# Patient Record
Sex: FEMALE | Race: WHITE | NOT HISPANIC OR LATINO | Employment: FULL TIME | URBAN - METROPOLITAN AREA
[De-identification: names, ages, dates, MRNs, and addresses within clinical notes are randomized per-mention and may not be internally consistent; named-entity substitution may affect disease eponyms.]

---

## 2017-01-09 ENCOUNTER — TELEPHONE (OUTPATIENT)
Dept: MEDICAL GROUP | Facility: MEDICAL CENTER | Age: 32
End: 2017-01-09

## 2017-01-09 DIAGNOSIS — N39.0 ACUTE UTI: ICD-10-CM

## 2017-01-09 RX ORDER — PROMETHAZINE HYDROCHLORIDE 25 MG/1
TABLET ORAL
Qty: 90 TAB | Refills: 0 | Status: SHIPPED | OUTPATIENT
Start: 2017-01-09 | End: 2017-03-14 | Stop reason: SDUPTHER

## 2017-01-09 RX ORDER — CIPROFLOXACIN 500 MG/1
500 TABLET, FILM COATED ORAL 2 TIMES DAILY
Qty: 10 TAB | Refills: 0 | Status: SHIPPED | OUTPATIENT
Start: 2017-01-09 | End: 2017-01-14

## 2017-01-09 NOTE — TELEPHONE ENCOUNTER
"1. Caller Name: Joy                                         Call Back Number: [phone]      Patient approves a detailed voicemail message: yes    Patient says she took a \"home UTI test\" and it came back positive for infection. She says she has been having urgency, pain with urination, nausea and states she also had a fever of 101.2 yesterday.  She is requesting a prescription of Cipro called in for her into [facility] on [address].  "

## 2017-01-24 ENCOUNTER — TELEPHONE (OUTPATIENT)
Dept: MEDICAL GROUP | Facility: MEDICAL CENTER | Age: 32
End: 2017-01-24

## 2017-01-24 NOTE — TELEPHONE ENCOUNTER
DOCUMENTATION OF PAR STATUS:    1. Name of Medication & Dose: FentaNYL 75MCG/HR 72 hr patches      2. Name of Prescription Coverage Company & phone #: [email or web address]    3. Date Prior Auth Submitted: 1/24/17    4. What information was given to obtain insurance decision? Pt stable, list of meds tried and failed    5. Prior Auth Status? Approved 1/24/17-1/24/18/    6. Patient Notified: n/a

## 2017-02-15 NOTE — TELEPHONE ENCOUNTER
This has been approved 1/24/17-1/24/18/ you can update this and close this encounter. I will fax approval letter to [place] [phone]

## 2017-02-17 ENCOUNTER — OFFICE VISIT (OUTPATIENT)
Dept: MEDICAL GROUP | Facility: MEDICAL CENTER | Age: 32
End: 2017-02-17
Payer: COMMERCIAL

## 2017-02-17 VITALS
SYSTOLIC BLOOD PRESSURE: 128 MMHG | TEMPERATURE: 98.1 F | RESPIRATION RATE: 12 BRPM | HEIGHT: 72 IN | OXYGEN SATURATION: 99 % | BODY MASS INDEX: 26.41 KG/M2 | HEART RATE: 77 BPM | WEIGHT: 195 LBS | DIASTOLIC BLOOD PRESSURE: 92 MMHG

## 2017-02-17 DIAGNOSIS — M51.26 DISPLACEMENT OF LUMBAR INTERVERTEBRAL DISC WITHOUT MYELOPATHY: ICD-10-CM

## 2017-02-17 DIAGNOSIS — Z00.00 PREVENTATIVE HEALTH CARE: ICD-10-CM

## 2017-02-17 DIAGNOSIS — N92.1 MENORRHAGIA WITH IRREGULAR CYCLE: ICD-10-CM

## 2017-02-17 DIAGNOSIS — M51.36 DEGENERATIVE DISC DISEASE, LUMBAR: ICD-10-CM

## 2017-02-17 DIAGNOSIS — Z79.891 CHRONIC USE OF OPIATE DRUGS THERAPEUTIC PURPOSES: ICD-10-CM

## 2017-02-17 DIAGNOSIS — M51.34 DDD (DEGENERATIVE DISC DISEASE), THORACIC: ICD-10-CM

## 2017-02-17 DIAGNOSIS — F32.89 OTHER DEPRESSION: ICD-10-CM

## 2017-02-17 PROCEDURE — 99214 OFFICE O/P EST MOD 30 MIN: CPT | Performed by: NURSE PRACTITIONER

## 2017-02-17 RX ORDER — FENTANYL 75 UG/1
1 PATCH TRANSDERMAL
Qty: 15 PATCH | Refills: 0 | Status: SHIPPED | OUTPATIENT
Start: 2017-02-17 | End: 2017-03-15

## 2017-02-17 RX ORDER — FENTANYL 75 UG/1
1 PATCH TRANSDERMAL
Qty: 15 PATCH | Refills: 0 | Status: SHIPPED | OUTPATIENT
Start: 2017-02-17 | End: 2017-10-16

## 2017-02-17 RX ORDER — HYDROCODONE BITARTRATE AND ACETAMINOPHEN 5; 325 MG/1; MG/1
1 TABLET ORAL EVERY 12 HOURS PRN
Qty: 56 TAB | Refills: 0 | Status: SHIPPED | OUTPATIENT
Start: 2017-02-17 | End: 2017-05-24 | Stop reason: SDUPTHER

## 2017-02-17 RX ORDER — HYDROCODONE BITARTRATE AND ACETAMINOPHEN 5; 325 MG/1; MG/1
1 TABLET ORAL EVERY 12 HOURS
Qty: 56 TAB | Refills: 0 | Status: SHIPPED | OUTPATIENT
Start: 2017-02-17 | End: 2017-03-15

## 2017-02-17 RX ORDER — HYDROCODONE BITARTRATE AND ACETAMINOPHEN 5; 325 MG/1; MG/1
1 TABLET ORAL EVERY 12 HOURS PRN
Qty: 56 TAB | Refills: 0 | Status: SHIPPED | OUTPATIENT
Start: 2017-02-17 | End: 2017-03-15

## 2017-02-17 ASSESSMENT — ENCOUNTER SYMPTOMS: DEPRESSION: 1

## 2017-02-17 ASSESSMENT — LIFESTYLE VARIABLES: HISTORY_ALCOHOL_USE: 0

## 2017-02-17 NOTE — PROGRESS NOTES
Chief Complaint   Patient presents with   • Medication Refill     3 month med check & refill      HPI:   Joy is a 31-year-old established female here to follow-up on chronic pain, depression and menorrhagia.  #1-chronic pain: Suffers from chronic pain in her mid and low back. She plans on having a hysterectomy this summer and then proceeding with a spinal fusion with Dr. Jack. Current physical medicine specialist is Dr. Main. Describes her current pain control as excellent with a fentanyl patch every other day and to [place] at night.  Chronic pain recheck: 3 months ago   Last dose of controlled substance: last night - norco.  Last patch placed yesterday morning.   Chronic pain treated with hydrocodone taken 1-2 times a day.  Fentanyl patch every other day    She  reports that she does not drink alcohol.  She  reports that she does not use illicit drugs.    Consequences of Chronic Opiate therapy:  (5 A's)  Analgesia: Compared to no treatment or prior treatment, pain is currently improved with changing fentanyl patch every 48 hours  Activity: improved  Adverse Events: She denies constipation, dry mouth, itchy skin, nausea and sedation.  Drinks lots of water and takes senokot daily   Aberrant Behaviors: She reports she is taking medication as prescribed and is not veering from agreed treatment regimen. There have been no inappropriate refills or lost/stolen meds reported.   Affect/Mood: Pain is not impacting patient's mood.  Patient denies depression/anxiety - controlled with 10 mg prozac.  Nonnarcotic treatments that are being used: muscle relaxers and SSRI/SNRI.   Treatment goals discussed.    Last order of CONTROLLED SUBSTANCE TREATMENT AGREEMENT was found on 3/4/2016 from Office Visit on 3/4/2016     UDS Summary     Patient has no health maintenance due at this time - done 5/2016       Most recent UDS reviewed today and is consistent with prescribed medications.  I have reviewed the medical records, the  Prescription Monitoring Program and I have determined that controlled substance treatment is medically indicated.    #2-depression: Chronic issue. Feels that her moods are well controlled with Prozac 10 mg daily. She continues of frustration that her pain level and her heavy menses but feels that she is able to handle was going on in her life better with fluoxetine 10 mg daily. Denies suicidal or homicidal ideations.    #3-menorrhagia: Persistent issue. Resolved for a few months but returned. She is having heavy, crampy, clotty periods for 7-10 days monthly. She plans on having a hysterectomy this summer, prolonging this because of school, work and insurance.    Review of systems:  Negative for fever, chills, syncopal episodes or chest pain    Exam:  Blood pressure 128/92, pulse 77, temperature 36.7 °C (98.1 °F), resp. rate 12, height 1.829 m (6'), weight 88.451 kg (195 lb), SpO2 99 %.  Gen. appears healthy in no distress   Skin appropriate for sex and age   HEENT unremarkable   Neck no adenopathy, no nodules or masses palpable   Lungs clear   Heart regular   Extremities no edema   Neuro gait and station normal   Psych appropriate      Assessment/plan:  1. Chronic use of opiate drugs therapeutic purposes  - Controlled Substance Treatment Agreement    2. Degenerative disc disease, lumbar  -Continue with current pain regimen. Patient compliant with current treatment plan. Updated controlled substance agreement. Urine drug screen to be updated at next visit. Continue efforts at trunk strengthening and weight loss which she has been successful with lately. Follow-up 3 months.  -Reviewed  profile which is appropriate.    3. Displacement of lumbar intervertebral disc without myelopathy  -As above. She also plans on following up with Dr. Main for possible injection and then Dr. Jack after her hysterectomy, likely next fall.    4. DDD (degenerative disc disease), thoracic  As above      5. Other depression  -Stable  with fluoxetine.    6. Menorrhagia with irregular cycle  -Agree with the patient that she should move forward with Dr. Vivas recommendation to have a hysterectomy. We'll check a CBC.    7. Preventative health care  -As above  - COMP METABOLIC PANEL; Future  - CBC WITH DIFFERENTIAL; Future  - LIPID PROFILE; Future

## 2017-02-17 NOTE — MR AVS SNAPSHOT
"        Joy Mcnamara   2017 3:20 PM   Office Visit   MRN: [id number]    Department:  [address]   Dept Phone:  [phone]    Description:  Female : 1985   Provider:  ILYA May           Reason for Visit     Medication Refill 3 month med check & refill       Allergies as of 2017     Allergen Noted Reactions    Sumatriptan Succinate 2008       \"face burns & I can't see\"    Tramadol 2014       Gives her migraines and makes her feel sick      You were diagnosed with     Chronic use of opiate drugs therapeutic purposes   [0428762]       Degenerative disc disease, lumbar   [713727]       Displacement of lumbar intervertebral disc without myelopathy   [722.10.ICD-9-CM]       DDD (degenerative disc disease), thoracic   [983546]       Other depression   [8833091]       Preventative health care   [610483]         Vital Signs     Blood Pressure Pulse Temperature Respirations Height Weight    128/92 mmHg 77 36.7 °C (98.1 °F) 12 1.829 m (6') 88.451 kg (195 lb)    Body Mass Index Oxygen Saturation Smoking Status             26.44 kg/m2 99% Former Smoker         Basic Information     Date Of Birth Sex Race Ethnicity Preferred Language    1985 Female White Non- English      Your appointments     May 01, 2017 10:00 AM   Established Patient with ILYA May   [facility] (--)    [address] [address] [id number] [phone]           You will be receiving a confirmation call a few days before your appointment from our automated call confirmation system.              Problem List              ICD-10-CM Priority Class Noted - Resolved    Degenerative disc disease, lumbar M51.36   2012 - Present    Chronic low back pain M54.5, G89.29   2012 - Present    Displacement of lumbar intervertebral disc without myelopathy M51.26   10/3/2013 - Present    Anxiety F41.9   3/28/2014 - Present    Depression " F32.9   4/25/2014 - Present    Chronic migraine G43.709   9/8/2015 - Present      Health Maintenance        Date Due Completion Dates    IMM DTaP/Tdap/Td Vaccine (2 - Td) 12/19/2016 12/19/2006 (Done)    Override on 12/19/2006: Done    PAP SMEAR 12/19/2017 12/19/2014, 10/12/2010            Current Immunizations     Influenza TIV (IM) 12/2/2013  9:40 AM, 10/24/2012    Influenza Vac Subunit Quad Inj (Pf) 11/18/2016    Tuberculin Skin Test 11/18/2014  4:02 PM      Below and/or attached are the medications your provider expects you to take. Review all of your home medications and newly ordered medications with your provider and/or pharmacist. Follow medication instructions as directed by your provider and/or pharmacist. Please keep your medication list with you and share with your provider. Update the information when medications are discontinued, doses are changed, or new medications (including over-the-counter products) are added; and carry medication information at all times in the event of emergency situations     Allergies:  SUMATRIPTAN SUCCINATE - (reactions not documented)     TRAMADOL - (reactions not documented)               Medications  Valid as of: February 17, 2017 -  3:41 PM    Generic Name Brand Name Tablet Size Instructions for use    Butalbital-APAP-Caffeine (Cap) FIORICET -40 MG TAKE ONE CAPSULE BY MOUTH TWICE DAILY AS NEEDED FOR HEADACHE        Cranberry (Cap) Cranberry 1000 MG Take 2,500 mg by mouth every day.        Cyanocobalamin (Solution) VITAMIN B-12 1000 MCG/ML 1 mL by Intramuscular route every 30 days. Please administer to patient (she reports [facility] does this?)        FentaNYL (PATCH 72 HR) DURAGESIC 75 MCG/HR Apply 1 Patch to skin as directed every 48 hours.        FentaNYL (PATCH 72 HR) DURAGESIC 75 MCG/HR Apply 1 Patch to skin as directed every 48 hours.        FentaNYL (PATCH 72 HR) DURAGESIC 75 MCG/HR Apply 1 Patch to skin as directed every 48 hours.        FLUoxetine HCl (Cap)  PROZAC 10 MG Take 1 Cap by mouth every day.        Hydrocodone-Acetaminophen (Tab) NORCO 5-325 MG Take 1 Tab by mouth every 12 hours as needed.        Hydrocodone-Acetaminophen (Tab) NORCO 5-325 MG Take 1 Tab by mouth every 12 hours as needed.        Hydrocodone-Acetaminophen (Tab) NORCO 5-325 MG Take 1 Tab by mouth every 12 hours.        Promethazine HCl (Tab) PHENERGAN 25 MG TAKE 1 TABLET BY MOUTH EVERY 6 HOURS AS NEEDED FOR NAUSEA OR VOMITING        TiZANidine HCl (Tab) ZANAFLEX 4 MG TAKE 1 TABLET BY MOUTH EVERY 6 HOURS AS NEEDED        .                 Medicines prescribed today were sent to:     [facility] [address] [facility], [place] - [address] AT [facility] [address]    [address] [place] [address]    Phone: [phone] Fax: [phone]    Open 24 Hours?: No      Medication refill instructions:       If your prescription bottle indicates you have medication refills left, it is not necessary to call your provider’s office. Please contact your pharmacy and they will refill your medication.    If your prescription bottle indicates you do not have any refills left, you may request refills at any time through one of the following ways: The online [employer] system (except Urgent Care), by calling your provider’s office, or by asking your pharmacy to contact your provider’s office with a refill request. Medication refills are processed only during regular business hours and may not be available until the next business day. Your provider may request additional information or to have a follow-up visit with you prior to refilling your medication.   *Please Note: Medication refills are assigned a new Rx number when refilled electronically. Your pharmacy may indicate that no refills were authorized even though a new prescription for the same medication is available at the pharmacy. Please request the medicine by name with the pharmacy before contacting your provider for a refill.        Your  To Do List     Future Labs/Procedures Complete By Expires    CBC WITH DIFFERENTIAL  As directed 2/18/2018    COMP METABOLIC PANEL  As directed 2/18/2018    LIPID PROFILE  As directed 2/18/2018         MyChart Access Code: Activation code not generated  Current MyChart Status: Active

## 2017-02-20 ENCOUNTER — HOSPITAL ENCOUNTER (OUTPATIENT)
Dept: LAB | Facility: MEDICAL CENTER | Age: 32
End: 2017-02-20
Attending: NURSE PRACTITIONER
Payer: COMMERCIAL

## 2017-02-20 DIAGNOSIS — Z00.00 PREVENTATIVE HEALTH CARE: ICD-10-CM

## 2017-02-20 LAB
ALBUMIN SERPL BCP-MCNC: 4.5 G/DL (ref 3.2–4.9)
ALBUMIN/GLOB SERPL: 1.9 G/DL
ALP SERPL-CCNC: 52 U/L (ref 30–99)
ALT SERPL-CCNC: 24 U/L (ref 2–50)
ANION GAP SERPL CALC-SCNC: 8 MMOL/L (ref 0–11.9)
AST SERPL-CCNC: 26 U/L (ref 12–45)
BASOPHILS # BLD AUTO: 1.1 % (ref 0–1.8)
BASOPHILS # BLD: 0.05 K/UL (ref 0–0.12)
BILIRUB SERPL-MCNC: 0.5 MG/DL (ref 0.1–1.5)
BUN SERPL-MCNC: 9 MG/DL (ref 8–22)
CALCIUM SERPL-MCNC: 9.2 MG/DL (ref 8.4–10.2)
CHLORIDE SERPL-SCNC: 105 MMOL/L (ref 96–112)
CHOLEST SERPL-MCNC: 197 MG/DL (ref 100–199)
CO2 SERPL-SCNC: 27 MMOL/L (ref 20–33)
CREAT SERPL-MCNC: 0.74 MG/DL (ref 0.5–1.4)
EOSINOPHIL # BLD AUTO: 0.11 K/UL (ref 0–0.51)
EOSINOPHIL NFR BLD: 2.4 % (ref 0–6.9)
ERYTHROCYTE [DISTWIDTH] IN BLOOD BY AUTOMATED COUNT: 40.9 FL (ref 35.9–50)
GFR SERPL CREATININE-BSD FRML MDRD: >60 ML/MIN/1.73 M 2
GLOBULIN SER CALC-MCNC: 2.4 G/DL (ref 1.9–3.5)
GLUCOSE SERPL-MCNC: 93 MG/DL (ref 65–99)
HCT VFR BLD AUTO: 38.7 % (ref 37–47)
HDLC SERPL-MCNC: 51 MG/DL
HGB BLD-MCNC: 13.7 G/DL (ref 12–16)
IMM GRANULOCYTES # BLD AUTO: 0 K/UL (ref 0–0.11)
IMM GRANULOCYTES NFR BLD AUTO: 0 % (ref 0–0.9)
LDLC SERPL CALC-MCNC: 136 MG/DL
LYMPHOCYTES # BLD AUTO: 1.71 K/UL (ref 1–4.8)
LYMPHOCYTES NFR BLD: 37.7 % (ref 22–41)
MCH RBC QN AUTO: 30.5 PG (ref 27–33)
MCHC RBC AUTO-ENTMCNC: 35.4 G/DL (ref 33.6–35)
MCV RBC AUTO: 86.2 FL (ref 81.4–97.8)
MONOCYTES # BLD AUTO: 0.42 K/UL (ref 0–0.85)
MONOCYTES NFR BLD AUTO: 9.3 % (ref 0–13.4)
NEUTROPHILS # BLD AUTO: 2.24 K/UL (ref 2–7.15)
NEUTROPHILS NFR BLD: 49.5 % (ref 44–72)
NRBC # BLD AUTO: 0 K/UL
NRBC BLD AUTO-RTO: 0 /100 WBC
PLATELET # BLD AUTO: 238 K/UL (ref 164–446)
PMV BLD AUTO: 9.4 FL (ref 9–12.9)
POTASSIUM SERPL-SCNC: 3.9 MMOL/L (ref 3.6–5.5)
PROT SERPL-MCNC: 6.9 G/DL (ref 6–8.2)
RBC # BLD AUTO: 4.49 M/UL (ref 4.2–5.4)
SODIUM SERPL-SCNC: 140 MMOL/L (ref 135–145)
TRIGL SERPL-MCNC: 48 MG/DL (ref 0–149)
WBC # BLD AUTO: 4.5 K/UL (ref 4.8–10.8)

## 2017-02-20 PROCEDURE — 36415 COLL VENOUS BLD VENIPUNCTURE: CPT

## 2017-02-20 PROCEDURE — 85025 COMPLETE CBC W/AUTO DIFF WBC: CPT

## 2017-02-20 PROCEDURE — 80061 LIPID PANEL: CPT

## 2017-02-20 PROCEDURE — 80053 COMPREHEN METABOLIC PANEL: CPT

## 2017-02-21 RX ORDER — TIZANIDINE 4 MG/1
TABLET ORAL
Qty: 90 TAB | Refills: 0 | Status: SHIPPED | OUTPATIENT
Start: 2017-02-21 | End: 2017-03-14 | Stop reason: SDUPTHER

## 2017-02-21 RX ORDER — ESTRADIOL 2 MG/1
TABLET ORAL
Qty: 10 TAB | Refills: 0 | OUTPATIENT
Start: 2017-02-21

## 2017-03-01 ENCOUNTER — HOSPITAL ENCOUNTER (OUTPATIENT)
Facility: MEDICAL CENTER | Age: 32
End: 2017-03-01
Attending: OBSTETRICS & GYNECOLOGY
Payer: COMMERCIAL

## 2017-03-01 LAB — ESTRADIOL SERPL-MCNC: 34 PG/ML

## 2017-03-01 PROCEDURE — 82670 ASSAY OF TOTAL ESTRADIOL: CPT

## 2017-03-02 ENCOUNTER — TELEPHONE (OUTPATIENT)
Dept: MEDICAL GROUP | Facility: LAB | Age: 32
End: 2017-03-02

## 2017-03-03 NOTE — TELEPHONE ENCOUNTER
Patient called and states that she is having her hysterectomy March 27th.  She wants to know how to handle the pain management.  She does not want to break her pain contract,

## 2017-03-15 DIAGNOSIS — Z01.812 PRE-OPERATIVE LABORATORY EXAMINATION: ICD-10-CM

## 2017-03-15 LAB
ANION GAP SERPL CALC-SCNC: 10 MMOL/L (ref 0–11.9)
APPEARANCE UR: ABNORMAL
BACTERIA #/AREA URNS HPF: ABNORMAL /HPF
BASOPHILS # BLD AUTO: 0.7 % (ref 0–1.8)
BASOPHILS # BLD: 0.04 K/UL (ref 0–0.12)
BILIRUB UR QL STRIP.AUTO: NEGATIVE
BUN SERPL-MCNC: 11 MG/DL (ref 8–22)
CALCIUM SERPL-MCNC: 9.4 MG/DL (ref 8.5–10.5)
CAOX CRY #/AREA URNS HPF: ABNORMAL /HPF
CHLORIDE SERPL-SCNC: 105 MMOL/L (ref 96–112)
CO2 SERPL-SCNC: 27 MMOL/L (ref 20–33)
COLOR UR: YELLOW
CREAT SERPL-MCNC: 0.6 MG/DL (ref 0.5–1.4)
CULTURE IF INDICATED INDCX: NO UA CULTURE
EOSINOPHIL # BLD AUTO: 0.09 K/UL (ref 0–0.51)
EOSINOPHIL NFR BLD: 1.6 % (ref 0–6.9)
EPI CELLS #/AREA URNS HPF: ABNORMAL /HPF
ERYTHROCYTE [DISTWIDTH] IN BLOOD BY AUTOMATED COUNT: 43 FL (ref 35.9–50)
GFR SERPL CREATININE-BSD FRML MDRD: >60 ML/MIN/1.73 M 2
GLUCOSE SERPL-MCNC: 39 MG/DL (ref 65–99)
GLUCOSE UR STRIP.AUTO-MCNC: NEGATIVE MG/DL
HCG SERPL QL: NEGATIVE
HCT VFR BLD AUTO: 39.8 % (ref 37–47)
HGB BLD-MCNC: 13.2 G/DL (ref 12–16)
HYALINE CASTS #/AREA URNS LPF: ABNORMAL /LPF
IMM GRANULOCYTES # BLD AUTO: 0.01 K/UL (ref 0–0.11)
IMM GRANULOCYTES NFR BLD AUTO: 0.2 % (ref 0–0.9)
KETONES UR STRIP.AUTO-MCNC: NEGATIVE MG/DL
LEUKOCYTE ESTERASE UR QL STRIP.AUTO: NEGATIVE
LYMPHOCYTES # BLD AUTO: 1.52 K/UL (ref 1–4.8)
LYMPHOCYTES NFR BLD: 27.6 % (ref 22–41)
MCH RBC QN AUTO: 29.9 PG (ref 27–33)
MCHC RBC AUTO-ENTMCNC: 33.2 G/DL (ref 33.6–35)
MCV RBC AUTO: 90 FL (ref 81.4–97.8)
MICRO URNS: ABNORMAL
MONOCYTES # BLD AUTO: 0.37 K/UL (ref 0–0.85)
MONOCYTES NFR BLD AUTO: 6.7 % (ref 0–13.4)
MUCOUS THREADS #/AREA URNS HPF: ABNORMAL /HPF
NEUTROPHILS # BLD AUTO: 3.48 K/UL (ref 2–7.15)
NEUTROPHILS NFR BLD: 63.2 % (ref 44–72)
NITRITE UR QL STRIP.AUTO: NEGATIVE
NRBC # BLD AUTO: 0 K/UL
NRBC BLD AUTO-RTO: 0 /100 WBC
PH UR STRIP.AUTO: 6 [PH]
PLATELET # BLD AUTO: 245 K/UL (ref 164–446)
PMV BLD AUTO: 9.8 FL (ref 9–12.9)
POTASSIUM SERPL-SCNC: 3.2 MMOL/L (ref 3.6–5.5)
PROT UR QL STRIP: NEGATIVE MG/DL
RBC # BLD AUTO: 4.42 M/UL (ref 4.2–5.4)
RBC # URNS HPF: ABNORMAL /HPF
RBC UR QL AUTO: NEGATIVE
SODIUM SERPL-SCNC: 142 MMOL/L (ref 135–145)
SP GR UR STRIP.AUTO: 1.03
WBC # BLD AUTO: 5.5 K/UL (ref 4.8–10.8)
WBC #/AREA URNS HPF: ABNORMAL /HPF

## 2017-03-15 PROCEDURE — 81001 URINALYSIS AUTO W/SCOPE: CPT

## 2017-03-15 PROCEDURE — 85025 COMPLETE CBC W/AUTO DIFF WBC: CPT

## 2017-03-15 PROCEDURE — 36415 COLL VENOUS BLD VENIPUNCTURE: CPT

## 2017-03-15 PROCEDURE — 80048 BASIC METABOLIC PNL TOTAL CA: CPT

## 2017-03-15 PROCEDURE — 84703 CHORIONIC GONADOTROPIN ASSAY: CPT

## 2017-03-15 RX ORDER — IBUPROFEN 800 MG
1 TABLET ORAL DAILY
COMMUNITY
End: 2019-09-30

## 2017-03-15 RX ORDER — DIPHENHYDRAMINE HCL 25 MG
25 TABLET ORAL EVERY 6 HOURS PRN
COMMUNITY
End: 2017-04-03

## 2017-03-15 RX ORDER — PHENOL 1.4 %
AEROSOL, SPRAY (ML) MUCOUS MEMBRANE PRN
COMMUNITY
End: 2019-09-30

## 2017-03-22 ENCOUNTER — TELEPHONE (OUTPATIENT)
Dept: MEDICAL GROUP | Facility: LAB | Age: 32
End: 2017-03-22

## 2017-03-22 ENCOUNTER — OFFICE VISIT (OUTPATIENT)
Dept: MEDICAL GROUP | Facility: LAB | Age: 32
End: 2017-03-22
Payer: COMMERCIAL

## 2017-03-22 VITALS
HEIGHT: 72 IN | OXYGEN SATURATION: 99 % | HEART RATE: 75 BPM | RESPIRATION RATE: 12 BRPM | DIASTOLIC BLOOD PRESSURE: 86 MMHG | SYSTOLIC BLOOD PRESSURE: 116 MMHG | BODY MASS INDEX: 27.09 KG/M2 | TEMPERATURE: 99 F | WEIGHT: 200 LBS

## 2017-03-22 DIAGNOSIS — R10.2 PELVIC PAIN: ICD-10-CM

## 2017-03-22 DIAGNOSIS — S92.901G: ICD-10-CM

## 2017-03-22 DIAGNOSIS — D26.9: ICD-10-CM

## 2017-03-22 PROCEDURE — 99213 OFFICE O/P EST LOW 20 MIN: CPT | Performed by: NURSE PRACTITIONER

## 2017-03-22 RX ORDER — OXYCODONE HYDROCHLORIDE AND ACETAMINOPHEN 5; 325 MG/1; MG/1
1-2 TABLET ORAL EVERY 6 HOURS PRN
Qty: 50 TAB | Refills: 0 | Status: SHIPPED | OUTPATIENT
Start: 2017-03-22 | End: 2017-08-24

## 2017-03-22 RX ORDER — BUTALBITAL, ACETAMINOPHEN AND CAFFEINE 300; 40; 50 MG/1; MG/1; MG/1
1-2 CAPSULE ORAL EVERY 6 HOURS PRN
Qty: 90 CAP | Refills: 0
Start: 2017-03-22 | End: 2018-01-03 | Stop reason: SDUPTHER

## 2017-03-22 NOTE — MR AVS SNAPSHOT
"        Joy Mcnamara   3/22/2017 10:00 AM   Office Visit   MRN: [id number]    Department:  [facility]   Dept Phone:  [phone]    Description:  Female : 1985   Provider:  ILYA May           Reason for Visit     Procedure pre- surgery check up       Allergies as of 3/22/2017     Allergen Noted Reactions    Sumatriptan Succinate 2008       \"face burns & I can't see\"    Tramadol 2014   Anaphylaxis    Gives her migraines and makes her feel sick      You were diagnosed with     Adenomatoid tumor of uterus   [297929]       Pelvic pain   [720407]       Chronic migraine   [092435]         Vital Signs     Blood Pressure Pulse Temperature Respirations Height Weight    116/86 mmHg 75 37.2 °C (99 °F) 12 1.829 m (6' 0.01\") 90.719 kg (200 lb)    Body Mass Index Oxygen Saturation Last Menstrual Period Smoking Status          27.12 kg/m2 99% 2017 Former Smoker        Basic Information     Date Of Birth Sex Race Ethnicity Preferred Language    1985 Female White Non- English      Your appointments     May 01, 2017 10:00 AM   Established Patient with ILYA May   [facility] (--)    [id number] [place] [id number] [phone]           You will be receiving a confirmation call a few days before your appointment from our automated call confirmation system.              Problem List              ICD-10-CM Priority Class Noted - Resolved    Degenerative disc disease, lumbar M51.36   2012 - Present    Chronic low back pain M54.5, G89.29   2012 - Present    Displacement of lumbar intervertebral disc without myelopathy M51.26   10/3/2013 - Present    Anxiety F41.9   3/28/2014 - Present    Depression F32.9   2014 - Present    Chronic migraine G43.709   2015 - Present    Chronic use of opiate drugs therapeutic purposes Z79.899   2017 - Present      Health Maintenance        Date Due " Completion Dates    IMM DTaP/Tdap/Td Vaccine (2 - Td) 12/19/2016 12/19/2006 (Done)    Override on 12/19/2006: Done    PAP SMEAR 12/19/2017 12/19/2014, 10/12/2010            Current Immunizations     Influenza TIV (IM) 12/2/2013  9:40 AM, 10/24/2012    Influenza Vac Subunit Quad Inj (Pf) 11/18/2016    Tuberculin Skin Test 11/18/2014  4:02 PM      Below and/or attached are the medications your provider expects you to take. Review all of your home medications and newly ordered medications with your provider and/or pharmacist. Follow medication instructions as directed by your provider and/or pharmacist. Please keep your medication list with you and share with your provider. Update the information when medications are discontinued, doses are changed, or new medications (including over-the-counter products) are added; and carry medication information at all times in the event of emergency situations     Allergies:  SUMATRIPTAN SUCCINATE - (reactions not documented)     TRAMADOL - Anaphylaxis               Medications  Valid as of: March 22, 2017 -  2:55 PM    Generic Name Brand Name Tablet Size Instructions for use    Biotin   Take  by mouth every day. 10,000 mcg        Butalbital-APAP-Caffeine (Cap) FIORICET -40 MG Take 1-2 Caps by mouth every 6 hours as needed for Headache.        Cholecalciferol (Cap) Vitamin D3 400 UNITS Take 1 Cap by mouth every day.        Cranberry (Cap) Cranberry 1000 MG Take 4,200 mg by mouth every day.        Cyanocobalamin (Solution) VITAMIN B-12 1000 MCG/ML 1 mL by Intramuscular route every 30 days. Please administer to patient (she reports [facility] does this?)        DiphenhydrAMINE HCl (Tab) BENADRYL 25 MG Take 25 mg by mouth every 6 hours as needed for Sleep.        FentaNYL (PATCH 72 HR) DURAGESIC 75 MCG/HR Apply 1 Patch to skin as directed every 48 hours.        FLUoxetine HCl (Cap) PROZAC 10 MG Take 1 Cap by mouth every day.        Hydrocodone-Acetaminophen (Tab) NORCO 5-325 MG  Take 1 Tab by mouth every 12 hours as needed.        Melatonin (Tab) Melatonin 10 MG Take  by mouth as needed.        Oxycodone-Acetaminophen (Tab) PERCOCET 5-325 MG Take 1-2 Tabs by mouth every 6 hours as needed. For post-operative pain.  Do not take norco for 1-2 weeks while taking percocet.        Promethazine HCl (Tab) PHENERGAN 25 MG TAKE 1 TABLET BY MOUTH EVERY 6 HOURS AS NEEDED FOR NAUSEA OR VOMITING        TiZANidine HCl (Tab) ZANAFLEX 4 MG TAKE 1 TABLET BY MOUTH EVERY 6 HOURS AS NEEDED        .                 Medicines prescribed today were sent to:     [facility] [address] [facility] [place] - [address] [facility]    [address] [facility] [address]    Phone: [phone] Fax: [phone]    Open 24 Hours?: No      Medication refill instructions:       If your prescription bottle indicates you have medication refills left, it is not necessary to call your provider’s office. Please contact your pharmacy and they will refill your medication.    If your prescription bottle indicates you do not have any refills left, you may request refills at any time through one of the following ways: The online [employer]TM[employer] by [employer] system (except Urgent Care), by calling your provider’s office, or by asking your pharmacy to contact your provider’s office with a refill request. Medication refills are processed only during regular business hours and may not be available until the next business day. Your provider may request additional information or to have a follow-up visit with you prior to refilling your medication.   *Please Note: Medication refills are assigned a new Rx number when refilled electronically. Your pharmacy may indicate that no refills were authorized even though a new prescription for the same medication is available at the pharmacy. Please request the medicine by name with the pharmacy before contacting your provider for a refill.           [employer](TM[employer] Access Code: Activation code not  generated  Current MyChart Status: Active

## 2017-03-22 NOTE — PROGRESS NOTES
"Chief Complaint   Patient presents with   • Procedure     pre- surgery check up        HPI  Mercedes is a 30 yo est female here to f/u on pain management.  She is currently treated with fentanyl patches 75 µg every 48 hours and to hydrocodone at night. She suffers from chronic menstrual pain and chronic low back pain due to lumbar degenerative disc disease. She is scheduled to have a partial hysterectomy on Monday with Dr. Santillan. She's been told that she has a fairly large tumor growing in her uterus. She has very long, heavy and painful menses. She will then be scheduling to see her neurosurgeon, update her MRI and have lumbar spine surgery. She is very motivated to fix all of the sources of her chronic pain and taper off of her opiates. She is requesting postoperative pain control. She's been told to leave her fentanyl patch on throughout surgery.    Past medical, surgical, family, and social history is reviewed and updated in Epic chart by me today.   Medications and allergies reviewed and updated in Epic chart by me today.     ROS:   As documented in history of present illness above    Exam:  Blood pressure 116/86, pulse 75, temperature 37.2 °C (99 °F), resp. rate 12, height 1.829 m (6' 0.01\"), weight 90.719 kg (200 lb), last menstrual period 03/16/2017, SpO2 99 %.  Gen. appears healthy in no distress   Skin appropriate for sex and age   HEENT unremarkable   Neck no adenopathy, no nodules or masses palpable   Lungs clear   Heart regular   Extremities no edema   Neuro gait and station normal   Psych appropriate      A/P:  1. Adenomatoid tumor of uterus  -Stop Freer after dosage on Sunday night prior to surgery. Discussed Percocet one to 2 pills every 6 hours as needed for severe pain postoperatively. She will restart Norco when she no longer needs Percocet. She'll continue with her fentanyl as prescribed. Will follow-up with me in May and will begin reducing her fentanyl patch microgram dosage, eventually " tapering her off of fentanyl.  - oxycodone-acetaminophen (PERCOCET) 5-325 MG Tab; Take 1-2 Tabs by mouth every 6 hours as needed. For post-operative pain.  Do not take norco for 1-2 weeks while taking percocet.  Dispense: 50 Tab; Refill: 0    2. Pelvic pain  -As above    3. Chronic migraine  -Changed how her Fioricet was written per patient request because of cost.  - acetaminophen/caffeine/butalbital 300-40-50 mg (FIORICET) -40 MG Cap capsule; Take 1-2 Caps by mouth every 6 hours as needed for Headache.  Dispense: 90 Cap; Refill: 0

## 2017-03-27 ENCOUNTER — HOSPITAL ENCOUNTER (OUTPATIENT)
Facility: MEDICAL CENTER | Age: 32
End: 2017-03-28
Attending: OBSTETRICS & GYNECOLOGY | Admitting: OBSTETRICS & GYNECOLOGY
Payer: COMMERCIAL

## 2017-03-27 PROBLEM — N92.0 EXCESSIVE OR FREQUENT MENSTRUATION: Status: ACTIVE | Noted: 2017-03-27

## 2017-03-27 LAB
BASOPHILS # BLD AUTO: 0.1 % (ref 0–1.8)
BASOPHILS # BLD: 0.02 K/UL (ref 0–0.12)
EOSINOPHIL # BLD AUTO: 0 K/UL (ref 0–0.51)
EOSINOPHIL NFR BLD: 0 % (ref 0–6.9)
ERYTHROCYTE [DISTWIDTH] IN BLOOD BY AUTOMATED COUNT: 44 FL (ref 35.9–50)
HCG SERPL QL: NEGATIVE
HCT VFR BLD AUTO: 38.2 % (ref 37–47)
HGB BLD-MCNC: 13 G/DL (ref 12–16)
IMM GRANULOCYTES # BLD AUTO: 0.07 K/UL (ref 0–0.11)
IMM GRANULOCYTES NFR BLD AUTO: 0.5 % (ref 0–0.9)
LYMPHOCYTES # BLD AUTO: 0.57 K/UL (ref 1–4.8)
LYMPHOCYTES NFR BLD: 3.9 % (ref 22–41)
MCH RBC QN AUTO: 31 PG (ref 27–33)
MCHC RBC AUTO-ENTMCNC: 34 G/DL (ref 33.6–35)
MCV RBC AUTO: 91.2 FL (ref 81.4–97.8)
MONOCYTES # BLD AUTO: 0.23 K/UL (ref 0–0.85)
MONOCYTES NFR BLD AUTO: 1.6 % (ref 0–13.4)
NEUTROPHILS # BLD AUTO: 13.75 K/UL (ref 2–7.15)
NEUTROPHILS NFR BLD: 93.9 % (ref 44–72)
NRBC # BLD AUTO: 0 K/UL
NRBC BLD AUTO-RTO: 0 /100 WBC
PLATELET # BLD AUTO: 272 K/UL (ref 164–446)
PMV BLD AUTO: 9.3 FL (ref 9–12.9)
RBC # BLD AUTO: 4.19 M/UL (ref 4.2–5.4)
WBC # BLD AUTO: 14.6 K/UL (ref 4.8–10.8)

## 2017-03-27 PROCEDURE — 700111 HCHG RX REV CODE 636 W/ 250 OVERRIDE (IP): Performed by: OBSTETRICS & GYNECOLOGY

## 2017-03-27 PROCEDURE — 700111 HCHG RX REV CODE 636 W/ 250 OVERRIDE (IP)

## 2017-03-27 PROCEDURE — 502704 HCHG DEVICE, LIGASURE IMPACT: Performed by: OBSTETRICS & GYNECOLOGY

## 2017-03-27 PROCEDURE — 160041 HCHG SURGERY MINUTES - EA ADDL 1 MIN LEVEL 4: Performed by: OBSTETRICS & GYNECOLOGY

## 2017-03-27 PROCEDURE — 160029 HCHG SURGERY MINUTES - 1ST 30 MINS LEVEL 4: Performed by: OBSTETRICS & GYNECOLOGY

## 2017-03-27 PROCEDURE — A6402 STERILE GAUZE <= 16 SQ IN: HCPCS | Performed by: OBSTETRICS & GYNECOLOGY

## 2017-03-27 PROCEDURE — 160048 HCHG OR STATISTICAL LEVEL 1-5: Performed by: OBSTETRICS & GYNECOLOGY

## 2017-03-27 PROCEDURE — A4338 INDWELLING CATHETER LATEX: HCPCS | Performed by: OBSTETRICS & GYNECOLOGY

## 2017-03-27 PROCEDURE — 501838 HCHG SUTURE GENERAL: Performed by: OBSTETRICS & GYNECOLOGY

## 2017-03-27 PROCEDURE — 503054 HCHG APPLICATOR-HEMOSTAT POWDER: Performed by: OBSTETRICS & GYNECOLOGY

## 2017-03-27 PROCEDURE — 700102 HCHG RX REV CODE 250 W/ 637 OVERRIDE(OP): Performed by: OBSTETRICS & GYNECOLOGY

## 2017-03-27 PROCEDURE — 84703 CHORIONIC GONADOTROPIN ASSAY: CPT

## 2017-03-27 PROCEDURE — G0378 HOSPITAL OBSERVATION PER HR: HCPCS

## 2017-03-27 PROCEDURE — 96376 TX/PRO/DX INJ SAME DRUG ADON: CPT

## 2017-03-27 PROCEDURE — 502240 HCHG MISC OR SUPPLY RC 0272: Performed by: OBSTETRICS & GYNECOLOGY

## 2017-03-27 PROCEDURE — 700102 HCHG RX REV CODE 250 W/ 637 OVERRIDE(OP)

## 2017-03-27 PROCEDURE — A9270 NON-COVERED ITEM OR SERVICE: HCPCS | Performed by: OBSTETRICS & GYNECOLOGY

## 2017-03-27 PROCEDURE — 88307 TISSUE EXAM BY PATHOLOGIST: CPT

## 2017-03-27 PROCEDURE — 160002 HCHG RECOVERY MINUTES (STAT): Performed by: OBSTETRICS & GYNECOLOGY

## 2017-03-27 PROCEDURE — 503052 HCHG HEMOSTAT POWDER-5GRAM: Performed by: OBSTETRICS & GYNECOLOGY

## 2017-03-27 PROCEDURE — 36415 COLL VENOUS BLD VENIPUNCTURE: CPT

## 2017-03-27 PROCEDURE — 501411 HCHG SPONGE, BABY LAP W/O RINGS: Performed by: OBSTETRICS & GYNECOLOGY

## 2017-03-27 PROCEDURE — A4606 OXYGEN PROBE USED W OXIMETER: HCPCS | Performed by: OBSTETRICS & GYNECOLOGY

## 2017-03-27 PROCEDURE — 700101 HCHG RX REV CODE 250

## 2017-03-27 PROCEDURE — 160036 HCHG PACU - EA ADDL 30 MINS PHASE I: Performed by: OBSTETRICS & GYNECOLOGY

## 2017-03-27 PROCEDURE — 500888 HCHG PACK, MINOR BASIN: Performed by: OBSTETRICS & GYNECOLOGY

## 2017-03-27 PROCEDURE — A9270 NON-COVERED ITEM OR SERVICE: HCPCS

## 2017-03-27 PROCEDURE — 500886 HCHG PACK, LAPAROSCOPY: Performed by: OBSTETRICS & GYNECOLOGY

## 2017-03-27 PROCEDURE — 160009 HCHG ANES TIME/MIN: Performed by: OBSTETRICS & GYNECOLOGY

## 2017-03-27 PROCEDURE — 160035 HCHG PACU - 1ST 60 MINS PHASE I: Performed by: OBSTETRICS & GYNECOLOGY

## 2017-03-27 PROCEDURE — 110382 HCHG SHELL REV 271: Performed by: OBSTETRICS & GYNECOLOGY

## 2017-03-27 PROCEDURE — 96374 THER/PROPH/DIAG INJ IV PUSH: CPT

## 2017-03-27 PROCEDURE — 85025 COMPLETE CBC W/AUTO DIFF WBC: CPT

## 2017-03-27 RX ORDER — OXYCODONE HYDROCHLORIDE 5 MG/1
5 TABLET ORAL
Status: DISCONTINUED | OUTPATIENT
Start: 2017-03-27 | End: 2017-03-28 | Stop reason: HOSPADM

## 2017-03-27 RX ORDER — KETOROLAC TROMETHAMINE 30 MG/ML
30 INJECTION, SOLUTION INTRAMUSCULAR; INTRAVENOUS EVERY 6 HOURS
Status: COMPLETED | OUTPATIENT
Start: 2017-03-27 | End: 2017-03-28

## 2017-03-27 RX ORDER — SODIUM CHLORIDE 9 MG/ML
INJECTION, SOLUTION INTRAVENOUS
Status: ACTIVE
Start: 2017-03-27 | End: 2017-03-28

## 2017-03-27 RX ORDER — SODIUM CHLORIDE, SODIUM LACTATE, POTASSIUM CHLORIDE, CALCIUM CHLORIDE 600; 310; 30; 20 MG/100ML; MG/100ML; MG/100ML; MG/100ML
1000 INJECTION, SOLUTION INTRAVENOUS ONCE
Status: COMPLETED | OUTPATIENT
Start: 2017-03-27 | End: 2017-03-27

## 2017-03-27 RX ORDER — ONDANSETRON 2 MG/ML
4 INJECTION INTRAMUSCULAR; INTRAVENOUS EVERY 6 HOURS PRN
Status: DISCONTINUED | OUTPATIENT
Start: 2017-03-27 | End: 2017-03-28 | Stop reason: HOSPADM

## 2017-03-27 RX ORDER — BUPIVACAINE HYDROCHLORIDE AND EPINEPHRINE 2.5; 5 MG/ML; UG/ML
INJECTION, SOLUTION EPIDURAL; INFILTRATION; INTRACAUDAL; PERINEURAL
Status: COMPLETED
Start: 2017-03-27 | End: 2017-03-27

## 2017-03-27 RX ORDER — SODIUM CHLORIDE, SODIUM LACTATE, POTASSIUM CHLORIDE, CALCIUM CHLORIDE 600; 310; 30; 20 MG/100ML; MG/100ML; MG/100ML; MG/100ML
INJECTION, SOLUTION INTRAVENOUS CONTINUOUS
Status: DISCONTINUED | OUTPATIENT
Start: 2017-03-27 | End: 2017-03-28 | Stop reason: HOSPADM

## 2017-03-27 RX ORDER — ACETAMINOPHEN 500 MG
1000 TABLET ORAL EVERY 6 HOURS
Status: DISCONTINUED | OUTPATIENT
Start: 2017-03-27 | End: 2017-03-28 | Stop reason: HOSPADM

## 2017-03-27 RX ORDER — OXYCODONE HCL 5 MG/5 ML
SOLUTION, ORAL ORAL
Status: COMPLETED
Start: 2017-03-27 | End: 2017-03-27

## 2017-03-27 RX ORDER — KETAMINE HYDROCHLORIDE 50 MG/ML
INJECTION, SOLUTION INTRAMUSCULAR; INTRAVENOUS
Status: COMPLETED
Start: 2017-03-27 | End: 2017-03-27

## 2017-03-27 RX ORDER — HALOPERIDOL 5 MG/ML
INJECTION INTRAMUSCULAR
Status: COMPLETED
Start: 2017-03-27 | End: 2017-03-27

## 2017-03-27 RX ORDER — GABAPENTIN 300 MG/1
CAPSULE ORAL
Status: COMPLETED
Start: 2017-03-27 | End: 2017-03-27

## 2017-03-27 RX ORDER — OXYCODONE HCL 10 MG/1
10 TABLET, FILM COATED, EXTENDED RELEASE ORAL ONCE
Status: COMPLETED | OUTPATIENT
Start: 2017-03-27 | End: 2017-03-27

## 2017-03-27 RX ORDER — SIMETHICONE 80 MG
80 TABLET,CHEWABLE ORAL EVERY 8 HOURS PRN
Status: DISCONTINUED | OUTPATIENT
Start: 2017-03-27 | End: 2017-03-28 | Stop reason: HOSPADM

## 2017-03-27 RX ORDER — ACETAMINOPHEN 325 MG/1
650 TABLET ORAL ONCE
Status: ACTIVE | OUTPATIENT
Start: 2017-03-27 | End: 2017-03-28

## 2017-03-27 RX ORDER — OXYCODONE HYDROCHLORIDE 5 MG/1
2.5 TABLET ORAL
Status: DISCONTINUED | OUTPATIENT
Start: 2017-03-27 | End: 2017-03-28 | Stop reason: HOSPADM

## 2017-03-27 RX ORDER — BUPIVACAINE HYDROCHLORIDE AND EPINEPHRINE 2.5; 5 MG/ML; UG/ML
INJECTION, SOLUTION EPIDURAL; INFILTRATION; INTRACAUDAL; PERINEURAL
Status: DISCONTINUED | OUTPATIENT
Start: 2017-03-27 | End: 2017-03-27 | Stop reason: HOSPADM

## 2017-03-27 RX ORDER — MORPHINE SULFATE 4 MG/ML
2 INJECTION, SOLUTION INTRAMUSCULAR; INTRAVENOUS
Status: DISCONTINUED | OUTPATIENT
Start: 2017-03-27 | End: 2017-03-28 | Stop reason: HOSPADM

## 2017-03-27 RX ORDER — AMOXICILLIN 250 MG
2 CAPSULE ORAL
Status: DISCONTINUED | OUTPATIENT
Start: 2017-03-27 | End: 2017-03-28 | Stop reason: HOSPADM

## 2017-03-27 RX ORDER — OXYCODONE HCL 10 MG/1
TABLET, FILM COATED, EXTENDED RELEASE ORAL
Status: COMPLETED
Start: 2017-03-27 | End: 2017-03-27

## 2017-03-27 RX ORDER — CELECOXIB 200 MG/1
200 CAPSULE ORAL 2 TIMES DAILY WITH MEALS
Status: DISCONTINUED | OUTPATIENT
Start: 2017-03-28 | End: 2017-03-28 | Stop reason: HOSPADM

## 2017-03-27 RX ORDER — LIDOCAINE HYDROCHLORIDE 10 MG/ML
INJECTION, SOLUTION INFILTRATION; PERINEURAL
Status: COMPLETED
Start: 2017-03-27 | End: 2017-03-27

## 2017-03-27 RX ORDER — ACETAMINOPHEN 500 MG
TABLET ORAL
Status: COMPLETED
Start: 2017-03-27 | End: 2017-03-27

## 2017-03-27 RX ADMIN — HALOPERIDOL LACTATE 1 MG: 5 INJECTION, SOLUTION INTRAMUSCULAR at 17:37

## 2017-03-27 RX ADMIN — ACETAMINOPHEN 1000 MG: 500 TABLET, FILM COATED ORAL at 23:19

## 2017-03-27 RX ADMIN — SODIUM CHLORIDE, POTASSIUM CHLORIDE, SODIUM LACTATE AND CALCIUM CHLORIDE: 600; 310; 30; 20 INJECTION, SOLUTION INTRAVENOUS at 19:51

## 2017-03-27 RX ADMIN — ACETAMINOPHEN 1000 MG: 500 TABLET, FILM COATED ORAL at 18:51

## 2017-03-27 RX ADMIN — SODIUM CHLORIDE, SODIUM LACTATE, POTASSIUM CHLORIDE, CALCIUM CHLORIDE 1000 ML: 600; 310; 30; 20 INJECTION, SOLUTION INTRAVENOUS at 12:00

## 2017-03-27 RX ADMIN — ACETAMINOPHEN 1000 MG: 500 TABLET, FILM COATED ORAL at 11:45

## 2017-03-27 RX ADMIN — LIDOCAINE HYDROCHLORIDE 0.1 ML: 10 INJECTION, SOLUTION INFILTRATION; PERINEURAL at 12:00

## 2017-03-27 RX ADMIN — HYDROMORPHONE HYDROCHLORIDE 0.5 MG: 1 INJECTION, SOLUTION INTRAMUSCULAR; INTRAVENOUS; SUBCUTANEOUS at 16:45

## 2017-03-27 RX ADMIN — OXYCODONE HYDROCHLORIDE 5 MG: 5 TABLET ORAL at 20:05

## 2017-03-27 RX ADMIN — GABAPENTIN 300 MG: 300 CAPSULE ORAL at 11:45

## 2017-03-27 RX ADMIN — SIMETHICONE CHEW TAB 80 MG 80 MG: 80 TABLET ORAL at 18:50

## 2017-03-27 RX ADMIN — HYDROMORPHONE HYDROCHLORIDE 0.5 MG: 1 INJECTION, SOLUTION INTRAMUSCULAR; INTRAVENOUS; SUBCUTANEOUS at 17:00

## 2017-03-27 RX ADMIN — KETOROLAC TROMETHAMINE 30 MG: 30 INJECTION, SOLUTION INTRAMUSCULAR; INTRAVENOUS at 23:19

## 2017-03-27 RX ADMIN — OXYCODONE HYDROCHLORIDE 10 MG: 10 TABLET, FILM COATED, EXTENDED RELEASE ORAL at 13:38

## 2017-03-27 RX ADMIN — KETOROLAC TROMETHAMINE 30 MG: 30 INJECTION, SOLUTION INTRAMUSCULAR; INTRAVENOUS at 18:51

## 2017-03-27 RX ADMIN — HYDROMORPHONE HYDROCHLORIDE 0.5 MG: 1 INJECTION, SOLUTION INTRAMUSCULAR; INTRAVENOUS; SUBCUTANEOUS at 18:00

## 2017-03-27 RX ADMIN — OXYCODONE HYDROCHLORIDE 10 MG: 5 SOLUTION ORAL at 17:25

## 2017-03-27 ASSESSMENT — LIFESTYLE VARIABLES
ALCOHOL_USE: NO
EVER_SMOKED: YES

## 2017-03-27 ASSESSMENT — PATIENT HEALTH QUESTIONNAIRE - PHQ9
SUM OF ALL RESPONSES TO PHQ QUESTIONS 1-9: 0
SUM OF ALL RESPONSES TO PHQ9 QUESTIONS 1 AND 2: 0
2. FEELING DOWN, DEPRESSED, IRRITABLE, OR HOPELESS: NOT AT ALL
1. LITTLE INTEREST OR PLEASURE IN DOING THINGS: NOT AT ALL

## 2017-03-27 ASSESSMENT — PAIN SCALES - GENERAL
PAINLEVEL_OUTOF10: 0
PAINLEVEL_OUTOF10: 6
PAINLEVEL_OUTOF10: 10
PAINLEVEL_OUTOF10: 6
PAINLEVEL_OUTOF10: 4
PAINLEVEL_OUTOF10: 0
PAINLEVEL_OUTOF10: 4
PAINLEVEL_OUTOF10: 0
PAINLEVEL_OUTOF10: 6
PAINLEVEL_OUTOF10: 6
PAINLEVEL_OUTOF10: 4
PAINLEVEL_OUTOF10: 10

## 2017-03-27 NOTE — IP AVS SNAPSHOT
" <p align=\"LEFT\"><IMG SRC=\"//[email or web address]\" alt=\"Image\" WIDTH=\"50%\" HEIGHT=\"200\" BORDER=\"\"></p>                   Name:Joy Mcnamara  Medical Record Number:[id number]  CSN: [id number]    YOB: 1985   Age: 31 y.o.  Sex: female  HT:1.829 m (6') WT: 90 kg (198 lb 6.6 oz)          Admit Date: 3/27/2017     Discharge Date:   Today's Date: 3/28/2017  Attending Doctor:  Zayda Santillan M.D.                  Allergies:  Sumatriptan succinate and Tramadol          Your appointments     May 01, 2017 10:00 AM   Established Patient with ILYA May   [facility] (--)    [address] [address] [phone]           You will be receiving a confirmation call a few days before your appointment from our automated call confirmation system.              Follow-up Information     1. Follow up with Zayda Santillan M.D.. Go in 1 week.    Specialty:  OB/Gyn    Why:  Follow Up    Contact information    [address]  [address]  [facility] [id number] [phone]          2. Follow up with ILYA May.    Specialty:  Family Medicine    Why:  As needed    Contact information    [address] [address] [phone]           Medication List      Take these Medications        Instructions    NIFEdipine 30 MG CR tablet   Commonly known as:  ADALAT CC    Take 1 Tab by mouth every day.   Dose:  30 mg         Ask your Physician about these medications        Instructions    acetaminophen/caffeine/butalbital 300-40-50 mg -40 MG Caps capsule   Commonly known as:  FIORICET    Take 1-2 Caps by mouth every 6 hours as needed for Headache.   Dose:  1-2 Cap       BIOTIN PO    Take  by mouth every day. 10,000 mcg       Cranberry 1000 MG Caps    Take 4,200 mg by mouth every day.   Dose:  4200 mg       cyanocobalamin 1000 MCG/ML Soln   Commonly known as:  VITAMIN B-12    1 mL by Intramuscular route every 30 days. " Please administer to patient (she reports [facility] does this?)   Dose:  1000 mcg       diphenhydrAMINE 25 MG Tabs   Commonly known as:  BENADRYL    Take 25 mg by mouth every 6 hours as needed for Sleep.   Dose:  25 mg       fentanyl 75 MCG/HR Pt72   Commonly known as:  DURAGESIC    Doctor's comments:  May fill 2/24/2017   Apply 1 Patch to skin as directed every 48 hours.   Dose:  1 Patch       hydrocodone-acetaminophen 5-325 MG Tabs per tablet   Commonly known as:  NORCO    Doctor's comments:  May fill 4/17/2017   Take 1 Tab by mouth every 12 hours as needed.   Dose:  1 Tab       Melatonin 10 MG Tabs    Take  by mouth as needed.       oxycodone-acetaminophen 5-325 MG Tabs   Commonly known as:  PERCOCET    Take 1-2 Tabs by mouth every 6 hours as needed. For post-operative pain.  Do not take norco for 1-2 weeks while taking percocet.   Dose:  1-2 Tab       promethazine 25 MG Tabs   Commonly known as:  PHENERGAN    TAKE 1 TABLET BY MOUTH EVERY 6 HOURS AS NEEDED FOR NAUSEA OR VOMITING       tizanidine 4 MG Tabs   Commonly known as:  ZANAFLEX    TAKE 1 TABLET BY MOUTH EVERY 6 HOURS AS NEEDED       Vitamin D3 400 UNITS Caps    Take 1 Cap by mouth every day.   Dose:  1 Cap

## 2017-03-27 NOTE — OR NURSING
RECEIVED FROM OR WITH DR GROSS.  PATIENT AWAKE.  VSS.  ABDOMEN SOFT.  GAUZE AND TEGADERM ON UMBILLICUS AND TEGADERM ON SUPRAPUBIC ABDOMEN BOTH DRY AND IN TACT.  IVORY CATHETER IN PLACE.  RALF PAD DRY.  WARMER STARTED AND COVERED WITH WARM BLANKETS.    1640 PATIENT C/O PAIN 10/10  MEDICATED PER ORDER.  1720 PAIN IMPROVING - 6/10  MOSTLY DISCOMFORT FROM CATHETER.  GIVEN ORAL PAIN MEDICATION AND  BROUGHT TO BEDSIDE.  1735 C/O NAUSEA.  MEDICATED.    1755 NAUSEA IMPROVED; PAIN REMAINS AT 6/10  CONTINUE TO MEDICATE. REPOSITIONED FOR COMFORT.  NO CHANGE IN ASSESSMENT  1805 REPORT TO ENOCH DELANEY  TRANSPORT REQUESTED.  1827 TRANSPORT HERE.   AT SIDE WITH BELONGINGS.

## 2017-03-27 NOTE — IP AVS SNAPSHOT
3/28/2017          Joy Mcnamara  [address]  [place] [address]    Dear Joy:    [facility] wants to ensure your discharge home is safe and you or your loved ones have had all your questions answered regarding your care after you leave the hospital.    You may receive a telephone call within two days of your discharge.  This call is to make certain you understand your discharge instructions as well as ensure we provided you with the best care possible during your stay with us.     The call will only last approximately 3-5 minutes and will be done by a nurse.    Once again, we want to ensure your discharge home is safe and that you have a clear understanding of any next steps in your care.  If you have any questions or concerns, please do not hesitate to contact us, we are here for you.  Thank you for choosing [facility] for your healthcare needs.    Sincerely,    Jose Woodward    [facility]

## 2017-03-27 NOTE — IP AVS SNAPSHOT
Home Care Instructions                                                                                                                  Name:Joy Mcnamara  Medical Record Number:[id number]  CSN: [id number]    YOB: 1985   Age: 31 y.o.  Sex: female  HT:1.829 m (6') WT: 90 kg (198 lb 6.6 oz)          Admit Date: 3/27/2017     Discharge Date:   Today's Date: 3/28/2017  Attending Doctor:  Zayda Santillan M.D.                  Allergies:  Sumatriptan succinate and Tramadol            Discharge Instructions       Discharge Instructions    Discharged to home by car with relative. Discharged via wheelchair, hospital escort: Yes.  Special equipment needed: Not Applicable    Be sure to schedule a follow-up appointment with your primary care doctor or any specialists as instructed.     Discharge Plan:   Diet Plan: Discussed  Activity Level: Discussed  Confirmed Follow up Appointment: Appointment Scheduled  Confirmed Symptoms Management: Discussed  Medication Reconciliation Updated: Yes    Incision Care  An incision is when a surgeon cuts into your body. After surgery, the incision needs to be cared for properly to prevent infection.   HOW TO CARE FOR YOUR INCISION  · Take medicines only as directed by your health care provider.  · There are many different ways to close and cover an incision, including stitches, skin glue, and adhesive strips. Follow your health care provider's instructions on:  · Incision care.  · Bandage (dressing) changes and removal.  · Incision closure removal.  · Do not take baths, swim, or use a hot tub until your health care provider approves. You may shower as directed by your health care provider.  · Resume your normal diet and activities as directed.  · Use anti-itch medicine (such as an antihistamine) as directed by your health care provider. The incision may itch while it is healing. Do not pick or scratch at the incision.  · Drink enough fluid to keep your urine clear or pale  yellow.  SEEK MEDICAL CARE IF:   · You have drainage, redness, swelling, or pain at your incision site.  · You have muscle aches, chills, or a general ill feeling.  · You notice a bad smell coming from the incision or dressing.  · Your incision edges separate after the sutures, staples, or skin adhesive strips have been removed.  · You have persistent nausea or vomiting.  · You have a fever.  · You are dizzy.  SEEK IMMEDIATE MEDICAL CARE IF:   · You have a rash.  · You faint.  · You have difficulty breathing.  MAKE SURE YOU:   · Understand these instructions.  · Will watch your condition.  · Will get help right away if you are not doing well or get worse.     This information is not intended to replace advice given to you by your health care provider. Make sure you discuss any questions you have with your health care provider.     Document Released: 07/07/2006 Document Revised: 01/08/2016 Document Reviewed: 02/11/2015  [employer] Interactive Patient Education ©2016 [employer].    Influenza Vaccine Indication: Not indicated: Previously immunized this influenza season and > 8 years of age    I understand that a diet low in cholesterol, fat, and sodium is recommended for good health. Unless I have been given specific instructions below for another diet, I accept this instruction as my diet prescription.   Other diet: As tolerated - Regular    Special Instructions: None    · Is patient discharged on Warfarin / Coumadin?   No     · Is patient Post Blood Transfusion?  No    Depression / Suicide Risk    As you are discharged from this [facility] facility, it is important to learn how to keep safe from harming yourself.    Recognize the warning signs:  · Abrupt changes in personality, positive or negative- including increase in energy   · Giving away possessions  · Change in eating patterns- significant weight changes-  positive or negative  · Change in sleeping patterns- unable to sleep or sleeping all the  time   · Unwillingness or inability to communicate  · Depression  · Unusual sadness, discouragement and loneliness  · Talk of wanting to die  · Neglect of personal appearance   · Rebelliousness- reckless behavior  · Withdrawal from people/activities they love  · Confusion- inability to concentrate     If you or a loved one observes any of these behaviors or has concerns about self-harm, here's what you can do:  · Talk about it- your feelings and reasons for harming yourself  · Remove any means that you might use to hurt yourself (examples: pills, rope, extension cords, firearm)  · Get professional help from the community (Mental Health, Substance Abuse, psychological counseling)  · Do not be alone:Call your Safe Contact- someone whom you trust who will be there for you.  · Call your local CRISIS HOTLINE 763-9660 or 282-749-8342  · Call your local Children's Mobile Crisis Response Team [place] (346) 057-8838 or www.Memorop  · Call the toll free National Suicide Prevention Hotlines   · National Suicide Prevention Lifeline 821-722-AMQH (2877)  · National Hope Line Network 800-SUICIDE (229-9231)  Hysterosalpingography, Care After  Refer to this sheet in the next few weeks. These instructions provide you with information on caring for yourself after your procedure. Your health care provider may also give you more specific instructions. Your treatment has been planned according to current medical practices, but problems sometimes occur. Call your health care provider if you have any problems or questions after your procedure.  WHAT TO EXPECT AFTER THE PROCEDURE  After your procedure, it is typical to have the following:  · Cramping and spotting. This should go away in 24 hours.  · Contrast dye naturally flowing out of your vagina. You may want to wear a sanitary pad.  HOME CARE INSTRUCTIONS  · Only take medicines as directed by your health care provider.  · Wear a sanitary pad to catch any dye that will  flow out naturally, if necessary. Change your sanitary pad each time it becomes wet or soiled.   · Do not douche or have sexual intercourse until your health care provider says it is okay.   · Ask when your test results will be ready. Make sure you get your test results.   · If you get an abnormal result, your health care provider may prescribe an antibiotic medicine. Take your antibiotics as directed. Finish them even if you start to feel better.  SEEK MEDICAL CARE IF:  · You have a fever.    · You have chills.    · Your skin is itchy, swollen, or has a rash.  · You have a bad smelling discharge coming from your vagina.  · You have vaginal bleeding that lasts more than 4 days.  SEEK IMMEDIATE MEDICAL CARE IF:  · You have nausea and vomiting.  · You have heavy vaginal bleeding, soaking more than one pad every hour.  · You have severe abdominal pain.     This information is not intended to replace advice given to you by your health care provider. Make sure you discuss any questions you have with your health care provider.     Document Released: 03/11/2013 Document Revised: 08/20/2014 Document Reviewed: 06/20/2014  [employer] Interactive Patient Education ©2016 [employer] Inc.    Hypertension  Hypertension, commonly called high blood pressure, is when the force of blood pumping through your arteries is too strong. Your arteries are the blood vessels that carry blood from your heart throughout your body. A blood pressure reading consists of a higher number over a lower number, such as 110/72. The higher number (systolic) is the pressure inside your arteries when your heart pumps. The lower number (diastolic) is the pressure inside your arteries when your heart relaxes. Ideally you want your blood pressure below 120/80.  Hypertension forces your heart to work harder to pump blood. Your arteries may become narrow or stiff. Having untreated or uncontrolled hypertension can cause heart attack, stroke, kidney disease, and other  problems.  RISK FACTORS  Some risk factors for high blood pressure are controllable. Others are not.   Risk factors you cannot control include:   · Race. You may be at higher risk if you are .  · Age. Risk increases with age.  · Gender. Men are at higher risk than women before age 45 years. After age 65, women are at higher risk than men.  Risk factors you can control include:  · Not getting enough exercise or physical activity.  · Being overweight.  · Getting too much fat, sugar, calories, or salt in your diet.  · Drinking too much alcohol.  SIGNS AND SYMPTOMS  Hypertension does not usually cause signs or symptoms. Extremely high blood pressure (hypertensive crisis) may cause headache, anxiety, shortness of breath, and nosebleed.  DIAGNOSIS  To check if you have hypertension, your health care provider will measure your blood pressure while you are seated, with your arm held at the level of your heart. It should be measured at least twice using the same arm. Certain conditions can cause a difference in blood pressure between your right and left arms. A blood pressure reading that is higher than normal on one occasion does not mean that you need treatment. If it is not clear whether you have high blood pressure, you may be asked to return on a different day to have your blood pressure checked again. Or, you may be asked to monitor your blood pressure at home for 1 or more weeks.  TREATMENT  Treating high blood pressure includes making lifestyle changes and possibly taking medicine. Living a healthy lifestyle can help lower high blood pressure. You may need to change some of your habits.  Lifestyle changes may include:  · Following the DASH diet. This diet is high in fruits, vegetables, and whole grains. It is low in salt, red meat, and added sugars.  · Keep your sodium intake below 2,300 mg per day.  · Getting at least 30-45 minutes of aerobic exercise at least 4 times per week.  · Losing weight if  necessary.  · Not smoking.  · Limiting alcoholic beverages.  · Learning ways to reduce stress.  Your health care provider may prescribe medicine if lifestyle changes are not enough to get your blood pressure under control, and if one of the following is true:  · You are 18-59 years of age and your systolic blood pressure is above 140.  · You are 60 years of age or older, and your systolic blood pressure is above 150.  · Your diastolic blood pressure is above 90.  · You have diabetes, and your systolic blood pressure is over 140 or your diastolic blood pressure is over 90.  · You have kidney disease and your blood pressure is above 140/90.  · You have heart disease and your blood pressure is above 140/90.  Your personal target blood pressure may vary depending on your medical conditions, your age, and other factors.  HOME CARE INSTRUCTIONS  · Have your blood pressure rechecked as directed by your health care provider.    · Take medicines only as directed by your health care provider. Follow the directions carefully. Blood pressure medicines must be taken as prescribed. The medicine does not work as well when you skip doses. Skipping doses also puts you at risk for problems.  · Do not smoke.    · Monitor your blood pressure at home as directed by your health care provider.   SEEK MEDICAL CARE IF:   · You think you are having a reaction to medicines taken.  · You have recurrent headaches or feel dizzy.  · You have swelling in your ankles.  · You have trouble with your vision.  SEEK IMMEDIATE MEDICAL CARE IF:  · You develop a severe headache or confusion.  · You have unusual weakness, numbness, or feel faint.  · You have severe chest or abdominal pain.  · You vomit repeatedly.  · You have trouble breathing.  MAKE SURE YOU:   · Understand these instructions.  · Will watch your condition.  · Will get help right away if you are not doing well or get worse.     This information is not intended to replace advice given to you  by your health care provider. Make sure you discuss any questions you have with your health care provider.     Document Released: 12/18/2006 Document Revised: 05/03/2016 Document Reviewed: 10/10/2014  [employer] Interactive Patient Education ©2016 [employer]  Laparoscopically Assisted Vaginal Hysterectomy, Care After  Refer to this sheet in the next few weeks. These instructions provide you with information on caring for yourself after your procedure. Your health care provider may also give you more specific instructions. Your treatment has been planned according to current medical practices, but problems sometimes occur. Call your health care provider if you have any problems or questions after your procedure.  WHAT TO EXPECT AFTER THE PROCEDURE  After your procedure, it is typical to have the following:  · Abdominal pain. You will be given pain medicine to control it.  · Sore throat from the breathing tube that was inserted during surgery.  HOME CARE INSTRUCTIONS  · Only take over-the-counter or prescription medicines for pain, discomfort, or fever as directed by your health care provider.  · Do not take aspirin. It can cause bleeding.  · Do not drive when taking pain medicine.  · Follow your health care provider's advice regarding diet, exercise, lifting, driving, and general activities.  · Resume your usual diet as directed and allowed.  · Get plenty of rest and sleep.  · Do not douche, use tampons, or have sexual intercourse for at least 6 weeks, or until your health care provider gives you permission.  · Change your bandages (dressings) as directed by your health care provider.  · Monitor your temperature and notify your health care provider of a fever.  · Take showers instead of baths for 2-3 weeks.  · Do not drink alcohol until your health care provider gives you permission.  · If you develop constipation, you may take a mild laxative with your health care provider's permission. Bran foods may help with  constipation problems. Drinking enough fluids to keep your urine clear or pale yellow may help as well.  · Try to have someone home with you for 1-2 weeks to help around the house.  · Keep all of your follow-up appointments as directed by your health care provider.  SEEK MEDICAL CARE IF:   · You have swelling, redness, or increasing pain around your incision sites.  · You have pus coming from your incision.  · You notice a bad smell coming from your incision.  · Your incision breaks open.  · You feel dizzy or lightheaded.  · You have pain or bleeding when you urinate.  · You have persistent diarrhea.  · You have persistent nausea and vomiting.  · You have abnormal vaginal discharge.  · You have a rash.  · You have any type of abnormal reaction or develop an allergy to your medicine.  · You have poor pain control with your prescribed medicine.  SEEK IMMEDIATE MEDICAL CARE IF:   · You have a fever.  · You have severe abdominal pain.  · You have chest pain.  · You have shortness of breath.  · You faint.  · You have pain, swelling, or redness in your leg.  · You have heavy vaginal bleeding with blood clots.  MAKE SURE YOU:  · Understand these instructions.  · Will watch your condition.  · Will get help right away if you are not doing well or get worse.     This information is not intended to replace advice given to you by your health care provider. Make sure you discuss any questions you have with your health care provider.     Document Released: 12/06/2012 Document Revised: 12/23/2014 Document Reviewed: 07/03/2014  [email or web address] Interactive Patient Education ©2016 [email or web address]    Managing Your High Blood Pressure  Blood pressure is a measurement of how forceful your blood is pressing against the walls of the arteries. Arteries are muscular tubes within the circulatory system. Blood pressure does not stay the same. Blood pressure rises when you are active, excited, or nervous; and it lowers during sleep and relaxation. If  the numbers measuring your blood pressure stay above normal most of the time, you are at risk for health problems. High blood pressure (hypertension) is a long-term (chronic) condition in which blood pressure is elevated.  A blood pressure reading is recorded as two numbers, such as 120 over 80 (or 120/80). The first, higher number is called the systolic pressure. It is a measure of the pressure in your arteries as the heart beats. The second, lower number is called the diastolic pressure. It is a measure of the pressure in your arteries as the heart relaxes between beats.   Keeping your blood pressure in a normal range is important to your overall health and prevention of health problems, such as heart disease and stroke. When your blood pressure is uncontrolled, your heart has to work harder than normal. High blood pressure is a very common condition in adults because blood pressure tends to rise with age. Men and women are equally likely to have hypertension but at different times in life. Before age 45, men are more likely to have hypertension. After 65 years of age, women are more likely to have it. Hypertension is especially common in  Americans. This condition often has no signs or symptoms. The cause of the condition is usually not known. Your caregiver can help you come up with a plan to keep your blood pressure in a normal, healthy range.  BLOOD PRESSURE STAGES  Blood pressure is classified into four stages: normal, prehypertension, stage 1, and stage 2. Your blood pressure reading will be used to determine what type of treatment, if any, is necessary. Appropriate treatment options are tied to these four stages:   Normal  · Systolic pressure (mm Hg): below 120.  · Diastolic pressure (mm Hg): below 80.  Prehypertension  · Systolic pressure (mm Hg): 120 to 139.  · Diastolic pressure (mm Hg): 80 to 89.  Stage 1  · Systolic pressure (mm Hg): 140 to 159.  · Diastolic pressure (mm Hg): 90 to  99.  Stage 2  · Systolic pressure (mm Hg): 160 or above.  · Diastolic pressure (mm Hg): 100 or above.  RISKS RELATED TO HIGH BLOOD PRESSURE  Managing your blood pressure is an important responsibility. Uncontrolled high blood pressure can lead to:  · A heart attack.  · A stroke.  · A weakened blood vessel (aneurysm).  · Heart failure.  · Kidney damage.  · Eye damage.  · Metabolic syndrome.  · Memory and concentration problems.  HOW TO MANAGE YOUR BLOOD PRESSURE  Blood pressure can be managed effectively with lifestyle changes and medicines (if needed). Your caregiver will help you come up with a plan to bring your blood pressure within a normal range. Your plan should include the following:  Education  · Read all information provided by your caregivers about how to control blood pressure.  · Educate yourself on the latest guidelines and treatment recommendations. New research is always being done to further define the risks and treatments for high blood pressure.  Lifestyle changes  · Control your weight.  · Avoid smoking.  · Stay physically active.  · Reduce the amount of salt in your diet.  · Reduce stress.  · Control any chronic conditions, such as high cholesterol or diabetes.  · Reduce your alcohol intake.  Medicines  · Several medicines (antihypertensive medicines) are available, if needed, to bring blood pressure within a normal range.  Communication  · Review all the medicines you take with your caregiver because there may be side effects or interactions.  · Talk with your caregiver about your diet, exercise habits, and other lifestyle factors that may be contributing to high blood pressure.  · See your caregiver regularly. Your caregiver can help you create and adjust your plan for managing high blood pressure.  RECOMMENDATIONS FOR TREATMENT AND FOLLOW-UP   The following recommendations are based on current guidelines for managing high blood pressure in nonpregnant adults. Use these recommendations to  identify the proper follow-up period or treatment option based on your blood pressure reading. You can discuss these options with your caregiver.  · Systolic pressure of 120 to 139 or diastolic pressure of 80 to 89: Follow up with your caregiver as directed.  · Systolic pressure of 140 to 160 or diastolic pressure of 90 to 100: Follow up with your caregiver within 2 months.  · Systolic pressure above 160 or diastolic pressure above 100: Follow up with your caregiver within 1 month.  · Systolic pressure above 180 or diastolic pressure above 110: Consider antihypertensive therapy; follow up with your caregiver within 1 week.  · Systolic pressure above 200 or diastolic pressure above 120: Begin antihypertensive therapy; follow up with your caregiver within 1 week.     This information is not intended to replace advice given to you by your health care provider. Make sure you discuss any questions you have with your health care provider.     Document Released: 09/11/2013 Document Reviewed: 09/11/2013  [employer] Interactive Patient Education ©2016 [employer]        Your appointments     May 01, 2017 10:00 AM   Established Patient with ILYA May   [facility] (--)    [address] [id number] [phone]           You will be receiving a confirmation call a few days before your appointment from our automated call confirmation system.              Follow-up Information     1. Follow up with Zayda Santillan M.D.. Go in 1 week.    Specialty:  OB/Gyn    Why:  Follow Up    Contact information    [address]  [address]  [facility] [address]  [phone]          2. Follow up with ILYA May.    Specialty:  Family Medicine    Why:  As needed    Contact information    [address] [id number]  [facility] [id number] [phone]           Discharge Medication Instructions:    Below are the medications your physician expects you to take upon  discharge:    Review all your home medications and newly ordered medications with your doctor and/or pharmacist. Follow medication instructions as directed by your doctor and/or pharmacist.    Please keep your medication list with you and share with your physician.               Medication List      START taking these medications        Instructions    NIFEdipine 30 MG CR tablet   Commonly known as:  ADALAT CC    Take 1 Tab by mouth every day.   Dose:  30 mg         ASK your doctor about these medications        Instructions    acetaminophen/caffeine/butalbital 300-40-50 mg -40 MG Caps capsule   Commonly known as:  FIORICET    Take 1-2 Caps by mouth every 6 hours as needed for Headache.   Dose:  1-2 Cap       BIOTIN PO    Take  by mouth every day. 10,000 mcg       Cranberry 1000 MG Caps    Take 4,200 mg by mouth every day.   Dose:  4200 mg       cyanocobalamin 1000 MCG/ML Soln   Commonly known as:  VITAMIN B-12    1 mL by Intramuscular route every 30 days. Please administer to patient (she reports [facility] does this?)   Dose:  1000 mcg       diphenhydrAMINE 25 MG Tabs   Commonly known as:  BENADRYL    Take 25 mg by mouth every 6 hours as needed for Sleep.   Dose:  25 mg       fentanyl 75 MCG/HR Pt72   Commonly known as:  DURAGESIC    Doctor's comments:  May fill 2/24/2017   Apply 1 Patch to skin as directed every 48 hours.   Dose:  1 Patch       hydrocodone-acetaminophen 5-325 MG Tabs per tablet   Commonly known as:  NORCO    Doctor's comments:  May fill 4/17/2017   Take 1 Tab by mouth every 12 hours as needed.   Dose:  1 Tab       Melatonin 10 MG Tabs    Take  by mouth as needed.       oxycodone-acetaminophen 5-325 MG Tabs   Commonly known as:  PERCOCET    Take 1-2 Tabs by mouth every 6 hours as needed. For post-operative pain.  Do not take norco for 1-2 weeks while taking percocet.   Dose:  1-2 Tab       promethazine 25 MG Tabs   Commonly known as:  PHENERGAN    TAKE 1 TABLET BY MOUTH EVERY 6 HOURS AS  NEEDED FOR NAUSEA OR VOMITING       tizanidine 4 MG Tabs   Commonly known as:  ZANAFLEX    TAKE 1 TABLET BY MOUTH EVERY 6 HOURS AS NEEDED       Vitamin D3 400 UNITS Caps    Take 1 Cap by mouth every day.   Dose:  1 Cap               Instructions           Diet / Nutrition:    Follow any diet instructions given to you by your doctor or the dietician, including how much salt (sodium) you are allowed each day.    If you are overweight, talk to your doctor about a weight reduction plan.    Activity:    Remain physically active following your doctor's instructions about exercise and activity.    Rest often.     Any time you become even a little tired or short of breath, SIT DOWN and rest.    Worsening Symptoms:    Report any of the following signs and symptoms to the doctor's office immediately:    *Pain of jaw, arm, or neck  *Chest pain not relieved by medication                               *Dizziness or loss of consciousness  *Difficulty breathing even when at rest   *More tired than usual                                       *Bleeding drainage or swelling of surgical site  *Swelling of feet, ankles, legs or stomach                 *Fever (>100ºF)  *Pink or blood tinged sputum  *Weight gain (3lbs/day or 5lbs /week)           *Shock from internal defibrillator (if applicable)  *Palpitations or irregular heartbeats                *Cool and/or numb extremities    Stroke Awareness    Common Risk Factors for Stroke include:    Age  Atrial Fibrillation  Carotid Artery Stenosis  Diabetes Mellitus  Excessive alcohol consumption  High blood pressure  Overweight   Physical inactivity  Smoking    Warning signs and symptoms of a stroke include:    *Sudden numbness or weakness of the face, arm or leg (especially on one side of the body).  *Sudden confusion, trouble speaking or understanding.  *Sudden trouble seeing in one or both eyes.  *Sudden trouble walking, dizziness, loss of balance or coordination.Sudden severe headache  with no known cause.    It is very important to get treatment quickly when a stroke occurs. If you experience any of the above warning signs, call 911 immediately.                   Disclaimer         Quit Smoking / Tobacco Use:    I understand the use of any tobacco products increases my chance of suffering from future heart disease or stroke and could cause other illnesses which may shorten my life. Quitting the use of tobacco products is the single most important thing I can do to improve my health. For further information on smoking / tobacco cessation call a Toll Free Quit Line at 1-639.375.9209 (*[employer] [place]) or 1-323.422.9455 (American [employer] Association) or you can access the web based program at www.lungusa.org.    [place] Tobacco Users Help Line:  (719) 767-4361       Toll Free: 1-702.618.7018  Quit Tobacco Program [facility] [phone]    Crisis Hotline:    [place] Crisis Hotline:  5-999-JXADDOO or 1-966.444.3756    [place] Crisis Hotline:    1-588.526.2367 or 957-863-4906    Discharge Survey:   Thank you for choosing [facility]. We hope we did everything we could to make your hospital stay a pleasant one. You may be receiving a phone survey and we would appreciate your time and participation in answering the questions. Your input is very valuable to us in our efforts to improve our service to our patients and their families.        My signature on this form indicates that:    1. I have reviewed and understand the above information.  2. My questions regarding this information have been answered to my satisfaction.  3. I have formulated a plan with my discharge nurse to obtain my prescribed medications for home.                  Disclaimer         __________________________________                     __________       ________                       Patient Signature                                                 Date                    Time

## 2017-03-27 NOTE — H&P
CHIEF COMPLAINT:  Patient presents for a laparoscopic-assisted vaginal   hysterectomy on March 27th for menorrhagia.    HISTORY OF PRESENT ILLNESS:  Patient is a 31-year-old female, para 2, who has   prolonged heavy periods that last 2 weeks.  They are very heavy for seven on   those days, which she will be changing a pad every hour.  She misses 1-2 days   of work a month because of her heavy bleeding and associated cramping.  She   has tried birth control pills in the past without benefit.    Ultrasound was performed.  She has a 2.5-3 cm lesion in the intrauterine   cavity, this is vascular.  Endometrial biopsy was normal.  Patient wants   removal of uterus for definitive treatment.    PAST MEDICAL HISTORY:  Significant for depression.    PAST SURGICAL HISTORY:  Include C-sections in 2003, 2007 salpingectomy in 2011   for ectopic.    ALLERGIES:  IMITREX.    HABITS:  The patient denies tobacco, alcohol or drug use.    OBSTETRIC HISTORY:  Significant for 2 C-sections.    PHYSICAL EXAMINATION:  VITAL SIGNS:  Normal.  HEENT:  Atraumatic, normocephalic.  NECK:  Soft, supple.  LUNGS:  Clear to auscultation.  CARDIOVASCULAR:  Regular rate without murmurs.  ABDOMEN:  Soft, nontender, no rebound, no guarding.  PELVIC:  Deferred.  EXTREMITIES:  Normal.    ASSESSMENT:  1.  Menorrhagia.  2.  Intrauterine lesion possibly fibroid or large polyp.  3.  Dysmenorrhea.    PLAN:  To proceed with hysterectomy, laparoscopic assisted.  We have discussed   the options of retry birth control pills Lysteda, Mirena IUD, endometrial   ablation and hysteroscopic resection of intrauterine lesion.  Patient wants   definitive treatment with hysterectomy.    The patient has read and signed consent form.  She acknowledges understanding.    We reviewed it and discussed potential sequelae of damage to bowel, bladder,   ureters, blood transfusion, and adverse effects of anesthesia, etc.  All   questions have been answered and patient desires to  proceed with surgery.       ____________________________________     MD DALLIN TALLEY / SHELTON    DD:  03/26/2017 15:51:56  DT:  03/26/2017 18:42:12    D#:  [id number]  Job#:  [id number]

## 2017-03-27 NOTE — H&P
CHIEF COMPLAINT:  Patient presents for a laparoscopic-assisted vaginal   hysterectomy on March 27th for menorrhagia.    HISTORY OF PRESENT ILLNESS:  Patient is a 31-year-old female, para 2, who has   prolonged heavy periods that last 2 weeks.  They are very heavy for seven on   those days, which she will be changing a pad every hour.  She misses 1-2 days   of work a month because of her heavy bleeding and associated cramping.  She   has tried birth control pills in the past without benefit.    Ultrasound was performed.  She has a 2.5-3 cm lesion in the intrauterine   cavity, this is vascular.  Endometrial biopsy was normal.  Patient wants   removal of uterus for definitive treatment.    PAST MEDICAL HISTORY:  Significant for depression.    PAST SURGICAL HISTORY:  Include C-sections in 2003, 2007 salpingectomy in 2011   for ectopic.    ALLERGIES:  IMITREX.    HABITS:  The patient denies tobacco, alcohol or drug use.    OBSTETRIC HISTORY:  Significant for 2 C-sections.    PHYSICAL EXAMINATION:  VITAL SIGNS:  Normal.  HEENT:  Atraumatic, normocephalic.  NECK:  Soft, supple.  LUNGS:  Clear to auscultation.  CARDIOVASCULAR:  Regular rate without murmurs.  ABDOMEN:  Soft, nontender, no rebound, no guarding.  PELVIC:  Deferred.  EXTREMITIES:  Normal.    ASSESSMENT:  1.  Menorrhagia.  2.  Intrauterine lesion possibly fibroid or large polyp.  3.  Dysmenorrhea.    PLAN:  To proceed with hysterectomy, laparoscopic assisted.  We have discussed   the options of retry birth control pills Lysteda, Mirena IUD, endometrial   ablation and hysteroscopic resection of intrauterine lesion.  Patient wants   definitive treatment with hysterectomy.    The patient has read and signed consent form.  She acknowledges understanding.    We reviewed it and discussed potential sequelae of damage to bowel, bladder,   ureters, blood transfusion, and adverse effects of anesthesia, etc.  All   questions have been answered and patient desires to  proceed with surgery.       ____________________________________     MD DALLIN TALLEY / SHELTON    DD:  03/26/2017 15:51:56  DT:  03/26/2017 18:42:12    D#:  [id number]  Job#:  [id number]

## 2017-03-28 VITALS
TEMPERATURE: 98 F | BODY MASS INDEX: 26.87 KG/M2 | OXYGEN SATURATION: 100 % | HEART RATE: 94 BPM | HEIGHT: 72 IN | WEIGHT: 198.41 LBS | SYSTOLIC BLOOD PRESSURE: 142 MMHG | RESPIRATION RATE: 16 BRPM | DIASTOLIC BLOOD PRESSURE: 90 MMHG

## 2017-03-28 LAB
BASOPHILS # BLD AUTO: 0.1 % (ref 0–1.8)
BASOPHILS # BLD: 0.02 K/UL (ref 0–0.12)
EOSINOPHIL # BLD AUTO: 0 K/UL (ref 0–0.51)
EOSINOPHIL NFR BLD: 0 % (ref 0–6.9)
ERYTHROCYTE [DISTWIDTH] IN BLOOD BY AUTOMATED COUNT: 42.9 FL (ref 35.9–50)
HCT VFR BLD AUTO: 33.5 % (ref 37–47)
HGB BLD-MCNC: 11.5 G/DL (ref 12–16)
IMM GRANULOCYTES # BLD AUTO: 0.09 K/UL (ref 0–0.11)
IMM GRANULOCYTES NFR BLD AUTO: 0.6 % (ref 0–0.9)
LYMPHOCYTES # BLD AUTO: 0.86 K/UL (ref 1–4.8)
LYMPHOCYTES NFR BLD: 5.8 % (ref 22–41)
MCH RBC QN AUTO: 30.5 PG (ref 27–33)
MCHC RBC AUTO-ENTMCNC: 34.3 G/DL (ref 33.6–35)
MCV RBC AUTO: 88.9 FL (ref 81.4–97.8)
MONOCYTES # BLD AUTO: 0.63 K/UL (ref 0–0.85)
MONOCYTES NFR BLD AUTO: 4.3 % (ref 0–13.4)
NEUTROPHILS # BLD AUTO: 13.14 K/UL (ref 2–7.15)
NEUTROPHILS NFR BLD: 89.2 % (ref 44–72)
NRBC # BLD AUTO: 0 K/UL
NRBC BLD AUTO-RTO: 0 /100 WBC
PLATELET # BLD AUTO: 305 K/UL (ref 164–446)
PMV BLD AUTO: 9.5 FL (ref 9–12.9)
RBC # BLD AUTO: 3.77 M/UL (ref 4.2–5.4)
WBC # BLD AUTO: 14.7 K/UL (ref 4.8–10.8)

## 2017-03-28 PROCEDURE — 85025 COMPLETE CBC W/AUTO DIFF WBC: CPT

## 2017-03-28 PROCEDURE — 96375 TX/PRO/DX INJ NEW DRUG ADDON: CPT

## 2017-03-28 PROCEDURE — 700102 HCHG RX REV CODE 250 W/ 637 OVERRIDE(OP): Performed by: OBSTETRICS & GYNECOLOGY

## 2017-03-28 PROCEDURE — G0378 HOSPITAL OBSERVATION PER HR: HCPCS

## 2017-03-28 PROCEDURE — A9270 NON-COVERED ITEM OR SERVICE: HCPCS | Performed by: OBSTETRICS & GYNECOLOGY

## 2017-03-28 PROCEDURE — 96376 TX/PRO/DX INJ SAME DRUG ADON: CPT

## 2017-03-28 PROCEDURE — 36415 COLL VENOUS BLD VENIPUNCTURE: CPT

## 2017-03-28 PROCEDURE — 700111 HCHG RX REV CODE 636 W/ 250 OVERRIDE (IP): Performed by: OBSTETRICS & GYNECOLOGY

## 2017-03-28 RX ORDER — LABETALOL 200 MG/1
200 TABLET, FILM COATED ORAL TWICE DAILY
Status: DISCONTINUED | OUTPATIENT
Start: 2017-03-28 | End: 2017-03-28

## 2017-03-28 RX ORDER — LABETALOL 200 MG/1
200 TABLET, FILM COATED ORAL
Status: COMPLETED | OUTPATIENT
Start: 2017-03-28 | End: 2017-03-28

## 2017-03-28 RX ORDER — NIFEDIPINE 30 MG
30 TABLET, EXTENDED RELEASE ORAL DAILY
Qty: 15 TAB | Refills: 1 | Status: SHIPPED | OUTPATIENT
Start: 2017-03-28 | End: 2019-07-18

## 2017-03-28 RX ORDER — NIFEDIPINE 30 MG/1
30 TABLET, EXTENDED RELEASE ORAL
Status: DISCONTINUED | OUTPATIENT
Start: 2017-03-28 | End: 2017-03-28 | Stop reason: HOSPADM

## 2017-03-28 RX ADMIN — LABETALOL HCL 200 MG: 200 TABLET, FILM COATED ORAL at 16:54

## 2017-03-28 RX ADMIN — CELECOXIB 200 MG: 200 CAPSULE ORAL at 17:50

## 2017-03-28 RX ADMIN — ACETAMINOPHEN 1000 MG: 500 TABLET, FILM COATED ORAL at 04:58

## 2017-03-28 RX ADMIN — KETOROLAC TROMETHAMINE 30 MG: 30 INJECTION, SOLUTION INTRAMUSCULAR; INTRAVENOUS at 04:59

## 2017-03-28 RX ADMIN — OXYCODONE HYDROCHLORIDE 5 MG: 5 TABLET ORAL at 13:48

## 2017-03-28 RX ADMIN — OXYCODONE HYDROCHLORIDE 5 MG: 5 TABLET ORAL at 16:49

## 2017-03-28 RX ADMIN — OXYCODONE HYDROCHLORIDE 5 MG: 5 TABLET ORAL at 04:11

## 2017-03-28 RX ADMIN — ACETAMINOPHEN 1000 MG: 500 TABLET, FILM COATED ORAL at 11:10

## 2017-03-28 RX ADMIN — MORPHINE SULFATE 2 MG: 4 INJECTION INTRAVENOUS at 08:38

## 2017-03-28 RX ADMIN — KETOROLAC TROMETHAMINE 30 MG: 30 INJECTION, SOLUTION INTRAMUSCULAR; INTRAVENOUS at 11:10

## 2017-03-28 RX ADMIN — ACETAMINOPHEN 1000 MG: 500 TABLET, FILM COATED ORAL at 17:51

## 2017-03-28 RX ADMIN — SODIUM CHLORIDE, POTASSIUM CHLORIDE, SODIUM LACTATE AND CALCIUM CHLORIDE: 600; 310; 30; 20 INJECTION, SOLUTION INTRAVENOUS at 03:39

## 2017-03-28 RX ADMIN — SIMETHICONE CHEW TAB 80 MG 80 MG: 80 TABLET ORAL at 11:46

## 2017-03-28 RX ADMIN — OXYCODONE HYDROCHLORIDE 5 MG: 5 TABLET ORAL at 07:42

## 2017-03-28 RX ADMIN — SIMETHICONE CHEW TAB 80 MG 80 MG: 80 TABLET ORAL at 03:39

## 2017-03-28 ASSESSMENT — PAIN SCALES - GENERAL
PAINLEVEL_OUTOF10: 9
PAINLEVEL_OUTOF10: 5

## 2017-03-28 ASSESSMENT — LIFESTYLE VARIABLES: EVER_SMOKED: YES

## 2017-03-28 NOTE — CARE PLAN
Problem: Safety  Goal: Will remain free from falls  Outcome: PROGRESSING AS EXPECTED    Problem: Pain Management  Goal: Pain level will decrease to patient’s comfort goal  Outcome: PROGRESSING AS EXPECTED  Patient medicated for pain as ordered.

## 2017-03-28 NOTE — DIETARY
Nutrition Services: Unplanned weight loss, pta. This is a 31 y.o female with admit dx: Excessive or frequent menstruation. Ht: 6'  Wt: 90 kg  BMI: 26.9 kg/m2  The patient is s/p laparoscopic-assisted vaginal hysterectomy, right salpingectomy( 3/27)  The patient is on a low fiber PO diet, no meals have been recorded  No recent labs, IVF of LR infusion at 125 ml/hr, no skin breakdown is noted per chart  Plan/Rcommendations: Encourage good PO intake, fluids per MD  RD monitoring

## 2017-03-28 NOTE — CARE PLAN
Problem: Pain Management  Goal: Pain level will decrease to patient’s comfort goal  Intervention: Follow pain managment plan developed in collaboration with patient and Interdisciplinary Team  Encouraged pt to call for pain management as needed. Pain assessment Q4H.       Problem: Respiratory:  Goal: Respiratory status will improve  Intervention: Educate and encourage incentive spirometry usage  Encouraged DB and C exercises with incentive spirometer Q1H while awake.

## 2017-03-28 NOTE — PROGRESS NOTES
"Received pt awake sitting in chair at bedside. Alert and oriented x 3. VSS.   Lungs clear bilaterally. No SOB or cough.   Abdomen soft, slightly distended and tender. BS present x 4. Pt states passing flatus and denies nausea.   Abdomen dressing with tegarderm and guaze clean, dry and intact. Suprapubic dressing clean, dry and intact.   IV infusing well.   Pt had aguilera catheter removed early this morning, pt states has voided twice since then (unmeasured).  Pt states abdominal pain 9/10 \" lots of gas pain\". Oxycodone 5mg PO given as ordered.  No voiced concerns at this time. Call bell in reach.   "

## 2017-03-28 NOTE — OP REPORT
DATE OF SERVICE:  03/27/2017    PREOPERATIVE DIAGNOSES:  1.  Menorrhagia.  2.  Dysmenorrhea.  3.  Intrauterine lesion.    POSTOPERATIVE DIAGNOSES:  1.  Menorrhagia.  2.  Dysmenorrhea.  3.  Intrauterine lesion.    PROCEDURES:  Laparoscopic-assisted vaginal hysterectomy, right salpingectomy.    SURGEON:  Zayda Santillan MD.    ASSISTANT:  Beka Wilkes MD.    ANESTHESIA TYPE:  General.    ESTIMATED BLOOD LOSS:  500 mL.    ANTIBIOTICS:  Ancef.    INDICATIONS:  Patient is a 31-year-old female who has prolonged heavy periods   of 2 weeks.  She presented for hopeful definitive treatment.    PROCEDURE:  Patient was taken to the OR, given general anesthesia, placed in a   lithotomy position and prepped and draped in sterile manner.  A Hulka   tenaculum was placed in the uterus.  The umbilicus and suprapubic area were   injected with 0.25% Marcaine with epinephrine.  A 5 mm horizontal incision was   made suprapubically 2 cm above the pubic bone midline.  A 2 cm incision was   made in the umbilicus.  We bluntly dissected down to the fascia.  The fascia   was grasped with Kocher clamps and the umbilicus.  A 1 cm incision was made   and 0 Vicryl sutures were placed on the superior and inferior sheaths.  While   dissecting down the peritoneum, it was entered with the S retractor and we   then placed Melanie and the abdomen was insufflated under high flow.    Inspection of the pelvis revealed normal liver and gallbladder, and normal   appendix.  Right tube, uterus, and left ovary are normal.  There is only half   of a left tube.  The fimbria was grasped on the right tube.  We then   cauterized along the mesosalpinx.  The cornea of the uterus was cut off and   removed out of the field of operation.  We then cauterized adjacent lead to   the uterus along the uteroovarian pedicle just above the bladder.  The exact   same procedure was carried on the left side.  We then created the bladder flap   without complication.    We then placed  tenaculums at 3 and 9 o'clock on the cervix as we turned our   attention to the vaginal portion of the case.  We injected circumferentially   with 0.25% Marcaine with epinephrine about 8 mL and then cauterized in a   circumferential manner, dissected down the pubocervical fascia, retracted the   vaginal mucosa anteriorly.  We then entered the posterior cul-de-sac, placed   in a weighted speculum.  We then created a plane on the anterior cervix,   clamped the uterosacral ligaments.  These were clamped, cut, and tied.  We   then sharply dissected creating a plane along the anterior wall of the uterus   and right angle retractor was then placed into the abdominal cavity.  The bowel was   visualized and the CO2 bubbles were seen.  We then clamped the uterosacral   ligaments.  These were cut and tied.  We then took about 4 bites on each side   until the uterus was free.  The uterus was removed without complication.  We   did this with about 500 mL.  There was some bleeding from the vaginal cuff and   some backbleeding on the uterus as well as some bleeders on the anterior   vaginal cuff.  Once the uterus was identified, we then placed one additional   suture in the left uterosacral ligament.  We then closed the cuff with a   running 0 Vicryl suture.    We then turned our attention to the abdominal portion.  We inspected all   pedicles.  They were hemostatic.  We irrigated the pelvis and put some Alex   along the cuff.  All pedicles noted to be hemostatic.  We desufflated the   abdomen, removed the 5 mm port, appeared hemostatic.  We then slowly removed   the umbilical port, it was also hemostatic.  We tied the medial ends of the   fascial sutures together and placed the suture left in lateral making it   figure-of-eight suture.  This was tied.  Fascia was palpated and noted to be   well approximated.  We then placed a deep and subcuticular 4-0 Vicryl suture   in the umbilicus.  We placed 2 interrupted sutures  suprapubically.    We then did a cystoscopy.  Both ureters were patent.  Bladder was intact.    Peguero catheter was placed.  Patient discharged to recovery room in good   condition.       ____________________________________     MD DALLIN TALLEY / SHELTON    DD:  03/27/2017 16:38:21  DT:  03/27/2017 17:20:27    D#:  [id number]  Job#:  [id number]

## 2017-03-28 NOTE — PROGRESS NOTES
Received pt from PACU via stretcher, crying in pain. Pt c/o gas pain to right ribs.   Pt ambulated to bed with 1 x assist. Pt settled into bed with call bell in reach.   Administered Toradol, Acetaminophen and Simethicone as ordered.   Umbilical dressing clean, dry and intact and dressing to suprapubic clean, dry and intact.   Peguero insitu draining bright yellow urine.   IV infusing RL @ 125ml/h.  No voiced concerns at this time. Call bell in reach.

## 2017-03-28 NOTE — PROGRESS NOTES
"Pt in bed resting.   BP reading 173/117. Manual /110. Pt states \"having lots of gas pain\".   MD paged.   "

## 2017-03-29 RX ORDER — TIZANIDINE 4 MG/1
TABLET ORAL
Qty: 90 TAB | Refills: 0 | Status: SHIPPED | OUTPATIENT
Start: 2017-03-29 | End: 2017-04-20 | Stop reason: SDUPTHER

## 2017-03-29 NOTE — PROGRESS NOTES
Last BP @ 1740 142/90.  Pt has been ambulating frequently in the hallways throughout the day.   Pt passing flatus. Had a BM today.   Pt tolerating meals well, no nausea.   Pain controlled with PO pain medication.   No voiced concerns at this time. Call bell in reach.

## 2017-03-29 NOTE — PROGRESS NOTES
DATE OF SERVICE:  03/28/2017    SUBJECTIVE:  The patient had a LAVH last night.  Her only complaints today was   mainly gas pain and some shoulder pain.    Today, she has been walking, passed gas and feels much better.  She has been   on a fentanyl patch for chronic pain.  She will be due for that today and her   blood pressures have been high and she thinks it is more related to pain.  We   did give her labetalol 200 mg p.o.    PHYSICAL EXAMINATION:  VITAL SIGNS:  Her most recent blood pressure since labetalol is 142/90.  HEAD:  Atraumatic, normocephalic.  ABDOMEN:  Soft.  She has some appropriate tenderness.    ASSESSMENT:  Postop day #1 laparoscopic assisted vaginal hysterectomy.    PLAN:  We are going to discharge the patient home.  Infectious and activity   precautions were reviewed.  She will get a blood pressure cuff to monitor her   blood pressure.  We will give her a script forprocardia 30 mg xl.  We expect her   blood pressures to be normal,  with appropriate pain control.  The   patient will follow up next week for an appointment.       ____________________________________     MD DALLIN TALLEY / NTS    DD:  03/28/2017 18:11:53  DT:  03/28/2017 19:28:57    D#:  [id number]  Job#:  [id number]

## 2017-03-29 NOTE — PROGRESS NOTES
Discharge papers signed and given to pt. Prescription for antihypertensive given to pt.   Teaching done in regards to incision care, pain management, hysterectomy care after, follow up appointments and hypertension. No voiced concerns or questions.

## 2017-03-29 NOTE — DISCHARGE INSTRUCTIONS
Discharge Instructions    Discharged to home by car with relative. Discharged via wheelchair, hospital escort: Yes.  Special equipment needed: Not Applicable    Be sure to schedule a follow-up appointment with your primary care doctor or any specialists as instructed.     Discharge Plan:   Diet Plan: Discussed  Activity Level: Discussed  Confirmed Follow up Appointment: Appointment Scheduled  Confirmed Symptoms Management: Discussed  Medication Reconciliation Updated: Yes    Incision Care  An incision is when a surgeon cuts into your body. After surgery, the incision needs to be cared for properly to prevent infection.   HOW TO CARE FOR YOUR INCISION  · Take medicines only as directed by your health care provider.  · There are many different ways to close and cover an incision, including stitches, skin glue, and adhesive strips. Follow your health care provider's instructions on:  · Incision care.  · Bandage (dressing) changes and removal.  · Incision closure removal.  · Do not take baths, swim, or use a hot tub until your health care provider approves. You may shower as directed by your health care provider.  · Resume your normal diet and activities as directed.  · Use anti-itch medicine (such as an antihistamine) as directed by your health care provider. The incision may itch while it is healing. Do not pick or scratch at the incision.  · Drink enough fluid to keep your urine clear or pale yellow.  SEEK MEDICAL CARE IF:   · You have drainage, redness, swelling, or pain at your incision site.  · You have muscle aches, chills, or a general ill feeling.  · You notice a bad smell coming from the incision or dressing.  · Your incision edges separate after the sutures, staples, or skin adhesive strips have been removed.  · You have persistent nausea or vomiting.  · You have a fever.  · You are dizzy.  SEEK IMMEDIATE MEDICAL CARE IF:   · You have a rash.  · You faint.  · You have difficulty breathing.  MAKE SURE YOU:    · Understand these instructions.  · Will watch your condition.  · Will get help right away if you are not doing well or get worse.     This information is not intended to replace advice given to you by your health care provider. Make sure you discuss any questions you have with your health care provider.     Document Released: 07/07/2006 Document Revised: 01/08/2016 Document Reviewed: 02/11/2015  [employer] Interactive Patient Education ©2016 [employer]vier Inc.    Influenza Vaccine Indication: Not indicated: Previously immunized this influenza season and > 8 years of age    I understand that a diet low in cholesterol, fat, and sodium is recommended for good health. Unless I have been given specific instructions below for another diet, I accept this instruction as my diet prescription.   Other diet: As tolerated - Regular    Special Instructions: None    · Is patient discharged on Warfarin / Coumadin?   No     · Is patient Post Blood Transfusion?  No    Depression / Suicide Risk    As you are discharged from this [facility] facility, it is important to learn how to keep safe from harming yourself.    Recognize the warning signs:  · Abrupt changes in personality, positive or negative- including increase in energy   · Giving away possessions  · Change in eating patterns- significant weight changes-  positive or negative  · Change in sleeping patterns- unable to sleep or sleeping all the time   · Unwillingness or inability to communicate  · Depression  · Unusual sadness, discouragement and loneliness  · Talk of wanting to die  · Neglect of personal appearance   · Rebelliousness- reckless behavior  · Withdrawal from people/activities they love  · Confusion- inability to concentrate     If you or a loved one observes any of these behaviors or has concerns about self-harm, here's what you can do:  · Talk about it- your feelings and reasons for harming yourself  · Remove any means that you might use to hurt yourself  (examples: pills, rope, extension cords, firearm)  · Get professional help from the community (Mental Health, Substance Abuse, psychological counseling)  · Do not be alone:Call your Safe Contact- someone whom you trust who will be there for you.  · Call your local CRISIS HOTLINE 484-5709 or 736-466-5064  · Call your local Children's Mobile Crisis Response Team [place] (463) 980-8291 or www.[employer]  · Call the toll free National Suicide Prevention Hotlines   · National Suicide Prevention Lifeline 866-959-TSGO (9580)  · [facility] [employer] [facility] Network 800-SUICIDE (617-4048)  Hysterosalpingography, Care After  Refer to this sheet in the next few weeks. These instructions provide you with information on caring for yourself after your procedure. Your health care provider may also give you more specific instructions. Your treatment has been planned according to current medical practices, but problems sometimes occur. Call your health care provider if you have any problems or questions after your procedure.  WHAT TO EXPECT AFTER THE PROCEDURE  After your procedure, it is typical to have the following:  · Cramping and spotting. This should go away in 24 hours.  · Contrast dye naturally flowing out of your vagina. You may want to wear a sanitary pad.  HOME CARE INSTRUCTIONS  · Only take medicines as directed by your health care provider.  · Wear a sanitary pad to catch any dye that will flow out naturally, if necessary. Change your sanitary pad each time it becomes wet or soiled.   · Do not douche or have sexual intercourse until your health care provider says it is okay.   · Ask when your test results will be ready. Make sure you get your test results.   · If you get an abnormal result, your health care provider may prescribe an antibiotic medicine. Take your antibiotics as directed. Finish them even if you start to feel better.  SEEK MEDICAL CARE IF:  · You have a fever.    · You have chills.    · Your skin is  itchy, swollen, or has a rash.  · You have a bad smelling discharge coming from your vagina.  · You have vaginal bleeding that lasts more than 4 days.  SEEK IMMEDIATE MEDICAL CARE IF:  · You have nausea and vomiting.  · You have heavy vaginal bleeding, soaking more than one pad every hour.  · You have severe abdominal pain.     This information is not intended to replace advice given to you by your health care provider. Make sure you discuss any questions you have with your health care provider.     Document Released: 03/11/2013 Document Revised: 08/20/2014 Document Reviewed: 06/20/2014  [employer] Interactive Patient Education ©2016 Elsevier Inc.    Hypertension  Hypertension, commonly called high blood pressure, is when the force of blood pumping through your arteries is too strong. Your arteries are the blood vessels that carry blood from your heart throughout your body. A blood pressure reading consists of a higher number over a lower number, such as 110/72. The higher number (systolic) is the pressure inside your arteries when your heart pumps. The lower number (diastolic) is the pressure inside your arteries when your heart relaxes. Ideally you want your blood pressure below 120/80.  Hypertension forces your heart to work harder to pump blood. Your arteries may become narrow or stiff. Having untreated or uncontrolled hypertension can cause heart attack, stroke, kidney disease, and other problems.  RISK FACTORS  Some risk factors for high blood pressure are controllable. Others are not.   Risk factors you cannot control include:   · Race. You may be at higher risk if you are .  · Age. Risk increases with age.  · Gender. Men are at higher risk than women before age 45 years. After age 65, women are at higher risk than men.  Risk factors you can control include:  · Not getting enough exercise or physical activity.  · Being overweight.  · Getting too much fat, sugar, calories, or salt in your  diet.  · Drinking too much alcohol.  SIGNS AND SYMPTOMS  Hypertension does not usually cause signs or symptoms. Extremely high blood pressure (hypertensive crisis) may cause headache, anxiety, shortness of breath, and nosebleed.  DIAGNOSIS  To check if you have hypertension, your health care provider will measure your blood pressure while you are seated, with your arm held at the level of your heart. It should be measured at least twice using the same arm. Certain conditions can cause a difference in blood pressure between your right and left arms. A blood pressure reading that is higher than normal on one occasion does not mean that you need treatment. If it is not clear whether you have high blood pressure, you may be asked to return on a different day to have your blood pressure checked again. Or, you may be asked to monitor your blood pressure at home for 1 or more weeks.  TREATMENT  Treating high blood pressure includes making lifestyle changes and possibly taking medicine. Living a healthy lifestyle can help lower high blood pressure. You may need to change some of your habits.  Lifestyle changes may include:  · Following the DASH diet. This diet is high in fruits, vegetables, and whole grains. It is low in salt, red meat, and added sugars.  · Keep your sodium intake below 2,300 mg per day.  · Getting at least 30-45 minutes of aerobic exercise at least 4 times per week.  · Losing weight if necessary.  · Not smoking.  · Limiting alcoholic beverages.  · Learning ways to reduce stress.  Your health care provider may prescribe medicine if lifestyle changes are not enough to get your blood pressure under control, and if one of the following is true:  · You are 18-59 years of age and your systolic blood pressure is above 140.  · You are 60 years of age or older, and your systolic blood pressure is above 150.  · Your diastolic blood pressure is above 90.  · You have diabetes, and your systolic blood pressure is over  140 or your diastolic blood pressure is over 90.  · You have kidney disease and your blood pressure is above 140/90.  · You have heart disease and your blood pressure is above 140/90.  Your personal target blood pressure may vary depending on your medical conditions, your age, and other factors.  HOME CARE INSTRUCTIONS  · Have your blood pressure rechecked as directed by your health care provider.    · Take medicines only as directed by your health care provider. Follow the directions carefully. Blood pressure medicines must be taken as prescribed. The medicine does not work as well when you skip doses. Skipping doses also puts you at risk for problems.  · Do not smoke.    · Monitor your blood pressure at home as directed by your health care provider.   SEEK MEDICAL CARE IF:   · You think you are having a reaction to medicines taken.  · You have recurrent headaches or feel dizzy.  · You have swelling in your ankles.  · You have trouble with your vision.  SEEK IMMEDIATE MEDICAL CARE IF:  · You develop a severe headache or confusion.  · You have unusual weakness, numbness, or feel faint.  · You have severe chest or abdominal pain.  · You vomit repeatedly.  · You have trouble breathing.  MAKE SURE YOU:   · Understand these instructions.  · Will watch your condition.  · Will get help right away if you are not doing well or get worse.     This information is not intended to replace advice given to you by your health care provider. Make sure you discuss any questions you have with your health care provider.     Document Released: 12/18/2006 Document Revised: 05/03/2016 Document Reviewed: 10/10/2014  [employer] Interactive Patient Education ©2016 [employer]  Laparoscopically Assisted Vaginal Hysterectomy, Care After  Refer to this sheet in the next few weeks. These instructions provide you with information on caring for yourself after your procedure. Your health care provider may also give you more specific instructions.  Your treatment has been planned according to current medical practices, but problems sometimes occur. Call your health care provider if you have any problems or questions after your procedure.  WHAT TO EXPECT AFTER THE PROCEDURE  After your procedure, it is typical to have the following:  · Abdominal pain. You will be given pain medicine to control it.  · Sore throat from the breathing tube that was inserted during surgery.  HOME CARE INSTRUCTIONS  · Only take over-the-counter or prescription medicines for pain, discomfort, or fever as directed by your health care provider.  · Do not take aspirin. It can cause bleeding.  · Do not drive when taking pain medicine.  · Follow your health care provider's advice regarding diet, exercise, lifting, driving, and general activities.  · Resume your usual diet as directed and allowed.  · Get plenty of rest and sleep.  · Do not douche, use tampons, or have sexual intercourse for at least 6 weeks, or until your health care provider gives you permission.  · Change your bandages (dressings) as directed by your health care provider.  · Monitor your temperature and notify your health care provider of a fever.  · Take showers instead of baths for 2-3 weeks.  · Do not drink alcohol until your health care provider gives you permission.  · If you develop constipation, you may take a mild laxative with your health care provider's permission. Bran foods may help with constipation problems. Drinking enough fluids to keep your urine clear or pale yellow may help as well.  · Try to have someone home with you for 1-2 weeks to help around the house.  · Keep all of your follow-up appointments as directed by your health care provider.  SEEK MEDICAL CARE IF:   · You have swelling, redness, or increasing pain around your incision sites.  · You have pus coming from your incision.  · You notice a bad smell coming from your incision.  · Your incision breaks open.  · You feel dizzy or  lightheaded.  · You have pain or bleeding when you urinate.  · You have persistent diarrhea.  · You have persistent nausea and vomiting.  · You have abnormal vaginal discharge.  · You have a rash.  · You have any type of abnormal reaction or develop an allergy to your medicine.  · You have poor pain control with your prescribed medicine.  SEEK IMMEDIATE MEDICAL CARE IF:   · You have a fever.  · You have severe abdominal pain.  · You have chest pain.  · You have shortness of breath.  · You faint.  · You have pain, swelling, or redness in your leg.  · You have heavy vaginal bleeding with blood clots.  MAKE SURE YOU:  · Understand these instructions.  · Will watch your condition.  · Will get help right away if you are not doing well or get worse.     This information is not intended to replace advice given to you by your health care provider. Make sure you discuss any questions you have with your health care provider.     Document Released: 12/06/2012 Document Revised: 12/23/2014 Document Reviewed: 07/03/2014  [employer] Interactive Patient Education ©2016 Elsevier Inc.    Managing Your High Blood Pressure  Blood pressure is a measurement of how forceful your blood is pressing against the walls of the arteries. Arteries are muscular tubes within the circulatory system. Blood pressure does not stay the same. Blood pressure rises when you are active, excited, or nervous; and it lowers during sleep and relaxation. If the numbers measuring your blood pressure stay above normal most of the time, you are at risk for health problems. High blood pressure (hypertension) is a long-term (chronic) condition in which blood pressure is elevated.  A blood pressure reading is recorded as two numbers, such as 120 over 80 (or 120/80). The first, higher number is called the systolic pressure. It is a measure of the pressure in your arteries as the heart beats. The second, lower number is called the diastolic pressure. It is a measure of  the pressure in your arteries as the heart relaxes between beats.   Keeping your blood pressure in a normal range is important to your overall health and prevention of health problems, such as heart disease and stroke. When your blood pressure is uncontrolled, your heart has to work harder than normal. High blood pressure is a very common condition in adults because blood pressure tends to rise with age. Men and women are equally likely to have hypertension but at different times in life. Before age 45, men are more likely to have hypertension. After 65 years of age, women are more likely to have it. Hypertension is especially common in  Americans. This condition often has no signs or symptoms. The cause of the condition is usually not known. Your caregiver can help you come up with a plan to keep your blood pressure in a normal, healthy range.  BLOOD PRESSURE STAGES  Blood pressure is classified into four stages: normal, prehypertension, stage 1, and stage 2. Your blood pressure reading will be used to determine what type of treatment, if any, is necessary. Appropriate treatment options are tied to these four stages:   Normal  · Systolic pressure (mm Hg): below 120.  · Diastolic pressure (mm Hg): below 80.  Prehypertension  · Systolic pressure (mm Hg): 120 to 139.  · Diastolic pressure (mm Hg): 80 to 89.  Stage 1  · Systolic pressure (mm Hg): 140 to 159.  · Diastolic pressure (mm Hg): 90 to 99.  Stage 2  · Systolic pressure (mm Hg): 160 or above.  · Diastolic pressure (mm Hg): 100 or above.  RISKS RELATED TO HIGH BLOOD PRESSURE  Managing your blood pressure is an important responsibility. Uncontrolled high blood pressure can lead to:  · A heart attack.  · A stroke.  · A weakened blood vessel (aneurysm).  · Heart failure.  · Kidney damage.  · Eye damage.  · Metabolic syndrome.  · Memory and concentration problems.  HOW TO MANAGE YOUR BLOOD PRESSURE  Blood pressure can be managed effectively with lifestyle  changes and medicines (if needed). Your caregiver will help you come up with a plan to bring your blood pressure within a normal range. Your plan should include the following:  Education  · Read all information provided by your caregivers about how to control blood pressure.  · Educate yourself on the latest guidelines and treatment recommendations. New research is always being done to further define the risks and treatments for high blood pressure.  Lifestyle changes  · Control your weight.  · Avoid smoking.  · Stay physically active.  · Reduce the amount of salt in your diet.  · Reduce stress.  · Control any chronic conditions, such as high cholesterol or diabetes.  · Reduce your alcohol intake.  Medicines  · Several medicines (antihypertensive medicines) are available, if needed, to bring blood pressure within a normal range.  Communication  · Review all the medicines you take with your caregiver because there may be side effects or interactions.  · Talk with your caregiver about your diet, exercise habits, and other lifestyle factors that may be contributing to high blood pressure.  · See your caregiver regularly. Your caregiver can help you create and adjust your plan for managing high blood pressure.  RECOMMENDATIONS FOR TREATMENT AND FOLLOW-UP   The following recommendations are based on current guidelines for managing high blood pressure in nonpregnant adults. Use these recommendations to identify the proper follow-up period or treatment option based on your blood pressure reading. You can discuss these options with your caregiver.  · Systolic pressure of 120 to 139 or diastolic pressure of 80 to 89: Follow up with your caregiver as directed.  · Systolic pressure of 140 to 160 or diastolic pressure of 90 to 100: Follow up with your caregiver within 2 months.  · Systolic pressure above 160 or diastolic pressure above 100: Follow up with your caregiver within 1 month.  · Systolic pressure above 180 or  diastolic pressure above 110: Consider antihypertensive therapy; follow up with your caregiver within 1 week.  · Systolic pressure above 200 or diastolic pressure above 120: Begin antihypertensive therapy; follow up with your caregiver within 1 week.     This information is not intended to replace advice given to you by your health care provider. Make sure you discuss any questions you have with your health care provider.     Document Released: 09/11/2013 Document Reviewed: 09/11/2013  [employer] Interactive Patient Education ©2016 Elsevier Inc.

## 2017-04-03 ENCOUNTER — HOSPITAL ENCOUNTER (EMERGENCY)
Facility: MEDICAL CENTER | Age: 32
End: 2017-04-03
Attending: EMERGENCY MEDICINE
Payer: COMMERCIAL

## 2017-04-03 ENCOUNTER — APPOINTMENT (OUTPATIENT)
Dept: RADIOLOGY | Facility: MEDICAL CENTER | Age: 32
End: 2017-04-03
Attending: EMERGENCY MEDICINE
Payer: COMMERCIAL

## 2017-04-03 ENCOUNTER — HOSPITAL ENCOUNTER (INPATIENT)
Facility: MEDICAL CENTER | Age: 32
LOS: 5 days | DRG: 863 | End: 2017-04-08
Attending: OBSTETRICS & GYNECOLOGY | Admitting: OBSTETRICS & GYNECOLOGY
Payer: COMMERCIAL

## 2017-04-03 VITALS
OXYGEN SATURATION: 95 % | TEMPERATURE: 98.1 F | RESPIRATION RATE: 16 BRPM | SYSTOLIC BLOOD PRESSURE: 71 MMHG | HEART RATE: 102 BPM | DIASTOLIC BLOOD PRESSURE: 43 MMHG | HEIGHT: 72 IN | WEIGHT: 194 LBS | BODY MASS INDEX: 26.28 KG/M2

## 2017-04-03 DIAGNOSIS — R10.84 GENERALIZED ABDOMINAL PAIN: ICD-10-CM

## 2017-04-03 LAB
ALBUMIN SERPL BCP-MCNC: 4 G/DL (ref 3.2–4.9)
ALBUMIN/GLOB SERPL: 1.5 G/DL
ALP SERPL-CCNC: 52 U/L (ref 30–99)
ALT SERPL-CCNC: 24 U/L (ref 2–50)
ANION GAP SERPL CALC-SCNC: 11 MMOL/L (ref 0–11.9)
APPEARANCE UR: CLEAR
AST SERPL-CCNC: 28 U/L (ref 12–45)
BACTERIA #/AREA URNS HPF: ABNORMAL /HPF
BASOPHILS # BLD AUTO: 0.2 % (ref 0–1.8)
BASOPHILS # BLD: 0.03 K/UL (ref 0–0.12)
BILIRUB SERPL-MCNC: 1 MG/DL (ref 0.1–1.5)
BILIRUB UR QL STRIP.AUTO: NEGATIVE
BUN SERPL-MCNC: 8 MG/DL (ref 8–22)
CALCIUM SERPL-MCNC: 8.6 MG/DL (ref 8.4–10.2)
CHLORIDE SERPL-SCNC: 101 MMOL/L (ref 96–112)
CO2 SERPL-SCNC: 23 MMOL/L (ref 20–33)
COLOR UR: YELLOW
CREAT SERPL-MCNC: 1.06 MG/DL (ref 0.5–1.4)
CULTURE IF INDICATED INDCX: YES UA CULTURE
EOSINOPHIL # BLD AUTO: 0 K/UL (ref 0–0.51)
EOSINOPHIL NFR BLD: 0 % (ref 0–6.9)
EPI CELLS #/AREA URNS HPF: ABNORMAL /HPF
ERYTHROCYTE [DISTWIDTH] IN BLOOD BY AUTOMATED COUNT: 44.7 FL (ref 35.9–50)
GFR SERPL CREATININE-BSD FRML MDRD: 59 ML/MIN/1.73 M 2
GLOBULIN SER CALC-MCNC: 2.7 G/DL (ref 1.9–3.5)
GLUCOSE SERPL-MCNC: 143 MG/DL (ref 65–99)
GLUCOSE UR STRIP.AUTO-MCNC: NEGATIVE MG/DL
HCT VFR BLD AUTO: 31.5 % (ref 37–47)
HGB BLD-MCNC: 11 G/DL (ref 12–16)
IMM GRANULOCYTES # BLD AUTO: 0.06 K/UL (ref 0–0.11)
IMM GRANULOCYTES NFR BLD AUTO: 0.4 % (ref 0–0.9)
KETONES UR STRIP.AUTO-MCNC: ABNORMAL MG/DL
LEUKOCYTE ESTERASE UR QL STRIP.AUTO: NEGATIVE
LYMPHOCYTES # BLD AUTO: 1.07 K/UL (ref 1–4.8)
LYMPHOCYTES NFR BLD: 7.8 % (ref 22–41)
MCH RBC QN AUTO: 31.2 PG (ref 27–33)
MCHC RBC AUTO-ENTMCNC: 34.9 G/DL (ref 33.6–35)
MCV RBC AUTO: 89.2 FL (ref 81.4–97.8)
MICRO URNS: ABNORMAL
MONOCYTES # BLD AUTO: 0.93 K/UL (ref 0–0.85)
MONOCYTES NFR BLD AUTO: 6.8 % (ref 0–13.4)
MUCOUS THREADS #/AREA URNS HPF: ABNORMAL /HPF
NEUTROPHILS # BLD AUTO: 11.63 K/UL (ref 2–7.15)
NEUTROPHILS NFR BLD: 84.8 % (ref 44–72)
NITRITE UR QL STRIP.AUTO: NEGATIVE
NRBC # BLD AUTO: 0 K/UL
NRBC BLD AUTO-RTO: 0 /100 WBC
PH UR STRIP.AUTO: 7.5 [PH]
PLATELET # BLD AUTO: 260 K/UL (ref 164–446)
PMV BLD AUTO: 9.5 FL (ref 9–12.9)
POTASSIUM SERPL-SCNC: 3.1 MMOL/L (ref 3.6–5.5)
PROT SERPL-MCNC: 6.7 G/DL (ref 6–8.2)
PROT UR QL STRIP: NEGATIVE MG/DL
RBC # BLD AUTO: 3.53 M/UL (ref 4.2–5.4)
RBC # URNS HPF: ABNORMAL /HPF
RBC UR QL AUTO: ABNORMAL
SODIUM SERPL-SCNC: 135 MMOL/L (ref 135–145)
SP GR UR STRIP.AUTO: 1.01
WBC # BLD AUTO: 13.7 K/UL (ref 4.8–10.8)
WBC #/AREA URNS HPF: ABNORMAL /HPF

## 2017-04-03 PROCEDURE — 700111 HCHG RX REV CODE 636 W/ 250 OVERRIDE (IP)

## 2017-04-03 PROCEDURE — 81001 URINALYSIS AUTO W/SCOPE: CPT

## 2017-04-03 PROCEDURE — 700117 HCHG RX CONTRAST REV CODE 255: Performed by: EMERGENCY MEDICINE

## 2017-04-03 PROCEDURE — 74177 CT ABD & PELVIS W/CONTRAST: CPT

## 2017-04-03 PROCEDURE — 96375 TX/PRO/DX INJ NEW DRUG ADDON: CPT

## 2017-04-03 PROCEDURE — 85025 COMPLETE CBC W/AUTO DIFF WBC: CPT

## 2017-04-03 PROCEDURE — 302129 PCA PLUS: Performed by: OBSTETRICS & GYNECOLOGY

## 2017-04-03 PROCEDURE — 770006 HCHG ROOM/CARE - MED/SURG/GYN SEMI*

## 2017-04-03 PROCEDURE — 700111 HCHG RX REV CODE 636 W/ 250 OVERRIDE (IP): Performed by: EMERGENCY MEDICINE

## 2017-04-03 PROCEDURE — 99285 EMERGENCY DEPT VISIT HI MDM: CPT

## 2017-04-03 PROCEDURE — 80053 COMPREHEN METABOLIC PANEL: CPT

## 2017-04-03 PROCEDURE — 87086 URINE CULTURE/COLONY COUNT: CPT

## 2017-04-03 PROCEDURE — 96374 THER/PROPH/DIAG INJ IV PUSH: CPT

## 2017-04-03 PROCEDURE — 700105 HCHG RX REV CODE 258: Performed by: EMERGENCY MEDICINE

## 2017-04-03 PROCEDURE — 36415 COLL VENOUS BLD VENIPUNCTURE: CPT

## 2017-04-03 PROCEDURE — 96361 HYDRATE IV INFUSION ADD-ON: CPT

## 2017-04-03 RX ORDER — SODIUM CHLORIDE, SODIUM LACTATE, POTASSIUM CHLORIDE, CALCIUM CHLORIDE 600; 310; 30; 20 MG/100ML; MG/100ML; MG/100ML; MG/100ML
INJECTION, SOLUTION INTRAVENOUS
Status: COMPLETED
Start: 2017-04-03 | End: 2017-04-03

## 2017-04-03 RX ORDER — ONDANSETRON 2 MG/ML
4 INJECTION INTRAMUSCULAR; INTRAVENOUS ONCE
Status: COMPLETED | OUTPATIENT
Start: 2017-04-03 | End: 2017-04-03

## 2017-04-03 RX ORDER — MORPHINE SULFATE 4 MG/ML
INJECTION, SOLUTION INTRAMUSCULAR; INTRAVENOUS
Status: COMPLETED
Start: 2017-04-03 | End: 2017-04-03

## 2017-04-03 RX ORDER — ONDANSETRON 2 MG/ML
INJECTION INTRAMUSCULAR; INTRAVENOUS
Status: COMPLETED
Start: 2017-04-03 | End: 2017-04-03

## 2017-04-03 RX ORDER — SODIUM CHLORIDE 9 MG/ML
1000 INJECTION, SOLUTION INTRAVENOUS ONCE
Status: COMPLETED | OUTPATIENT
Start: 2017-04-03 | End: 2017-04-03

## 2017-04-03 RX ORDER — MORPHINE SULFATE 4 MG/ML
4 INJECTION, SOLUTION INTRAMUSCULAR; INTRAVENOUS ONCE
Status: COMPLETED | OUTPATIENT
Start: 2017-04-04 | End: 2017-04-03

## 2017-04-03 RX ORDER — ONDANSETRON 2 MG/ML
4 INJECTION INTRAMUSCULAR; INTRAVENOUS EVERY 4 HOURS PRN
Status: DISCONTINUED | OUTPATIENT
Start: 2017-04-03 | End: 2017-04-08 | Stop reason: HOSPADM

## 2017-04-03 RX ORDER — SODIUM CHLORIDE, SODIUM LACTATE, POTASSIUM CHLORIDE, CALCIUM CHLORIDE 600; 310; 30; 20 MG/100ML; MG/100ML; MG/100ML; MG/100ML
1000 INJECTION, SOLUTION INTRAVENOUS CONTINUOUS
Status: DISCONTINUED | OUTPATIENT
Start: 2017-04-04 | End: 2017-04-08 | Stop reason: HOSPADM

## 2017-04-03 RX ORDER — ACETAMINOPHEN 500 MG
500 TABLET ORAL EVERY 6 HOURS PRN
Status: DISCONTINUED | OUTPATIENT
Start: 2017-04-03 | End: 2017-04-08 | Stop reason: HOSPADM

## 2017-04-03 RX ORDER — AMPICILLIN 2 G/1
2 INJECTION, POWDER, FOR SOLUTION INTRAVENOUS EVERY 6 HOURS
Status: DISCONTINUED | OUTPATIENT
Start: 2017-04-04 | End: 2017-04-03

## 2017-04-03 RX ADMIN — MORPHINE SULFATE 4 MG: 4 INJECTION, SOLUTION INTRAMUSCULAR; INTRAVENOUS at 23:47

## 2017-04-03 RX ADMIN — HYDROMORPHONE HYDROCHLORIDE 1 MG: 1 INJECTION, SOLUTION INTRAMUSCULAR; INTRAVENOUS; SUBCUTANEOUS at 18:41

## 2017-04-03 RX ADMIN — MORPHINE SULFATE 4 MG: 4 INJECTION INTRAVENOUS at 23:47

## 2017-04-03 RX ADMIN — IOHEXOL 100 ML: 350 INJECTION, SOLUTION INTRAVENOUS at 20:07

## 2017-04-03 RX ADMIN — SODIUM CHLORIDE, POTASSIUM CHLORIDE, SODIUM LACTATE AND CALCIUM CHLORIDE 1000 ML: 600; 310; 30; 20 INJECTION, SOLUTION INTRAVENOUS at 23:47

## 2017-04-03 RX ADMIN — SODIUM CHLORIDE 1000 ML: 9 INJECTION, SOLUTION INTRAVENOUS at 18:05

## 2017-04-03 RX ADMIN — ONDANSETRON 4 MG: 2 INJECTION, SOLUTION INTRAMUSCULAR; INTRAVENOUS at 18:05

## 2017-04-03 RX ADMIN — ONDANSETRON 4 MG: 2 INJECTION INTRAMUSCULAR; INTRAVENOUS at 23:46

## 2017-04-03 RX ADMIN — ONDANSETRON 4 MG: 2 INJECTION, SOLUTION INTRAMUSCULAR; INTRAVENOUS at 23:46

## 2017-04-03 ASSESSMENT — PAIN SCALES - GENERAL
PAINLEVEL_OUTOF10: 9
PAINLEVEL_OUTOF10: 9

## 2017-04-03 NOTE — IP AVS SNAPSHOT
" <p align=\"LEFT\"><IMG SRC=\"//[email or web address]\" alt=\"Image\" WIDTH=\"50%\" HEIGHT=\"200\" BORDER=\"\"></p>                   Name:Joy Mcnamara  Medical Record Number:[id number]  CSN: [id number]    YOB: 1985   Age: 32 y.o.  Sex: female  HT:1.829 m (6') WT: 87.998 kg (194 lb)          Admit Date: 4/3/2017     Discharge Date:   Today's Date: 4/8/2017  Attending Doctor:  Hemant Christopher M.D.                  Allergies:  Sumatriptan succinate and Tramadol          Your appointments     May 01, 2017 10:00 AM   Established Patient with ILYA May   [facility] (--)    [address] [address] [id number] [phone]           You will be receiving a confirmation call a few days before your appointment from our automated call confirmation system.                 Medication List      Take these Medications        Instructions    cefUROXime 500 MG Tabs   Commonly known as:  CEFTIN    Take 1 Tab by mouth every 12 hours.   Dose:  500 mg       clindamycin 150 MG Caps   Commonly known as:  CLEOCIN    Take 3 Caps by mouth 4 times a day.   Dose:  450 mg         Ask your Physician about these medications        Instructions    acetaminophen/caffeine/butalbital 300-40-50 mg -40 MG Caps capsule   Commonly known as:  FIORICET    Take 1-2 Caps by mouth every 6 hours as needed for Headache.   Dose:  1-2 Cap       BIOTIN PO    Take  by mouth every day. 10,000 mcg       Cranberry 1000 MG Caps    Take 4,200 mg by mouth every day.   Dose:  4200 mg       cyanocobalamin 1000 MCG/ML Soln   Commonly known as:  VITAMIN B-12    1 mL by Intramuscular route every 30 days. Please administer to patient (she reports walbaeens does this?)   Dose:  1000 mcg       fentanyl 75 MCG/HR Pt72   Commonly known as:  DURAGESIC    Doctor's comments:  May fill 2/24/2017   Apply 1 Patch to skin as directed every 48 hours.   Dose:  1 Patch       hydrocodone-acetaminophen 5-325 MG Tabs " per tablet   Commonly known as:  NORCO    Doctor's comments:  May fill 4/17/2017   Take 1 Tab by mouth every 12 hours as needed.   Dose:  1 Tab       Melatonin 10 MG Tabs    Take  by mouth as needed.       NIFEdipine 30 MG CR tablet   Commonly known as:  ADALAT CC    Take 1 Tab by mouth every day.   Dose:  30 mg       oxycodone-acetaminophen 5-325 MG Tabs   Commonly known as:  PERCOCET    Take 1-2 Tabs by mouth every 6 hours as needed. For post-operative pain.  Do not take norco for 1-2 weeks while taking percocet.   Dose:  1-2 Tab       promethazine 25 MG Tabs   Commonly known as:  PHENERGAN    TAKE 1 TABLET BY MOUTH EVERY 6 HOURS AS NEEDED FOR NAUSEA OR VOMITING       tizanidine 4 MG Tabs   Commonly known as:  ZANAFLEX    TAKE 1 TABLET BY MOUTH EVERY 6 HOURS AS NEEDED       Vitamin D3 400 UNITS Caps    Take 1 Cap by mouth every day.   Dose:  1 Cap

## 2017-04-03 NOTE — IP AVS SNAPSHOT
Kekanto Access Code: Activation code not generated  Current Kekanto Status: Active    [employer] [place] [employer] (MCTAS: [place])hart  A secure, online tool to manage your health information     [employer]’s Kekanto® is a secure, online tool that connects you to your personalized health information from the privacy of your home -- day or night - making it very easy for you to manage your healthcare. Once the activation process is completed, you can even access your medical information using the Kekanto rajiv, which is available for free in the Apple Rajiv store or Google Play store.     Kekanto provides the following levels of access (as shown below):   My Chart Features   [facility] Primary Care Doctor [facility]  Specialists [facility]  Urgent  Care Non-[facility]  Primary Care  Doctor   Email your healthcare team securely and privately 24/7 X X X X   Manage appointments: schedule your next appointment; view details of past/upcoming appointments X      Request prescription refills. X      View recent personal medical records, including lab and immunizations X X X X   View health record, including health history, allergies, medications X X X X   Read reports about your outpatient visits, procedures, consult and ER notes X X X X   See your discharge summary, which is a recap of your hospital and/or ER visit that includes your diagnosis, lab results, and care plan. X X       How to register for Kekanto:  1. Go to  [email or web address].  2. Click on the Sign Up Now box, which takes you to the New Member Sign Up page. You will need to provide the following information:  a. Enter your Kekanto Access Code exactly as it appears at the top of this page. (You will not need to use this code after you’ve completed the sign-up process. If you do not sign up before the expiration date, you must request a new code.)   b. Enter your date of birth.   c. Enter your home email address.   d. Click Submit, and follow the next screen’s instructions.  3. Create a Kekanto ID. This will  be your [employer] login ID and cannot be changed, so think of one that is secure and easy to remember.  4. Create a [employer] password. You can change your password at any time.  5. Enter your Password Reset Question and Answer. This can be used at a later time if you forget your password.   6. Enter your e-mail address. This allows you to receive e-mail notifications when new information is available in [employer].  7. Click Sign Up. You can now view your health information.    For assistance activating your [employer] account, call [phone]

## 2017-04-03 NOTE — IP AVS SNAPSHOT
4/8/2017          Joy Mcnamara  [address]  [place] [address]    Dear Joy:    [facility] wants to ensure your discharge home is safe and you or your loved ones have had all your questions answered regarding your care after you leave the hospital.    You may receive a telephone call within two days of your discharge.  This call is to make certain you understand your discharge instructions as well as ensure we provided you with the best care possible during your stay with us.     The call will only last approximately 3-5 minutes and will be done by a nurse.    Once again, we want to ensure your discharge home is safe and that you have a clear understanding of any next steps in your care.  If you have any questions or concerns, please do not hesitate to contact us, we are here for you.  Thank you for choosing [facility] for your healthcare needs.    Sincerely,    Jose Woodward    [facility]

## 2017-04-03 NOTE — IP AVS SNAPSHOT
Home Care Instructions                                                                                                                  Name:Joy Mcnamara  Medical Record Number:[id number]  CSN: [id number]    YOB: 1985   Age: 32 y.o.  Sex: female  HT:1.829 m (6') WT: 87.998 kg (194 lb)          Admit Date: 4/3/2017     Discharge Date:   Today's Date: 4/8/2017  Attending Doctor:  Hemant Christopher M.D.                  Allergies:  Sumatriptan succinate and Tramadol            Discharge Instructions       Discharge Instructions    Discharged to home by car with relative. Discharged via walking, hospital escort: Refused.  Special equipment needed: Not Applicable    Be sure to schedule a follow-up appointment with your primary care doctor or any specialists as instructed.     Discharge Plan:   Diet Plan: Discussed  Activity Level: Discussed  Confirmed Follow up Appointment: Appointment Scheduled  Confirmed Symptoms Management: Discussed  Medication Reconciliation Updated: Yes  Influenza Vaccine Indication: Not indicated: Previously immunized this influenza season and > 8 years of age    I understand that a diet low in cholesterol, fat, and sodium is recommended for good health. Unless I have been given specific instructions below for another diet, I accept this instruction as my diet prescription.   Other diet: Regular    Special Instructions: None    · Is patient discharged on Warfarin / Coumadin?   No     · Is patient Post Blood Transfusion?  No    Hysterectomy Information   A hysterectomy is a surgery in which your uterus is removed. This surgery may be done to treat various medical problems. After the surgery, you will no longer have menstrual periods. The surgery will also make you unable to become pregnant (sterile). The fallopian tubes and ovaries can be removed (bilateral salpingo-oophorectomy) during this surgery as well.   REASONS FOR A HYSTERECTOMY  · Persistent, abnormal bleeding.  · Lasting  (chronic) pelvic pain or infection.  · The lining of the uterus (endometrium) starts growing outside the uterus (endometriosis).  · The endometrium starts growing in the muscle of the uterus (adenomyosis).  · The uterus falls down into the vagina (pelvic organ prolapse).  · Noncancerous growths in the uterus (uterine fibroids) that cause symptoms.  · Precancerous cells.  · Cervical cancer or uterine cancer.  TYPES OF HYSTERECTOMIES  1. Supracervical hysterectomy--In this type, the top part of the uterus is removed, but not the cervix.  2. Total hysterectomy--The uterus and cervix are removed.  3. Radical hysterectomy--The uterus, the cervix, and the fibrous tissue that holds the uterus in place in the pelvis (parametrium) are removed.  WAYS A HYSTERECTOMY CAN BE PERFORMED  · Abdominal hysterectomy--A large surgical cut (incision) is made in the abdomen. The uterus is removed through this incision.  · Vaginal hysterectomy--An incision is made in the vagina. The uterus is removed through this incision. There are no abdominal incisions.  · Conventional laparoscopic hysterectomy--Three or four small incisions are made in the abdomen. A thin, lighted tube with a camera (laparoscope) is inserted into one of the incisions. Other tools are put through the other incisions. The uterus is cut into small pieces. The small pieces are removed through the incisions, or they are removed through the vagina.  · Laparoscopically assisted vaginal hysterectomy (LAVH)--Three or four small incisions are made in the abdomen. Part of the surgery is performed laparoscopically and part vaginally. The uterus is removed through the vagina.  · Robot-assisted laparoscopic hysterectomy--A laparoscope and other tools are inserted into 3 or 4 small incisions in the abdomen. A computer-controlled device is used to give the surgeon a 3D image and to help control the surgical instruments. This allows for more precise movements of surgical instruments.  The uterus is cut into small pieces and removed through the incisions or removed through the vagina.  RISKS AND COMPLICATIONS   Possible complications associated with this procedure include:  · Bleeding and risk of blood transfusion. Tell your health care provider if you do not want to receive any blood products.  · Blood clots in the legs or lung.  · Infection.  · Injury to surrounding organs.  · Problems or side effects related to anesthesia.  · Conversion to an abdominal hysterectomy from one of the other techniques.  WHAT TO EXPECT AFTER A HYSTERECTOMY  · You will be given pain medicine.  · You will need to have someone with you for the first 3-5 days after you go home.  · You will need to follow up with your surgeon in 2-4 weeks after surgery to evaluate your progress.  · You may have early menopause symptoms such as hot flashes, night sweats, and insomnia.  · If you had a hysterectomy for a problem that was not cancer or not a condition that could lead to cancer, then you no longer need Pap tests. However, even if you no longer need a Pap test, a regular exam is a good idea to make sure no other problems are starting.     This information is not intended to replace advice given to you by your health care provider. Make sure you discuss any questions you have with your health care provider.     Document Released: 06/13/2002 Document Revised: 10/08/2014 Document Reviewed: 08/25/2014  [employer] Interactive Patient Education ©2016 [employer]      Depression / Suicide Risk    As you are discharged from this [facility] facility, it is important to learn how to keep safe from harming yourself.    Recognize the warning signs:  · Abrupt changes in personality, positive or negative- including increase in energy   · Giving away possessions  · Change in eating patterns- significant weight changes-  positive or negative  · Change in sleeping patterns- unable to sleep or sleeping all the time   · Unwillingness or  inability to communicate  · Depression  · Unusual sadness, discouragement and loneliness  · Talk of wanting to die  · Neglect of personal appearance   · Rebelliousness- reckless behavior  · Withdrawal from people/activities they love  · Confusion- inability to concentrate     If you or a loved one observes any of these behaviors or has concerns about self-harm, here's what you can do:  · Talk about it- your feelings and reasons for harming yourself  · Remove any means that you might use to hurt yourself (examples: pills, rope, extension cords, firearm)  · Get professional help from the community (Mental Health, Substance Abuse, psychological counseling)  · Do not be alone:Call your Safe Contact- someone whom you trust who will be there for you.  · Call your local CRISIS HOTLINE 405-3888 or 296-916-1330  · Call your local Children's Mobile Crisis Response Team [place] (498) 199-6145 or www.Anda  · Call the toll free National Suicide Prevention Hotlines   · National Suicide Prevention Lifeline 029-121-FHJR (6793)  · National Hope Line Network 800-SUICIDE (006-3152)    Infection Control in the Home  If you have an infection or you are taking care of someone who has an infection, it is important to know how to keep the infection from spreading. Follow these guidelines to help stop the spread of infection, and talk to your health care provider.  HOW ARE INFECTIONS SPREAD?  In order for an infection to spread, the following must be present:  · A germ. This may be a virus, bacteria, fungus, or parasite.  · A place for the germ to live. This may be:  ¨ On or in a person, animal, plant, or food.  ¨ In soil or water.  ¨ On surfaces, such as a door handle.  · A susceptible host. This is a person or animal who does not have resistance (immunity) to the germ.  · A way for the germ to enter the host. This may occur by:  ¨ Direct contact. This may happen by making contact--such as shaking hands or hugging--with  an infected person or animal. Some germs can also travel through the air and spread to you if an infected person coughs or sneezes on you or near you.  ¨ Indirect contact. This is when the germ enters the host through contact with an infected object. Examples include eating contaminated food, drinking contaminated water, or touching a contaminated surface with your hands and then touching your face, nose, or mouth soon after that.  HOW CAN I HELP TO PREVENT INFECTION FROM SPREADING?  There are several things that you can do to help prevent infection from spreading.  Hand Washing  It is very important to wash your hands correctly, following these steps:  4. Wet your hands with clean, running water.  5. Apply soap to your hands. Liquid soap is better than bar soap.  6. Rub your hands together quickly to create lather.  7. Keep rubbing your hands together for at least 20 seconds. Thoroughly scrub all parts of your hands, including under your fingernails and between your fingers.  8. Rinse your hands with clean, running water until all of the soap is gone.  9. Dry your hands with an air dryer or a clean paper or cloth towel, or let your hands air-dry. Do not use your clothing or a soiled towel to dry your hands.  10. If you are in a public restroom, use your towel to turn off the water faucet and to open the bathroom door.  Make sure to wash your hands:  · Before:  ¨ Visiting a baby or anyone with a weakened or lowered defense (immune) system.  ¨ Putting in and taking out any contact lenses.  · After:  ¨ Working or playing outside.  ¨ Touching an animal or its toys or leash.  ¨ Handling livestock.  ¨ Using the bathroom or helping a child or adult to use the bathroom.  ¨ Using household  or toxic chemicals.  ¨ Touching or taking out the garbage.  ¨ Touching anything dirty around your home.  ¨ Handling soiled clothes or rags.  ¨ Taking care of a sick child. This includes touching used tissues, toys, and  clothes.  ¨ Sneezing, coughing, or blowing your nose.  ¨ Using public transportation.  ¨ Shaking hands.  ¨ Using a phone, including your mobile phone.  ¨ Touching money.  · Before and after:  ¨ Preparing food.  ¨ Preparing a bottle for a baby.  ¨ Feeding a baby or a young child.  ¨ Eating.  ¨ Visiting or taking care of someone who is sick.  ¨ Changing a diaper.  ¨ Changing a bandage (dressing) or taking care of an injury or wound.  ¨ Giving or taking medicine.  Taking Care of Your Home  · Make sure that you have enough cleaning supplies at all times. These include:  ¨ Disinfectants.  ¨ Reusable cleaning cloths. Wash these after each use.  ¨ Paper towels.  ¨ Utility gloves. Replace your gloves if they are cracked or torn or if they start to peel.  · Use bleach safely. Never mix it with other cleaning products, especially those that contain ammonia. This mixture can create a dangerous gas that may be deadly.  · Take care of your cleaning supplies. Toilet brushes, mops, and sponges can breed germs. Soak them in bleach and water for 5 minutes after each use.  · Do not pour used mop water down the sink. Pour it down the toilet instead.  · Maintain proper ventilation in your home.  · If you have a pet, ensure that your pet stays clean. Do not let people with weak immune systems touch bird droppings, fish tank water, or a litter box.  ¨ If you have a cat, be sure to change the litter every day.  · In the bathroom, make sure you:  ¨ Provide liquid soap.  ¨ Change towels and washcloths frequently. Avoid sharing towels and washcloths.  ¨ Change toothbrushes often and store them separately in a clean, dry place.  ¨ Disinfect the toilet.  ¨ Clean the tub, shower, and sink with standard cleaning products.    ¨ Mop the floor with a standard .  ¨ Do not share personal items, such as razors, toothbrushes, drinking glasses, deodorant, aguirre, brushes, towels, and washcloths.    · In the kitchen, make sure you:  ¨ Store food  carefully.  ¨ Refrigerate leftovers promptly in covered containers.  ¨ Throw out stale or spoiled food.  ¨ Clean the inside of your refrigerator each week.  ¨ Keep your refrigerator set at 40°F (4°C) or less, and set your freezer at 0°F (-18°C) or less.  ¨ Thaw foods in the refrigerator or microwave, not at room temperature.  ¨ Serve foods at the proper temperature. Do not eat raw meat. Make sure it is cooked to the appropriate temperature. Cook eggs until they are firm.  ¨ Wash fruits and vegetables under running water.  ¨ Use separate cutting boards, plates, and utensils for raw foods and cooked foods.  ¨ Keep work surfaces clean.  ¨ Use a clean spoon each time you sample food while cooking.  ¨ Wash your dishes in hot, soapy water. Air-dry your dishes or use a .  ¨ Do not share forks, cups, or spoons during meals.  · Wear gloves if laundry is visibly soiled.  · Change linens each week or whenever they are soiled.  · Do not shake soiled linens. Doing that may send germs into the air. Put dressings, sanitary or incontinence pads, diapers, and gloves in plastic garbage bags for disposal.     This information is not intended to replace advice given to you by your health care provider. Make sure you discuss any questions you have with your health care provider.     Document Released: 09/26/2009 Document Revised: 01/08/2016 Document Reviewed: 08/20/2015  [employer] Interactive Patient Education ©2016 [employer]          Your appointments     May 01, 2017 10:00 AM   Established Patient with ILYA May   [facility] (--)    [address] [place] [address]   [phone]           You will be receiving a confirmation call a few days before your appointment from our automated call confirmation system.                 Discharge Medication Instructions:    Below are the medications your physician expects you to take upon discharge:    Review all your home  medications and newly ordered medications with your doctor and/or pharmacist. Follow medication instructions as directed by your doctor and/or pharmacist.    Please keep your medication list with you and share with your physician.               Medication List      START taking these medications        Instructions    cefUROXime 500 MG Tabs   Last time this was given:  500 mg on 4/8/2017  8:41 AM   Commonly known as:  CEFTIN    Take 1 Tab by mouth every 12 hours.   Dose:  500 mg       clindamycin 150 MG Caps   Last time this was given:  450 mg on 4/8/2017 12:15 PM   Commonly known as:  CLEOCIN    Take 3 Caps by mouth 4 times a day.   Dose:  450 mg         ASK your doctor about these medications        Instructions    acetaminophen/caffeine/butalbital 300-40-50 mg -40 MG Caps capsule   Commonly known as:  FIORICET    Take 1-2 Caps by mouth every 6 hours as needed for Headache.   Dose:  1-2 Cap       BIOTIN PO    Take  by mouth every day. 10,000 mcg       Cranberry 1000 MG Caps    Take 4,200 mg by mouth every day.   Dose:  4200 mg       cyanocobalamin 1000 MCG/ML Soln   Commonly known as:  VITAMIN B-12    1 mL by Intramuscular route every 30 days. Please administer to patient (she reports walbaeens does this?)   Dose:  1000 mcg       fentanyl 75 MCG/HR Pt72   Last time this was given:  1 Patch on 4/7/2017  8:52 PM   Commonly known as:  DURAGESIC    Doctor's comments:  May fill 2/24/2017   Apply 1 Patch to skin as directed every 48 hours.   Dose:  1 Patch       hydrocodone-acetaminophen 5-325 MG Tabs per tablet   Commonly known as:  NORCO    Doctor's comments:  May fill 4/17/2017   Take 1 Tab by mouth every 12 hours as needed.   Dose:  1 Tab       Melatonin 10 MG Tabs    Take  by mouth as needed.       NIFEdipine 30 MG CR tablet   Commonly known as:  ADALAT CC    Take 1 Tab by mouth every day.   Dose:  30 mg       oxycodone-acetaminophen 5-325 MG Tabs   Commonly known as:  PERCOCET    Take 1-2 Tabs by mouth every  6 hours as needed. For post-operative pain.  Do not take norco for 1-2 weeks while taking percocet.   Dose:  1-2 Tab       promethazine 25 MG Tabs   Commonly known as:  PHENERGAN    TAKE 1 TABLET BY MOUTH EVERY 6 HOURS AS NEEDED FOR NAUSEA OR VOMITING       tizanidine 4 MG Tabs   Last time this was given:  4 mg on 4/7/2017 11:56 PM   Commonly known as:  ZANAFLEX    TAKE 1 TABLET BY MOUTH EVERY 6 HOURS AS NEEDED       Vitamin D3 400 UNITS Caps    Take 1 Cap by mouth every day.   Dose:  1 Cap               Instructions           Diet / Nutrition:    Follow any diet instructions given to you by your doctor or the dietician, including how much salt (sodium) you are allowed each day.    If you are overweight, talk to your doctor about a weight reduction plan.    Activity:    Remain physically active following your doctor's instructions about exercise and activity.    Rest often.     Any time you become even a little tired or short of breath, SIT DOWN and rest.    Worsening Symptoms:    Report any of the following signs and symptoms to the doctor's office immediately:    *Pain of jaw, arm, or neck  *Chest pain not relieved by medication                               *Dizziness or loss of consciousness  *Difficulty breathing even when at rest   *More tired than usual                                       *Bleeding drainage or swelling of surgical site  *Swelling of feet, ankles, legs or stomach                 *Fever (>100ºF)  *Pink or blood tinged sputum  *Weight gain (3lbs/day or 5lbs /week)           *Shock from internal defibrillator (if applicable)  *Palpitations or irregular heartbeats                *Cool and/or numb extremities    Stroke Awareness    Common Risk Factors for Stroke include:    Age  Atrial Fibrillation  Carotid Artery Stenosis  Diabetes Mellitus  Excessive alcohol consumption  High blood pressure  Overweight   Physical inactivity  Smoking    Warning signs and symptoms of a stroke include:    *Sudden  numbness or weakness of the face, arm or leg (especially on one side of the body).  *Sudden confusion, trouble speaking or understanding.  *Sudden trouble seeing in one or both eyes.  *Sudden trouble walking, dizziness, loss of balance or coordination.Sudden severe headache with no known cause.    It is very important to get treatment quickly when a stroke occurs. If you experience any of the above warning signs, call 911 immediately.                   Disclaimer         Quit Smoking / Tobacco Use:    I understand the use of any tobacco products increases my chance of suffering from future heart disease or stroke and could cause other illnesses which may shorten my life. Quitting the use of tobacco products is the single most important thing I can do to improve my health. For further information on smoking / tobacco cessation call a Toll Free Quit Line at 1-307.142.7667 (*National Cancer [place]) or 1-336.832.6695 (American Lung Association) or you can access the web based program at www.lungAntriaBio.org.    [place] Tobacco Users Help Line:  (800) 197-1778       Toll Free: 1-823.959.2410  Quit Tobacco Program [facility] (836)318-6692    Crisis Hotline:    [place] Crisis Hotline:  7-250-ZLPACNE or 1-158.572.1218    [place] Crisis Hotline:    1-912.816.5120 or 605-892-0344    Discharge Survey:   Thank you for choosing [facility]. We hope we did everything we could to make your hospital stay a pleasant one. You may be receiving a phone survey and we would appreciate your time and participation in answering the questions. Your input is very valuable to us in our efforts to improve our service to our patients and their families.        My signature on this form indicates that:    1. I have reviewed and understand the above information.  2. My questions regarding this information have been answered to my satisfaction.  3. I have formulated a plan with my discharge nurse to obtain my prescribed  medications for home.                  Disclaimer         __________________________________                     __________       ________                       Patient Signature                                                 Date                    Time

## 2017-04-04 LAB
ALBUMIN SERPL BCP-MCNC: 4.2 G/DL (ref 3.2–4.9)
ALBUMIN/GLOB SERPL: 1.4 G/DL
ALP SERPL-CCNC: 50 U/L (ref 30–99)
ALT SERPL-CCNC: 18 U/L (ref 2–50)
ANION GAP SERPL CALC-SCNC: 10 MMOL/L (ref 0–11.9)
AST SERPL-CCNC: 18 U/L (ref 12–45)
BASOPHILS # BLD AUTO: 0.2 % (ref 0–1.8)
BASOPHILS # BLD: 0.03 K/UL (ref 0–0.12)
BILIRUB SERPL-MCNC: 1 MG/DL (ref 0.1–1.5)
BUN SERPL-MCNC: 9 MG/DL (ref 8–22)
CALCIUM SERPL-MCNC: 9.3 MG/DL (ref 8.5–10.5)
CHLORIDE SERPL-SCNC: 106 MMOL/L (ref 96–112)
CO2 SERPL-SCNC: 21 MMOL/L (ref 20–33)
CREAT SERPL-MCNC: 0.63 MG/DL (ref 0.5–1.4)
EOSINOPHIL # BLD AUTO: 0 K/UL (ref 0–0.51)
EOSINOPHIL NFR BLD: 0 % (ref 0–6.9)
ERYTHROCYTE [DISTWIDTH] IN BLOOD BY AUTOMATED COUNT: 43.8 FL (ref 35.9–50)
GFR SERPL CREATININE-BSD FRML MDRD: >60 ML/MIN/1.73 M 2
GLOBULIN SER CALC-MCNC: 3 G/DL (ref 1.9–3.5)
GLUCOSE SERPL-MCNC: 115 MG/DL (ref 65–99)
HCT VFR BLD AUTO: 30.6 % (ref 37–47)
HGB BLD-MCNC: 10.6 G/DL (ref 12–16)
IMM GRANULOCYTES # BLD AUTO: 0.06 K/UL (ref 0–0.11)
IMM GRANULOCYTES NFR BLD AUTO: 0.4 % (ref 0–0.9)
LYMPHOCYTES # BLD AUTO: 0.87 K/UL (ref 1–4.8)
LYMPHOCYTES NFR BLD: 5.8 % (ref 22–41)
MCH RBC QN AUTO: 30.5 PG (ref 27–33)
MCHC RBC AUTO-ENTMCNC: 34.6 G/DL (ref 33.6–35)
MCV RBC AUTO: 88.2 FL (ref 81.4–97.8)
MONOCYTES # BLD AUTO: 1.03 K/UL (ref 0–0.85)
MONOCYTES NFR BLD AUTO: 6.9 % (ref 0–13.4)
NEUTROPHILS # BLD AUTO: 12.94 K/UL (ref 2–7.15)
NEUTROPHILS NFR BLD: 86.7 % (ref 44–72)
NRBC # BLD AUTO: 0 K/UL
NRBC BLD AUTO-RTO: 0 /100 WBC
PLATELET # BLD AUTO: 244 K/UL (ref 164–446)
PMV BLD AUTO: 9.4 FL (ref 9–12.9)
POTASSIUM SERPL-SCNC: 3.4 MMOL/L (ref 3.6–5.5)
PROT SERPL-MCNC: 7.2 G/DL (ref 6–8.2)
RBC # BLD AUTO: 3.47 M/UL (ref 4.2–5.4)
SODIUM SERPL-SCNC: 137 MMOL/L (ref 135–145)
WBC # BLD AUTO: 14.9 K/UL (ref 4.8–10.8)

## 2017-04-04 PROCEDURE — 87040 BLOOD CULTURE FOR BACTERIA: CPT

## 2017-04-04 PROCEDURE — 700101 HCHG RX REV CODE 250: Performed by: OBSTETRICS & GYNECOLOGY

## 2017-04-04 PROCEDURE — 700111 HCHG RX REV CODE 636 W/ 250 OVERRIDE (IP): Performed by: OBSTETRICS & GYNECOLOGY

## 2017-04-04 PROCEDURE — A9270 NON-COVERED ITEM OR SERVICE: HCPCS | Performed by: OBSTETRICS & GYNECOLOGY

## 2017-04-04 PROCEDURE — 700102 HCHG RX REV CODE 250 W/ 637 OVERRIDE(OP): Performed by: OBSTETRICS & GYNECOLOGY

## 2017-04-04 PROCEDURE — 700105 HCHG RX REV CODE 258: Performed by: OBSTETRICS & GYNECOLOGY

## 2017-04-04 PROCEDURE — 85025 COMPLETE CBC W/AUTO DIFF WBC: CPT

## 2017-04-04 PROCEDURE — 80053 COMPREHEN METABOLIC PANEL: CPT

## 2017-04-04 PROCEDURE — 770006 HCHG ROOM/CARE - MED/SURG/GYN SEMI*

## 2017-04-04 PROCEDURE — 36415 COLL VENOUS BLD VENIPUNCTURE: CPT

## 2017-04-04 RX ORDER — TIZANIDINE 4 MG/1
4 TABLET ORAL EVERY 6 HOURS PRN
Status: DISCONTINUED | OUTPATIENT
Start: 2017-04-04 | End: 2017-04-08 | Stop reason: HOSPADM

## 2017-04-04 RX ORDER — CLINDAMYCIN PHOSPHATE 900 MG/50ML
900 INJECTION, SOLUTION INTRAVENOUS EVERY 8 HOURS
Status: DISCONTINUED | OUTPATIENT
Start: 2017-04-04 | End: 2017-04-06

## 2017-04-04 RX ORDER — BUTALBITAL, ACETAMINOPHEN AND CAFFEINE 50; 325; 40 MG/1; MG/1; MG/1
1-2 TABLET ORAL EVERY 6 HOURS PRN
Status: DISCONTINUED | OUTPATIENT
Start: 2017-04-04 | End: 2017-04-08 | Stop reason: HOSPADM

## 2017-04-04 RX ORDER — ACETAMINOPHEN 325 MG/1
TABLET ORAL
Status: COMPLETED
Start: 2017-04-04 | End: 2017-04-04

## 2017-04-04 RX ADMIN — SODIUM CHLORIDE, POTASSIUM CHLORIDE, SODIUM LACTATE AND CALCIUM CHLORIDE 1000 ML: 600; 310; 30; 20 INJECTION, SOLUTION INTRAVENOUS at 20:30

## 2017-04-04 RX ADMIN — CLINDAMYCIN IN 5 PERCENT DEXTROSE 900 MG: 18 INJECTION, SOLUTION INTRAVENOUS at 22:47

## 2017-04-04 RX ADMIN — ACETAMINOPHEN 500 MG: 500 TABLET, FILM COATED ORAL at 15:22

## 2017-04-04 RX ADMIN — AMPICILLIN SODIUM 2000 MG: 2 INJECTION, POWDER, FOR SOLUTION INTRAMUSCULAR; INTRAVENOUS at 01:59

## 2017-04-04 RX ADMIN — PIPERACILLIN AND TAZOBACTAM 3.38 G: 3; .375 INJECTION, POWDER, LYOPHILIZED, FOR SOLUTION INTRAVENOUS; PARENTERAL at 18:08

## 2017-04-04 RX ADMIN — TIZANIDINE 4 MG: 4 TABLET ORAL at 22:45

## 2017-04-04 RX ADMIN — AMPICILLIN SODIUM 2000 MG: 2 INJECTION, POWDER, FOR SOLUTION INTRAMUSCULAR; INTRAVENOUS at 06:05

## 2017-04-04 RX ADMIN — CLINDAMYCIN IN 5 PERCENT DEXTROSE 900 MG: 18 INJECTION, SOLUTION INTRAVENOUS at 15:23

## 2017-04-04 RX ADMIN — ONDANSETRON 4 MG: 2 INJECTION INTRAMUSCULAR; INTRAVENOUS at 20:30

## 2017-04-04 RX ADMIN — GENTAMICIN SULFATE 360 MG: 40 INJECTION, SOLUTION INTRAMUSCULAR; INTRAVENOUS at 02:30

## 2017-04-04 RX ADMIN — Medication 50 MG: at 02:08

## 2017-04-04 RX ADMIN — PIPERACILLIN AND TAZOBACTAM 3.38 G: 3; .375 INJECTION, POWDER, LYOPHILIZED, FOR SOLUTION INTRAVENOUS; PARENTERAL at 23:48

## 2017-04-04 RX ADMIN — ONDANSETRON 4 MG: 2 INJECTION INTRAMUSCULAR; INTRAVENOUS at 12:27

## 2017-04-04 RX ADMIN — CLINDAMYCIN IN 5 PERCENT DEXTROSE 900 MG: 18 INJECTION, SOLUTION INTRAVENOUS at 08:54

## 2017-04-04 RX ADMIN — SODIUM CHLORIDE, POTASSIUM CHLORIDE, SODIUM LACTATE AND CALCIUM CHLORIDE 1000 ML: 600; 310; 30; 20 INJECTION, SOLUTION INTRAVENOUS at 11:30

## 2017-04-04 RX ADMIN — BUTALBITAL, ACETAMINOPHEN, AND CAFFEINE 2 TABLET: 50; 325; 40 TABLET ORAL at 22:45

## 2017-04-04 RX ADMIN — PIPERACILLIN AND TAZOBACTAM 3.38 G: 3; .375 INJECTION, POWDER, LYOPHILIZED, FOR SOLUTION INTRAVENOUS; PARENTERAL at 12:44

## 2017-04-04 ASSESSMENT — PAIN SCALES - GENERAL
PAINLEVEL_OUTOF10: 6
PAINLEVEL_OUTOF10: 5
PAINLEVEL_OUTOF10: 5
PAINLEVEL_OUTOF10: 7
PAINLEVEL_OUTOF10: 5

## 2017-04-04 ASSESSMENT — LIFESTYLE VARIABLES
EVER_SMOKED: YES
ALCOHOL_USE: NO

## 2017-04-04 NOTE — DISCHARGE INSTRUCTIONS
Abdominal Pain (Nonspecific)  Your exam might not show the exact reason you have abdominal pain. Since there are many different causes of abdominal pain, another checkup and more tests may be needed. It is very important to follow up for lasting (persistent) or worsening symptoms. A possible cause of abdominal pain in any person who still has his or her appendix is acute appendicitis. Appendicitis is often hard to diagnose. Normal blood tests, urine tests, ultrasound, and CT scans do not completely rule out early appendicitis or other causes of abdominal pain. Sometimes, only the changes that happen over time will allow appendicitis and other causes of abdominal pain to be determined. Other potential problems that may require surgery may also take time to become more apparent. Because of this, it is important that you follow all of the instructions below.  HOME CARE INSTRUCTIONS   · Rest as much as possible.   · Do not eat solid food until your pain is gone.   · While adults or children have pain: A diet of water, weak decaffeinated tea, broth or bouillon, gelatin, oral rehydration solutions (ORS), frozen ice pops, or ice chips may be helpful.   · When pain is gone in adults or children: Start a light diet (dry toast, crackers, applesauce, or white rice). Increase the diet slowly as long as it does not bother you. Eat no dairy products (including cheese and eggs) and no spicy, fatty, fried, or high-fiber foods.   · Use no alcohol, caffeine, or cigarettes.   · Take your regular medicines unless your caregiver told you not to.   · Take any prescribed medicine as directed.   · Only take over-the-counter or prescription medicines for pain, discomfort, or fever as directed by your caregiver. Do not give aspirin to children.   If your caregiver has given you a follow-up appointment, it is very important to keep that appointment. Not keeping the appointment could result in a permanent injury and/or lasting (chronic) pain  and/or disability. If there is any problem keeping the appointment, you must call to reschedule.   SEEK IMMEDIATE MEDICAL CARE IF:   · Your pain is not gone in 24 hours.   · Your pain becomes worse, changes location, or feels different.   · You or your child has an oral temperature above 102° F (38.9° C), not controlled by medicine.   · Your baby is older than 3 months with a rectal temperature of 102° F (38.9° C) or higher.   · Your baby is 3 months old or younger with a rectal temperature of 100.4° F (38° C) or higher.   · You have shaking chills.   · You keep throwing up (vomiting) or cannot drink liquids.   · There is blood in your vomit or you see blood in your bowel movements.   · Your bowel movements become dark or black.   · You have frequent bowel movements.   · Your bowel movements stop (become blocked) or you cannot pass gas.   · You have bloody, frequent, or painful urination.   · You have yellow discoloration in the skin or whites of the eyes.   · Your stomach becomes bloated or bigger.   · You have dizziness or fainting.   · You have chest or back pain.   MAKE SURE YOU:   · Understand these instructions.   · Will watch your condition.   · Will get help right away if you are not doing well or get worse.   Document Released: 12/18/2006 Document Revised: 03/11/2013 Document Reviewed: 11/15/2010  ExitCare® Patient Information ©2013 [employer].

## 2017-04-04 NOTE — PROGRESS NOTES
Pt A&O x4. Fatigue, tearful.  present at bedside.    Vitals: /81 mmHg  Pulse 105  Temp(Src) 38.2 °C (100.7 °F)  Resp 20  Ht 1.829 m (6')  Wt 87.998 kg (194 lb)  BMI 26.31 kg/m2  SpO2 97%  LMP 03/16/2017    Pt running mild fever and mild tachycardia.     Pt rates pain 9 out of 10. Medicated for pain.    Neuro: FELIPE. Denies new onset of numbness/ tingling.    Cardiac: Denies new onset of chest pain.    Vascular: Pulses 2+ BUE, BLE.    Respiratory: Lungs sound clear to auscultation. Denies SOB.    GI: Abdomen semi-firm, tender, rounded. + bowel sounds, + flatus, + BM this AM. NPO. + nausea/ vomiting, medicated appropriately.     : Pt voiding adequately.      MSK: Pt up to bathroom self, no assistance required, tolerating well.    Integumentary: Abdominal surgical incisions from previous hysterectomy CDI, ALFRED, sutures in place, no drainage noted.    Labs noted.    Fall precautions in place: Bed locked in lowest position, Upper bed rails up, treaded socks in place, personal belongings within reach, call light within reach, appropriate mobility signs in place, - bed alarm. Pt calls appropriately.     Pt updated on POC.

## 2017-04-04 NOTE — PROGRESS NOTES
Admitting MD contact information provided to Charge RN.  Bedside RN to call MD when pt arrives to room for further orders.

## 2017-04-04 NOTE — PROGRESS NOTES
Pharmacy Kinetics 32 y.o. female on gentamicin day # 1 4/3/2017    Dosing Weight: 73 kg ideal weight  Currently on Gentamicin 360 mg mg iv q24hr    Indication for treatment: post op abdominal infection    Pertinent history per medical record: Admitted on 4/3/2017 for     Other antibiotics: ampicillin 2 gm iv q6h    Allergies: Sumatriptan succinate and Tramadol     List concerns for renal function  : none at this time    Pertinent cultures to date:        Recent Labs      04/03/17   1747   WBC  13.7*   NEUTSPOLYS  84.80*     Recent Labs      04/03/17   1747   BUN  8   CREATININE  1.06   ALBUMIN  4.0     No results for input(s): GENTTROUGH, GENTPEAK, GENTRANDOM in the last 72 hours.No intake or output data in the 24 hours ending 04/03/17 2341   Height 1.829 m (6'), weight 87.998 kg (194 lb), last menstrual period 03/16/2017. No data recorded.      A/P   1. Gentamicin dose change: new start  2. Next gentamicin level: to be determined by rounding pharmacist  3. Goal trough: non detectable

## 2017-04-04 NOTE — PROGRESS NOTES
Medicated x 1 for nausea, no emesis, ambulating, and voiding without difficulty. Decreased appetite

## 2017-04-04 NOTE — ED PROVIDER NOTES
ED Provider Note    CHIEF COMPLAINT  Chief Complaint   Patient presents with   • Post-Op Complications   • Head Ache   • Pelvic Pain       HPI  Joy Mcnamara is a 32 y.o. female who presents complaining of abdominal pain. The abdominal pain is 9/10 in severity. It is located in the suprapubic region. It started in the last 24 hours. She is 7 days status post laparoscopic assisted vaginal hysterectomy. Patient has had some mild dysuria. Positive nausea. No vomiting. No diarrhea or constipation. She states she was quite active yesterday but not today. She do fever to 101.5 at home    REVIEW OF SYSTEMS  See HPI for further details.  No cough or cold symptoms. No vomiting. Positive nausea. All other systems are negative.    PAST MEDICAL HISTORY  Past Medical History   Diagnosis Date   • Migraine headache    • Miscarriage    • Depression      s/p mom's death   • Depression 4/25/2014   • Gynecological disorder    • Pain 2017     low back; degenerative disk disease       FAMILY HISTORY  Family History   Problem Relation Age of Onset   • Stroke Mother      aneurysm   • Cancer Maternal Grandmother      lung /breast   • Cancer Paternal Grandmother      breast / lung?       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 at SURGERY [facility]   • Pr inj,foramen,l/s,1 level  10/8/2014     Performed by Tom Main M.D. at SURGERY [facility]   • Pr inj,foramen,l/s,1 level Bilateral 6/17/2015     Procedure: TRANSFORAMINAL BILATERAL L5-S1 EPIDURAL WITH IV SEDATION;  Surgeon: Tom Main M.D.;  Location: SURGERY [facility];  Service: Pain Management   • Pr inj,foramen,l/s,1 level  6/17/2015     Procedure: INJ-FORAMEN EPI LUM/SAC SNGL;  Surgeon: Tom Main M.D.;  Location: SURGERY [facility];  Service: Pain Management   • Vaginal hysterectomy scope total N/A 3/27/2017     Procedure: VAGINAL HYSTERECTOMY SCOPE TOTAL right salpingectomy, partial left salpingectomy. Cystoscopy;  Surgeon: Zayda Santillan M.D.;  Location: SURGERY SAME DAY [facility] ORS;  Service:        CURRENT MEDICATIONS  @ He is Deon@    ALLERGIES  Allergies   Allergen Reactions   • Sumatriptan Succinate      \"face burns & I can't see\"   • Tramadol Photosensitivity     Gives her migraines and makes her feel sick       PHYSICAL EXAM  VITAL SIGNS: BP 71/43 mmHg  Pulse 112  Temp(Src) 36.7 °C (98.1 °F)  Resp 16  Ht 1.829 m (6')  Wt 88 kg (194 lb 0.1 oz)  BMI 26.31 kg/m2  SpO2 99%  LMP 03/16/2017  Constitutional: Well developed, Well nourished, appears uncomfortable  HENT: Normocephalic, Atraumatic, Bilateral external ears normal, Oropharynx dry, No oral exudates, Nose normal.   Eyes: CONNER, EOMI, Conjunctiva normal, No discharge.   Neck: Normal range of motion, No tenderness, Supple, No stridor. No nuchal rigidity  Lymphatic: No lymphadenopathy noted.   Cardiovascular: Normal heart rate, Normal rhythm, No murmurs, No rubs, No gallops.   Thorax & Lungs: Normal breath sounds, No respiratory distress, No " wheezing, No chest tenderness.  Abdomen: Normal bowel sounds. Soft. Tenderness throughout. Positive percussion tenderness  Musculoskeletal: No CVA tenderness  Skin: Warm, Dry, No erythema, No rash.   Back: No tenderness, No CVA tenderness.   Extremities: No edema, No tenderness, No cyanosis, No clubbing. Dorsalis pedis pulses 2+ equal bilaterally. Radial pulses 2+ equal bilaterally    RADIOLOGY/PROCEDURES  CT-ABDOMEN-PELVIS WITH   Final Result         1.  Intermediate density fluid collection in the pelvis and right adnexa, appearance most compatible with hemorrhagic fluid, likely small residual postop hematoma given history of hysterectomy 1 week ago.   2.  Large right ovarian cyst, recommend follow-up pelvic sonography in 6 weeks for reevaluation and further characterization.   3.  Hepatomegaly   4.  Cholelithiasis            COURSE & MEDICAL DECISION MAKING  Pertinent Labs & Imaging studies reviewed. (See chart for details)  Differential diagnosis includes UTI versus peritonitis versus postop abscess. CT scan was unremarkable.  Results for orders placed or performed during the hospital encounter of 04/03/17   CBC WITH DIFFERENTIAL   Result Value Ref Range    WBC 13.7 (H) 4.8 - 10.8 K/uL    RBC 3.53 (L) 4.20 - 5.40 M/uL    Hemoglobin 11.0 (L) 12.0 - 16.0 g/dL    Hematocrit 31.5 (L) 37.0 - 47.0 %    MCV 89.2 81.4 - 97.8 fL    MCH 31.2 27.0 - 33.0 pg    MCHC 34.9 33.6 - 35.0 g/dL    RDW 44.7 35.9 - 50.0 fL    Platelet Count 260 164 - 446 K/uL    MPV 9.5 9.0 - 12.9 fL    Neutrophils-Polys 84.80 (H) 44.00 - 72.00 %    Lymphocytes 7.80 (L) 22.00 - 41.00 %    Monocytes 6.80 0.00 - 13.40 %    Eosinophils 0.00 0.00 - 6.90 %    Basophils 0.20 0.00 - 1.80 %    Immature Granulocytes 0.40 0.00 - 0.90 %    Nucleated RBC 0.00 /100 WBC    Neutrophils (Absolute) 11.63 (H) 2.00 - 7.15 K/uL    Lymphs (Absolute) 1.07 1.00 - 4.80 K/uL    Monos (Absolute) 0.93 (H) 0.00 - 0.85 K/uL    Eos (Absolute) 0.00 0.00 - 0.51 K/uL    Baso  (Absolute) 0.03 0.00 - 0.12 K/uL    Immature Granulocytes (abs) 0.06 0.00 - 0.11 K/uL    NRBC (Absolute) 0.00 K/uL   COMP METABOLIC PANEL   Result Value Ref Range    Sodium 135 135 - 145 mmol/L    Potassium 3.1 (L) 3.6 - 5.5 mmol/L    Chloride 101 96 - 112 mmol/L    Co2 23 20 - 33 mmol/L    Anion Gap 11.0 0.0 - 11.9    Glucose 143 (H) 65 - 99 mg/dL    Bun 8 8 - 22 mg/dL    Creatinine 1.06 0.50 - 1.40 mg/dL    Calcium 8.6 8.4 - 10.2 mg/dL    AST(SGOT) 28 12 - 45 U/L    ALT(SGPT) 24 2 - 50 U/L    Alkaline Phosphatase 52 30 - 99 U/L    Total Bilirubin 1.0 0.1 - 1.5 mg/dL    Albumin 4.0 3.2 - 4.9 g/dL    Total Protein 6.7 6.0 - 8.2 g/dL    Globulin 2.7 1.9 - 3.5 g/dL    A-G Ratio 1.5 g/dL   URINALYSIS CULTURE, IF INDICATED   Result Value Ref Range    Micro Urine Req Microscopic     Color Yellow     Character Clear     Specific Gravity 1.015 <1.035    Ph 7.5 5.0-8.0    Glucose Negative Negative mg/dL    Ketones Trace (A) Negative mg/dL    Protein Negative Negative mg/dL    Bilirubin Negative Negative    Nitrite Negative Negative    Leukocyte Esterase Negative Negative    Occult Blood Small (A) Negative    Culture Indicated Yes UA Culture   URINE MICROSCOPIC (W/UA)   Result Value Ref Range    WBC 5-10 (A) /hpf    RBC 5-10 (A) /hpf    Bacteria Rare (A) None /hpf    Epithelial Cells Few Few /hpf    Mucous Threads Moderate /hpf   ESTIMATED GFR   Result Value Ref Range    GFR If African American >60 >60 mL/min/1.73 m 2    GFR If Non African American 59 (A) >60 mL/min/1.73 m 2     Patient's initial presentation was accompanied with hypotension. She was given IV fluids which the hypotension responded to. She did not drop her blood pressure after that. Her workup appears really unremarkable. Her urine shows rare bacteria with few epithelial cells. Her white count was elevated at 13.6. CT scan did not show a cause for her infection. Patient was reassessed at 9:30 PM. She continued to be quite tender. Consultation was obtained  with her surgeon's partner Dr. Christopher. He will be admitting her to the hospital. Both the patient and her  understand that her pain with low blood pressure and her fever could indicate sepsis or severe infection. They understand they're going directly to the main hospital where they are at direct admit. Dr. Christopher wanted to evaluate her on the floor and did not want her to go to the ER again.    FINAL IMPRESSION  1. Acute abdominal pain  2.   3.    Please note that this dictation was created using voice recognition software. I have worked with consultants from the vendor as well as technical experts from [facility] to optimize the interface. I have made every reasonable attempt to correct obvious errors, but I expect that there are errors of grammar and possibly content that I did not discover before finalizing the note.    Electronically signed by: Tanner Barkley, 4/3/2017 9:41 PM

## 2017-04-04 NOTE — CARE PLAN
Problem: Communication  Goal: The ability to communicate needs accurately and effectively will improve  Outcome: PROGRESSING AS EXPECTED  Intervention: [place] patient and significant other/support system to call light to alert staff of needs    04/04/17 0500   OTHER   Oriented to: All of the Following : Location of Bathroom, Visiting Policy, Unit Routine, Call Light and Bedside Controls, Bedside Rail Policy, Smoking Policy, Rights and Responsibilities, Bedside Report, and Patient Education Notebook       Intervention: Reorient patient to environment as needed    04/04/17 0500   OTHER   Oriented to: All of the Following : Location of Bathroom, Visiting Policy, Unit Routine, Call Light and Bedside Controls, Bedside Rail Policy, Smoking Policy, Rights and Responsibilities, Bedside Report, and Patient Education Notebook       Intervention: Educate patient and significant other/support system about the plan of care, procedures, treatments, medications and allow for questions    04/04/17 0500   OTHER   Pt & Family Have Been Educated on Methods Available to Report Concerns Related to Care, Treatment, Services, and Patient Safety Issues Yes           Problem: Safety  Goal: Will remain free from falls  Outcome: PROGRESSING AS EXPECTED  Intervention: Assess risk factors for falls    04/03/17 2300 04/04/17 0400 04/04/17 0500   OTHER   Fall Risk High Risk to Fall - 2 or more points  --  --    Risk for Injury-Any positive answers results in the pt being at high risk for fall related injury Not Applicable --  --    Mobility Status Assessment 0-Ambulates & Transfers Independently. No Assistance Required --  --    History of fall --  --  0   Pt Calls for Assistance --  Yes --        Intervention: Implement fall precautions    04/03/17 2300   OTHER   Environmental Precautions Treaded Slipper Socks on Patient;Personal Belongings, Wastebasket, Call Bell etc. in Easy Reach;Report Given to Other Health Care Providers Regarding Fall  Risk;Bed in Low Position;Communication Sign for Patients & Families;Mobility Assessed & Appropriate Sign Placed   IV Pole on Same Side of Bed as Bathroom Yes   Bedrails Bedrails Closest to Bathroom Down

## 2017-04-04 NOTE — ED NOTES
Chief Complaint   Patient presents with   • Post-Op Complications   • Head Ache   • Pelvic Pain     Pt amb to room with above complaint. Pt states she had a hysterectomy 1 week ago and states pain to abdomen when urinating. Pt hypotensive in triage.

## 2017-04-04 NOTE — PROGRESS NOTES
Direct admit from [facility], Dr. Barkley, [phone].  Accepted by Dr. Santos for Abdominal pain and post-op complication.  ADT signed & held @ 0272, needs to be released upon pt arrival.  No written orders received.  Pt coming by ground.

## 2017-04-04 NOTE — PROGRESS NOTES
Pt aao, pca effective for pain control, denies n/v at this time, tolerating clears, lungs clear, hr in th 90's, poc reviewed

## 2017-04-04 NOTE — ED NOTES
Pt transferring to [facility] via private vehicle per erp. Pt agreeable to POC. IV in place. Report called to gsu.

## 2017-04-04 NOTE — H&P
IDENTIFICATION:  This is a 32-year-old  5, para 2, status post   laparoscopic-assisted vaginal hysterectomy with right salpingectomy on   2017 who presents with a chief complaint of abdominal pain.    HISTORY OF PRESENT ILLNESS:  This is a patient of Dr. Santillan's who had a   hysterectomy on the , laparoscopic-assisted vaginal hysterectomy with   right salpingectomy.  She had menorrhagia, dysmenorrhea, and an intrauterine   lesion.  Pathology is pending at this time.  She had a normal postoperative   care and was sent home, restarted on all her previous medications.  At home,   she was feeling fine until today when she had increasing abdominal pain and   fever with a T-max of 101.7 at home.  Patient has chronic nausea with her   migraine headaches.  Her nausea is a bit above baseline.  Denies vomiting   until just now. Denies chills.  She admits to passing flatus and bowel   movements today.  Denies dysuria, vaginal bleeding as well.  She states that   the abdominal pain is diffuse.  Subsequently, she went to the [facility]   where she was seen and evaluated.  There, her white count was 13.7, it was   actually 14 when she was discharged from the hospital.  Her hemoglobin is 11,   previously 11.5; hematocrit is 31.5, previously 33.5.  Her platelet count is   260, previously 305.  Her AST is 26, ALT is 24.  Sodium is 135, potassium 3.1,   chloride 101, CO2 of 23, BUN of 8, and creatinine of 1.06.  Urinalysis shows   no nitrites, trace ketones, no leukocytes, some small blood cells.  Her   abdominal pain was worsening.  A CAT scan was performed and this showed normal   bladder postsurgical changes.  CT showed normal sized bladder,   posthysterectomy changes, 4.6 cm right ovarian cyst, moderate sized fluid in   the pelvis to the right adnexa, probable hemorrhagic fluid with some small   postop hematoma.  Subsequently, I was contacted by the ED doctor at [facility].  Dr. Barkley made the decision  to bring her here to the [facility]   as there is no GYN coverage there.  Her in [facility], the patient reports   worsening nausea.  She in fact had a bout of emesis and her abdominal pain is   stable.  She has diffuse abdominal pain in all 4 quadrants of her abdomen   without rebound or guarding, but she is quite tender.  She is not _____.  The   patient is on a pain patch chronically for back pain, but did not change her   patch today.    OBSTETRIC HISTORY:  Significant for 2 spontaneous abortions, 2 C-sections and   1 ectopic with salpingectomy in 2012.    GYNECOLOGIC HISTORY:  Denies STDs or abnormal paps.  Significant also for LAVH   with pathology pending.    ALLERGIES:  SUMATRIPTAN AND TRAMADOL.    CURRENT MEDICATIONS:  Fentanyl patch 75 mcg q. 48, Fioricet _____ as needed,   tizanidine 4 mg, Percocet daily postoperatively, vitamin B12, D3, and   cranberry juice, Phenergan for migraines.    MEDICAL PROBLEMS:  Migraine headaches, chronic back pain.    PAST SURGICAL HISTORY:  Two disk surgeries, L4 and L5 in 2009 and 2013,   ectopic pregnancy, recent hysterectomy as well as 2 C-sections.    SOCIAL HISTORY:  She works as a .  Denies alcohol,   tobacco or drug use.    FAMILY HISTORY:  Noncontributory.    REVIEW OF SYSTEMS:  Review of systems x12 is negative per AMA standards.    LABORATORY DATA:  As above.    IMAGING:  As above.    PHYSICAL EXAMINATION:  VITAL SIGNS:  Patient is afebrile currently, vital signs within normal limits.  GENERAL:  She is awake, alert.  She is tearful, bit uncomfortable, but in no   apparent distress.  NECK:  Supple.  HEART:  Regular rate.  CHEST:  Clear.  ABDOMEN:  Soft.  Mildly pannus.  She does have diffuse abdominal tenderness   without rebound or guarding.  No peritoneal signs.  GYNECOLOGIC:  Deferred given her postoperative status.    ASSESSMENT:  1.  At this time is status post laparoscopic-assisted vaginal   hysterectomy-right salpingo-oophorectomy  with pathology pending.  2.  Postoperative hematoma.  3.  Possible postoperative infection.  4.  A 4.6 cm right ovarian cyst.  5.  History of chronic pain.    PLAN:  At this time:  1.  Admit.  2.  IV hydrate.  3.  Zofran.  4.  Blood cultures.  5.  We will start her on ampicillin and gentamicin per pharmacy protocol.  6.  We will give her one-time 4 mg dose of morphine and then start her on   morphine PCA.  7.  Keep n.p.o. with serial exams.  8.  A.m. CBC and chemistry.  9.  Blood cultures x2.       ____________________________________     Hemant Santos MD    PBPEGGY / NTS    DD:  04/03/2017 23:42:34  DT:  04/04/2017 00:26:49    D#:  [id number]  Job#:  [id number]    cc: VADIM MEADOWS MD

## 2017-04-05 LAB
BACTERIA UR CULT: NORMAL
SIGNIFICANT IND 70042: NORMAL
SITE SITE: NORMAL
SOURCE SOURCE: NORMAL

## 2017-04-05 PROCEDURE — A9270 NON-COVERED ITEM OR SERVICE: HCPCS | Performed by: OBSTETRICS & GYNECOLOGY

## 2017-04-05 PROCEDURE — 700111 HCHG RX REV CODE 636 W/ 250 OVERRIDE (IP): Performed by: OBSTETRICS & GYNECOLOGY

## 2017-04-05 PROCEDURE — 700101 HCHG RX REV CODE 250: Performed by: OBSTETRICS & GYNECOLOGY

## 2017-04-05 PROCEDURE — 700105 HCHG RX REV CODE 258: Performed by: OBSTETRICS & GYNECOLOGY

## 2017-04-05 PROCEDURE — 700102 HCHG RX REV CODE 250 W/ 637 OVERRIDE(OP): Performed by: OBSTETRICS & GYNECOLOGY

## 2017-04-05 PROCEDURE — 770006 HCHG ROOM/CARE - MED/SURG/GYN SEMI*

## 2017-04-05 RX ADMIN — PIPERACILLIN AND TAZOBACTAM 3.38 G: 3; .375 INJECTION, POWDER, LYOPHILIZED, FOR SOLUTION INTRAVENOUS; PARENTERAL at 11:53

## 2017-04-05 RX ADMIN — SODIUM CHLORIDE, POTASSIUM CHLORIDE, SODIUM LACTATE AND CALCIUM CHLORIDE 1000 ML: 600; 310; 30; 20 INJECTION, SOLUTION INTRAVENOUS at 16:34

## 2017-04-05 RX ADMIN — ONDANSETRON 4 MG: 2 INJECTION INTRAMUSCULAR; INTRAVENOUS at 18:21

## 2017-04-05 RX ADMIN — PIPERACILLIN AND TAZOBACTAM 3.38 G: 3; .375 INJECTION, POWDER, LYOPHILIZED, FOR SOLUTION INTRAVENOUS; PARENTERAL at 17:46

## 2017-04-05 RX ADMIN — CLINDAMYCIN IN 5 PERCENT DEXTROSE 900 MG: 18 INJECTION, SOLUTION INTRAVENOUS at 14:28

## 2017-04-05 RX ADMIN — BUTALBITAL, ACETAMINOPHEN, AND CAFFEINE 1 TABLET: 50; 325; 40 TABLET ORAL at 14:28

## 2017-04-05 RX ADMIN — CLINDAMYCIN IN 5 PERCENT DEXTROSE 900 MG: 18 INJECTION, SOLUTION INTRAVENOUS at 20:33

## 2017-04-05 RX ADMIN — PIPERACILLIN AND TAZOBACTAM 3.38 G: 3; .375 INJECTION, POWDER, LYOPHILIZED, FOR SOLUTION INTRAVENOUS; PARENTERAL at 06:22

## 2017-04-05 RX ADMIN — ONDANSETRON 4 MG: 2 INJECTION INTRAMUSCULAR; INTRAVENOUS at 06:30

## 2017-04-05 RX ADMIN — TIZANIDINE 4 MG: 4 TABLET ORAL at 08:45

## 2017-04-05 RX ADMIN — SODIUM CHLORIDE, POTASSIUM CHLORIDE, SODIUM LACTATE AND CALCIUM CHLORIDE 1000 ML: 600; 310; 30; 20 INJECTION, SOLUTION INTRAVENOUS at 05:28

## 2017-04-05 RX ADMIN — CLINDAMYCIN IN 5 PERCENT DEXTROSE 900 MG: 18 INJECTION, SOLUTION INTRAVENOUS at 05:20

## 2017-04-05 RX ADMIN — TIZANIDINE 4 MG: 4 TABLET ORAL at 20:33

## 2017-04-05 RX ADMIN — Medication 50 MG: at 08:46

## 2017-04-05 ASSESSMENT — PAIN SCALES - GENERAL
PAINLEVEL_OUTOF10: 3
PAINLEVEL_OUTOF10: 4
PAINLEVEL_OUTOF10: 3

## 2017-04-05 NOTE — PROGRESS NOTES
Pt A&OX4. VSS. SCD's implemented. Pt denies chest pain/shortness of breath. Pt denies numbness/tingling. Pt c/o nausea, recently medicated with PRN. Will continue to monitor tolerance for diet. Morphine PCA in use, pt states PCA is managing her pain effectively. Will continue to monitor pain level and provide intervention as needed. Pt ambulates independently with no complications. Pt voiding and +BM 4/5. Plan of care reviewed. Bed in lowest position. Call light within reach. Continue to monitor.

## 2017-04-05 NOTE — CARE PLAN
Problem: Pain Management  Goal: Pain level will decrease to patient’s comfort goal  Intervention: Follow pain managment plan developed in collaboration with patient and Interdisciplinary Team  pca effective for pain control and ice for headache

## 2017-04-05 NOTE — PROGRESS NOTES
Pt A&O x4.    Vitals: /71 mmHg  Pulse 95  Temp(Src) 37.1 °C (98.8 °F)  Resp 16  Ht 1.829 m (6')  Wt 87.998 kg (194 lb)  BMI 26.31 kg/m2  SpO2 99%  LMP 03/16/2017    Pt rates pain 5 out of 10. PCA on and running. Pt demonstrates how to use effectively. Pt c/o headache and muscle spasms, requesting to resume home meds. Called physician, reordered home medications with telephone read back.     Neuro: FELIPE. Denies new onset of numbness/ tingling.    Cardiac: Denies new onset of chest pain.    Vascular: Pulses 2+ BUE, BLE. No edema noted.    Respiratory: Lungs sound clear to auscultation. Denies SOB.    GI: Abdomen soft, rounded, tender. + bowel sounds, + flatus, + BM today. On full liquid diet, tolerating well, will advance in morning per pt request. + nausea/ - vomiting, medicated appropriately.    : Pt voiding adequately.      MSK: Pt up to bathroom self, no assistance required, tolerating well.    Integumentary: Abdominal lap sites from previous hysterectomy CDI, approximated, ALFRED, staples.    Labs noted.    Fall precautions in place: Bed locked in lowest position, Upper bed rails up, treaded socks in place, personal belongings within reach, call light within reach, appropriate mobility signs in place, - bed alarm. Pt calls appropriately.     Pt updated on POC.

## 2017-04-05 NOTE — CARE PLAN
Problem: Safety  Goal: Will remain free from falls  Outcome: PROGRESSING AS EXPECTED  Intervention: Assess risk factors for falls    04/04/17 2000 04/04/17 2339   OTHER   Fall Risk High Risk to Fall - 2 or more points  --    Risk for Injury-Any positive answers results in the pt being at high risk for fall related injury Not Applicable --    Mobility Status Assessment 0-Ambulates & Transfers Independently. No Assistance Required --    History of fall 0 --    Pt Calls for Assistance --  Yes       Intervention: Implement fall precautions    04/04/17 2000   OTHER   Environmental Precautions Treaded Slipper Socks on Patient;Personal Belongings, Wastebasket, Call Bell etc. in Easy Reach;Report Given to Other Health Care Providers Regarding Fall Risk;Bed in Low Position;Communication Sign for Patients & Families;Mobility Assessed & Appropriate Sign Placed   IV Pole on Same Side of Bed as Bathroom Yes   Bedrails Bedrails Closest to Bathroom Down           Problem: Bowel/Gastric:  Goal: Will not experience complications related to bowel motility  Outcome: PROGRESSING AS EXPECTED    04/04/17 0855   OTHER   Last BM 04/04/17

## 2017-04-05 NOTE — PROGRESS NOTES
"Paged MD at this time to notify him of pt's low grade temperature of 100.5. Vital signs remain stable. Pt states \"I just hot and sweaty and uncomfortable.\" Waiting to hear back. Continue to monitor.   "

## 2017-04-06 LAB
BASOPHILS # BLD AUTO: 0.3 % (ref 0–1.8)
BASOPHILS # BLD: 0.03 K/UL (ref 0–0.12)
EOSINOPHIL # BLD AUTO: 0.08 K/UL (ref 0–0.51)
EOSINOPHIL NFR BLD: 0.9 % (ref 0–6.9)
ERYTHROCYTE [DISTWIDTH] IN BLOOD BY AUTOMATED COUNT: 46.7 FL (ref 35.9–50)
HCT VFR BLD AUTO: 25.3 % (ref 37–47)
HGB BLD-MCNC: 8.5 G/DL (ref 12–16)
IMM GRANULOCYTES # BLD AUTO: 0.03 K/UL (ref 0–0.11)
IMM GRANULOCYTES NFR BLD AUTO: 0.3 % (ref 0–0.9)
LYMPHOCYTES # BLD AUTO: 0.99 K/UL (ref 1–4.8)
LYMPHOCYTES NFR BLD: 11.4 % (ref 22–41)
MCH RBC QN AUTO: 30.6 PG (ref 27–33)
MCHC RBC AUTO-ENTMCNC: 33.6 G/DL (ref 33.6–35)
MCV RBC AUTO: 91 FL (ref 81.4–97.8)
MONOCYTES # BLD AUTO: 0.73 K/UL (ref 0–0.85)
MONOCYTES NFR BLD AUTO: 8.4 % (ref 0–13.4)
NEUTROPHILS # BLD AUTO: 6.79 K/UL (ref 2–7.15)
NEUTROPHILS NFR BLD: 78.7 % (ref 44–72)
NRBC # BLD AUTO: 0 K/UL
NRBC BLD AUTO-RTO: 0 /100 WBC
PLATELET # BLD AUTO: 198 K/UL (ref 164–446)
PMV BLD AUTO: 9.6 FL (ref 9–12.9)
RBC # BLD AUTO: 2.78 M/UL (ref 4.2–5.4)
WBC # BLD AUTO: 8.7 K/UL (ref 4.8–10.8)

## 2017-04-06 PROCEDURE — 700102 HCHG RX REV CODE 250 W/ 637 OVERRIDE(OP): Performed by: OBSTETRICS & GYNECOLOGY

## 2017-04-06 PROCEDURE — A9270 NON-COVERED ITEM OR SERVICE: HCPCS | Performed by: OBSTETRICS & GYNECOLOGY

## 2017-04-06 PROCEDURE — 700111 HCHG RX REV CODE 636 W/ 250 OVERRIDE (IP): Performed by: OBSTETRICS & GYNECOLOGY

## 2017-04-06 PROCEDURE — 700101 HCHG RX REV CODE 250: Performed by: OBSTETRICS & GYNECOLOGY

## 2017-04-06 PROCEDURE — 85025 COMPLETE CBC W/AUTO DIFF WBC: CPT

## 2017-04-06 PROCEDURE — 36415 COLL VENOUS BLD VENIPUNCTURE: CPT

## 2017-04-06 PROCEDURE — 700105 HCHG RX REV CODE 258: Performed by: OBSTETRICS & GYNECOLOGY

## 2017-04-06 PROCEDURE — 770001 HCHG ROOM/CARE - MED/SURG/GYN PRIV*

## 2017-04-06 RX ORDER — FERROUS GLUCONATE 324(38)MG
324 TABLET ORAL ONCE
Status: COMPLETED | OUTPATIENT
Start: 2017-04-06 | End: 2017-04-06

## 2017-04-06 RX ORDER — FERROUS GLUCONATE 324(38)MG
324 TABLET ORAL 2 TIMES DAILY WITH MEALS
Status: DISCONTINUED | OUTPATIENT
Start: 2017-04-07 | End: 2017-04-08 | Stop reason: HOSPADM

## 2017-04-06 RX ADMIN — PIPERACILLIN AND TAZOBACTAM 3.38 G: 3; .375 INJECTION, POWDER, LYOPHILIZED, FOR SOLUTION INTRAVENOUS; PARENTERAL at 00:30

## 2017-04-06 RX ADMIN — SODIUM CHLORIDE, POTASSIUM CHLORIDE, SODIUM LACTATE AND CALCIUM CHLORIDE 1000 ML: 600; 310; 30; 20 INJECTION, SOLUTION INTRAVENOUS at 02:58

## 2017-04-06 RX ADMIN — SODIUM CHLORIDE, POTASSIUM CHLORIDE, SODIUM LACTATE AND CALCIUM CHLORIDE 1000 ML: 600; 310; 30; 20 INJECTION, SOLUTION INTRAVENOUS at 12:26

## 2017-04-06 RX ADMIN — ONDANSETRON 4 MG: 2 INJECTION INTRAMUSCULAR; INTRAVENOUS at 20:32

## 2017-04-06 RX ADMIN — TIZANIDINE 4 MG: 4 TABLET ORAL at 02:55

## 2017-04-06 RX ADMIN — FERROUS GLUCONATE 324 MG: 324 TABLET ORAL at 23:54

## 2017-04-06 RX ADMIN — TIZANIDINE 4 MG: 4 TABLET ORAL at 20:31

## 2017-04-06 RX ADMIN — BUTALBITAL, ACETAMINOPHEN, AND CAFFEINE 2 TABLET: 50; 325; 40 TABLET ORAL at 15:08

## 2017-04-06 RX ADMIN — CLINDAMYCIN IN 5 PERCENT DEXTROSE 900 MG: 18 INJECTION, SOLUTION INTRAVENOUS at 15:03

## 2017-04-06 RX ADMIN — SODIUM CHLORIDE, POTASSIUM CHLORIDE, SODIUM LACTATE AND CALCIUM CHLORIDE 1000 ML: 600; 310; 30; 20 INJECTION, SOLUTION INTRAVENOUS at 20:32

## 2017-04-06 RX ADMIN — PIPERACILLIN AND TAZOBACTAM 3.38 G: 3; .375 INJECTION, POWDER, LYOPHILIZED, FOR SOLUTION INTRAVENOUS; PARENTERAL at 05:49

## 2017-04-06 RX ADMIN — CLINDAMYCIN IN 5 PERCENT DEXTROSE 900 MG: 18 INJECTION, SOLUTION INTRAVENOUS at 06:57

## 2017-04-06 RX ADMIN — ONDANSETRON 4 MG: 2 INJECTION INTRAMUSCULAR; INTRAVENOUS at 10:48

## 2017-04-06 RX ADMIN — PIPERACILLIN AND TAZOBACTAM 3.38 G: 3; .375 INJECTION, POWDER, LYOPHILIZED, FOR SOLUTION INTRAVENOUS; PARENTERAL at 12:24

## 2017-04-06 RX ADMIN — PIPERACILLIN AND TAZOBACTAM 3.38 G: 3; .375 INJECTION, POWDER, LYOPHILIZED, FOR SOLUTION INTRAVENOUS; PARENTERAL at 23:54

## 2017-04-06 RX ADMIN — PIPERACILLIN AND TAZOBACTAM 3.38 G: 3; .375 INJECTION, POWDER, LYOPHILIZED, FOR SOLUTION INTRAVENOUS; PARENTERAL at 17:33

## 2017-04-06 RX ADMIN — ONDANSETRON 4 MG: 2 INJECTION INTRAMUSCULAR; INTRAVENOUS at 02:55

## 2017-04-06 RX ADMIN — TIZANIDINE 4 MG: 4 TABLET ORAL at 10:42

## 2017-04-06 ASSESSMENT — PAIN SCALES - GENERAL
PAINLEVEL_OUTOF10: 6
PAINLEVEL_OUTOF10: 3
PAINLEVEL_OUTOF10: 4
PAINLEVEL_OUTOF10: 7

## 2017-04-06 NOTE — PROGRESS NOTES
DATE OF SERVICE:  04/05/2017    DATE OF SERVICE:  04/04/2017.    SUBJECTIVE:  The patient was admitted last night.  She is 1 week after [facility].    She developed fever symptoms the day prior a CAT scan was done.  There was no   obvious abscess.  There was a cystic collection of fluid, which possibly looks   like hemorrhagic fluid from surgery.    She does have normal GI,  function.  She had a bowel movement prior to   admission.  She had been feeling fine up until the day of admission.    Currently, she is placed on ampicillin and gentamicin.    OBJECTIVE:  GENERAL:  Patient is awake, pleasant.  She looks a little uncomfortable.  ABDOMEN:  Tender in all 4 quadrants.  It is nondistended, but is tender.    ASSESSMENT:  Possible cuff cellulitis or possible early abscess.    PLAN:  We are going to add the clindamycin for anaerobic coverage.  We talked   with pharmacy they recommended zoayn over  ampicillin and gentamicin, due  To difficulties in monitoring gentamycin. Patient will be evaluated on a   day-to-day basis.       ____________________________________     MD DALLIN TALLEY / NTS    DD:  04/05/2017 19:18:17  DT:  04/05/2017 21:39:46    D#:  [id number]  Job#:  [id number]

## 2017-04-06 NOTE — PROGRESS NOTES
Received report from NOC. Pt A&Ox4. Pain well controlled with PCA, reports 3 on a scale of 0-10.   Moves all extremities, up to bathroom by self, no assistance required.   No other needs at this time.

## 2017-04-06 NOTE — PROGRESS NOTES
DATE OF SERVICE:  04/05/2017    SUBJECTIVE:  The patient is hospital day #2 with infection, postop LAVH.  She   currently is on Zosyn and clindamycin.  She states she is significantly better   today.  Her pain is much decreased.  She does not have much appetite.  She is   passing gas.    PHYSICAL EXAMINATION:  VITAL SIGNS:  Normal.  She did have a low grade temperature of 100.2.  GENERAL:  Patient is awake, pleasant.  ABDOMEN:  Nondistended with very mild tenderness, significantly improved.    ASSESSMENT:  Postop infection, laparoscopically assisted vaginal hysterectomy,   possible cuff cellulitis.    PLAN:  We will continue antibiotics and evaluate the patient on a day-to-day   basis.       ____________________________________     MD DALLIN TALLEY / NTS    DD:  04/05/2017 19:20:18  DT:  04/05/2017 21:44:00    D#:  [id number]  Job#:  [id number]

## 2017-04-06 NOTE — CARE PLAN
Problem: Infection  Goal: Will remain free from infection  Outcome: PROGRESSING AS EXPECTED  Pt educated on the s/s of infection, verbalizes understanding, will continue to monitor    Problem: Pain Management  Goal: Pain level will decrease to patient’s comfort goal  Outcome: PROGRESSING AS EXPECTED  Pain managed w/ morphine PCA, resting comfortably in bed

## 2017-04-06 NOTE — PROGRESS NOTES
AOx4, up self, nausea but no vomiting, +flatus, normoactive bs, voiding, -BM, reporting abd pain that is 3/10, medicated w/ morphine PCA. Hemoglobin dropping from 10.6 to 8.5, positive for hematuria, last BP at 0400 is 98/58. Patient and RN discussed plan of care, all questions answered. Labs noted, assessment complete, patient tolerating reg diet. Call light in place, personal belongings available, patient educated on importance of calling for assistance

## 2017-04-06 NOTE — DISCHARGE PLANNING
Care Transition Team Assessment    Information Source  Orientation : Oriented x 4  Information Given By: Patient  Who is responsible for making decisions for patient? : Patient    Readmission Evaluation  Is this a readmission?: Yes - unplanned readmission  Why do you think you were readmitted?: Abdominal pain   Was an appointment arranged for you prior to discharge?: No  Did you understand your discharge instructions?: Yes  Did you have enough support after your last discharge?: Yes    Elopement Risk  Legal Hold: No  Ambulatory or Self Mobile in Wheelchair: Yes  Disoriented: No  Psychiatric Symptoms: None  History of Wandering: No  Elopement this Admit: No  Vocalizing Wanting to Leave: No  Displays Behaviors, Body Language Wanting to Leave: No-Not at Risk for Elopement  Elopement Risk: Not at Risk for Elopement    Interdisciplinary Discharge Planning  Does Admitting Nurse Feel This Could be a Complex Discharge?: No  Primary Care Physician: Dr. Patrick  Lives with - Patient's Self Care Capacity: Spouse  Patient or legal guardian wants to designate a caregiver (see row info): No  Support Systems: Children, Family Member(s), Spouse / Significant Other  Housing / Facility: 1 Story Apartment / Condo  Do You Take your Prescribed Medications Regularly: Yes  Able to Return to Previous ADL's: Yes  Mobility Issues: No  Prior Services: None  Patient Expects to be Discharged to:: Home  Assistance Needed: No  Durable Medical Equipment: Not Applicable    Discharge Preparedness  What is your plan after discharge?: Home with help  What are your discharge supports?: Child, Spouse  Prior Functional Level: Ambulatory, Drives Self, Independent with Activities of Daily Living, Independent with Medication Management  Difficulity with ADLs: None  Difficulity with IADLs: None    Functional Assesment  Prior Functional Level: Ambulatory, Drives Self, Independent with Activities of Daily Living, Independent with Medication  Management    Finances  Financial Barriers to Discharge: No  Prescription Coverage: Yes    Vision / Hearing Impairment  Vision Impairment : Yes  Right Eye Vision: Wears Glasses  Left Eye Vision: Wears Glasses  Hearing Impairment : No    Values / Beliefs / Concerns  Values / Beliefs Concerns : No  Spiritual Requests During Hospitalization: No  Special Hospitalization Concerns: None    Advance Directive  Advance Directive?: None  Advance Directive offered?: AD Booklet refused    Domestic Abuse  Have you ever been the victim of abuse or violence?: No  Physical Abuse or Sexual Abuse: No  Verbal Abuse or Emotional Abuse: No  Possible Abuse Reported to:: Not Applicable    Psychological Assessment  History of Substance Abuse: None  History of Psychiatric Problems: No  Non-compliant with Treatment: No  Newly Diagnosed Illness: No    Discharge Risks or Barriers  Discharge risks or barriers?: No    Anticipated Discharge Information  Anticipated discharge disposition: Home  Discharge Address: [facility] [address]  Discharge Contact Phone Number: [phone]

## 2017-04-07 ENCOUNTER — TELEPHONE (OUTPATIENT)
Dept: MEDICAL GROUP | Facility: LAB | Age: 32
End: 2017-04-07

## 2017-04-07 LAB
BASOPHILS # BLD AUTO: 0.4 % (ref 0–1.8)
BASOPHILS # BLD: 0.04 K/UL (ref 0–0.12)
EOSINOPHIL # BLD AUTO: 0.09 K/UL (ref 0–0.51)
EOSINOPHIL NFR BLD: 0.9 % (ref 0–6.9)
ERYTHROCYTE [DISTWIDTH] IN BLOOD BY AUTOMATED COUNT: 46.8 FL (ref 35.9–50)
HCT VFR BLD AUTO: 28.3 % (ref 37–47)
HGB BLD-MCNC: 9.5 G/DL (ref 12–16)
IMM GRANULOCYTES # BLD AUTO: 0.03 K/UL (ref 0–0.11)
IMM GRANULOCYTES NFR BLD AUTO: 0.3 % (ref 0–0.9)
LYMPHOCYTES # BLD AUTO: 1.39 K/UL (ref 1–4.8)
LYMPHOCYTES NFR BLD: 14.5 % (ref 22–41)
MCH RBC QN AUTO: 30.5 PG (ref 27–33)
MCHC RBC AUTO-ENTMCNC: 33.6 G/DL (ref 33.6–35)
MCV RBC AUTO: 91 FL (ref 81.4–97.8)
MONOCYTES # BLD AUTO: 0.69 K/UL (ref 0–0.85)
MONOCYTES NFR BLD AUTO: 7.2 % (ref 0–13.4)
NEUTROPHILS # BLD AUTO: 7.37 K/UL (ref 2–7.15)
NEUTROPHILS NFR BLD: 76.7 % (ref 44–72)
NRBC # BLD AUTO: 0 K/UL
NRBC BLD AUTO-RTO: 0 /100 WBC
PLATELET # BLD AUTO: 274 K/UL (ref 164–446)
PMV BLD AUTO: 9.3 FL (ref 9–12.9)
RBC # BLD AUTO: 3.11 M/UL (ref 4.2–5.4)
WBC # BLD AUTO: 9.6 K/UL (ref 4.8–10.8)

## 2017-04-07 PROCEDURE — 700102 HCHG RX REV CODE 250 W/ 637 OVERRIDE(OP): Performed by: OBSTETRICS & GYNECOLOGY

## 2017-04-07 PROCEDURE — 770001 HCHG ROOM/CARE - MED/SURG/GYN PRIV*

## 2017-04-07 PROCEDURE — 700111 HCHG RX REV CODE 636 W/ 250 OVERRIDE (IP): Performed by: OBSTETRICS & GYNECOLOGY

## 2017-04-07 PROCEDURE — 36415 COLL VENOUS BLD VENIPUNCTURE: CPT

## 2017-04-07 PROCEDURE — 700105 HCHG RX REV CODE 258: Performed by: OBSTETRICS & GYNECOLOGY

## 2017-04-07 PROCEDURE — A9270 NON-COVERED ITEM OR SERVICE: HCPCS | Performed by: OBSTETRICS & GYNECOLOGY

## 2017-04-07 PROCEDURE — 85025 COMPLETE CBC W/AUTO DIFF WBC: CPT

## 2017-04-07 RX ORDER — CEFUROXIME AXETIL 500 MG/1
500 TABLET ORAL EVERY 12 HOURS
Status: DISCONTINUED | OUTPATIENT
Start: 2017-04-07 | End: 2017-04-08 | Stop reason: HOSPADM

## 2017-04-07 RX ORDER — CIPROFLOXACIN 500 MG/1
500 TABLET, FILM COATED ORAL EVERY 12 HOURS
Status: DISCONTINUED | OUTPATIENT
Start: 2017-04-07 | End: 2017-04-07

## 2017-04-07 RX ORDER — CLINDAMYCIN HYDROCHLORIDE 150 MG/1
450 CAPSULE ORAL 4 TIMES DAILY
Status: DISCONTINUED | OUTPATIENT
Start: 2017-04-07 | End: 2017-04-08 | Stop reason: HOSPADM

## 2017-04-07 RX ORDER — FENTANYL 75 UG/1
1 PATCH TRANSDERMAL
Status: DISCONTINUED | OUTPATIENT
Start: 2017-04-07 | End: 2017-04-08 | Stop reason: HOSPADM

## 2017-04-07 RX ORDER — OXYCODONE HYDROCHLORIDE AND ACETAMINOPHEN 5; 325 MG/1; MG/1
1-2 TABLET ORAL EVERY 4 HOURS PRN
Status: DISCONTINUED | OUTPATIENT
Start: 2017-04-07 | End: 2017-04-08 | Stop reason: HOSPADM

## 2017-04-07 RX ADMIN — ONDANSETRON 4 MG: 2 INJECTION INTRAMUSCULAR; INTRAVENOUS at 11:07

## 2017-04-07 RX ADMIN — FERROUS GLUCONATE 324 MG: 324 TABLET ORAL at 18:19

## 2017-04-07 RX ADMIN — FERROUS GLUCONATE 324 MG: 324 TABLET ORAL at 08:35

## 2017-04-07 RX ADMIN — FENTANYL 1 PATCH: 75 PATCH TRANSDERMAL at 20:52

## 2017-04-07 RX ADMIN — ONDANSETRON 4 MG: 2 INJECTION INTRAMUSCULAR; INTRAVENOUS at 02:28

## 2017-04-07 RX ADMIN — TIZANIDINE 4 MG: 4 TABLET ORAL at 23:56

## 2017-04-07 RX ADMIN — PIPERACILLIN AND TAZOBACTAM 3.38 G: 3; .375 INJECTION, POWDER, LYOPHILIZED, FOR SOLUTION INTRAVENOUS; PARENTERAL at 14:14

## 2017-04-07 RX ADMIN — TIZANIDINE 4 MG: 4 TABLET ORAL at 02:28

## 2017-04-07 RX ADMIN — SODIUM CHLORIDE, POTASSIUM CHLORIDE, SODIUM LACTATE AND CALCIUM CHLORIDE 1000 ML: 600; 310; 30; 20 INJECTION, SOLUTION INTRAVENOUS at 05:59

## 2017-04-07 RX ADMIN — PIPERACILLIN AND TAZOBACTAM 3.38 G: 3; .375 INJECTION, POWDER, LYOPHILIZED, FOR SOLUTION INTRAVENOUS; PARENTERAL at 05:59

## 2017-04-07 RX ADMIN — BUTALBITAL, ACETAMINOPHEN, AND CAFFEINE 2 TABLET: 50; 325; 40 TABLET ORAL at 02:28

## 2017-04-07 RX ADMIN — CLINDAMYCIN HYDROCHLORIDE 450 MG: 150 CAPSULE ORAL at 22:56

## 2017-04-07 RX ADMIN — BUTALBITAL, ACETAMINOPHEN, AND CAFFEINE 1 TABLET: 50; 325; 40 TABLET ORAL at 11:07

## 2017-04-07 RX ADMIN — BUTALBITAL, ACETAMINOPHEN, AND CAFFEINE 2 TABLET: 50; 325; 40 TABLET ORAL at 20:41

## 2017-04-07 RX ADMIN — TIZANIDINE 4 MG: 4 TABLET ORAL at 14:41

## 2017-04-07 RX ADMIN — SODIUM CHLORIDE, POTASSIUM CHLORIDE, SODIUM LACTATE AND CALCIUM CHLORIDE 1000 ML: 600; 310; 30; 20 INJECTION, SOLUTION INTRAVENOUS at 20:42

## 2017-04-07 RX ADMIN — PIPERACILLIN AND TAZOBACTAM 3.38 G: 3; .375 INJECTION, POWDER, LYOPHILIZED, FOR SOLUTION INTRAVENOUS; PARENTERAL at 18:19

## 2017-04-07 RX ADMIN — CEFUROXIME AXETIL 500 MG: 500 TABLET ORAL at 23:56

## 2017-04-07 RX ADMIN — ONDANSETRON 4 MG: 2 INJECTION INTRAMUSCULAR; INTRAVENOUS at 20:45

## 2017-04-07 ASSESSMENT — PAIN SCALES - GENERAL
PAINLEVEL_OUTOF10: 2
PAINLEVEL_OUTOF10: 4
PAINLEVEL_OUTOF10: 2
PAINLEVEL_OUTOF10: 4
PAINLEVEL_OUTOF10: 2

## 2017-04-07 NOTE — PROGRESS NOTES
Pt A&O x4. SO present at bedside.    Vitals: /93 mmHg  Pulse 85  Temp(Src) 36.8 °C (98.3 °F)  Resp 19  Ht 1.829 m (6')  Wt 87.998 kg (194 lb)  BMI 26.31 kg/m2  SpO2 99%  LMP 03/16/2017    Pt rates pain 4 out of 10. Pt on Morphine PCA with basal rate and bolus available. Pt states PCA controlling pain well. Demonstrated how to use properly.     Neuro: FELIPE. Denies new onset of numbness/ tingling.    Cardiac: Denies new onset of chest pain.    Vascular: Pulses 2+ BUE, BLE. No edema noted.    Respiratory: Lungs sound clear to auscultation. Denies SOB.    GI: Abdomen soft, rounded, tender. + bowel sounds, + flatus, + BM today. On regular diet, tolerating well. + nausea/ - vomiting, medicated with antiemetics.     : Pt voiding urine with bloody discharge. Urine pink tinged with clots, clear.     MSK: Pt up to bathroom self, no assistance required, tolerating well.    Integumentary: Abdominal lap sites CDI, approximated, ALFRED, sutures.    Labs noted.    Fall precautions in place: Bed locked in lowest position, Upper bed rails up, treaded socks in place, personal belongings within reach, call light within reach, appropriate mobility signs in place, - bed alarm. Pt calls appropriately.     Pt updated on POC.

## 2017-04-07 NOTE — CARE PLAN
Problem: Safety  Goal: Will remain free from falls  Outcome: PROGRESSING AS EXPECTED  Intervention: Assess risk factors for falls    04/06/17 2000 04/07/17 0215   OTHER   Fall Risk High Risk to Fall - 2 or more points  --    Risk for Injury-Any positive answers results in the pt being at high risk for fall related injury Not Applicable --    Mobility Status Assessment 0-Ambulates & Transfers Independently. No Assistance Required --    History of fall 0 --    Pt Calls for Assistance --  Yes       Intervention: Implement fall precautions    04/06/17 2000   OTHER   Environmental Precautions Treaded Slipper Socks on Patient;Personal Belongings, Wastebasket, Call Bell etc. in Easy Reach;Report Given to Other Health Care Providers Regarding Fall Risk;Bed in Low Position;Communication Sign for Patients & Families;Mobility Assessed & Appropriate Sign Placed   IV Pole on Same Side of Bed as Bathroom Yes   Bedrails Bedrails Closest to Bathroom Down           Problem: Bowel/Gastric:  Goal: Will not experience complications related to bowel motility  Outcome: PROGRESSING AS EXPECTED    04/06/17 0915   OTHER   Last BM 04/06/17

## 2017-04-07 NOTE — PROGRESS NOTES
Report received from NOC shift RN. Patient is A/O x 4 and on room air. VSS. /96 mmHg  Pulse 68  Temp(Src) 36.6 °C (97.9 °F)  Resp 16  Ht 1.829 m (6')  Wt 87.998 kg (194 lb)  BMI 26.31 kg/m2  SpO2 98%  LMP 03/16/2017 Labs reviewed. Hgb at 9.5 from 8.5. Patient has remained afebrile. BS normoactive X 4. +BM, loose as of this am. +void, bloody in presentation. PIV infilitrated. US guided PIV placed on left FA. ABX restarted.Patient walks independently.Reports lower abdomen pain of 2/10. Call light at bedside.

## 2017-04-07 NOTE — TELEPHONE ENCOUNTER
1. Caller Name: Joy                                         Call Back Number: [phone]      Patient approves a detailed voicemail message: N\A    Pt called and wanted to inform Filomena she was admitted to [facility] due to an incisional infection from her hysterectomy surgery.

## 2017-04-08 VITALS
HEART RATE: 77 BPM | WEIGHT: 194 LBS | OXYGEN SATURATION: 96 % | RESPIRATION RATE: 17 BRPM | TEMPERATURE: 98.6 F | BODY MASS INDEX: 26.28 KG/M2 | HEIGHT: 72 IN | SYSTOLIC BLOOD PRESSURE: 136 MMHG | DIASTOLIC BLOOD PRESSURE: 88 MMHG

## 2017-04-08 PROCEDURE — 700111 HCHG RX REV CODE 636 W/ 250 OVERRIDE (IP): Performed by: OBSTETRICS & GYNECOLOGY

## 2017-04-08 PROCEDURE — 700102 HCHG RX REV CODE 250 W/ 637 OVERRIDE(OP): Performed by: OBSTETRICS & GYNECOLOGY

## 2017-04-08 PROCEDURE — A9270 NON-COVERED ITEM OR SERVICE: HCPCS | Performed by: OBSTETRICS & GYNECOLOGY

## 2017-04-08 RX ORDER — CLINDAMYCIN HYDROCHLORIDE 150 MG/1
450 CAPSULE ORAL 4 TIMES DAILY
Qty: 64 CAP | Refills: 0 | Status: SHIPPED | OUTPATIENT
Start: 2017-04-08 | End: 2017-10-16

## 2017-04-08 RX ORDER — CEFUROXIME AXETIL 500 MG/1
500 TABLET ORAL EVERY 12 HOURS
Qty: 28 TAB | Refills: 1 | Status: SHIPPED | OUTPATIENT
Start: 2017-04-08 | End: 2017-10-16

## 2017-04-08 RX ADMIN — FERROUS GLUCONATE 324 MG: 324 TABLET ORAL at 08:41

## 2017-04-08 RX ADMIN — CLINDAMYCIN HYDROCHLORIDE 450 MG: 150 CAPSULE ORAL at 08:41

## 2017-04-08 RX ADMIN — CLINDAMYCIN HYDROCHLORIDE 450 MG: 150 CAPSULE ORAL at 12:15

## 2017-04-08 RX ADMIN — ONDANSETRON 4 MG: 2 INJECTION INTRAMUSCULAR; INTRAVENOUS at 08:41

## 2017-04-08 RX ADMIN — BUTALBITAL, ACETAMINOPHEN, AND CAFFEINE 2 TABLET: 50; 325; 40 TABLET ORAL at 06:09

## 2017-04-08 RX ADMIN — CEFUROXIME AXETIL 500 MG: 500 TABLET ORAL at 08:41

## 2017-04-08 RX ADMIN — SODIUM CHLORIDE, POTASSIUM CHLORIDE, SODIUM LACTATE AND CALCIUM CHLORIDE 1000 ML: 600; 310; 30; 20 INJECTION, SOLUTION INTRAVENOUS at 06:11

## 2017-04-08 NOTE — PROGRESS NOTES
Spoke to Corinne Capurro MD. Asked about weaning protocol for PCA prior to getting patient home. MD agreed to get patient back on fentanyl patch. Will wait to see when Dr. Santillan will transition patient to PO ABX.

## 2017-04-08 NOTE — CARE PLAN
Problem: Safety  Goal: Will remain free from falls  Outcome: PROGRESSING AS EXPECTED  Intervention: Assess risk factors for falls    04/07/17 2000 04/08/17 0100   OTHER   Fall Risk High Risk to Fall - 2 or more points  --    Risk for Injury-Any positive answers results in the pt being at high risk for fall related injury Not Applicable --    Mobility Status Assessment 0-Ambulates & Transfers Independently. No Assistance Required --    History of fall 0 --    Pt Calls for Assistance --  Yes       Intervention: Implement fall precautions    04/07/17 2000   OTHER   Environmental Precautions Treaded Slipper Socks on Patient;Personal Belongings, Wastebasket, Call Bell etc. in Easy Reach;Report Given to Other Health Care Providers Regarding Fall Risk;Bed in Low Position;Communication Sign for Patients & Families;Mobility Assessed & Appropriate Sign Placed   IV Pole on Same Side of Bed as Bathroom Yes   Bedrails Bedrails Closest to Bathroom Down           Problem: Psychosocial Needs:  Goal: Level of anxiety will decrease  Outcome: PROGRESSING AS EXPECTED  Pt calm, pleasant.

## 2017-04-08 NOTE — DISCHARGE INSTRUCTIONS
Discharge Instructions    Discharged to home by car with relative. Discharged via walking, hospital escort: Refused.  Special equipment needed: Not Applicable    Be sure to schedule a follow-up appointment with your primary care doctor or any specialists as instructed.     Discharge Plan:   Diet Plan: Discussed  Activity Level: Discussed  Confirmed Follow up Appointment: Appointment Scheduled  Confirmed Symptoms Management: Discussed  Medication Reconciliation Updated: Yes  Influenza Vaccine Indication: Not indicated: Previously immunized this influenza season and > 8 years of age    I understand that a diet low in cholesterol, fat, and sodium is recommended for good health. Unless I have been given specific instructions below for another diet, I accept this instruction as my diet prescription.   Other diet: Regular    Special Instructions: None    · Is patient discharged on Warfarin / Coumadin?   No     · Is patient Post Blood Transfusion?  No    Hysterectomy Information   A hysterectomy is a surgery in which your uterus is removed. This surgery may be done to treat various medical problems. After the surgery, you will no longer have menstrual periods. The surgery will also make you unable to become pregnant (sterile). The fallopian tubes and ovaries can be removed (bilateral salpingo-oophorectomy) during this surgery as well.   REASONS FOR A HYSTERECTOMY  · Persistent, abnormal bleeding.  · Lasting (chronic) pelvic pain or infection.  · The lining of the uterus (endometrium) starts growing outside the uterus (endometriosis).  · The endometrium starts growing in the muscle of the uterus (adenomyosis).  · The uterus falls down into the vagina (pelvic organ prolapse).  · Noncancerous growths in the uterus (uterine fibroids) that cause symptoms.  · Precancerous cells.  · Cervical cancer or uterine cancer.  TYPES OF HYSTERECTOMIES  1. Supracervical hysterectomy--In this type, the top part of the uterus is removed, but  not the cervix.  2. Total hysterectomy--The uterus and cervix are removed.  3. Radical hysterectomy--The uterus, the cervix, and the fibrous tissue that holds the uterus in place in the pelvis (parametrium) are removed.  WAYS A HYSTERECTOMY CAN BE PERFORMED  · Abdominal hysterectomy--A large surgical cut (incision) is made in the abdomen. The uterus is removed through this incision.  · Vaginal hysterectomy--An incision is made in the vagina. The uterus is removed through this incision. There are no abdominal incisions.  · Conventional laparoscopic hysterectomy--Three or four small incisions are made in the abdomen. A thin, lighted tube with a camera (laparoscope) is inserted into one of the incisions. Other tools are put through the other incisions. The uterus is cut into small pieces. The small pieces are removed through the incisions, or they are removed through the vagina.  · Laparoscopically assisted vaginal hysterectomy (LAVH)--Three or four small incisions are made in the abdomen. Part of the surgery is performed laparoscopically and part vaginally. The uterus is removed through the vagina.  · Robot-assisted laparoscopic hysterectomy--A laparoscope and other tools are inserted into 3 or 4 small incisions in the abdomen. A computer-controlled device is used to give the surgeon a 3D image and to help control the surgical instruments. This allows for more precise movements of surgical instruments. The uterus is cut into small pieces and removed through the incisions or removed through the vagina.  RISKS AND COMPLICATIONS   Possible complications associated with this procedure include:  · Bleeding and risk of blood transfusion. Tell your health care provider if you do not want to receive any blood products.  · Blood clots in the legs or lung.  · Infection.  · Injury to surrounding organs.  · Problems or side effects related to anesthesia.  · Conversion to an abdominal hysterectomy from one of the other  techniques.  WHAT TO EXPECT AFTER A HYSTERECTOMY  · You will be given pain medicine.  · You will need to have someone with you for the first 3-5 days after you go home.  · You will need to follow up with your surgeon in 2-4 weeks after surgery to evaluate your progress.  · You may have early menopause symptoms such as hot flashes, night sweats, and insomnia.  · If you had a hysterectomy for a problem that was not cancer or not a condition that could lead to cancer, then you no longer need Pap tests. However, even if you no longer need a Pap test, a regular exam is a good idea to make sure no other problems are starting.     This information is not intended to replace advice given to you by your health care provider. Make sure you discuss any questions you have with your health care provider.     Document Released: 06/13/2002 Document Revised: 10/08/2014 Document Reviewed: 08/25/2014  [employer] Interactive Patient Education ©2016 [employer]      Depression / Suicide Risk    As you are discharged from this [facility] facility, it is important to learn how to keep safe from harming yourself.    Recognize the warning signs:  · Abrupt changes in personality, positive or negative- including increase in energy   · Giving away possessions  · Change in eating patterns- significant weight changes-  positive or negative  · Change in sleeping patterns- unable to sleep or sleeping all the time   · Unwillingness or inability to communicate  · Depression  · Unusual sadness, discouragement and loneliness  · Talk of wanting to die  · Neglect of personal appearance   · Rebelliousness- reckless behavior  · Withdrawal from people/activities they love  · Confusion- inability to concentrate     If you or a loved one observes any of these behaviors or has concerns about self-harm, here's what you can do:  · Talk about it- your feelings and reasons for harming yourself  · Remove any means that you might use to hurt yourself (examples:  pills, rope, extension cords, firearm)  · Get professional help from the community (Mental Health, Substance Abuse, psychological counseling)  · Do not be alone:Call your Safe Contact- someone whom you trust who will be there for you.  · Call your local CRISIS HOTLINE 611-6896 or 212-983-1885  · Call your local Children's Mobile Crisis Response Team [place] (891) 728-6560 or www.[employer]Peer ([place])  · Call the toll free National Suicide Prevention Hotlines   · National Suicide Prevention Lifeline 404-608-WGWD (0605)  · National Hope Line Network 800-SUICIDE (792-1161)    Infection Control in the Home  If you have an infection or you are taking care of someone who has an infection, it is important to know how to keep the infection from spreading. Follow these guidelines to help stop the spread of infection, and talk to your health care provider.  HOW ARE INFECTIONS SPREAD?  In order for an infection to spread, the following must be present:  · A germ. This may be a virus, bacteria, fungus, or parasite.  · A place for the germ to live. This may be:  ¨ On or in a person, animal, plant, or food.  ¨ In soil or water.  ¨ On surfaces, such as a door handle.  · A susceptible host. This is a person or animal who does not have resistance (immunity) to the germ.  · A way for the germ to enter the host. This may occur by:  ¨ Direct contact. This may happen by making contact--such as shaking hands or hugging--with an infected person or animal. Some germs can also travel through the air and spread to you if an infected person coughs or sneezes on you or near you.  ¨ Indirect contact. This is when the germ enters the host through contact with an infected object. Examples include eating contaminated food, drinking contaminated water, or touching a contaminated surface with your hands and then touching your face, nose, or mouth soon after that.  HOW CAN I HELP TO PREVENT INFECTION FROM SPREADING?  There are several things that  you can do to help prevent infection from spreading.  Hand Washing  It is very important to wash your hands correctly, following these steps:  4. Wet your hands with clean, running water.  5. Apply soap to your hands. Liquid soap is better than bar soap.  6. Rub your hands together quickly to create lather.  7. Keep rubbing your hands together for at least 20 seconds. Thoroughly scrub all parts of your hands, including under your fingernails and between your fingers.  8. Rinse your hands with clean, running water until all of the soap is gone.  9. Dry your hands with an air dryer or a clean paper or cloth towel, or let your hands air-dry. Do not use your clothing or a soiled towel to dry your hands.  10. If you are in a public restroom, use your towel to turn off the water faucet and to open the bathroom door.  Make sure to wash your hands:  · Before:  ¨ Visiting a baby or anyone with a weakened or lowered defense (immune) system.  ¨ Putting in and taking out any contact lenses.  · After:  ¨ Working or playing outside.  ¨ Touching an animal or its toys or leash.  ¨ Handling livestock.  ¨ Using the bathroom or helping a child or adult to use the bathroom.  ¨ Using household  or toxic chemicals.  ¨ Touching or taking out the garbage.  ¨ Touching anything dirty around your home.  ¨ Handling soiled clothes or rags.  ¨ Taking care of a sick child. This includes touching used tissues, toys, and clothes.  ¨ Sneezing, coughing, or blowing your nose.  ¨ Using public transportation.  ¨ Shaking hands.  ¨ Using a phone, including your mobile phone.  ¨ Touching money.  · Before and after:  ¨ Preparing food.  ¨ Preparing a bottle for a baby.  ¨ Feeding a baby or a young child.  ¨ Eating.  ¨ Visiting or taking care of someone who is sick.  ¨ Changing a diaper.  ¨ Changing a bandage (dressing) or taking care of an injury or wound.  ¨ Giving or taking medicine.  Taking Care of Your Home  · Make sure that you have enough  cleaning supplies at all times. These include:  ¨ Disinfectants.  ¨ Reusable cleaning cloths. Wash these after each use.  ¨ Paper towels.  ¨ Utility gloves. Replace your gloves if they are cracked or torn or if they start to peel.  · Use bleach safely. Never mix it with other cleaning products, especially those that contain ammonia. This mixture can create a dangerous gas that may be deadly.  · Take care of your cleaning supplies. Toilet brushes, mops, and sponges can breed germs. Soak them in bleach and water for 5 minutes after each use.  · Do not pour used mop water down the sink. Pour it down the toilet instead.  · Maintain proper ventilation in your home.  · If you have a pet, ensure that your pet stays clean. Do not let people with weak immune systems touch bird droppings, fish tank water, or a litter box.  ¨ If you have a cat, be sure to change the litter every day.  · In the bathroom, make sure you:  ¨ Provide liquid soap.  ¨ Change towels and washcloths frequently. Avoid sharing towels and washcloths.  ¨ Change toothbrushes often and store them separately in a clean, dry place.  ¨ Disinfect the toilet.  ¨ Clean the tub, shower, and sink with standard cleaning products.    ¨ Mop the floor with a standard .  ¨ Do not share personal items, such as razors, toothbrushes, drinking glasses, deodorant, aguirre, brushes, towels, and washcloths.    · In the kitchen, make sure you:  ¨ Store food carefully.  ¨ Refrigerate leftovers promptly in covered containers.  ¨ Throw out stale or spoiled food.  ¨ Clean the inside of your refrigerator each week.  ¨ Keep your refrigerator set at 40°F (4°C) or less, and set your freezer at 0°F (-18°C) or less.  ¨ Thaw foods in the refrigerator or microwave, not at room temperature.  ¨ Serve foods at the proper temperature. Do not eat raw meat. Make sure it is cooked to the appropriate temperature. Cook eggs until they are firm.  ¨ Wash fruits and vegetables under running  water.  ¨ Use separate cutting boards, plates, and utensils for raw foods and cooked foods.  ¨ Keep work surfaces clean.  ¨ Use a clean spoon each time you sample food while cooking.  ¨ Wash your dishes in hot, soapy water. Air-dry your dishes or use a .  ¨ Do not share forks, cups, or spoons during meals.  · Wear gloves if laundry is visibly soiled.  · Change linens each week or whenever they are soiled.  · Do not shake soiled linens. Doing that may send germs into the air. Put dressings, sanitary or incontinence pads, diapers, and gloves in plastic garbage bags for disposal.     This information is not intended to replace advice given to you by your health care provider. Make sure you discuss any questions you have with your health care provider.     Document Released: 09/26/2009 Document Revised: 01/08/2016 Document Reviewed: 08/20/2015  [employer] Interactive Patient Education ©2016 [employer]

## 2017-04-08 NOTE — PROGRESS NOTES
Received bedside report and assumed care of patient.  Assessment complete; discussed POC with patient.  AO x4, patient calls for assistance.  Patient appears calm and exhibits no signs of distress.  Patient reports pain of 1/10, declines interventions.  Patient tolerating current diet, mild nausea, IV zofran given.  BS normoactive x 4, LBM 4/7/17, patient voids ambulating to BR.  Patient is self ambulating with no assist, gait steady.  Call light within reach, bed in lowest position, SCDs refused, bed alarm refused.  Will continue to monitor pt for changes in status and provide care.

## 2017-04-08 NOTE — PROGRESS NOTES
Pt A&O x4.  present at bedside.    Vitals: /90 mmHg  Pulse 76  Temp(Src) 36.9 °C (98.4 °F)  Resp 18  Ht 1.829 m (6')  Wt 87.998 kg (194 lb)  BMI 26.31 kg/m2  SpO2 98%  LMP 03/16/2017    Pt rates pain 2 out of 10. Discontinued PCA, restarted pt on Fentanyl patch and Percocet.     Neuro: FELIPE. Denies new onset of numbness/ tingling.    Cardiac: Denies new onset of chest pain.    Vascular: Pulses 2+ BUE, BLE. No edema noted.    Respiratory: Lungs sound clear to auscultation. Denies SOB.    GI: Abdomen soft, rounded, tender. + bowel sounds, + flatus, + BM today. On regular diet, tolerating well. Mild nausea/ - vomiting, medicated with antiemetics.    : Pt voiding adequately. Less/ almost no bloody discharge with urination.     MSK: Pt up to bathroom self, tolerating well. Ambulating halls.     Integumentary: Abdominal lap sites CDI, no drainage noted.    Labs noted.    Fall precautions in place: Bed locked in lowest position, Upper bed rails up, treaded socks in place, personal belongings within reach, call light within reach, appropriate mobility signs in place, - bed alarm. Pt calls appropriately.     Pt updated on POC.

## 2017-04-09 LAB
BACTERIA BLD CULT: NORMAL
BACTERIA BLD CULT: NORMAL
SIGNIFICANT IND 70042: NORMAL
SIGNIFICANT IND 70042: NORMAL
SITE SITE: NORMAL
SITE SITE: NORMAL
SOURCE SOURCE: NORMAL
SOURCE SOURCE: NORMAL

## 2017-04-09 NOTE — PROGRESS NOTES
DATE OF SERVICE:  04/08/2017    This is from April 6th, patient is now hospital day #3.  She is on Zosyn and   clindamycin.  She continues to prove she is having some vaginal bleeding.  She   does have diarrhea.    PHYSICAL EXAMINATION:  VITAL SIGNS:  Normal.  She has been afebrile since yesterday.  GENERAL:  Patient is awake, alert, pleasant.  ABDOMEN:  Nondistended.  She has mild tenderness.    ASSESSMENT:  1.  Postop infection after laparoscopic-assisted vaginal hysterectomy, likely   cuff cellulitis.    PLAN:  We will continue IV antibiotics until she is afebrile at least 48 hours,       ____________________________________     MD DALLIN TALLEY / SHELTON    DD:  04/08/2017 13:04:25  DT:  04/08/2017 19:27:52    D#:  [id number]  Job#:  [id number]

## 2017-04-09 NOTE — PROGRESS NOTES
DATE OF SERVICE:  04/08/2017    TIME:  10 a.m.    SUBJECTIVE:  The patient is now hospital day #4, she continues to improve.    She states she is still having some bright red vaginal bleeding, it is much   less than _____.  It is lighter than the previous day.  She did not have any   fever symptoms in the last 24 hours.    PHYSICAL EXAMINATION:  VITAL SIGNS:  Normal.  She has been afebrile for almost 2 days.  GENERAL:  She is awake, alert, pleasant.  ABDOMEN:  Soft, nontender with again very well tenderness.    ASSESSMENT:  Postoperative infection after LAVH likely cuff cellulitis.    PLAN:  If she is afebrile this afternoon, we are going to switch over to oral   antibiotics tonight with 1-24 hours.  If she remains afebrile, she will   probably be discharged home on April 8th.       ____________________________________     MD DALLIN TALLEY / SHELTON    DD:  04/08/2017 13:06:10  DT:  04/08/2017 19:28:46    D#:  [id number]  Job#:  [id number]

## 2017-04-09 NOTE — DISCHARGE SUMMARY
DISCHARGE DIAGNOSIS:  Cuff cellulitis after laparoscopic-assisted vaginal   hysterectomy.    HISTORY OF PRESENT ILLNESS:  Patient was admitted on April 3rd by Dr. Santos.    She presented with fever symptoms and increased abdominal pain.  She had   elevated white count at 14,000 and temp of 101.5.  A CAT scan showed normal   post-surgical changes.  No abscess was noted.  She did have a 4.5 cm right   ovary.  There was some peritoneal fluid, most consistent with hemorrhagic   fluid.    She was initially begun on ampicillin and gentamicin.  We eventually switched   her to Zosyn and clindamycin.  She was kept on these, so she was afebrile for   48 hours.  She did have significant improvement and day of discharge, she had   essentially no pain.  She had normal GI and  function.  She was afebrile.    Her white count had returned to normal.  She was eating and drinking normally.    She did have diarrhea, which seemed to be secondary to the antibiotics.    Today, she complains of no pain, no fever symptoms and would like to go home.    PHYSICAL EXAMINATION:  VITAL SIGNS:  Normal.  Patient has been afebrile for almost 3 days.  ABDOMEN:  Soft, nontender.  No rebound, no guarding.    ASSESSMENT:  Postoperative cuff cellulitis.    PLAN:  Patient will be discharged home.  She is going to follow up and see me   on Monday morning.  We are going to send her home on clindamycin 450 mg q.i.d.   and Ceftin 500 mg b.i.d.  She already receives pain medication for chronic   pain of her migraines and back.  She is on fentanyl patch.  She has not taken   any additional pain medications other than her fentanyl patch.  This is   managed by her pain doctor.  Infectious and activity precautions reviewed and   I will see her in 2 days.       ____________________________________     MD DALLIN TALLEY / NTS    DD:  04/08/2017 13:10:41  DT:  04/08/2017 19:41:54    D#:  [id number]  Job#:  [id number]

## 2017-04-20 RX ORDER — TIZANIDINE 4 MG/1
TABLET ORAL
Qty: 90 TAB | Refills: 0 | Status: SHIPPED | OUTPATIENT
Start: 2017-04-20 | End: 2017-05-08 | Stop reason: SDUPTHER

## 2017-04-20 NOTE — TELEPHONE ENCOUNTER
Was the patient seen in the last year in this department? Yes     Does patient have an active prescription for medications requested? No     Received Request Via: Pharmacy     Last visit:3/22/17

## 2017-05-08 RX ORDER — TIZANIDINE 4 MG/1
TABLET ORAL
Qty: 90 TAB | Refills: 0 | Status: SHIPPED | OUTPATIENT
Start: 2017-05-08 | End: 2017-05-23 | Stop reason: SDUPTHER

## 2017-05-23 RX ORDER — TIZANIDINE 4 MG/1
TABLET ORAL
Qty: 90 TAB | Refills: 0 | Status: SHIPPED | OUTPATIENT
Start: 2017-05-23 | End: 2017-06-12 | Stop reason: SDUPTHER

## 2017-05-24 ENCOUNTER — OFFICE VISIT (OUTPATIENT)
Dept: MEDICAL GROUP | Facility: LAB | Age: 32
End: 2017-05-24
Payer: COMMERCIAL

## 2017-05-24 VITALS
OXYGEN SATURATION: 97 % | DIASTOLIC BLOOD PRESSURE: 108 MMHG | BODY MASS INDEX: 25.33 KG/M2 | HEART RATE: 80 BPM | HEIGHT: 72 IN | RESPIRATION RATE: 12 BRPM | WEIGHT: 187 LBS | TEMPERATURE: 98.7 F | SYSTOLIC BLOOD PRESSURE: 130 MMHG

## 2017-05-24 DIAGNOSIS — Z90.710 S/P ABDOMINAL HYSTERECTOMY: ICD-10-CM

## 2017-05-24 DIAGNOSIS — D62 ACUTE BLOOD LOSS AS CAUSE OF POSTOPERATIVE ANEMIA: ICD-10-CM

## 2017-05-24 DIAGNOSIS — M51.36 DEGENERATIVE DISC DISEASE, LUMBAR: ICD-10-CM

## 2017-05-24 DIAGNOSIS — Z79.891 CHRONIC USE OF OPIATE DRUGS THERAPEUTIC PURPOSES: ICD-10-CM

## 2017-05-24 DIAGNOSIS — M54.42 CHRONIC BILATERAL LOW BACK PAIN WITH BILATERAL SCIATICA: ICD-10-CM

## 2017-05-24 DIAGNOSIS — M54.41 CHRONIC BILATERAL LOW BACK PAIN WITH BILATERAL SCIATICA: ICD-10-CM

## 2017-05-24 DIAGNOSIS — G89.29 CHRONIC BILATERAL LOW BACK PAIN WITH BILATERAL SCIATICA: ICD-10-CM

## 2017-05-24 PROBLEM — N92.0 EXCESSIVE OR FREQUENT MENSTRUATION: Status: RESOLVED | Noted: 2017-03-27 | Resolved: 2017-05-24

## 2017-05-24 PROCEDURE — 99214 OFFICE O/P EST MOD 30 MIN: CPT | Performed by: NURSE PRACTITIONER

## 2017-05-24 RX ORDER — FENTANYL 50 UG/1
1 PATCH TRANSDERMAL
Qty: 15 PATCH | Refills: 0 | Status: SHIPPED | OUTPATIENT
Start: 2017-05-24 | End: 2017-10-16

## 2017-05-24 RX ORDER — HYDROCODONE BITARTRATE AND ACETAMINOPHEN 5; 325 MG/1; MG/1
1 TABLET ORAL EVERY 12 HOURS PRN
Qty: 56 TAB | Refills: 0 | Status: SHIPPED | OUTPATIENT
Start: 2017-05-24 | End: 2017-07-31 | Stop reason: SDUPTHER

## 2017-05-24 RX ORDER — FENTANYL 50 UG/1
1 PATCH TRANSDERMAL
Qty: 15 PATCH | Refills: 0 | Status: SHIPPED | OUTPATIENT
Start: 2017-05-24 | End: 2017-08-24

## 2017-05-24 RX ORDER — HYDROCODONE BITARTRATE AND ACETAMINOPHEN 5; 325 MG/1; MG/1
1 TABLET ORAL EVERY 12 HOURS PRN
Qty: 56 TAB | Refills: 0 | Status: SHIPPED | OUTPATIENT
Start: 2017-05-24 | End: 2017-10-16

## 2017-05-24 ASSESSMENT — ENCOUNTER SYMPTOMS: DEPRESSION: 1

## 2017-05-24 ASSESSMENT — PATIENT HEALTH QUESTIONNAIRE - PHQ9: CLINICAL INTERPRETATION OF PHQ2 SCORE: 0

## 2017-05-24 ASSESSMENT — LIFESTYLE VARIABLES: HISTORY_ALCOHOL_USE: 0

## 2017-05-24 NOTE — PROGRESS NOTES
Chief Complaint   Patient presents with   • Medication Refill     3 month med check & refill        HPI   32-year-old established female here with a few issues:  #1-she had a hysterectomy in March, postoperative infection in April and spent about a week in the hospital on IV antibiotics in early April. Out of hospital since 4/9/2016.  Cleared by Dr. Santillan for intercourse and activity this morning. She was fairly anemic in the hospital and still feels more tired than normal, would like to repeat lab work. Denies any issues with bowels, bladder or abdominal pain. No diarrhea. She is nauseated frequently because of antibiotic use but is now off of antibiotics.    #2-chronic pain: Reports that the pain she experienced from menorrhagia and menstrual cramps has obviously resolved now that she does not have a uterus. She lives in chronic low back pain due to lumbar and thoracic degenerative disc disease. She plans on following up with her neurosurgeon next month for potential discussion of surgery on her lumbar spine. She does feel that her low back pain worsened while she went through gynecological surgery, hospitalization and recovery. Continues on fentanyl but ran out of Basom several days ago.  Last dose of controlled substance: last norco 7 days ago.  Fentanyl patch is on.  No other meds since hospital in April.    Chronic pain treated with fentanyl 75 mcg every 48 hours and norco 2 pills at night    She  reports that she does not drink alcohol.  She  reports that she does not use illicit drugs.    Consequences of Chronic Opiate therapy:  (5 A's)  Analgesia: Compared to no treatment or prior treatment, pain is currently worsened  Activity: not changed  Adverse Events: She denies constipation, dry mouth, itchy skin, nausea and sedation  Aberrant Behaviors: She reports she is taking medication as prescribed and is not veering from agreed treatment regimen. There have been no inappropriate refills or lost/stolen meds  "reported.   Affect/Mood: Pain is not impacting patient's mood.  Patient denies depression/anxiety.    Nonnarcotic treatments that are being used: muscle relaxers.   Treatment goals discussed.    Opioid Risk Score: 2    Interpretation of Opioid Risk Score   Score 0-3 = Low risk of abuse. Do UDS at least once per year.  Score 4-7 = Moderate risk of abuse. Do UDS 1-4 times per year.  Score 8+ = High risk of abuse. Refer to specialist.    Last order of CONTROLLED SUBSTANCE TREATMENT AGREEMENT was found on 2/17/2017 from Office Visit on 2/17/2017     UDS Summary                URINE DRUG SCREEN Overdue 5/15/2017      Done 5/20/2016 PAIN MANAGEMENT SCRN, UR        Most recent UDS reviewed today and is consistent with prescribed medications.    I have reviewed the medical records, the Prescription Monitoring Program and I have determined that controlled substance treatment is medically indicated.    Past medical, surgical, family, and social history is reviewed and updated in Epic chart by me today.   Medications and allergies reviewed and updated in Epic chart by me today.     ROS:   As documented in history of present illness above    Exam:  Blood pressure 130/108, pulse 80, temperature 37.1 °C (98.7 °F), resp. rate 12, height 1.829 m (6' 0.01\"), weight 84.823 kg (187 lb), last menstrual period 03/16/2017, SpO2 97 %.  Constitutional: Alert, no distress, plus 3 vital signs  Skin:  Warm, dry, no rashes invisible areas  Eye: Equal, round and reactive, conjunctiva clear  ENMT: Lips without lesions, good dentition, oropharynx clear    Neck: Trachea midline  Respiratory: Unlabored respiration, lungs clear to auscultation, no wheezes, no rhonchi  Cardiovascular: Normal rate and rhythm, no murmur, no edema  Psych: Alert, pleasant, well-groomed, normal affect    A/P:  1. Degenerative disc disease, lumbar  -Recommend updated MRI of her lumbar spine as her last imaging was 2014. She will schedule an appointment with Dr. Jack, her " neurosurgeon, after her MRI is completed. She has lost a lot of weight and this will eventually help her with her back pain, discussed that with her today. She'll continue a trunk strengthening.  - MR-LUMBAR SPINE-W/O; Future    2. Chronic bilateral low back pain with bilateral sciatica  -As above  - MR-LUMBAR SPINE-W/O; Future    3. Acute blood loss as cause of postoperative anemia  -Reviewed lab work with her from prior to discharge and she'll recheck a CBC with CMP in the next few days.  - CBC WITH DIFFERENTIAL  - BASIC METABOLIC PANEL; Future    4. S/P abdominal hysterectomy  -Recovering well.    5.  Chronic use of opiates therapeutic purposes:  1. Chronic Pain associated with :    2. Chronic narcotic use ( V58.69)   Overall impression that this patient is benefiting from opioid therapy and that the benefits outweigh the risks of continued use. yes   - Controlled Substance Use Agreement current   - Urine drug screen ordered today   - Additional lab: CMP to assess liver and renal function - UTD   - Rx refill prescriptions are done for 3 months, No early refills. Reduced her fentanyl patch to 50 µg. Continue Norco at same dosage. She is agreeable to following up here in 3 months that emailing me with Dr. Jack's plan for her, especially for postoperative pain control. Ultimately her goal is to completely get off of all of her narcotics.   - Referral to pain management no

## 2017-05-24 NOTE — MR AVS SNAPSHOT
"        Joy Mcnamara   2017 4:20 PM   Office Visit   MRN: [id number]    Department:  [facility]   Dept Phone:  [phone]    Description:  Female : 1985   Provider:  ILYA May           Reason for Visit     Medication Refill 3 month med check & refill       Allergies as of 2017     Allergen Noted Reactions    Sumatriptan Succinate 2008       \"face burns & I can't see\"    Tramadol 2014   Photosensitivity    Gives her migraines and makes her feel sick      You were diagnosed with     Degenerative disc disease, lumbar   [981726]       Chronic bilateral low back pain with bilateral sciatica   [7647908]       Acute blood loss as cause of postoperative anemia   [6128614]       S/P abdominal hysterectomy   [679245]       Chronic use of opiate drugs therapeutic purposes   [0180453]         Vital Signs     Blood Pressure Pulse Temperature Respirations Height Weight    130/108 mmHg 80 37.1 °C (98.7 °F) 12 1.829 m (6' 0.01\") 84.823 kg (187 lb)    Body Mass Index Oxygen Saturation Last Menstrual Period Smoking Status          25.36 kg/m2 97% 2017 Former Smoker        Basic Information     Date Of Birth Sex Race Ethnicity Preferred Language    1985 Female White Non- English      Your appointments     2017  3:40 PM   Established Patient with ILYA May   [facility] (--)    [address] [address] [phone]           You will be receiving a confirmation call a few days before your appointment from our automated call confirmation system.            2017 10:00 AM   Established Patient with ILYA May   [facility] (--)    [address] [address] [phone]           You will be receiving a confirmation call a few days before your appointment from our automated call confirmation system.           "   Problem List              ICD-10-CM Priority Class Noted - Resolved    Degenerative disc disease, lumbar M51.36   4/30/2012 - Present    Chronic low back pain M54.5, G89.29   4/30/2012 - Present    Anxiety F41.9   3/28/2014 - Present    Depression F32.9   4/25/2014 - Present    Chronic migraine G43.709   9/8/2015 - Present    Chronic use of opiate drugs therapeutic purposes Z79.891   2/17/2017 - Present      Health Maintenance        Date Due Completion Dates    IMM DTaP/Tdap/Td Vaccine (2 - Td) 12/19/2016 12/19/2006 (Done)    Override on 12/19/2006: Done    PAP SMEAR 12/19/2017 12/19/2014, 10/12/2010            Current Immunizations     Influenza TIV (IM) 12/2/2013  9:40 AM, 10/24/2012    Influenza Vac Subunit Quad Inj (Pf) 11/18/2016    Tuberculin Skin Test 11/18/2014  4:02 PM      Below and/or attached are the medications your provider expects you to take. Review all of your home medications and newly ordered medications with your provider and/or pharmacist. Follow medication instructions as directed by your provider and/or pharmacist. Please keep your medication list with you and share with your provider. Update the information when medications are discontinued, doses are changed, or new medications (including over-the-counter products) are added; and carry medication information at all times in the event of emergency situations     Allergies:  SUMATRIPTAN SUCCINATE - (reactions not documented)     TRAMADOL - Photosensitivity               Medications  Valid as of: May 24, 2017 -  4:44 PM    Generic Name Brand Name Tablet Size Instructions for use    Biotin   Take  by mouth every day. 10,000 mcg        Butalbital-APAP-Caffeine (Cap) FIORICET -40 MG Take 1-2 Caps by mouth every 6 hours as needed for Headache.        Cefuroxime Axetil (Tab) CEFTIN 500 MG Take 1 Tab by mouth every 12 hours.        Cholecalciferol (Cap) Vitamin D3 400 UNITS Take 1 Cap by mouth every day.        Clindamycin HCl (Cap) CLEOCIN  150 MG Take 3 Caps by mouth 4 times a day.        Cranberry (Cap) Cranberry 1000 MG Take 4,200 mg by mouth every day.        Cyanocobalamin (Solution) VITAMIN B-12 1000 MCG/ML 1 mL by Intramuscular route every 30 days. Please administer to patient (she reports letty does this?)        FentaNYL (PATCH 72 HR) DURAGESIC 75 MCG/HR Apply 1 Patch to skin as directed every 48 hours.        FentaNYL (PATCH 72 HR) DURAGESIC 50 MCG/HR Apply 1 Patch to skin as directed every 48 hours.        FentaNYL (PATCH 72 HR) DURAGESIC 50 MCG/HR Apply 1 Patch to skin as directed every 48 hours.        FentaNYL (PATCH 72 HR) DURAGESIC 50 MCG/HR Apply 1 Patch to skin as directed every 48 hours.        Hydrocodone-Acetaminophen (Tab) NORCO 5-325 MG Take 1 Tab by mouth every 12 hours as needed.        Hydrocodone-Acetaminophen (Tab) NORCO 5-325 MG Take 1 Tab by mouth every 12 hours as needed.        Hydrocodone-Acetaminophen (Tab) NORCO 5-325 MG Take 1 Tab by mouth every 12 hours as needed.        Melatonin (Tab) Melatonin 10 MG Take  by mouth as needed.        NIFEdipine (TABLET SR 24 HR) ADALAT CC 30 MG Take 1 Tab by mouth every day.        Oxycodone-Acetaminophen (Tab) PERCOCET 5-325 MG Take 1-2 Tabs by mouth every 6 hours as needed. For post-operative pain.  Do not take norco for 1-2 weeks while taking percocet.        Promethazine HCl (Tab) PHENERGAN 25 MG TAKE 1 TABLET BY MOUTH EVERY 6 HOURS AS NEEDED FOR NAUSEA OR VOMITING        TiZANidine HCl (Tab) ZANAFLEX 4 MG TAKE 1 TABLET BY MOUTH EVERY 6 HOURS AS NEEDED        .                 Medicines prescribed today were sent to:     [id number] - [place], [place] - [address] AT [facility] [address]    [address] [address]    Phone: [phone] Fax: [phone]    Open 24 Hours?: No      Medication refill instructions:       If your prescription bottle indicates you have medication refills left, it is not necessary to call your provider’s  office. Please contact your pharmacy and they will refill your medication.    If your prescription bottle indicates you do not have any refills left, you may request refills at any time through one of the following ways: The online [employer] system (except Urgent Care), by calling your provider’s office, or by asking your pharmacy to contact your provider’s office with a refill request. Medication refills are processed only during regular business hours and may not be available until the next business day. Your provider may request additional information or to have a follow-up visit with you prior to refilling your medication.   *Please Note: Medication refills are assigned a new Rx number when refilled electronically. Your pharmacy may indicate that no refills were authorized even though a new prescription for the same medication is available at the pharmacy. Please request the medicine by name with the pharmacy before contacting your provider for a refill.        Your To Do List     Future Labs/Procedures Complete By Expires    BASIC METABOLIC PANEL  As directed 5/24/2018    [facility] PAIN MANAGEMENT SCREEN  As directed 5/24/2018    Comments:    Current Meds (name, sig, last dose):   Current outpatient prescriptions:   •  fentanyl, 1 Patch, Transdermal, Q48HRS  •  hydrocodone-acetaminophen, 1 Tab, Oral, Q12HRS PRN  •  fentanyl, 1 Patch, Transdermal, Q48HRS  •  hydrocodone-acetaminophen, 1 Tab, Oral, Q12HRS PRN  •  fentanyl, 1 Patch, Transdermal, Q48HRS  •  hydrocodone-acetaminophen, 1 Tab, Oral, Q12HRS PRN  •  tizanidine, TAKE 1 TABLET BY MOUTH EVERY 6 HOURS AS NEEDED  •  clindamycin, 450 mg, Oral, 4X/DAY  •  cefUROXime, 500 mg, Oral, Q12HRS  •  NIFEdipine SR, 30 mg, Oral, DAILY  •  oxycodone-acetaminophen, 1-2 Tab, Oral, Q6HRS PRN, 4/3/2017 at Unknown time  •  acetaminophen/caffeine/butalbital 300-40-50 mg, 1-2 Cap, Oral, Q6HRS PRN, 4/2/2017 at Unknown time  •  Vitamin D3, 1 Cap, Oral, DAILY, 4/1/2017  •  BIOTIN  PO, Take  by mouth every day. 10,000 mcg, 4/1/2017  •  Melatonin, Take  by mouth as needed., 3/1/2017  •  promethazine, TAKE 1 TABLET BY MOUTH EVERY 6 HOURS AS NEEDED FOR NAUSEA OR VOMITING, 4/3/2017 at Unknown time  •  fentanyl, 1 Patch, Transdermal, Q48HRS, 3/31/2017 at Unknown time  •  cyanocobalamin, 1,000 mcg, Intramuscular, Q30 DAYS, 4/1/2017  •  Cranberry, 4,200 mg, Oral, DAILY, 4/1/2017  No current facility-administered medications for this visit.    Facility-Administered Medications Ordered in Other Visits:   •  bupivacaine 0.25%  •  dexamethasone  •  fentaNYL  •  midazolam          MR-LUMBAR SPINE-W/O  As directed 11/24/2017         [employer] Access Code: Activation code not generated  Current [employer] Status: Active

## 2017-06-02 ENCOUNTER — TELEPHONE (OUTPATIENT)
Dept: MEDICAL GROUP | Facility: LAB | Age: 32
End: 2017-06-02

## 2017-06-02 NOTE — TELEPHONE ENCOUNTER
ESTABLISHED PATIENT PRE-VISIT PLANNING     Note: Patient will not be contacted if there is no indication to call.     1.  Reviewed notes from the last few office visits within the medical group: Yes    2.  If any orders were placed at last visit or intended to be done for this visit (i.e. 6 mos follow-up), do we have Results/Consult Notes?        •  Labs - Labs ordered, completed and results are in chart.       •  Imaging - Imaging ordered, completed and results are in chart.       •  Referrals - No referrals were ordered at last office visit.    3. Is this appointment scheduled as a Hospital Follow-Up? No but was in hospital 4/3/17    4.  Immunizations were updated in [employer] using WebIZ?: Yes       •  Web Iz Recommendations: HEPATITIS A  MMR  TD VARICELLA (Chicken Pox)     5.  Patient is due for the following Health Maintenance Topics:   Health Maintenance Due   Topic Date Due   • IMM DTaP/Tdap/Td Vaccine (2 - Td) 12/19/2016   • URINE DRUG SCREEN  05/15/2017       - Patient has completed TDAP Immunization(s) per WebIZ. Chart has been updated.    6.  Patient was not informed to arrive 15 min prior to their scheduled appointment and bring in their medication bottles.

## 2017-06-05 ENCOUNTER — APPOINTMENT (OUTPATIENT)
Dept: MEDICAL GROUP | Facility: LAB | Age: 32
End: 2017-06-05
Payer: COMMERCIAL

## 2017-06-07 NOTE — DOCUMENTATION QUERY
DOCUMENTATION QUERY    PROVIDERS: Please select “Cosign w/ note”to reply to query.    To better represent the severity of illness of your patient, please review the following information and exercise your independent professional judgment in responding to this query.     Postoperative hematoma is documented in the History and Physical, however this diagnosis is not elsewhere in the patient's notes or discharge summary. Based upon the clinical findings, risk factors, and treatment, was this condition ruled in or ruled out?    • Postoperative hematoma has been ruled in  • Postoperative hematoma has been ruled out  • Other explanation of clinical findings (please document)  • Unable to determine    The medical record reflects the following:   Clinical Findings HISTORY OF PRESENT ILLNESS:  This is a patient of Dr. Santillan's who had a hysterectomy on the 27th, laparoscopic-assisted vaginal hysterectomy with right salpingectomy.  She had menorrhagia, dysmenorrhea, and an intrauterine lesion.  Pathology is pending at this time.  She had a normal postoperative care and was sent home, restarted on all her previous medications.  At home, she was feeling fine until today when she had increasing abdominal pain and fever with a T-max of 101.7 at home.  Patient has chronic nausea with her    migraine headaches.  Her nausea is a bit above baseline.  Denies vomiting until just now. Denies chills.  She admits to passing flatus and bowel movements today.  Denies dysuria, vaginal bleeding as well.  She states that the abdominal pain is diffuse.  Subsequently, she went to the [facility] where she was seen and evaluated.  There, her white count was 13.7, it was actually 14 when she was discharged from the hospital.  Her hemoglobin is 11, previously 11.5; hematocrit is 31.5, previously 33.5.  Her platelet count is 260, previously 305.  Her AST is 26, ALT is 24.  Sodium is 135, potassium 3.1 chloride 101, CO2 of 23, BUN of 8, and  creatinine of 1.06.  Urinalysis shows no nitrites, trace ketones, no leukocytes, some small blood cells.  Her abdominal pain was worsening.  A CAT scan was performed and this showed normal bladder postsurgical changes.  CT showed normal sized bladder, posthysterectomy changes, 4.6 cm right ovarian cyst, moderate sized fluid in the pelvis to the right adnexa, probable hemorrhagic fluid with some small  postop hematoma.  Subsequently, I was contacted by the ED doctor at [facility].  Dr. Barkley made the decision to bring her here to the [facility] as there is no GYN coverage there.  Her in [facility], the patient reports worsening nausea.  She in fact had a bout of emesis and her abdominal pain is stable.  She has diffuse abdominal pain in all 4 quadrants of her abdomen without rebound or guarding, but she is quite tender.  She is not _____.  The patient is on a pain patch chronically for back pain, but did not change her patch today.    ASSESSMENT:  1.  At this time is status post laparoscopic-assisted vaginal    hysterectomy-right salpingo-oophorectomy with pathology pending.  2.  Postoperative hematoma.  3.  Possible postoperative infection.  4.  A 4.6 cm right ovarian cyst.  5.  History of chronic pain.   Risk Factors s/p [facility]   Location within medical record  History and Physical     Thank you,   Shannan Phelan MD, MEd, CPC, CCS, CDIP

## 2017-06-07 NOTE — ADDENDUM NOTE
Encounter addended by: Shannan Phelan on: 6/7/2017  1:49 PM<BR>     Documentation filed: Clinical Notes

## 2017-06-09 DIAGNOSIS — Z79.891 CHRONIC USE OF OPIATE DRUGS THERAPEUTIC PURPOSES: ICD-10-CM

## 2017-06-09 NOTE — ADDENDUM NOTE
Encounter addended by: Shannan Phelan on: 6/9/2017  8:16 AM<BR>     Documentation filed: Clinical Notes

## 2017-06-12 RX ORDER — TIZANIDINE 4 MG/1
TABLET ORAL
Qty: 90 TAB | Refills: 0 | Status: SHIPPED | OUTPATIENT
Start: 2017-06-12 | End: 2017-06-28 | Stop reason: SDUPTHER

## 2017-06-12 NOTE — TELEPHONE ENCOUNTER
Was the patient seen in the last year in this department? Yes     Does patient have an active prescription for medications requested? No     Received Request Via: Pharmacy     Last visit:5/24/17

## 2017-06-16 RX ORDER — PROMETHAZINE HYDROCHLORIDE 25 MG/1
25 TABLET ORAL EVERY 6 HOURS PRN
Qty: 90 TAB | Refills: 0 | Status: SHIPPED | OUTPATIENT
Start: 2017-06-16 | End: 2017-08-24 | Stop reason: SDUPTHER

## 2017-06-28 RX ORDER — TIZANIDINE 4 MG/1
TABLET ORAL
Qty: 90 TAB | Refills: 0 | Status: SHIPPED | OUTPATIENT
Start: 2017-06-28 | End: 2017-07-19 | Stop reason: SDUPTHER

## 2017-06-28 NOTE — TELEPHONE ENCOUNTER
Was the patient seen in the last year in this department? Yes     Does patient have an active prescription for medications requested? No     Received Request Via: Patient     Last visit:5/24/17

## 2017-07-10 ENCOUNTER — TELEPHONE (OUTPATIENT)
Dept: MEDICAL GROUP | Facility: LAB | Age: 32
End: 2017-07-10

## 2017-07-10 NOTE — TELEPHONE ENCOUNTER
Patient is going to vacation starting Friday and her pain medication will be up for refill when she is gone. She would like to know if she could get a vacation override.

## 2017-07-19 RX ORDER — TIZANIDINE 4 MG/1
TABLET ORAL
Qty: 90 TAB | Refills: 0 | Status: SHIPPED | OUTPATIENT
Start: 2017-07-19 | End: 2017-08-03 | Stop reason: SDUPTHER

## 2017-07-31 ENCOUNTER — OFFICE VISIT (OUTPATIENT)
Dept: MEDICAL GROUP | Facility: LAB | Age: 32
End: 2017-07-31
Payer: COMMERCIAL

## 2017-07-31 VITALS
WEIGHT: 192.8 LBS | HEART RATE: 78 BPM | DIASTOLIC BLOOD PRESSURE: 88 MMHG | HEIGHT: 72 IN | OXYGEN SATURATION: 99 % | RESPIRATION RATE: 12 BRPM | BODY MASS INDEX: 26.11 KG/M2 | TEMPERATURE: 98.6 F | SYSTOLIC BLOOD PRESSURE: 116 MMHG

## 2017-07-31 DIAGNOSIS — M54.41 CHRONIC BILATERAL LOW BACK PAIN WITH BILATERAL SCIATICA: ICD-10-CM

## 2017-07-31 DIAGNOSIS — Z79.891 CHRONIC USE OF OPIATE DRUGS THERAPEUTIC PURPOSES: ICD-10-CM

## 2017-07-31 DIAGNOSIS — M54.42 CHRONIC BILATERAL LOW BACK PAIN WITH BILATERAL SCIATICA: ICD-10-CM

## 2017-07-31 DIAGNOSIS — L23.1 CONTACT DERMATITIS DUE TO ADHESIVES, UNSPECIFIED CONTACT DERMATITIS TYPE: ICD-10-CM

## 2017-07-31 DIAGNOSIS — M51.36 DEGENERATIVE DISC DISEASE, LUMBAR: ICD-10-CM

## 2017-07-31 DIAGNOSIS — G89.29 CHRONIC BILATERAL LOW BACK PAIN WITH BILATERAL SCIATICA: ICD-10-CM

## 2017-07-31 PROCEDURE — 99214 OFFICE O/P EST MOD 30 MIN: CPT | Performed by: NURSE PRACTITIONER

## 2017-07-31 RX ORDER — FENTANYL 25 UG/1
1 PATCH TRANSDERMAL
Qty: 5 PATCH | Refills: 0 | Status: SHIPPED | OUTPATIENT
Start: 2017-07-31 | End: 2017-08-24

## 2017-07-31 RX ORDER — HYDROCODONE BITARTRATE AND ACETAMINOPHEN 5; 325 MG/1; MG/1
1 TABLET ORAL EVERY 4 HOURS PRN
Qty: 168 TAB | Refills: 0 | Status: SHIPPED | OUTPATIENT
Start: 2017-07-31 | End: 2017-10-16

## 2017-07-31 NOTE — MR AVS SNAPSHOT
"        Joy Mcnamara   2017 10:00 AM   Office Visit   MRN: [id number]    Department:  [facility]   Dept Phone:  [phone]    Description:  Female : 1985   Provider:  ILYA May           Reason for Visit     Medication Refill     Bleeding/Bruising pt states she has what she calls bruising across the middle of her back, some pain, onset 4-5 days       Allergies as of 2017     Allergen Noted Reactions    Sumatriptan Succinate 2008       \"face burns & I can't see\"    Tramadol 2014   Photosensitivity    Gives her migraines and makes her feel sick      You were diagnosed with     Degenerative disc disease, lumbar   [917516]       Chronic bilateral low back pain with bilateral sciatica   [8785225]       Chronic use of opiate drugs therapeutic purposes   [8489330]         Vital Signs     Blood Pressure Pulse Temperature Respirations Height Weight    116/88 mmHg 78 37 °C (98.6 °F) 12 1.829 m (6' 0.01\") 87.454 kg (192 lb 12.8 oz)    Body Mass Index Oxygen Saturation Last Menstrual Period Smoking Status          26.14 kg/m2 99% 2017 Former Smoker        Basic Information     Date Of Birth Sex Race Ethnicity Preferred Language    1985 Female White Non- English      Your appointments     Oct 16, 2017  4:00 PM   Established Patient with ILYA May   [facility] (--)    [address] [address] [phone]           You will be receiving a confirmation call a few days before your appointment from our automated call confirmation system.              Problem List              ICD-10-CM Priority Class Noted - Resolved    Degenerative disc disease, lumbar M51.36   2012 - Present    Chronic low back pain M54.5, G89.29   2012 - Present    Anxiety F41.9   3/28/2014 - Present    Depression F32.9   2014 - Present    Chronic migraine G43.709   2015 - Present    Chronic use " of opiate drugs therapeutic purposes Z79.891   2/17/2017 - Present      Health Maintenance        Date Due Completion Dates    IMM DTaP/Tdap/Td Vaccine (2 - Td) 12/19/2016 12/19/2006 (Done)    Override on 12/19/2006: Done    IMM INFLUENZA (1) 9/1/2017 11/18/2016, 12/2/2013, 10/24/2012    PAP SMEAR 12/19/2017 12/19/2014, 10/12/2010            Current Immunizations     Influenza TIV (IM) 12/2/2013  9:40 AM, 10/24/2012    Influenza Vac Subunit Quad Inj (Pf) 11/18/2016    Tuberculin Skin Test 11/18/2014  4:02 PM      Below and/or attached are the medications your provider expects you to take. Review all of your home medications and newly ordered medications with your provider and/or pharmacist. Follow medication instructions as directed by your provider and/or pharmacist. Please keep your medication list with you and share with your provider. Update the information when medications are discontinued, doses are changed, or new medications (including over-the-counter products) are added; and carry medication information at all times in the event of emergency situations     Allergies:  SUMATRIPTAN SUCCINATE - (reactions not documented)     TRAMADOL - Photosensitivity               Medications  Valid as of: July 31, 2017 - 10:30 AM    Generic Name Brand Name Tablet Size Instructions for use    Biotin   Take  by mouth every day. 10,000 mcg        Butalbital-APAP-Caffeine (Cap) FIORICET -40 MG Take 1-2 Caps by mouth every 6 hours as needed for Headache.        Cefuroxime Axetil (Tab) CEFTIN 500 MG Take 1 Tab by mouth every 12 hours.        Cholecalciferol (Cap) Vitamin D3 400 UNITS Take 1 Cap by mouth every day.        Clindamycin HCl (Cap) CLEOCIN 150 MG Take 3 Caps by mouth 4 times a day.        Cranberry (Cap) Cranberry 1000 MG Take 4,200 mg by mouth every day.        Cyanocobalamin (Solution) VITAMIN B-12 1000 MCG/ML 1 mL by Intramuscular route every 30 days. Please administer to patient (she reports letty antony  this?)        FentaNYL (PATCH 72 HR) DURAGESIC 75 MCG/HR Apply 1 Patch to skin as directed every 48 hours.        FentaNYL (PATCH 72 HR) DURAGESIC 50 MCG/HR Apply 1 Patch to skin as directed every 48 hours.        FentaNYL (PATCH 72 HR) DURAGESIC 50 MCG/HR Apply 1 Patch to skin as directed every 48 hours.        FentaNYL (PATCH 72 HR) DURAGESIC 50 MCG/HR Apply 1 Patch to skin as directed every 48 hours.        FentaNYL (PATCH 72 HR) DURAGESIC 25 MCG/HR Apply 1 Patch to skin as directed every 72 hours.        Hydrocodone-Acetaminophen (Tab) NORCO 5-325 MG Take 1 Tab by mouth every 12 hours as needed.        Hydrocodone-Acetaminophen (Tab) NORCO 5-325 MG Take 1 Tab by mouth every 12 hours as needed.        Hydrocodone-Acetaminophen (Tab) NORCO 5-325 MG Take 1 Tab by mouth every four hours as needed.        Hydrocodone-Acetaminophen (Tab) NORCO 5-325 MG Take 1 Tab by mouth every four hours as needed.        Hydrocodone-Acetaminophen (Tab) NORCO 5-325 MG Take 1 Tab by mouth every four hours as needed.        Melatonin (Tab) Melatonin 10 MG Take  by mouth as needed.        NIFEdipine (TABLET SR 24 HR) ADALAT CC 30 MG Take 1 Tab by mouth every day.        Oxycodone-Acetaminophen (Tab) PERCOCET 5-325 MG Take 1-2 Tabs by mouth every 6 hours as needed. For post-operative pain.  Do not take norco for 1-2 weeks while taking percocet.        Promethazine HCl (Tab) PHENERGAN 25 MG Take 1 Tab by mouth every 6 hours as needed for Nausea/Vomiting.        TiZANidine HCl (Tab) ZANAFLEX 4 MG TAKE 1 TABLET BY MOUTH EVERY 6 HOURS AS NEEDED        .                 Medicines prescribed today were sent to:     [id number] - [place] - [address] AT [facility] [address]    [address] [address]    Phone: [phone] Fax: [phone]    Open 24 Hours?: No      Medication refill instructions:       If your prescription bottle indicates you have medication refills left, it is not necessary  to call your provider’s office. Please contact your pharmacy and they will refill your medication.    If your prescription bottle indicates you do not have any refills left, you may request refills at any time through one of the following ways: The online [employer] system (except Urgent Care), by calling your provider’s office, or by asking your pharmacy to contact your provider’s office with a refill request. Medication refills are processed only during regular business hours and may not be available until the next business day. Your provider may request additional information or to have a follow-up visit with you prior to refilling your medication.   *Please Note: Medication refills are assigned a new Rx number when refilled electronically. Your pharmacy may indicate that no refills were authorized even though a new prescription for the same medication is available at the pharmacy. Please request the medicine by name with the pharmacy before contacting your provider for a refill.           [employer] Access Code: Activation code not generated  Current [employer] Status: Active

## 2017-07-31 NOTE — PROGRESS NOTES
Chief Complaint   Patient presents with   • Medication Refill   • Bleeding/Bruising     pt states she has what she calls bruising across the middle of her back, some pain, onset 4-5 days        HPI  Joy is a 32-year-old established female here for chronic pain follow-up. Fortunately her pelvic pain has resolved after hysterectomy last year. She is now awaiting MRI and neurosurgery consult to hopefully take care of her chronic low back pain. She is concerned about the skin appearance of her low back, stating that her  noticed bruise type appearance to low back a few days ago - pt can't recall any trauma / injuries. She does mention that she's had some brown spot appearing lesions to her low back since she's been applying fentanyl to the area -new issue to me.  Chronic pain recheck: 3 mo ago  Last dose of controlled substance: fentanyl patch on - 50 µg and changing every 2 days and hydrocodone 10 mg taken last night  Chronic pain treated with fentanyl 50 mcg and norco 2 pills at night.      She  reports that she does not drink alcohol.  She  reports that she does not use illicit drugs.    Consequences of Chronic Opiate therapy:  (5 A's)  Analgesia: Compared to no treatment or prior treatment, pain is currently not changed  Activity: not changed  Adverse Events: She denies constipation, dry mouth, itchy skin, nausea and sedation  Aberrant Behaviors: She reports she is taking medication as prescribed and is not veering from agreed treatment regimen. There have been no inappropriate refills or lost/stolen meds reported.   Affect/Mood: Pain is not impacting patient's mood.  Patient denies depression/anxiety.    Nonnarcotic treatments that are being used: muscle relaxers.   Treatment goals discussed.    Opioid Risk Score: 2    Interpretation of Opioid Risk Score   Score 0-3 = Low risk of abuse. Do UDS at least once per year.  Score 4-7 = Moderate risk of abuse. Do UDS 1-4 times per year.  Score 8+ = High risk  "of abuse. Refer to specialist.    Last order of CONTROLLED SUBSTANCE TREATMENT AGREEMENT was found on 2/17/2017 from Office Visit on 2/17/2017     UDS Summary                URINE DRUG SCREEN Next Due 5/19/2018      Done 5/24/2017 [facility] PAIN MANAGEMENT SCREEN     Patient has more history with this topic...        Most recent UDS reviewed today and is consistent with prescribed medications with the exception of hydrocodone as she had tried to come off of this for a brief period of time.    I have reviewed the medical records, the Prescription Monitoring Program and I have determined that controlled substance treatment is medically indicated.    Past medical, surgical, family, and social history is reviewed and updated in Epic chart by me today.   Medications and allergies reviewed and updated in Epic chart by me today.     ROS:   As documented in history of present illness above    Exam:  Blood pressure 116/88, pulse 78, temperature 37 °C (98.6 °F), resp. rate 12, height 1.829 m (6' 0.01\"), weight 87.454 kg (192 lb 12.8 oz), last menstrual period 03/16/2017, SpO2 99 %.  Constitutional: Alert, no distress, plus 3 vital signs  Skin:  Warm, dry. She has hyperpigmented patches to her low back that are nontender and there is no sign of bruising.  Eye: Equal, round and reactive, conjunctiva clear  ENMT: Lips without lesions, good dentition, oropharynx clear    Neck: Trachea midline  Respiratory: Unlabored respiration, lungs clear to auscultation, no wheezes, no rhonchi  Cardiovascular: Normal rate and rhythm, no murmur, no edema  Abdomen: Soft, nontender  Psych: Alert, pleasant, well-groomed, normal affect    A/P:  1. Degenerative disc disease, lumbar  -Stable. Awaiting appointment for MRI and will then follow-up with neurosurgery  - hydrocodone-acetaminophen (NORCO) 5-325 MG Tab per tablet; Take 1 Tab by mouth every four hours as needed.  Dispense: 168 Tab; Refill: 0  - hydrocodone-acetaminophen (NORCO) 5-325 MG Tab " per tablet; Take 1 Tab by mouth every four hours as needed.  Dispense: 168 Tab; Refill: 0  - hydrocodone-acetaminophen (NORCO) 5-325 MG Tab per tablet; Take 1 Tab by mouth every four hours as needed.  Dispense: 168 Tab; Refill: 0    2. Chronic bilateral low back pain with bilateral sciatica  -As above  - hydrocodone-acetaminophen (NORCO) 5-325 MG Tab per tablet; Take 1 Tab by mouth every four hours as needed.  Dispense: 168 Tab; Refill: 0  - hydrocodone-acetaminophen (NORCO) 5-325 MG Tab per tablet; Take 1 Tab by mouth every four hours as needed.  Dispense: 168 Tab; Refill: 0  - hydrocodone-acetaminophen (NORCO) 5-325 MG Tab per tablet; Take 1 Tab by mouth every four hours as needed.  Dispense: 168 Tab; Refill: 0    3. Chronic use of opiate drugs therapeutic purposes  Overall impression that this patient is benefiting from opioid therapy and that the benefits outweigh the risks of continued use. yes   - Controlled Substance Use Agreement updated today  - Urine drug screen current  - Additional lab: CMP to assess liver and renal function - UTD   -Reduced fentanyl to 25 µg every 72 hours and patient will stop fentanyl in the next month. She will start increasing her hydrocodone for her pain control on August 8 approximately individually only take hydrocodone for pain control. - IT does appear she's having a reaction to the adhesive of her fentanyl patch and the goal is to taper her off of fentanyl anyway. Discussed topical cortisone as needed and changing her site more frequently  - Referral to pain management no  - Controlled Substance Treatment Agreement

## 2017-08-03 RX ORDER — TIZANIDINE 4 MG/1
TABLET ORAL
Qty: 90 TAB | Refills: 0 | Status: SHIPPED | OUTPATIENT
Start: 2017-08-03 | End: 2017-08-24 | Stop reason: SDUPTHER

## 2017-08-03 NOTE — TELEPHONE ENCOUNTER
Was the patient seen in the last year in this department? Yes     Does patient have an active prescription for medications requested? No     Received Request Via: Pharmacy     Last visit:7/31/17

## 2017-08-07 RX ORDER — FLUOXETINE 10 MG/1
CAPSULE ORAL
Qty: 90 CAP | Refills: 0 | Status: SHIPPED | OUTPATIENT
Start: 2017-08-07 | End: 2019-07-18

## 2017-08-07 RX ORDER — FLUOXETINE HYDROCHLORIDE 20 MG/1
CAPSULE ORAL
Qty: 90 CAP | Refills: 0 | Status: SHIPPED | OUTPATIENT
Start: 2017-08-07 | End: 2019-07-18

## 2017-08-24 DIAGNOSIS — M51.36 DEGENERATIVE DISC DISEASE, LUMBAR: ICD-10-CM

## 2017-08-24 RX ORDER — PROMETHAZINE HYDROCHLORIDE 25 MG/1
25 TABLET ORAL EVERY 6 HOURS PRN
Qty: 30 TAB | Refills: 0 | Status: SHIPPED | OUTPATIENT
Start: 2017-08-24 | End: 2019-07-18

## 2017-08-24 RX ORDER — TIZANIDINE 4 MG/1
TABLET ORAL
Qty: 120 TAB | Refills: 0 | Status: SHIPPED | OUTPATIENT
Start: 2017-08-24 | End: 2017-09-22 | Stop reason: SDUPTHER

## 2017-08-24 RX ORDER — PROMETHAZINE HYDROCHLORIDE 25 MG/1
25 TABLET ORAL EVERY 6 HOURS PRN
Qty: 90 TAB | Refills: 0 | Status: SHIPPED | OUTPATIENT
Start: 2017-08-24 | End: 2017-10-16 | Stop reason: SDUPTHER

## 2017-08-24 NOTE — TELEPHONE ENCOUNTER
Was the patient seen in the last year in this department? Yes     Does patient have an active prescription for medications requested? No     Received Request Via: Patient     Last visit:7/31/17

## 2017-09-07 DIAGNOSIS — R20.2 PARESTHESIA: ICD-10-CM

## 2017-09-07 RX ORDER — CYANOCOBALAMIN 1000 UG/ML
1000 INJECTION, SOLUTION INTRAMUSCULAR; SUBCUTANEOUS
Qty: 1 ML | Refills: 11 | Status: SHIPPED | OUTPATIENT
Start: 2017-09-07 | End: 2019-09-30

## 2017-09-22 DIAGNOSIS — M51.36 DEGENERATIVE DISC DISEASE, LUMBAR: ICD-10-CM

## 2017-09-22 RX ORDER — TIZANIDINE 4 MG/1
TABLET ORAL
Qty: 120 TAB | Refills: 0 | Status: SHIPPED | OUTPATIENT
Start: 2017-09-22 | End: 2017-10-16 | Stop reason: SDUPTHER

## 2017-10-13 ENCOUNTER — TELEPHONE (OUTPATIENT)
Dept: MEDICAL GROUP | Facility: LAB | Age: 32
End: 2017-10-13

## 2017-10-13 NOTE — TELEPHONE ENCOUNTER
ESTABLISHED PATIENT PRE-VISIT PLANNING     Note: Patient will not be contacted if there is no indication to call.     1.  Reviewed notes from the last few office visits within the medical group: Yes    2.  If any orders were placed at last visit or intended to be done for this visit (i.e. 6 mos follow-up), do we have Results/Consult Notes?        •  Labs - Labs were not ordered at last office visit.   Note: If patient appointment is for lab review and patient did not complete labs,                check with provider if OK to reschedule patient until labs completed.       •  Imaging - Imaging was not ordered at last office visit.       •  Referrals - No referrals were ordered at last office visit.    3. Is this appointment scheduled as a Hospital Follow-Up? No    4.  Immunizations were updated in [employer] using WebIZ?: Yes       •  Web Iz Recommendations: Patient is up to date on all vaccines    5.  Patient is due for the following Health Maintenance Topics:   There are no preventive care reminders to display for this patient.    - Patient is up-to-date on all Health Maintenance topics. No records have been requested at this time.    6.  Patient was NOT informed to arrive 15 min prior to their scheduled appointment and bring in their medication bottles.

## 2017-10-16 ENCOUNTER — OFFICE VISIT (OUTPATIENT)
Dept: MEDICAL GROUP | Facility: LAB | Age: 32
End: 2017-10-16
Payer: COMMERCIAL

## 2017-10-16 VITALS
HEART RATE: 78 BPM | DIASTOLIC BLOOD PRESSURE: 118 MMHG | TEMPERATURE: 98.2 F | WEIGHT: 189 LBS | HEIGHT: 72 IN | BODY MASS INDEX: 25.6 KG/M2 | RESPIRATION RATE: 12 BRPM | OXYGEN SATURATION: 98 % | SYSTOLIC BLOOD PRESSURE: 160 MMHG

## 2017-10-16 DIAGNOSIS — M51.36 DEGENERATIVE DISC DISEASE, LUMBAR: ICD-10-CM

## 2017-10-16 DIAGNOSIS — M54.41 CHRONIC BILATERAL LOW BACK PAIN WITH BILATERAL SCIATICA: ICD-10-CM

## 2017-10-16 DIAGNOSIS — M54.42 CHRONIC BILATERAL LOW BACK PAIN WITH BILATERAL SCIATICA: ICD-10-CM

## 2017-10-16 DIAGNOSIS — G89.29 CHRONIC BILATERAL LOW BACK PAIN WITH BILATERAL SCIATICA: ICD-10-CM

## 2017-10-16 DIAGNOSIS — F32.89 OTHER DEPRESSION: ICD-10-CM

## 2017-10-16 PROCEDURE — 99214 OFFICE O/P EST MOD 30 MIN: CPT | Performed by: NURSE PRACTITIONER

## 2017-10-16 RX ORDER — PROMETHAZINE HYDROCHLORIDE 25 MG/1
25 TABLET ORAL EVERY 6 HOURS PRN
Qty: 90 TAB | Refills: 0 | Status: SHIPPED | OUTPATIENT
Start: 2017-10-16 | End: 2019-09-30

## 2017-10-16 RX ORDER — HYDROCODONE BITARTRATE AND ACETAMINOPHEN 10; 325 MG/1; MG/1
1 TABLET ORAL EVERY 6 HOURS PRN
Qty: 112 TAB | Refills: 0 | Status: SHIPPED | OUTPATIENT
Start: 2017-10-16 | End: 2019-07-18

## 2017-10-16 RX ORDER — TIZANIDINE 4 MG/1
TABLET ORAL
Qty: 120 TAB | Refills: 3 | Status: SHIPPED | OUTPATIENT
Start: 2017-10-16 | End: 2018-02-05 | Stop reason: SDUPTHER

## 2017-10-16 RX ORDER — HYDROCODONE BITARTRATE AND ACETAMINOPHEN 5; 325 MG/1; MG/1
1 TABLET ORAL EVERY 4 HOURS PRN
Qty: 168 TAB | Refills: 0 | Status: CANCELLED | OUTPATIENT
Start: 2017-10-16

## 2017-10-16 RX ORDER — CYANOCOBALAMIN 1000 UG/ML
1000 INJECTION, SOLUTION INTRAMUSCULAR; SUBCUTANEOUS
Qty: 1 VIAL | Refills: 11 | Status: SHIPPED | OUTPATIENT
Start: 2017-10-16 | End: 2019-09-30

## 2017-10-16 NOTE — PROGRESS NOTES
Chief Complaint   Patient presents with   • Medication Refill       HPI   Joy is a 31 yo est Female here to follow-up on chronic pain. She suffers from chronic pain in her low back that radiates alternately down one of her legs. Denies any recent falls or injuries. Has had 2 previous back surgeries with Dr. Jack and is trying to avoid further surgeries at this point because of finances. She would like to repeat her MRI in the spring when finances allow. Came off fentanyl patch in August - skin irritation and depression wasn't doing well with fentanyl.  Taking norco every 4 hours - pain levels worse at night and in the cold.  Sleep is poor.  Pain level 8-9/10 at night, down to 3-4 /10 in the morning.  Pain is worse at rest.    Chronic pain recheck: 3 mo ago  Last dose of controlled substance: norco today  Chronic pain treated with norco taken 6 daily.      She  reports that she does not drink alcohol.  She  reports that she does not use drugs.    Consequences of Chronic Opiate therapy:  (5 A's)  Analgesia: Compared to no treatment or prior treatment, pain is currently worsened  Activity: worsened  Adverse Events: She denies constipation, dry mouth, itchy skin, nausea and sedation  Aberrant Behaviors: She reports she is taking medication as prescribed and is not veering from agreed treatment regimen. There have been no inappropriate refills or lost/stolen meds reported.   Affect/Mood: Pain is not impacting patient's mood.  Patient denies depression/anxiety.    Nonnarcotic treatments that are being used: muscle relaxers. Taking tizanidine at night and occasionally during the day.    Treatment goals discussed.    Opioid Risk Score: 2    Interpretation of Opioid Risk Score   Score 0-3 = Low risk of abuse. Do UDS at least once per year.  Score 4-7 = Moderate risk of abuse. Do UDS 1-4 times per year.  Score 8+ = High risk of abuse. Refer to specialist.    Last order of CONTROLLED SUBSTANCE TREATMENT AGREEMENT was  "found on 7/31/2017 from Office Visit on 7/31/2017     UDS Summary                URINE DRUG SCREEN Next Due 5/19/2018      Done 5/24/2017 [facility] PAIN MANAGEMENT SCREEN     Patient has more history with this topic...        Most recent UDS reviewed today and is consistent with prescribed medications.    I have reviewed the medical records, the Prescription Monitoring Program and I have determined that controlled substance treatment is medically indicated.    #2-depression: Chronic issue. Vitamin B12 is helpful with this in conjunction with Prozac. Requesting a refill of vitamin B12 which her  injects for her at home. Moods have been stable for the most part, typically worsening in the evening when her back pain is more severe. Denies any suicidal or homicidal ideations.    #3-chronic migraines: Worsening lately with her back pain level. Requesting a refill of promethazine which helps during her migraines. Denies any head injuries or trauma. Has failed Imitrex and a few different preventative medications for her migraines.    Past medical, surgical, family, and social history is reviewed and updated in Epic chart by me today.   Medications and allergies reviewed and updated in Epic chart by me today.     ROS:   As documented in history of present illness above    Exam:  Blood pressure 160/118, pulse 78, temperature 36.8 °C (98.2 °F), resp. rate 12, height 1.829 m (6' 0.01\"), weight 85.7 kg (189 lb), last menstrual period 03/16/2017, SpO2 98 %.  Constitutional: Alert, no distress, plus 3 vital signs  Skin:  Warm, dry, no rashes invisible areas  Eye: Equal, round and reactive, conjunctiva clear  ENMT: Lips without lesions, good dentition, oropharynx clear    Neck: Trachea midline, no masses, no thyromegaly  Respiratory: Unlabored respiration, lungs clear to auscultation, no wheezes, no rhonchi  Cardiovascular: Normal rate and rhythm, no murmur, no edema  Abdomen: Soft, nontender, no masses or " hepatosplenomegaly  Psych: Alert, pleasant, well-groomed, normal affect    A/P:  1. Degenerative disc disease, lumbar  -Increased her hydrocodone to a 10 mg with allowance for 4 per day. Fentanyl not restarted. Recommend updated MRI when financially possible as well as a consult with neurosurgery. Encouraged her to maintain her weight loss which is much easier on her spine. Discussed trunk strengthening. Discussed proper lifting techniques.  - hydrocodone-acetaminophen (NORCO) 5-325 MG Tab per tablet; Take 1 Tab by mouth every four hours as needed.  Dispense: 168 Tab; Refill: 0    2. Chronic bilateral low back pain with bilateral sciatica  -As above.  - hydrocodone-acetaminophen (NORCO) 5-325 MG Tab per tablet; Take 1 Tab by mouth every four hours as needed.  Dispense: 168 Tab; Refill: 0    3.  Chronic use of opiates in therapeutic purpose  Overall impression that this patient is benefiting from opioid therapy and that the benefits outweigh the risks of continued use. yes   - Controlled Substance Use Agreement current  - Urine drug screen current.  - Additional lab: CMP to assess liver and renal function - UTD   - Rx refill prescriptions are done for 3 months, No early refills.   - Referral to pain management no    4.  Depression: Stable with B12 and Prozac.    5.  Chronic migraines: Concern over chronic narcotic therapy and rebound migraines, this was discussed with her. She does plan on having spinal surgery as soon as financially possible and we'll wean her off of narcotics which will likely help with her migraines as well. Vitamin B12 injections and promethazine do also seemed to help with her migraines. She has Fioricet to use as needed.

## 2017-11-17 DIAGNOSIS — M54.42 CHRONIC BILATERAL LOW BACK PAIN WITH BILATERAL SCIATICA: ICD-10-CM

## 2017-11-17 DIAGNOSIS — M51.36 DEGENERATIVE DISC DISEASE, LUMBAR: ICD-10-CM

## 2017-11-17 DIAGNOSIS — M54.41 CHRONIC BILATERAL LOW BACK PAIN WITH BILATERAL SCIATICA: ICD-10-CM

## 2017-11-17 DIAGNOSIS — Z79.891 CHRONIC USE OF OPIATE DRUGS THERAPEUTIC PURPOSES: ICD-10-CM

## 2017-11-17 DIAGNOSIS — G89.29 CHRONIC BILATERAL LOW BACK PAIN WITH BILATERAL SCIATICA: ICD-10-CM

## 2018-02-05 DIAGNOSIS — M54.42 CHRONIC BILATERAL LOW BACK PAIN WITH BILATERAL SCIATICA: ICD-10-CM

## 2018-02-05 DIAGNOSIS — M51.36 DEGENERATIVE DISC DISEASE, LUMBAR: ICD-10-CM

## 2018-02-05 DIAGNOSIS — M54.41 CHRONIC BILATERAL LOW BACK PAIN WITH BILATERAL SCIATICA: ICD-10-CM

## 2018-02-05 DIAGNOSIS — G89.29 CHRONIC BILATERAL LOW BACK PAIN WITH BILATERAL SCIATICA: ICD-10-CM

## 2018-02-06 RX ORDER — TIZANIDINE 4 MG/1
TABLET ORAL
Qty: 120 TAB | Refills: 0 | Status: SHIPPED | OUTPATIENT
Start: 2018-02-06 | End: 2018-03-02 | Stop reason: SDUPTHER

## 2018-02-06 NOTE — TELEPHONE ENCOUNTER
Was the patient seen in the last year in this department? Yes Lov 10/16/17    Does patient have an active prescription for medications requested? No     Received Request Via: Pharmacy

## 2018-03-02 DIAGNOSIS — M51.36 DEGENERATIVE DISC DISEASE, LUMBAR: ICD-10-CM

## 2018-03-02 DIAGNOSIS — G89.29 CHRONIC BILATERAL LOW BACK PAIN WITH BILATERAL SCIATICA: ICD-10-CM

## 2018-03-02 DIAGNOSIS — M54.41 CHRONIC BILATERAL LOW BACK PAIN WITH BILATERAL SCIATICA: ICD-10-CM

## 2018-03-02 DIAGNOSIS — M54.42 CHRONIC BILATERAL LOW BACK PAIN WITH BILATERAL SCIATICA: ICD-10-CM

## 2018-03-03 RX ORDER — TIZANIDINE 4 MG/1
TABLET ORAL
Qty: 120 TAB | Refills: 0 | Status: SHIPPED | OUTPATIENT
Start: 2018-03-03 | End: 2018-03-30 | Stop reason: SDUPTHER

## 2018-03-30 DIAGNOSIS — M51.36 DEGENERATIVE DISC DISEASE, LUMBAR: ICD-10-CM

## 2018-03-30 DIAGNOSIS — G89.29 CHRONIC BILATERAL LOW BACK PAIN WITH BILATERAL SCIATICA: ICD-10-CM

## 2018-03-30 DIAGNOSIS — M54.41 CHRONIC BILATERAL LOW BACK PAIN WITH BILATERAL SCIATICA: ICD-10-CM

## 2018-03-30 DIAGNOSIS — M54.42 CHRONIC BILATERAL LOW BACK PAIN WITH BILATERAL SCIATICA: ICD-10-CM

## 2018-03-30 RX ORDER — TIZANIDINE 4 MG/1
TABLET ORAL
Qty: 120 TAB | Refills: 0 | Status: SHIPPED | OUTPATIENT
Start: 2018-03-30 | End: 2018-04-27 | Stop reason: SDUPTHER

## 2018-04-27 DIAGNOSIS — G89.29 CHRONIC BILATERAL LOW BACK PAIN WITH BILATERAL SCIATICA: ICD-10-CM

## 2018-04-27 DIAGNOSIS — M54.42 CHRONIC BILATERAL LOW BACK PAIN WITH BILATERAL SCIATICA: ICD-10-CM

## 2018-04-27 DIAGNOSIS — M51.36 DEGENERATIVE DISC DISEASE, LUMBAR: ICD-10-CM

## 2018-04-27 DIAGNOSIS — M54.41 CHRONIC BILATERAL LOW BACK PAIN WITH BILATERAL SCIATICA: ICD-10-CM

## 2018-04-30 DIAGNOSIS — R20.2 PARESTHESIA: ICD-10-CM

## 2018-04-30 RX ORDER — CYANOCOBALAMIN 1000 UG/ML
INJECTION, SOLUTION INTRAMUSCULAR; SUBCUTANEOUS
Qty: 1 ML | Refills: 0 | Status: SHIPPED | OUTPATIENT
Start: 2018-04-30 | End: 2018-07-17 | Stop reason: SDUPTHER

## 2018-04-30 RX ORDER — TIZANIDINE 4 MG/1
TABLET ORAL
Qty: 120 TAB | Refills: 0 | Status: SHIPPED | OUTPATIENT
Start: 2018-04-30 | End: 2018-05-26 | Stop reason: SDUPTHER

## 2018-05-02 RX ORDER — NITROFURANTOIN 25; 75 MG/1; MG/1
100 CAPSULE ORAL 2 TIMES DAILY
Qty: 10 CAP | Refills: 0 | Status: SHIPPED | OUTPATIENT
Start: 2018-05-02 | End: 2018-05-07

## 2018-05-26 DIAGNOSIS — M51.36 DEGENERATIVE DISC DISEASE, LUMBAR: ICD-10-CM

## 2018-05-26 DIAGNOSIS — M54.41 CHRONIC BILATERAL LOW BACK PAIN WITH BILATERAL SCIATICA: ICD-10-CM

## 2018-05-26 DIAGNOSIS — G89.29 CHRONIC BILATERAL LOW BACK PAIN WITH BILATERAL SCIATICA: ICD-10-CM

## 2018-05-26 DIAGNOSIS — M54.42 CHRONIC BILATERAL LOW BACK PAIN WITH BILATERAL SCIATICA: ICD-10-CM

## 2018-05-28 RX ORDER — TIZANIDINE 4 MG/1
TABLET ORAL
Qty: 120 TAB | Refills: 0 | Status: SHIPPED | OUTPATIENT
Start: 2018-05-28 | End: 2018-06-27 | Stop reason: SDUPTHER

## 2018-06-27 DIAGNOSIS — M51.36 DEGENERATIVE DISC DISEASE, LUMBAR: ICD-10-CM

## 2018-06-27 DIAGNOSIS — M54.41 CHRONIC BILATERAL LOW BACK PAIN WITH BILATERAL SCIATICA: ICD-10-CM

## 2018-06-27 DIAGNOSIS — M54.42 CHRONIC BILATERAL LOW BACK PAIN WITH BILATERAL SCIATICA: ICD-10-CM

## 2018-06-27 DIAGNOSIS — G89.29 CHRONIC BILATERAL LOW BACK PAIN WITH BILATERAL SCIATICA: ICD-10-CM

## 2018-06-28 RX ORDER — TIZANIDINE 4 MG/1
TABLET ORAL
Qty: 120 TAB | Refills: 0 | Status: SHIPPED | OUTPATIENT
Start: 2018-06-28 | End: 2018-07-24 | Stop reason: SDUPTHER

## 2018-07-17 DIAGNOSIS — R20.2 PARESTHESIA: ICD-10-CM

## 2018-07-17 RX ORDER — CYANOCOBALAMIN 1000 UG/ML
INJECTION, SOLUTION INTRAMUSCULAR; SUBCUTANEOUS
Qty: 1 ML | Refills: 0 | Status: SHIPPED | OUTPATIENT
Start: 2018-07-17 | End: 2018-08-16

## 2018-07-17 RX ORDER — PROMETHAZINE HYDROCHLORIDE 25 MG/1
TABLET ORAL
Qty: 30 TAB | Refills: 0 | Status: SHIPPED | OUTPATIENT
Start: 2018-07-17 | End: 2018-08-27 | Stop reason: SDUPTHER

## 2018-07-17 NOTE — TELEPHONE ENCOUNTER
Was the patient seen in the last year in this department? Yes     Does patient have an active prescription for medications requested? No     Received Request Via: Pharmacy     Last visit:10/16/17

## 2018-08-27 DIAGNOSIS — G89.29 CHRONIC BILATERAL LOW BACK PAIN WITH BILATERAL SCIATICA: ICD-10-CM

## 2018-08-27 DIAGNOSIS — R20.2 PARESTHESIA: ICD-10-CM

## 2018-08-27 DIAGNOSIS — M54.42 CHRONIC BILATERAL LOW BACK PAIN WITH BILATERAL SCIATICA: ICD-10-CM

## 2018-08-27 DIAGNOSIS — M54.41 CHRONIC BILATERAL LOW BACK PAIN WITH BILATERAL SCIATICA: ICD-10-CM

## 2018-08-27 DIAGNOSIS — M51.36 DEGENERATIVE DISC DISEASE, LUMBAR: ICD-10-CM

## 2018-08-27 RX ORDER — PROMETHAZINE HYDROCHLORIDE 25 MG/1
TABLET ORAL
Qty: 30 TAB | Refills: 0 | Status: SHIPPED | OUTPATIENT
Start: 2018-08-27 | End: 2018-10-08 | Stop reason: SDUPTHER

## 2018-08-27 RX ORDER — CYANOCOBALAMIN 1000 UG/ML
INJECTION, SOLUTION INTRAMUSCULAR; SUBCUTANEOUS
Qty: 1 ML | Refills: 0 | Status: SHIPPED | OUTPATIENT
Start: 2018-08-27 | End: 2018-10-08 | Stop reason: SDUPTHER

## 2018-08-27 RX ORDER — TIZANIDINE 4 MG/1
TABLET ORAL
Qty: 120 TAB | Refills: 0 | Status: SHIPPED | OUTPATIENT
Start: 2018-08-27 | End: 2018-09-20 | Stop reason: SDUPTHER

## 2018-09-20 DIAGNOSIS — M54.41 CHRONIC BILATERAL LOW BACK PAIN WITH BILATERAL SCIATICA: ICD-10-CM

## 2018-09-20 DIAGNOSIS — M54.42 CHRONIC BILATERAL LOW BACK PAIN WITH BILATERAL SCIATICA: ICD-10-CM

## 2018-09-20 DIAGNOSIS — M51.36 DEGENERATIVE DISC DISEASE, LUMBAR: ICD-10-CM

## 2018-09-20 DIAGNOSIS — G89.29 CHRONIC BILATERAL LOW BACK PAIN WITH BILATERAL SCIATICA: ICD-10-CM

## 2018-09-21 RX ORDER — TIZANIDINE 4 MG/1
TABLET ORAL
Qty: 120 TAB | Refills: 0 | Status: SHIPPED | OUTPATIENT
Start: 2018-09-21 | End: 2018-11-09 | Stop reason: SDUPTHER

## 2018-10-08 DIAGNOSIS — R20.2 PARESTHESIA: ICD-10-CM

## 2018-10-08 RX ORDER — PROMETHAZINE HYDROCHLORIDE 25 MG/1
TABLET ORAL
Qty: 30 TAB | Refills: 0 | Status: SHIPPED | OUTPATIENT
Start: 2018-10-08 | End: 2018-12-07 | Stop reason: SDUPTHER

## 2018-10-08 RX ORDER — CYANOCOBALAMIN 1000 UG/ML
INJECTION, SOLUTION INTRAMUSCULAR; SUBCUTANEOUS
Qty: 1 ML | Refills: 0 | Status: SHIPPED | OUTPATIENT
Start: 2018-10-08 | End: 2018-12-07 | Stop reason: SDUPTHER

## 2018-10-08 RX ORDER — BUTALBITAL, ACETAMINOPHEN AND CAFFEINE 300; 40; 50 MG/1; MG/1; MG/1
CAPSULE ORAL
Qty: 60 CAP | Refills: 0 | Status: SHIPPED
Start: 2018-10-08 | End: 2018-11-07

## 2018-10-08 NOTE — TELEPHONE ENCOUNTER
Was the patient seen in the last year in this department? Yes    Does patient have an active prescription for medications requested? No     Received Request Via: Pharmacy   LOV-10/16/17

## 2018-11-09 DIAGNOSIS — M51.36 DEGENERATIVE DISC DISEASE, LUMBAR: ICD-10-CM

## 2018-11-09 DIAGNOSIS — M54.42 CHRONIC BILATERAL LOW BACK PAIN WITH BILATERAL SCIATICA: ICD-10-CM

## 2018-11-09 DIAGNOSIS — M54.41 CHRONIC BILATERAL LOW BACK PAIN WITH BILATERAL SCIATICA: ICD-10-CM

## 2018-11-09 DIAGNOSIS — G89.29 CHRONIC BILATERAL LOW BACK PAIN WITH BILATERAL SCIATICA: ICD-10-CM

## 2018-11-09 RX ORDER — TIZANIDINE 4 MG/1
TABLET ORAL
Qty: 120 TAB | Refills: 0 | Status: SHIPPED | OUTPATIENT
Start: 2018-11-09 | End: 2018-12-07 | Stop reason: SDUPTHER

## 2018-11-09 NOTE — TELEPHONE ENCOUNTER
Was the patient seen in the last year in this department? No lov 10/16/2017    Does patient have an active prescription for medications requested? No     Received Request Via: Pharmacy

## 2019-01-06 DIAGNOSIS — M54.42 CHRONIC BILATERAL LOW BACK PAIN WITH BILATERAL SCIATICA: ICD-10-CM

## 2019-01-06 DIAGNOSIS — M51.36 DEGENERATIVE DISC DISEASE, LUMBAR: ICD-10-CM

## 2019-01-06 DIAGNOSIS — M54.41 CHRONIC BILATERAL LOW BACK PAIN WITH BILATERAL SCIATICA: ICD-10-CM

## 2019-01-06 DIAGNOSIS — G89.29 CHRONIC BILATERAL LOW BACK PAIN WITH BILATERAL SCIATICA: ICD-10-CM

## 2019-01-07 RX ORDER — TIZANIDINE 4 MG/1
TABLET ORAL
Qty: 120 TAB | Refills: 0 | Status: SHIPPED | OUTPATIENT
Start: 2019-01-07 | End: 2019-09-30

## 2019-01-07 NOTE — TELEPHONE ENCOUNTER
Was the patient seen in the last year in this department? No lov 10/16/17    Does patient have an active prescription for medications requested? No     Received Request Via: Pharmacy

## 2019-07-18 ENCOUNTER — OFFICE VISIT (OUTPATIENT)
Dept: MEDICAL GROUP | Facility: LAB | Age: 34
End: 2019-07-18
Payer: COMMERCIAL

## 2019-07-18 VITALS
DIASTOLIC BLOOD PRESSURE: 88 MMHG | OXYGEN SATURATION: 97 % | TEMPERATURE: 97.6 F | BODY MASS INDEX: 30.61 KG/M2 | SYSTOLIC BLOOD PRESSURE: 128 MMHG | WEIGHT: 226 LBS | HEART RATE: 94 BPM | RESPIRATION RATE: 12 BRPM | HEIGHT: 72 IN

## 2019-07-18 DIAGNOSIS — F32.89 OTHER DEPRESSION: ICD-10-CM

## 2019-07-18 DIAGNOSIS — M79.672 LEFT FOOT PAIN: ICD-10-CM

## 2019-07-18 DIAGNOSIS — R51.9 GENERALIZED HEADACHE: ICD-10-CM

## 2019-07-18 DIAGNOSIS — F98.8 ATTENTION DEFICIT DISORDER, UNSPECIFIED HYPERACTIVITY PRESENCE: ICD-10-CM

## 2019-07-18 DIAGNOSIS — F41.9 ANXIETY: ICD-10-CM

## 2019-07-18 DIAGNOSIS — R20.2 PARESTHESIA: ICD-10-CM

## 2019-07-18 PROBLEM — Z79.891 CHRONIC USE OF OPIATE DRUGS THERAPEUTIC PURPOSES: Status: RESOLVED | Noted: 2017-02-17 | Resolved: 2019-07-18

## 2019-07-18 PROCEDURE — 99214 OFFICE O/P EST MOD 30 MIN: CPT | Performed by: NURSE PRACTITIONER

## 2019-07-18 RX ORDER — ESCITALOPRAM OXALATE 20 MG/1
20 TABLET ORAL DAILY
Qty: 30 TAB | Refills: 5 | Status: SHIPPED | DISCHARGE
Start: 2019-07-18 | End: 2019-11-04

## 2019-07-18 RX ORDER — CYANOCOBALAMIN 1000 UG/ML
INJECTION, SOLUTION INTRAMUSCULAR; SUBCUTANEOUS
Qty: 1 ML | Refills: 11 | Status: SHIPPED | OUTPATIENT
Start: 2019-07-18 | End: 2019-08-18

## 2019-07-18 RX ORDER — ZOLPIDEM TARTRATE 10 MG/1
10 TABLET ORAL NIGHTLY PRN
COMMUNITY
End: 2019-09-30

## 2019-07-18 RX ORDER — BUTALBITAL, ACETAMINOPHEN AND CAFFEINE 300; 40; 50 MG/1; MG/1; MG/1
CAPSULE ORAL
Qty: 60 CAP | Refills: 1 | Status: SHIPPED
Start: 2019-07-18 | End: 2019-08-16

## 2019-07-18 RX ORDER — ATOMOXETINE 25 MG/1
25 CAPSULE ORAL DAILY
Qty: 30 CAP | Refills: 5 | Status: SHIPPED | DISCHARGE
Start: 2019-07-18 | End: 2019-09-30

## 2019-07-18 RX ORDER — OXYCODONE HYDROCHLORIDE 5 MG/1
5 TABLET ORAL EVERY 4 HOURS PRN
COMMUNITY
End: 2019-11-04

## 2019-07-18 ASSESSMENT — PATIENT HEALTH QUESTIONNAIRE - PHQ9
SUM OF ALL RESPONSES TO PHQ9 QUESTIONS 1 AND 2: 0
2. FEELING DOWN, DEPRESSED, IRRITABLE, OR HOPELESS: NOT AT ALL
7. TROUBLE CONCENTRATING ON THINGS, SUCH AS READING THE NEWSPAPER OR WATCHING TELEVISION: NOT AT ALL
9. THOUGHTS THAT YOU WOULD BE BETTER OFF DEAD, OR OF HURTING YOURSELF: NOT AT ALL
4. FEELING TIRED OR HAVING LITTLE ENERGY: SEVERAL DAYS
6. FEELING BAD ABOUT YOURSELF - OR THAT YOU ARE A FAILURE OR HAVE LET YOURSELF OR YOUR FAMILY DOWN: NOT AL ALL
8. MOVING OR SPEAKING SO SLOWLY THAT OTHER PEOPLE COULD HAVE NOTICED. OR THE OPPOSITE, BEING SO FIGETY OR RESTLESS THAT YOU HAVE BEEN MOVING AROUND A LOT MORE THAN USUAL: NOT AT ALL
1. LITTLE INTEREST OR PLEASURE IN DOING THINGS: NOT AT ALL
SUM OF ALL RESPONSES TO PHQ QUESTIONS 1-9: 1
3. TROUBLE FALLING OR STAYING ASLEEP OR SLEEPING TOO MUCH: NOT AT ALL
5. POOR APPETITE OR OVEREATING: NOT AT ALL

## 2019-07-18 NOTE — PROGRESS NOTES
Chief Complaint   Patient presents with   • Foot Problem     left foot pain   • Migraine     med refill        HPI  Joy is a 34-year-old established female here with several issues:    #1-depression with anxiety: Chronic issue.  Now followed by psychiatry: Dr. Deluca  - given ambien yesterday - just started last night and this helps.  lexapro 20 mg is controlling her moods.  Tried trazodone 50 mg but it stopped work.  Overall describes mood stability.    #2-chronic pain: Now taking care of by Radha Roca at [facility] and is rx oxycodone - 5 mg 5 x daily tizanidine 4 mg up to tid and ibuprofen 800 mg tid prn.   Also getting trigger points and epidurals there, which helps.  Feels her pain is under control.  Suffers from chronic pain in her back.    #3-migraines: Chronic issue.  Improving.  Requesting a refill of Fioricet and promethazine.  She tells me that she takes promethazine 25 mg prn migraines that involve nausea.  Requesting fioricet prn headaches / migraines, denies any negative side effects of Fioricet.  Off the bat - takes fioricet when migraine comes on - this with coffee and ibuprofen helps.  Has an allergy to imitrex.  Tells me that an ear piercing really helped her migraines reduce in frequency to once monthly.  Also had a hysterectomy.      #4 -left foot pain: New issue.  She tells me that she accidentally shut car door on left foot last Saturday.  Her pain persist.  She is concerned because she is leaving for [place] in about 4 days and would like a walking boot as she will be doing a lot of physical activity.  The top of her foot did swell and bruise.    #5-chronic lower extremity paresthesias: This is been a chronic issue for the patient for years.  She has known lumbar degenerative disc disease.  B12 is helpful for her paresthesias and for her energy.  She gives herself B12 at home via intramuscular injection and is requesting her prescriptions to be refilled.    Past medical, surgical,  family, and social history is reviewed and updated in Epic chart by me today.   Medications and allergies reviewed and updated in Epic chart by me today.     ROS:   As documented in history of present illness above    Exam:  /88 (BP Location: Left arm, Patient Position: Sitting, BP Cuff Size: Large adult)   Pulse 94   Temp 36.4 °C (97.6 °F)   Resp 12   Ht 1.829 m (6')   Wt 102.5 kg (226 lb)   SpO2 97%   Constitutional: Alert, no distress, plus 3 vital signs  Skin:  Warm, dry, no rashes invisible areas  Eye: Equal, round and reactive, conjunctiva clear  ENMT: Lips without lesions  Respiratory: Unlabored respiration, lungs clear to auscultation, no wheezes, no rhonchi  Cardiovascular: Normal rate and rhythm  Musculoskeletal: She does have a small bruise to the dorsal aspect of her proximal left foot.  She experiences tenderness with palpation to the dorsal aspect of her proximal left foot and has discomfort with active resistance to dorsiflexion.  She does not have any left foot deformity.  Psych: Alert, pleasant, well-groomed, normal affect    Assessment / Plan / Medical Decision makin. Paresthesia  -Chronic issue for the patient and stable.  Refilled B12.  - cyanocobalamin (VITAMIN B-12) 1000 MCG/ML Solution; ADMINISTER 1 ML IN THE MUSCLE EVERY 30 DAYS  Dispense: 1 mL; Refill: 11    2. Other depression  -Stable and followed by psychiatry.  Updated medication list.  - escitalopram (LEXAPRO) 20 MG tablet; Take 1 Tab by mouth every day.  Dispense: 30 Tab; Refill: 5    3. Anxiety  -Continue same medications.  - escitalopram (LEXAPRO) 20 MG tablet; Take 1 Tab by mouth every day.  Dispense: 30 Tab; Refill: 5    4. Attention deficit disorder, unspecified hyperactivity presence  -Stable and followed by psychiatry.  - atomoxetine (STRATTERA) 25 MG capsule; Take 1 Cap by mouth every day.  Dispense: 30 Cap; Refill: 5    5. Generalized headache  -Improving.  Refilled Fioricet and promethazine.  Intolerant to  abortive triptan therapy.  Acetaminophen/caffeine/butalbital 300-40-50 mg (FIORICET) -40 MG Cap capsule; TAKE ONE CAPSULE BY MOUTH TWICE DAILY as needed FOR HEADACHEs  Dispense: 60 Cap; Refill: 1    6. Left foot pain  -She was given a prescription for a left walking boot for comfort on her upcoming vacation.  I encouraged her to have an x-ray prior to leaving.  - DX-FOOT-COMPLETE 3+ LEFT; Future  - NON SPECIFIED; Left walking boot  Dx:  Left foot contusion / strain  Dispense: 1 Each; Refill: 0  B12,

## 2019-09-30 ENCOUNTER — APPOINTMENT (OUTPATIENT)
Dept: RADIOLOGY | Facility: MEDICAL CENTER | Age: 34
DRG: 091 | End: 2019-09-30
Attending: EMERGENCY MEDICINE
Payer: COMMERCIAL

## 2019-09-30 ENCOUNTER — HOSPITAL ENCOUNTER (INPATIENT)
Facility: MEDICAL CENTER | Age: 34
LOS: 3 days | DRG: 091 | End: 2019-10-03
Attending: EMERGENCY MEDICINE | Admitting: INTERNAL MEDICINE
Payer: COMMERCIAL

## 2019-09-30 DIAGNOSIS — I95.2 HYPOTENSION DUE TO DRUGS: ICD-10-CM

## 2019-09-30 DIAGNOSIS — R94.31 QT PROLONGATION: ICD-10-CM

## 2019-09-30 DIAGNOSIS — T50.902A INTENTIONAL DRUG OVERDOSE, INITIAL ENCOUNTER (HCC): ICD-10-CM

## 2019-09-30 DIAGNOSIS — R45.851 SUICIDAL IDEATION: ICD-10-CM

## 2019-09-30 PROBLEM — G92.8 TOXIC METABOLIC ENCEPHALOPATHY: Status: ACTIVE | Noted: 2019-09-30

## 2019-09-30 PROBLEM — E87.29 INCREASED ANION GAP METABOLIC ACIDOSIS: Status: ACTIVE | Noted: 2019-09-30

## 2019-09-30 PROBLEM — T50.901A POLYSUBSTANCE OVERDOSE: Status: ACTIVE | Noted: 2019-09-30

## 2019-09-30 PROBLEM — T14.91XA SUICIDE ATTEMPT (HCC): Status: ACTIVE | Noted: 2019-09-30

## 2019-09-30 LAB
ACTION RANGE TRIGGERED IACRT: NO
ALBUMIN SERPL BCP-MCNC: 4.6 G/DL (ref 3.2–4.9)
ALBUMIN/GLOB SERPL: 1.9 G/DL
ALP SERPL-CCNC: 75 U/L (ref 30–99)
ALT SERPL-CCNC: 20 U/L (ref 2–50)
AMPHET UR QL SCN: NEGATIVE
ANION GAP SERPL CALC-SCNC: 13 MMOL/L (ref 0–11.9)
APAP SERPL-MCNC: <10 UG/ML (ref 10–30)
APPEARANCE UR: CLEAR
AST SERPL-CCNC: 17 U/L (ref 12–45)
BARBITURATES UR QL SCN: POSITIVE
BASE EXCESS BLDA CALC-SCNC: -7 MMOL/L (ref -4–3)
BASOPHILS # BLD AUTO: 0.4 % (ref 0–1.8)
BASOPHILS # BLD: 0.06 K/UL (ref 0–0.12)
BENZODIAZ UR QL SCN: POSITIVE
BILIRUB SERPL-MCNC: 0.5 MG/DL (ref 0.1–1.5)
BILIRUB UR QL STRIP.AUTO: NEGATIVE
BODY TEMPERATURE: ABNORMAL DEGREES
BUN SERPL-MCNC: 12 MG/DL (ref 8–22)
BZE UR QL SCN: NEGATIVE
CALCIUM SERPL-MCNC: 9 MG/DL (ref 8.5–10.5)
CANNABINOIDS UR QL SCN: NEGATIVE
CHLORIDE SERPL-SCNC: 104 MMOL/L (ref 96–112)
CK SERPL-CCNC: 69 U/L (ref 0–154)
CO2 BLDA-SCNC: 20 MMOL/L (ref 20–33)
CO2 SERPL-SCNC: 21 MMOL/L (ref 20–33)
COLOR UR: YELLOW
CREAT SERPL-MCNC: 0.89 MG/DL (ref 0.5–1.4)
EKG IMPRESSION: NORMAL
EKG IMPRESSION: NORMAL
EOSINOPHIL # BLD AUTO: 0.02 K/UL (ref 0–0.51)
EOSINOPHIL NFR BLD: 0.1 % (ref 0–6.9)
ERYTHROCYTE [DISTWIDTH] IN BLOOD BY AUTOMATED COUNT: 45.7 FL (ref 35.9–50)
ETHANOL BLD-MCNC: 0.01 G/DL
GLOBULIN SER CALC-MCNC: 2.4 G/DL (ref 1.9–3.5)
GLUCOSE SERPL-MCNC: 250 MG/DL (ref 65–99)
GLUCOSE UR STRIP.AUTO-MCNC: 100 MG/DL
HCG SERPL QL: NEGATIVE
HCO3 BLDA-SCNC: 18.5 MMOL/L (ref 17–25)
HCT VFR BLD AUTO: 40.1 % (ref 37–47)
HGB BLD-MCNC: 13.4 G/DL (ref 12–16)
HOROWITZ INDEX BLDA+IHG-RTO: 264 MM[HG]
IMM GRANULOCYTES # BLD AUTO: 0.08 K/UL (ref 0–0.11)
IMM GRANULOCYTES NFR BLD AUTO: 0.6 % (ref 0–0.9)
INST. QUALIFIED PATIENT IIQPT: YES
KETONES UR STRIP.AUTO-MCNC: NEGATIVE MG/DL
LACTATE BLD-SCNC: 1.3 MMOL/L (ref 0.5–2)
LEUKOCYTE ESTERASE UR QL STRIP.AUTO: NEGATIVE
LYMPHOCYTES # BLD AUTO: 1.23 K/UL (ref 1–4.8)
LYMPHOCYTES NFR BLD: 8.5 % (ref 22–41)
MAGNESIUM SERPL-MCNC: 2.7 MG/DL (ref 1.5–2.5)
MCH RBC QN AUTO: 30.2 PG (ref 27–33)
MCHC RBC AUTO-ENTMCNC: 33.4 G/DL (ref 33.6–35)
MCV RBC AUTO: 90.5 FL (ref 81.4–97.8)
METHADONE UR QL SCN: NEGATIVE
MICRO URNS: ABNORMAL
MONOCYTES # BLD AUTO: 0.57 K/UL (ref 0–0.85)
MONOCYTES NFR BLD AUTO: 3.9 % (ref 0–13.4)
NEUTROPHILS # BLD AUTO: 12.58 K/UL (ref 2–7.15)
NEUTROPHILS NFR BLD: 86.5 % (ref 44–72)
NITRITE UR QL STRIP.AUTO: NEGATIVE
NRBC # BLD AUTO: 0 K/UL
NRBC BLD-RTO: 0 /100 WBC
O2/TOTAL GAS SETTING VFR VENT: 50 %
OPIATES UR QL SCN: NEGATIVE
OXYCODONE UR QL SCN: POSITIVE
PCO2 BLDA: 36.5 MMHG (ref 26–37)
PCO2 TEMP ADJ BLDA: 33.6 MMHG (ref 26–37)
PCP UR QL SCN: NEGATIVE
PH BLDA: 7.31 [PH] (ref 7.4–7.5)
PH TEMP ADJ BLDA: 7.34 [PH] (ref 7.4–7.5)
PH UR STRIP.AUTO: 6 [PH] (ref 5–8)
PHOSPHATE SERPL-MCNC: 2.9 MG/DL (ref 2.5–4.5)
PLATELET # BLD AUTO: 332 K/UL (ref 164–446)
PMV BLD AUTO: 10.1 FL (ref 9–12.9)
PO2 BLDA: 132 MMHG (ref 64–87)
PO2 TEMP ADJ BLDA: 121 MMHG (ref 64–87)
POTASSIUM SERPL-SCNC: 3.7 MMOL/L (ref 3.6–5.5)
PROPOXYPH UR QL SCN: NEGATIVE
PROT SERPL-MCNC: 7 G/DL (ref 6–8.2)
PROT UR QL STRIP: NEGATIVE MG/DL
RBC # BLD AUTO: 4.43 M/UL (ref 4.2–5.4)
RBC UR QL AUTO: NEGATIVE
SALICYLATES SERPL-MCNC: 0 MG/DL (ref 15–25)
SAO2 % BLDA: 99 % (ref 93–99)
SODIUM SERPL-SCNC: 138 MMOL/L (ref 135–145)
SP GR UR STRIP.AUTO: 1.01
SPECIMEN DRAWN FROM PATIENT: ABNORMAL
UROBILINOGEN UR STRIP.AUTO-MCNC: 0.2 MG/DL
WBC # BLD AUTO: 14.5 K/UL (ref 4.8–10.8)

## 2019-09-30 PROCEDURE — 700105 HCHG RX REV CODE 258: Performed by: EMERGENCY MEDICINE

## 2019-09-30 PROCEDURE — 82803 BLOOD GASES ANY COMBINATION: CPT

## 2019-09-30 PROCEDURE — 36600 WITHDRAWAL OF ARTERIAL BLOOD: CPT

## 2019-09-30 PROCEDURE — 0BH18EZ INSERTION OF ENDOTRACHEAL AIRWAY INTO TRACHEA, VIA NATURAL OR ARTIFICIAL OPENING ENDOSCOPIC: ICD-10-PCS | Performed by: EMERGENCY MEDICINE

## 2019-09-30 PROCEDURE — 84100 ASSAY OF PHOSPHORUS: CPT

## 2019-09-30 PROCEDURE — 84703 CHORIONIC GONADOTROPIN ASSAY: CPT

## 2019-09-30 PROCEDURE — 82550 ASSAY OF CK (CPK): CPT

## 2019-09-30 PROCEDURE — 31500 INSERT EMERGENCY AIRWAY: CPT

## 2019-09-30 PROCEDURE — 94002 VENT MGMT INPAT INIT DAY: CPT

## 2019-09-30 PROCEDURE — 80053 COMPREHEN METABOLIC PANEL: CPT

## 2019-09-30 PROCEDURE — 93005 ELECTROCARDIOGRAM TRACING: CPT | Performed by: STUDENT IN AN ORGANIZED HEALTH CARE EDUCATION/TRAINING PROGRAM

## 2019-09-30 PROCEDURE — 51702 INSERT TEMP BLADDER CATH: CPT

## 2019-09-30 PROCEDURE — 81003 URINALYSIS AUTO W/O SCOPE: CPT

## 2019-09-30 PROCEDURE — 80307 DRUG TEST PRSMV CHEM ANLYZR: CPT

## 2019-09-30 PROCEDURE — 93005 ELECTROCARDIOGRAM TRACING: CPT

## 2019-09-30 PROCEDURE — 700111 HCHG RX REV CODE 636 W/ 250 OVERRIDE (IP)

## 2019-09-30 PROCEDURE — 700105 HCHG RX REV CODE 258: Performed by: STUDENT IN AN ORGANIZED HEALTH CARE EDUCATION/TRAINING PROGRAM

## 2019-09-30 PROCEDURE — 770022 HCHG ROOM/CARE - ICU (200)

## 2019-09-30 PROCEDURE — 5A1935Z RESPIRATORY VENTILATION, LESS THAN 24 CONSECUTIVE HOURS: ICD-10-PCS | Performed by: EMERGENCY MEDICINE

## 2019-09-30 PROCEDURE — 302151 K-PAD 14X20: Performed by: INTERNAL MEDICINE

## 2019-09-30 PROCEDURE — 71045 X-RAY EXAM CHEST 1 VIEW: CPT

## 2019-09-30 PROCEDURE — 93005 ELECTROCARDIOGRAM TRACING: CPT | Performed by: EMERGENCY MEDICINE

## 2019-09-30 PROCEDURE — 700111 HCHG RX REV CODE 636 W/ 250 OVERRIDE (IP): Performed by: EMERGENCY MEDICINE

## 2019-09-30 PROCEDURE — 99291 CRITICAL CARE FIRST HOUR: CPT | Performed by: INTERNAL MEDICINE

## 2019-09-30 PROCEDURE — 83605 ASSAY OF LACTIC ACID: CPT

## 2019-09-30 PROCEDURE — 96365 THER/PROPH/DIAG IV INF INIT: CPT

## 2019-09-30 PROCEDURE — 99291 CRITICAL CARE FIRST HOUR: CPT

## 2019-09-30 PROCEDURE — 303105 HCHG CATHETER EXTRA

## 2019-09-30 PROCEDURE — 700111 HCHG RX REV CODE 636 W/ 250 OVERRIDE (IP): Performed by: INTERNAL MEDICINE

## 2019-09-30 PROCEDURE — 96375 TX/PRO/DX INJ NEW DRUG ADDON: CPT

## 2019-09-30 PROCEDURE — 85025 COMPLETE CBC W/AUTO DIFF WBC: CPT

## 2019-09-30 PROCEDURE — 83735 ASSAY OF MAGNESIUM: CPT

## 2019-09-30 PROCEDURE — 302131 K PAD MOTOR: Performed by: INTERNAL MEDICINE

## 2019-09-30 RX ORDER — IBUPROFEN 800 MG/1
800 TABLET ORAL EVERY 8 HOURS PRN
COMMUNITY
End: 2019-11-04

## 2019-09-30 RX ORDER — DEXTROAMPHETAMINE SACCHARATE, AMPHETAMINE ASPARTATE, DEXTROAMPHETAMINE SULFATE AND AMPHETAMINE SULFATE 2.5; 2.5; 2.5; 2.5 MG/1; MG/1; MG/1; MG/1
5 TABLET ORAL DAILY
COMMUNITY
End: 2019-11-04 | Stop reason: SDUPTHER

## 2019-09-30 RX ORDER — SODIUM CHLORIDE 9 MG/ML
1000 INJECTION, SOLUTION INTRAVENOUS ONCE
Status: COMPLETED | OUTPATIENT
Start: 2019-09-30 | End: 2019-09-30

## 2019-09-30 RX ORDER — QUETIAPINE FUMARATE 100 MG/1
100 TABLET, FILM COATED ORAL
COMMUNITY
End: 2019-11-04

## 2019-09-30 RX ORDER — POLYETHYLENE GLYCOL 3350 17 G/17G
1 POWDER, FOR SOLUTION ORAL
Status: DISCONTINUED | OUTPATIENT
Start: 2019-09-30 | End: 2019-09-30

## 2019-09-30 RX ORDER — ETOMIDATE 2 MG/ML
10 INJECTION INTRAVENOUS ONCE
Status: COMPLETED | OUTPATIENT
Start: 2019-09-30 | End: 2019-09-30

## 2019-09-30 RX ORDER — SUCCINYLCHOLINE CHLORIDE 20 MG/ML
100 INJECTION INTRAMUSCULAR; INTRAVENOUS ONCE
Status: COMPLETED | OUTPATIENT
Start: 2019-09-30 | End: 2019-09-30

## 2019-09-30 RX ORDER — CYANOCOBALAMIN 1000 UG/ML
1000 INJECTION, SOLUTION INTRAMUSCULAR; SUBCUTANEOUS
COMMUNITY
End: 2020-02-10

## 2019-09-30 RX ORDER — BISACODYL 10 MG
10 SUPPOSITORY, RECTAL RECTAL
Status: DISCONTINUED | OUTPATIENT
Start: 2019-09-30 | End: 2019-09-30

## 2019-09-30 RX ORDER — MAGNESIUM SULFATE HEPTAHYDRATE 40 MG/ML
2 INJECTION, SOLUTION INTRAVENOUS ONCE
Status: COMPLETED | OUTPATIENT
Start: 2019-09-30 | End: 2019-09-30

## 2019-09-30 RX ORDER — SODIUM CHLORIDE 9 MG/ML
INJECTION, SOLUTION INTRAVENOUS CONTINUOUS
Status: DISCONTINUED | OUTPATIENT
Start: 2019-09-30 | End: 2019-10-01

## 2019-09-30 RX ORDER — BISACODYL 10 MG
10 SUPPOSITORY, RECTAL RECTAL
Status: DISCONTINUED | OUTPATIENT
Start: 2019-09-30 | End: 2019-10-03 | Stop reason: HOSPADM

## 2019-09-30 RX ORDER — IPRATROPIUM BROMIDE AND ALBUTEROL SULFATE 2.5; .5 MG/3ML; MG/3ML
3 SOLUTION RESPIRATORY (INHALATION)
Status: DISCONTINUED | OUTPATIENT
Start: 2019-09-30 | End: 2019-10-03 | Stop reason: HOSPADM

## 2019-09-30 RX ORDER — POLYETHYLENE GLYCOL 3350 17 G/17G
1 POWDER, FOR SOLUTION ORAL
Status: DISCONTINUED | OUTPATIENT
Start: 2019-09-30 | End: 2019-10-03 | Stop reason: HOSPADM

## 2019-09-30 RX ORDER — NALOXONE HYDROCHLORIDE 0.4 MG/ML
0.4 INJECTION, SOLUTION INTRAMUSCULAR; INTRAVENOUS; SUBCUTANEOUS ONCE
Status: COMPLETED | OUTPATIENT
Start: 2019-09-30 | End: 2019-09-30

## 2019-09-30 RX ORDER — AMOXICILLIN 250 MG
2 CAPSULE ORAL 2 TIMES DAILY
Status: DISCONTINUED | OUTPATIENT
Start: 2019-09-30 | End: 2019-10-03 | Stop reason: HOSPADM

## 2019-09-30 RX ORDER — FAMOTIDINE 20 MG/1
20 TABLET, FILM COATED ORAL EVERY 12 HOURS
Status: DISCONTINUED | OUTPATIENT
Start: 2019-09-30 | End: 2019-10-02

## 2019-09-30 RX ORDER — ATOMOXETINE 40 MG/1
40 CAPSULE ORAL DAILY
COMMUNITY
Start: 2019-07-17 | End: 2019-11-04

## 2019-09-30 RX ORDER — AMOXICILLIN 250 MG
2 CAPSULE ORAL 2 TIMES DAILY
Status: DISCONTINUED | OUTPATIENT
Start: 2019-09-30 | End: 2019-09-30

## 2019-09-30 RX ORDER — NALOXONE HYDROCHLORIDE 0.4 MG/ML
INJECTION, SOLUTION INTRAMUSCULAR; INTRAVENOUS; SUBCUTANEOUS
Status: COMPLETED
Start: 2019-09-30 | End: 2019-09-30

## 2019-09-30 RX ADMIN — NALOXONE HYDROCHLORIDE 0.4 MG: 0.4 INJECTION, SOLUTION INTRAMUSCULAR; INTRAVENOUS; SUBCUTANEOUS at 14:08

## 2019-09-30 RX ADMIN — SODIUM CHLORIDE 1000 ML: 9 INJECTION, SOLUTION INTRAVENOUS at 18:48

## 2019-09-30 RX ADMIN — SODIUM CHLORIDE: 9 INJECTION, SOLUTION INTRAVENOUS at 22:12

## 2019-09-30 RX ADMIN — SUCCINYLCHOLINE CHLORIDE 100 MG: 20 INJECTION, SOLUTION INTRAMUSCULAR; INTRAVENOUS; PARENTERAL at 20:58

## 2019-09-30 RX ADMIN — PROPOFOL 20 MCG/KG/MIN: 10 INJECTION, EMULSION INTRAVENOUS at 22:03

## 2019-09-30 RX ADMIN — FAMOTIDINE 20 MG: 10 INJECTION INTRAVENOUS at 22:18

## 2019-09-30 RX ADMIN — ETOMIDATE 10 MG: 2 INJECTION INTRAVENOUS at 20:58

## 2019-09-30 RX ADMIN — SODIUM CHLORIDE 1000 ML: 9 INJECTION, SOLUTION INTRAVENOUS at 20:15

## 2019-09-30 RX ADMIN — MAGNESIUM SULFATE IN WATER 2 G: 40 INJECTION, SOLUTION INTRAVENOUS at 20:31

## 2019-09-30 NOTE — ED NOTES
[facility] Notified that [facility] can not receive any visitors.     Russ Mcnamara () [phone]

## 2019-09-30 NOTE — ED NOTES
My DELANEY spoke with poison control regarding patients consumption of personal medications.    Case number [id number]

## 2019-09-30 NOTE — DISCHARGE PLANNING
Alert Team  Noted consult order placed.    Pt not currently ready for assessment; not medically cleared.    Once pt is medically cleared and appropriate for assessment, bedside RN to call AT.  If pt goes upstairs to inpt medical bed, she will need to have an order placed for IP Consult to Psychiatry.

## 2019-09-30 NOTE — ED TRIAGE NOTES
"Pt admits to taking \"rest of pills\" about an hour ago to kill herself. Empty bottles found of ibuprofen, tizanidine, escitalopram, quentiapine. Pt was alert and ambulatory to EMS. approx 1400 on arrival to ED pt became unresponsive and obtunded w GCS of 7. Pt given narcan on arrival. Pt now GCS of 12. Pt confused w slurred speech.   "

## 2019-09-30 NOTE — ED NOTES
Med rec completed per pt's home pharmacy  Allergies reviewed  No PO antibiotics in the last 14 days

## 2019-09-30 NOTE — ED NOTES
"Pt placed on legal hold by \"an officer authorized to make arrests in the state of [place].\" at 1338.     Charge nurse notified. Sitter requested.  "

## 2019-09-30 NOTE — ED NOTES
"Pt woke up and was moving around in bed .  Pt states \"I am tired I just want to sleep.\"  Offered pt warm blanket and pillow to help her sleep.  "

## 2019-09-30 NOTE — ED NOTES
Pt sleeping. Chest rise and fall. Moving all extremities. Cont to monitor.    Sitter 1:1 in direct line of observation at bedside.

## 2019-09-30 NOTE — ED PROVIDER NOTES
ED Provider Note    CHIEF COMPLAINT  Chief Complaint   Patient presents with   • Drug Overdose   • Suicide Attempt       HPI  Joygene Mcnamara is a 34 y.o. female who presents for evaluation of altered mental status in the setting of reported overdose.  The patient was initially triaged to the low acuity section.  She apparently took a handful of trazodone, ibuprofen, possible SSRI.  She was awake and talking.  When she got sent back to her room she became more drowsy.  Her medics had responded to a possible overdose.  Her blood sugar was normal and her mental status was normal on scene.  Patient could not provide any meaningful history on arrival.  The nurse that excepted the patient astutely moved her to a higher acuity area due to worsening altered mental status.  Patient cannot provide a meaningful history.  On arrival to the resuscitation room the patient was hypertensive and had pinpoint pupils.    REVIEW OF SYSTEMS  See HPI for further details.  Limited review of systems due to altered mental status all other systems are negative.     PAST MEDICAL HISTORY  Past Medical History:   Diagnosis Date   • Pain 2017    low back; degenerative disk disease   • Depression 4/25/2014   • Back pain    • Depression     s/p mom's death   • Gynecological disorder    • Migraine headache    • Miscarriage        FAMILY HISTORY  Noncontributory    SOCIAL HISTORY  Social History     Socioeconomic History   • Marital status:      Spouse name: Not on file   • Number of children: Not on file   • Years of education: Not on file   • Highest education level: Not on file   Occupational History   • Not on file   Social Needs   • Financial resource strain: Not on file   • Food insecurity:     Worry: Not on file     Inability: Not on file   • Transportation needs:     Medical: Not on file     Non-medical: Not on file   Tobacco Use   • Smoking status: Former Smoker     Years: 4.00     Types: Cigarettes     Last attempt to quit:  3/1/2007     Years since quittin.5   • Smokeless tobacco: Never Used   • Tobacco comment: 2007   Substance and Sexual Activity   • Alcohol use: No     Comment: denies ; family hx of alcoholism   • Drug use: No     Types: Marijuana, Methamphetamines   • Sexual activity: Yes     Birth control/protection: Injection     Comment: , two kids; special ed   Lifestyle   • Physical activity:     Days per week: Not on file     Minutes per session: Not on file   • Stress: Not on file   Relationships   • Social connections:     Talks on phone: Not on file     Gets together: Not on file     Attends Worship service: Not on file     Active member of club or organization: Not on file     Attends meetings of clubs or organizations: Not on file     Relationship status: Not on file   • Intimate partner violence:     Fear of current or ex partner: Not on file     Emotionally abused: Not on file     Physically abused: Not on file     Forced sexual activity: Not on file   Other Topics Concern   • Not on file   Social History Narrative   • Not on file   History of chronic pain    SURGICAL HISTORY  Past Surgical History:   Procedure Laterality Date   • VAGINAL HYSTERECTOMY SCOPE TOTAL N/A 3/27/2017    Procedure: VAGINAL HYSTERECTOMY SCOPE TOTAL right salpingectomy, partial left salpingectomy. Cystoscopy;  Surgeon: Zayda Santillan M.D.;  Location: SURGERY SAME DAY [facility] ORS;  Service:    • PB INJ,FORAMEN,L/S,1 LEVEL Bilateral 2015    Procedure: TRANSFORAMINAL BILATERAL L5-S1 EPIDURAL WITH IV SEDATION;  Surgeon: Tom Main M.D.;  Location: [facility] ORS;  Service: Pain Management   • PB INJ,FORAMEN,L/S,1 LEVEL  2015    Procedure: INJ-FORAMEN EPI LUM/SAC SNGL;  Surgeon: Tom Main M.D.;  Location: SURGERY [facility] ORS;  Service: Pain Management   • PB INJ,FORAMEN,L/S,1 LEVEL  10/8/2014    Performed by Tom Main M.D. at [facility]   • LUMBAR LAMINECTOMY DISKECTOMY   "10/3/2013    Performed by Estuardo Carmen M.D. at SURGERY [facility] ORS   • LUMBAR LAMINECTOMY DISKECTOMY  6/10/2009    Performed by ESTUARDO CARMEN at SURGERY [facility] ORS   • DENTAL EXTRACTION(S)      wisdom   • GYN SURGERY      c section x2   • OTHER ORTHOPEDIC SURGERY     • PRIMARY C SECTION      x2       CURRENT MEDICATIONS  No current facility-administered medications for this encounter.     Current Outpatient Medications:   •  amphetamine-dextroamphetamine (ADDERALL) 10 MG Tab, Take 5 mg by mouth every day., Disp: , Rfl:   •  QUEtiapine (SEROQUEL) 100 MG Tab, Take 100 mg by mouth every bedtime., Disp: , Rfl:   •  cyanocobalamin (VITAMIN B-12) 1000 MCG/ML Solution, 1,000 mcg by Intramuscular route every 30 days., Disp: , Rfl:   •  ibuprofen (MOTRIN) 800 MG Tab, Take 800 mg by mouth every 8 hours as needed., Disp: , Rfl:   •  atomoxetine (STRATTERA) 40 MG capsule, Take 40 mg by mouth every day., Disp: , Rfl:   •  oxyCODONE immediate-release (ROXICODONE) 5 MG Tab, Take 5 mg by mouth every four hours as needed for Severe Pain., Disp: , Rfl:   •  escitalopram (LEXAPRO) 20 MG tablet, Take 1 Tab by mouth every day., Disp: 30 Tab, Rfl: 5    Facility-Administered Medications Ordered in Other Encounters:   •  bupivacaine 0.25% (SENSORCAINE-MARCAINE) pf injection, , , Intra-Op Once PRN, Tom Main M.D., 4 mL at 06/17/15 0931  •  dexamethasone (DECADRON) injection (check route below), , , Intra-Op Once PRN, Tom Main M.D., 10 mg at 06/17/15 0931  •  fentanyl (SUBLIMAZE) injection, , , Intra-Op Once PRN, Tom Main M.D., 25 mcg at 06/17/15 0931  •  midazolam (VERSED) 2 MG/2ML injection, , , Intra-Op Once PRN, Tom Main M.D., 0.5 mg at 06/17/15 0931      ALLERGIES  Allergies   Allergen Reactions   • Sumatriptan Succinate      \"face burns & I can't see\"   • Tramadol Photosensitivity     Gives her migraines and makes her feel sick       PHYSICAL EXAM  VITAL SIGNS: BP (!) 97/57   Pulse 61   Temp (!) 35.6 " "°C (96.1 °F) (Temporal)   Resp 17   Ht 1.753 m (5' 9\")   Wt 95.3 kg (210 lb)   LMP 03/16/2017   SpO2 100%   BMI 31.01 kg/m²       Constitutional: Well developed, Well nourished, No acute distress, Non-toxic appearance.   HENT: Normocephalic, Atraumatic, Bilateral external ears normal, Oropharynx moist, No oral exudates, Nose normal.  Minimal gag reflex  Eyes: Pupils are pinpoint equal symmetric sluggish I, Conjunctiva normal, No discharge.   Neck: Normal range of motion, No tenderness, Supple, No stridor.   Cardiovascular: Normal heart rate, Normal rhythm, No murmurs, No rubs, No gallops.   Thorax & Lungs: Normal breath sounds, No respiratory distress, No wheezing, No chest tenderness.   Abdomen: Bowel sounds normal, Soft, No tenderness, No masses, No pulsatile masses.   Skin: Warm, Dry, No erythema, No rash.   Back: No tenderness, No CVA tenderness.   Extremities: Intact distal pulses, No edema, No tenderness, No cyanosis, No clubbing.   Musculoskeletal: Good range of motion in all major joints. No tenderness to palpation or major deformities noted.   Neurologic: Responds to painful stimuli no active seizures moving all extremities  Psychiatric: Sluggish minimally arousable    Results for orders placed or performed during the hospital encounter of 09/30/19   Salicylate   Result Value Ref Range    Salicylates, Quant. 0 (L) 15 - 25 mg/dL   CBC WITH DIFFERENTIAL   Result Value Ref Range    WBC 14.5 (H) 4.8 - 10.8 K/uL    RBC 4.43 4.20 - 5.40 M/uL    Hemoglobin 13.4 12.0 - 16.0 g/dL    Hematocrit 40.1 37.0 - 47.0 %    MCV 90.5 81.4 - 97.8 fL    MCH 30.2 27.0 - 33.0 pg    MCHC 33.4 (L) 33.6 - 35.0 g/dL    RDW 45.7 35.9 - 50.0 fL    Platelet Count 332 164 - 446 K/uL    MPV 10.1 9.0 - 12.9 fL    Neutrophils-Polys 86.50 (H) 44.00 - 72.00 %    Lymphocytes 8.50 (L) 22.00 - 41.00 %    Monocytes 3.90 0.00 - 13.40 %    Eosinophils 0.10 0.00 - 6.90 %    Basophils 0.40 0.00 - 1.80 %    Immature Granulocytes 0.60 0.00 - 0.90 " %    Nucleated RBC 0.00 /100 WBC    Neutrophils (Absolute) 12.58 (H) 2.00 - 7.15 K/uL    Lymphs (Absolute) 1.23 1.00 - 4.80 K/uL    Monos (Absolute) 0.57 0.00 - 0.85 K/uL    Eos (Absolute) 0.02 0.00 - 0.51 K/uL    Baso (Absolute) 0.06 0.00 - 0.12 K/uL    Immature Granulocytes (abs) 0.08 0.00 - 0.11 K/uL    NRBC (Absolute) 0.00 K/uL   Comp Metabolic Panel   Result Value Ref Range    Sodium 138 135 - 145 mmol/L    Potassium 3.7 3.6 - 5.5 mmol/L    Chloride 104 96 - 112 mmol/L    Co2 21 20 - 33 mmol/L    Anion Gap 13.0 (H) 0.0 - 11.9    Glucose 250 (H) 65 - 99 mg/dL    Bun 12 8 - 22 mg/dL    Creatinine 0.89 0.50 - 1.40 mg/dL    Calcium 9.0 8.5 - 10.5 mg/dL    AST(SGOT) 17 12 - 45 U/L    ALT(SGPT) 20 2 - 50 U/L    Alkaline Phosphatase 75 30 - 99 U/L    Total Bilirubin 0.5 0.1 - 1.5 mg/dL    Albumin 4.6 3.2 - 4.9 g/dL    Total Protein 7.0 6.0 - 8.2 g/dL    Globulin 2.4 1.9 - 3.5 g/dL    A-G Ratio 1.9 g/dL   HCG QUAL SERUM   Result Value Ref Range    Beta-Hcg Qualitative Serum Negative Negative   ACETAMINOPHEN   Result Value Ref Range    Acetaminophen -Tylenol <10 10 - 30 ug/mL   DIAGNOSTIC ALCOHOL   Result Value Ref Range    Diagnostic Alcohol 0.01 (H) 0.00 g/dL   ESTIMATED GFR   Result Value Ref Range    GFR If African American >60 >60 mL/min/1.73 m 2    GFR If Non African American >60 >60 mL/min/1.73 m 2   EKG   Result Value Ref Range    Report       [facility] Emergency Dept.    Test Date:  2019  Pt Name:    ELOY HUMPHRIES            Department: ER  MRN:        [id number]                      Room:        04  Gender:     Female                       Technician: [id number]  :        [id number]                   Requested By:ER TRIAGE PROTOCOL  Order #:    [id number]                    Reading MD:    Measurements  Intervals                                Axis  Rate:       59                           P:          50  OK:         169                          QRS:        2  QRSD:       92                            T:          30  QT:         484  QTc:        480    Interpretive Statements  Sinus bradycardia  Consider left ventricular hypertrophy  ST elev, probable normal early repol pattern  Compared to ECG 09/24/2013 09:58:54  ST (T wave) deviation now present  Sinus rhythm no longer present          EKG interpretation by me rate 59 LVH is noted, likely repolarization ST elevation in the precordial leads.  No pathological T wave inversions QRS is narrow.  No suggestion of arrhythmia or ischemia  DX-CHEST-PORTABLE (1 VIEW)   Final Result      Low lung volumes with mild perihilar and basilar interstitial opacities probably related to hypoinflation.          COURSE & MEDICAL DECISION MAKING  Pertinent Labs & Imaging studies reviewed. (See chart for details)  Patient presents here with drowsiness.  I immediately called the bedside.  She was given 0.4 mg dose of Narcan which did not significantly improve her mental status.  I checked her about every 1/2 hour and she had a good gag reflex.  We discussed the case with poison control.  Please see the nursing notes regarding the case number.  They recommended observing her for 4 to 6 hours.  She did not ultimately require any bicarb.  She never had any prolongation of her QRS.  This was apparently a suicidal attempt and therefore we will place her on a legal hold and monitor her until life skills can evaluate her in the morning.  The patient was still too drowsy to perform formal psychiatric evaluation therefore we cannot place her on legal hold at this time    FINAL IMPRESSION  1.  Polysubstance overdose  2.  Possible suicide attempt         Electronically signed by: Mike Campos, 9/30/2019 2:10 PM

## 2019-10-01 ENCOUNTER — APPOINTMENT (OUTPATIENT)
Dept: RADIOLOGY | Facility: MEDICAL CENTER | Age: 34
DRG: 091 | End: 2019-10-01
Attending: INTERNAL MEDICINE
Payer: COMMERCIAL

## 2019-10-01 PROBLEM — R93.89 ABNORMAL CHEST X-RAY: Status: ACTIVE | Noted: 2019-10-01

## 2019-10-01 PROBLEM — J96.01 ACUTE RESPIRATORY FAILURE WITH HYPOXIA (HCC): Status: ACTIVE | Noted: 2019-10-01

## 2019-10-01 LAB
ACTION RANGE TRIGGERED IACRT: NO
ACTION RANGE TRIGGERED IACRT: NO
ALBUMIN SERPL BCP-MCNC: 3 G/DL (ref 3.2–4.9)
ALBUMIN/GLOB SERPL: 1.9 G/DL
ALP SERPL-CCNC: 46 U/L (ref 30–99)
ALT SERPL-CCNC: 12 U/L (ref 2–50)
ANION GAP SERPL CALC-SCNC: 14 MMOL/L (ref 0–11.9)
ANION GAP SERPL CALC-SCNC: 9 MMOL/L (ref 0–11.9)
AST SERPL-CCNC: 13 U/L (ref 12–45)
BASE EXCESS BLDA CALC-SCNC: -7 MMOL/L (ref -4–3)
BASE EXCESS BLDA CALC-SCNC: -8 MMOL/L (ref -4–3)
BASOPHILS # BLD AUTO: 0.2 % (ref 0–1.8)
BASOPHILS # BLD: 0.02 K/UL (ref 0–0.12)
BILIRUB SERPL-MCNC: 0.2 MG/DL (ref 0.1–1.5)
BODY TEMPERATURE: ABNORMAL DEGREES
BODY TEMPERATURE: ABNORMAL DEGREES
BUN SERPL-MCNC: 12 MG/DL (ref 8–22)
BUN SERPL-MCNC: 17 MG/DL (ref 8–22)
CA-I BLD ISE-SCNC: 1.09 MMOL/L (ref 1.1–1.3)
CALCIUM SERPL-MCNC: 6 MG/DL (ref 8.5–10.5)
CALCIUM SERPL-MCNC: 9.1 MG/DL (ref 8.5–10.5)
CHLORIDE SERPL-SCNC: 114 MMOL/L (ref 96–112)
CHLORIDE SERPL-SCNC: 119 MMOL/L (ref 96–112)
CHLORIDE UR-SCNC: 105 MMOL/L
CO2 BLDA-SCNC: 18 MMOL/L (ref 20–33)
CO2 BLDA-SCNC: 18 MMOL/L (ref 20–33)
CO2 SERPL-SCNC: 13 MMOL/L (ref 20–33)
CO2 SERPL-SCNC: 17 MMOL/L (ref 20–33)
CREAT SERPL-MCNC: 0.79 MG/DL (ref 0.5–1.4)
CREAT SERPL-MCNC: 1.23 MG/DL (ref 0.5–1.4)
CREAT UR-MCNC: 39.5 MG/DL
EKG IMPRESSION: NORMAL
EOSINOPHIL # BLD AUTO: 0.08 K/UL (ref 0–0.51)
EOSINOPHIL NFR BLD: 0.9 % (ref 0–6.9)
ERYTHROCYTE [DISTWIDTH] IN BLOOD BY AUTOMATED COUNT: 48.2 FL (ref 35.9–50)
GLOBULIN SER CALC-MCNC: 1.6 G/DL (ref 1.9–3.5)
GLUCOSE BLD-MCNC: 105 MG/DL (ref 65–99)
GLUCOSE BLD-MCNC: 76 MG/DL (ref 65–99)
GLUCOSE SERPL-MCNC: 74 MG/DL (ref 65–99)
GLUCOSE SERPL-MCNC: 76 MG/DL (ref 65–99)
HCO3 BLDA-SCNC: 17.3 MMOL/L (ref 17–25)
HCO3 BLDA-SCNC: 17.3 MMOL/L (ref 17–25)
HCT VFR BLD AUTO: 32.3 % (ref 37–47)
HCT VFR BLD CALC: 32 % (ref 37–47)
HGB BLD-MCNC: 10.6 G/DL (ref 12–16)
HGB BLD-MCNC: 10.9 G/DL (ref 12–16)
HOROWITZ INDEX BLDA+IHG-RTO: 250 MM[HG]
HOROWITZ INDEX BLDA+IHG-RTO: 305 MM[HG]
IMM GRANULOCYTES # BLD AUTO: 0.03 K/UL (ref 0–0.11)
IMM GRANULOCYTES NFR BLD AUTO: 0.3 % (ref 0–0.9)
INST. QUALIFIED PATIENT IIQPT: YES
INST. QUALIFIED PATIENT IIQPT: YES
LYMPHOCYTES # BLD AUTO: 1.06 K/UL (ref 1–4.8)
LYMPHOCYTES NFR BLD: 11.5 % (ref 22–41)
MAGNESIUM SERPL-MCNC: 1.9 MG/DL (ref 1.5–2.5)
MCH RBC QN AUTO: 30.4 PG (ref 27–33)
MCHC RBC AUTO-ENTMCNC: 32.7 G/DL (ref 33.6–35)
MCV RBC AUTO: 93 FL (ref 81.4–97.8)
MONOCYTES # BLD AUTO: 0.71 K/UL (ref 0–0.85)
MONOCYTES NFR BLD AUTO: 7.7 % (ref 0–13.4)
NEUTROPHILS # BLD AUTO: 7.33 K/UL (ref 2–7.15)
NEUTROPHILS NFR BLD: 79.4 % (ref 44–72)
NRBC # BLD AUTO: 0 K/UL
NRBC BLD-RTO: 0 /100 WBC
O2/TOTAL GAS SETTING VFR VENT: 40 %
O2/TOTAL GAS SETTING VFR VENT: 40 %
OSMOLALITY UR: 446 MOSM/KG H2O (ref 300–900)
PCO2 BLDA: 31.5 MMHG (ref 26–37)
PCO2 BLDA: 32.7 MMHG (ref 26–37)
PCO2 TEMP ADJ BLDA: 30.4 MMHG (ref 26–37)
PCO2 TEMP ADJ BLDA: 33.4 MMHG (ref 26–37)
PH BLDA: 7.33 [PH] (ref 7.4–7.5)
PH BLDA: 7.35 [PH] (ref 7.4–7.5)
PH TEMP ADJ BLDA: 7.32 [PH] (ref 7.4–7.5)
PH TEMP ADJ BLDA: 7.36 [PH] (ref 7.4–7.5)
PHOSPHATE SERPL-MCNC: 2.4 MG/DL (ref 2.5–4.5)
PLATELET # BLD AUTO: 210 K/UL (ref 164–446)
PMV BLD AUTO: 9.7 FL (ref 9–12.9)
PO2 BLDA: 100 MMHG (ref 64–87)
PO2 BLDA: 122 MMHG (ref 64–87)
PO2 TEMP ADJ BLDA: 125 MMHG (ref 64–87)
PO2 TEMP ADJ BLDA: 95 MMHG (ref 64–87)
POTASSIUM BLD-SCNC: 3.7 MMOL/L (ref 3.6–5.5)
POTASSIUM SERPL-SCNC: 3.1 MMOL/L (ref 3.6–5.5)
POTASSIUM SERPL-SCNC: 4.1 MMOL/L (ref 3.6–5.5)
POTASSIUM UR-SCNC: 22 MMOL/L
PROCALCITONIN SERPL-MCNC: 0.11 NG/ML
PROT SERPL-MCNC: 4.6 G/DL (ref 6–8.2)
RBC # BLD AUTO: 3.45 M/UL (ref 4.2–5.4)
SAO2 % BLDA: 98 % (ref 93–99)
SAO2 % BLDA: 99 % (ref 93–99)
SODIUM BLD-SCNC: 143 MMOL/L (ref 135–145)
SODIUM SERPL-SCNC: 141 MMOL/L (ref 135–145)
SODIUM SERPL-SCNC: 145 MMOL/L (ref 135–145)
SODIUM UR-SCNC: 147 MMOL/L
SPECIMEN DRAWN FROM PATIENT: ABNORMAL
SPECIMEN DRAWN FROM PATIENT: ABNORMAL
WBC # BLD AUTO: 9.2 K/UL (ref 4.8–10.8)

## 2019-10-01 PROCEDURE — 80048 BASIC METABOLIC PNL TOTAL CA: CPT

## 2019-10-01 PROCEDURE — 93010 ELECTROCARDIOGRAM REPORT: CPT | Mod: 76 | Performed by: INTERNAL MEDICINE

## 2019-10-01 PROCEDURE — 82803 BLOOD GASES ANY COMBINATION: CPT

## 2019-10-01 PROCEDURE — 93010 ELECTROCARDIOGRAM REPORT: CPT | Performed by: INTERNAL MEDICINE

## 2019-10-01 PROCEDURE — 770022 HCHG ROOM/CARE - ICU (200)

## 2019-10-01 PROCEDURE — 85025 COMPLETE CBC W/AUTO DIFF WBC: CPT

## 2019-10-01 PROCEDURE — 99255 IP/OBS CONSLTJ NEW/EST HI 80: CPT | Performed by: PSYCHIATRY & NEUROLOGY

## 2019-10-01 PROCEDURE — 93005 ELECTROCARDIOGRAM TRACING: CPT | Performed by: STUDENT IN AN ORGANIZED HEALTH CARE EDUCATION/TRAINING PROGRAM

## 2019-10-01 PROCEDURE — 51798 US URINE CAPACITY MEASURE: CPT

## 2019-10-01 PROCEDURE — 82570 ASSAY OF URINE CREATININE: CPT

## 2019-10-01 PROCEDURE — 94150 VITAL CAPACITY TEST: CPT

## 2019-10-01 PROCEDURE — 82330 ASSAY OF CALCIUM: CPT

## 2019-10-01 PROCEDURE — 83935 ASSAY OF URINE OSMOLALITY: CPT

## 2019-10-01 PROCEDURE — 700105 HCHG RX REV CODE 258: Performed by: STUDENT IN AN ORGANIZED HEALTH CARE EDUCATION/TRAINING PROGRAM

## 2019-10-01 PROCEDURE — A9270 NON-COVERED ITEM OR SERVICE: HCPCS | Performed by: INTERNAL MEDICINE

## 2019-10-01 PROCEDURE — 71045 X-RAY EXAM CHEST 1 VIEW: CPT

## 2019-10-01 PROCEDURE — 84300 ASSAY OF URINE SODIUM: CPT

## 2019-10-01 PROCEDURE — 84100 ASSAY OF PHOSPHORUS: CPT

## 2019-10-01 PROCEDURE — 85014 HEMATOCRIT: CPT

## 2019-10-01 PROCEDURE — 82962 GLUCOSE BLOOD TEST: CPT | Mod: 91

## 2019-10-01 PROCEDURE — 700111 HCHG RX REV CODE 636 W/ 250 OVERRIDE (IP): Performed by: STUDENT IN AN ORGANIZED HEALTH CARE EDUCATION/TRAINING PROGRAM

## 2019-10-01 PROCEDURE — 84295 ASSAY OF SERUM SODIUM: CPT

## 2019-10-01 PROCEDURE — 93005 ELECTROCARDIOGRAM TRACING: CPT | Performed by: INTERNAL MEDICINE

## 2019-10-01 PROCEDURE — 83735 ASSAY OF MAGNESIUM: CPT

## 2019-10-01 PROCEDURE — 99291 CRITICAL CARE FIRST HOUR: CPT | Mod: 25 | Performed by: INTERNAL MEDICINE

## 2019-10-01 PROCEDURE — 84133 ASSAY OF URINE POTASSIUM: CPT

## 2019-10-01 PROCEDURE — 80053 COMPREHEN METABOLIC PANEL: CPT

## 2019-10-01 PROCEDURE — 36600 WITHDRAWAL OF ARTERIAL BLOOD: CPT

## 2019-10-01 PROCEDURE — 700111 HCHG RX REV CODE 636 W/ 250 OVERRIDE (IP): Performed by: INTERNAL MEDICINE

## 2019-10-01 PROCEDURE — 82436 ASSAY OF URINE CHLORIDE: CPT

## 2019-10-01 PROCEDURE — 700105 HCHG RX REV CODE 258: Performed by: INTERNAL MEDICINE

## 2019-10-01 PROCEDURE — 700102 HCHG RX REV CODE 250 W/ 637 OVERRIDE(OP): Performed by: INTERNAL MEDICINE

## 2019-10-01 PROCEDURE — 84145 PROCALCITONIN (PCT): CPT

## 2019-10-01 PROCEDURE — 84132 ASSAY OF SERUM POTASSIUM: CPT

## 2019-10-01 PROCEDURE — 94003 VENT MGMT INPAT SUBQ DAY: CPT

## 2019-10-01 RX ORDER — POTASSIUM CHLORIDE 7.45 MG/ML
10 INJECTION INTRAVENOUS
Status: DISPENSED | OUTPATIENT
Start: 2019-10-01 | End: 2019-10-01

## 2019-10-01 RX ORDER — ONDANSETRON 2 MG/ML
4 INJECTION INTRAMUSCULAR; INTRAVENOUS ONCE
Status: COMPLETED | OUTPATIENT
Start: 2019-10-01 | End: 2019-10-01

## 2019-10-01 RX ORDER — SODIUM CHLORIDE, SODIUM LACTATE, POTASSIUM CHLORIDE, CALCIUM CHLORIDE 600; 310; 30; 20 MG/100ML; MG/100ML; MG/100ML; MG/100ML
INJECTION, SOLUTION INTRAVENOUS CONTINUOUS
Status: DISCONTINUED | OUTPATIENT
Start: 2019-10-01 | End: 2019-10-02

## 2019-10-01 RX ORDER — POTASSIUM CHLORIDE 7.45 MG/ML
10 INJECTION INTRAVENOUS
Status: COMPLETED | OUTPATIENT
Start: 2019-10-01 | End: 2019-10-01

## 2019-10-01 RX ORDER — ONDANSETRON 2 MG/ML
4 INJECTION INTRAMUSCULAR; INTRAVENOUS EVERY 6 HOURS PRN
Status: DISCONTINUED | OUTPATIENT
Start: 2019-10-01 | End: 2019-10-03 | Stop reason: HOSPADM

## 2019-10-01 RX ORDER — CALCIUM GLUCONATE 94 MG/ML
2 INJECTION, SOLUTION INTRAVENOUS ONCE
Status: DISCONTINUED | OUTPATIENT
Start: 2019-10-01 | End: 2019-10-01

## 2019-10-01 RX ADMIN — SODIUM BICARBONATE 50 MEQ: 84 INJECTION, SOLUTION INTRAVENOUS at 00:04

## 2019-10-01 RX ADMIN — POTASSIUM CHLORIDE 10 MEQ: 7.46 INJECTION, SOLUTION INTRAVENOUS at 04:04

## 2019-10-01 RX ADMIN — SODIUM CHLORIDE: 9 INJECTION, SOLUTION INTRAVENOUS at 07:07

## 2019-10-01 RX ADMIN — POTASSIUM CHLORIDE 10 MEQ: 7.46 INJECTION, SOLUTION INTRAVENOUS at 02:16

## 2019-10-01 RX ADMIN — POTASSIUM CHLORIDE 10 MEQ: 7.46 INJECTION, SOLUTION INTRAVENOUS at 11:16

## 2019-10-01 RX ADMIN — ONDANSETRON 4 MG: 2 INJECTION INTRAMUSCULAR; INTRAVENOUS at 19:41

## 2019-10-01 RX ADMIN — POTASSIUM CHLORIDE 10 MEQ: 7.46 INJECTION, SOLUTION INTRAVENOUS at 10:05

## 2019-10-01 RX ADMIN — SENNOSIDES, DOCUSATE SODIUM 2 TABLET: 50; 8.6 TABLET, FILM COATED ORAL at 17:23

## 2019-10-01 RX ADMIN — AMPICILLIN AND SULBACTAM 3 G: 2; 1 INJECTION, POWDER, FOR SOLUTION INTRAVENOUS at 03:18

## 2019-10-01 RX ADMIN — SODIUM CHLORIDE, POTASSIUM CHLORIDE, SODIUM LACTATE AND CALCIUM CHLORIDE: 600; 310; 30; 20 INJECTION, SOLUTION INTRAVENOUS at 15:48

## 2019-10-01 RX ADMIN — POTASSIUM CHLORIDE 10 MEQ: 7.46 INJECTION, SOLUTION INTRAVENOUS at 03:14

## 2019-10-01 RX ADMIN — FAMOTIDINE 20 MG: 10 INJECTION INTRAVENOUS at 05:23

## 2019-10-01 RX ADMIN — AMPICILLIN AND SULBACTAM 3 G: 2; 1 INJECTION, POWDER, FOR SOLUTION INTRAVENOUS at 12:51

## 2019-10-01 RX ADMIN — FAMOTIDINE 20 MG: 20 TABLET ORAL at 17:23

## 2019-10-01 RX ADMIN — POTASSIUM CHLORIDE 10 MEQ: 7.46 INJECTION, SOLUTION INTRAVENOUS at 05:23

## 2019-10-01 RX ADMIN — POTASSIUM CHLORIDE 10 MEQ: 7.46 INJECTION, SOLUTION INTRAVENOUS at 08:58

## 2019-10-01 RX ADMIN — CALCIUM CHLORIDE 2 G: 100 INJECTION, SOLUTION INTRAVENOUS at 10:05

## 2019-10-01 ASSESSMENT — PATIENT HEALTH QUESTIONNAIRE - PHQ9
4. FEELING TIRED OR HAVING LITTLE ENERGY: NEARLY EVERY DAY
5. POOR APPETITE OR OVEREATING: NOT AT ALL
2. FEELING DOWN, DEPRESSED, IRRITABLE, OR HOPELESS: MORE THAN HALF THE DAYS
1. LITTLE INTEREST OR PLEASURE IN DOING THINGS: SEVERAL DAYS
SUM OF ALL RESPONSES TO PHQ9 QUESTIONS 1 AND 2: 3
3. TROUBLE FALLING OR STAYING ASLEEP OR SLEEPING TOO MUCH: MORE THAN HALF THE DAYS
7. TROUBLE CONCENTRATING ON THINGS, SUCH AS READING THE NEWSPAPER OR WATCHING TELEVISION: NEARLY EVERY DAY
4. FEELING TIRED OR HAVING LITTLE ENERGY: NEARLY EVERY DAY
5. POOR APPETITE OR OVEREATING: NOT AT ALL
6. FEELING BAD ABOUT YOURSELF - OR THAT YOU ARE A FAILURE OR HAVE LET YOURSELF OR YOUR FAMILY DOWN: NEARLY EVERY DAY
6. FEELING BAD ABOUT YOURSELF - OR THAT YOU ARE A FAILURE OR HAVE LET YOURSELF OR YOUR FAMILY DOWN: NEARLY EVERY DAY
9. THOUGHTS THAT YOU WOULD BE BETTER OFF DEAD, OR OF HURTING YOURSELF: MORE THAN HALF THE DAYS
SUM OF ALL RESPONSES TO PHQ QUESTIONS 1-9: 16
8. MOVING OR SPEAKING SO SLOWLY THAT OTHER PEOPLE COULD HAVE NOTICED. OR THE OPPOSITE, BEING SO FIGETY OR RESTLESS THAT YOU HAVE BEEN MOVING AROUND A LOT MORE THAN USUAL: NOT AT ALL
1. LITTLE INTEREST OR PLEASURE IN DOING THINGS: SEVERAL DAYS
7. TROUBLE CONCENTRATING ON THINGS, SUCH AS READING THE NEWSPAPER OR WATCHING TELEVISION: NEARLY EVERY DAY
8. MOVING OR SPEAKING SO SLOWLY THAT OTHER PEOPLE COULD HAVE NOTICED. OR THE OPPOSITE, BEING SO FIGETY OR RESTLESS THAT YOU HAVE BEEN MOVING AROUND A LOT MORE THAN USUAL: NOT AT ALL
SUM OF ALL RESPONSES TO PHQ QUESTIONS 1-9: 16
2. FEELING DOWN, DEPRESSED, IRRITABLE, OR HOPELESS: MORE THAN HALF THE DAYS
9. THOUGHTS THAT YOU WOULD BE BETTER OFF DEAD, OR OF HURTING YOURSELF: MORE THAN HALF THE DAYS
3. TROUBLE FALLING OR STAYING ASLEEP OR SLEEPING TOO MUCH: MORE THAN HALF THE DAYS
SUM OF ALL RESPONSES TO PHQ9 QUESTIONS 1 AND 2: 3

## 2019-10-01 ASSESSMENT — ENCOUNTER SYMPTOMS
DIZZINESS: 0
DEPRESSION: 1
CHILLS: 0
HEADACHES: 0
FEVER: 0
SHORTNESS OF BREATH: 0
BLURRED VISION: 0
ORTHOPNEA: 0
WEAKNESS: 0
CONSTIPATION: 0
DOUBLE VISION: 0
NERVOUS/ANXIOUS: 1
MYALGIAS: 0
INSOMNIA: 1
SORE THROAT: 0
DIARRHEA: 0
NAUSEA: 0
ABDOMINAL PAIN: 0
VOMITING: 0
HALLUCINATIONS: 0

## 2019-10-01 ASSESSMENT — COGNITIVE AND FUNCTIONAL STATUS - GENERAL
SUGGESTED CMS G CODE MODIFIER MOBILITY: CJ
DAILY ACTIVITIY SCORE: 23
MOBILITY SCORE: 20
WALKING IN HOSPITAL ROOM: A LITTLE
SUGGESTED CMS G CODE MODIFIER DAILY ACTIVITY: CI
STANDING UP FROM CHAIR USING ARMS: A LITTLE
MOVING FROM LYING ON BACK TO SITTING ON SIDE OF FLAT BED: A LITTLE
CLIMB 3 TO 5 STEPS WITH RAILING: A LITTLE
TOILETING: A LITTLE

## 2019-10-01 ASSESSMENT — LIFESTYLE VARIABLES
SUBSTANCE_ABUSE: 0
EVER_SMOKED: YES
ALCOHOL_USE: NO
EVER_SMOKED: YES

## 2019-10-01 ASSESSMENT — COPD QUESTIONNAIRES
COPD SCREENING SCORE: 2
HAVE YOU SMOKED AT LEAST 100 CIGARETTES IN YOUR ENTIRE LIFE: YES
DO YOU EVER COUGH UP ANY MUCUS OR PHLEGM?: NO/ONLY WITH OCCASIONAL COLDS OR INFECTIONS
DURING THE PAST 4 WEEKS HOW MUCH DID YOU FEEL SHORT OF BREATH: NONE/LITTLE OF THE TIME

## 2019-10-01 ASSESSMENT — PULMONARY FUNCTION TESTS: FVC: 1.2

## 2019-10-01 NOTE — ASSESSMENT & PLAN NOTE
LLL opacity, afebrile, WBC normalized, procalcitonin 0.11  Most consistent with aspiration pneumonitis  Discontinue Unasyn

## 2019-10-01 NOTE — ED NOTES
Pt sitting up awake asking to pee.    Notified pt we would get bed pan to collect UA sample  Pt cooperative and laid back down.    Sitter 1:1 in direct line of observation.

## 2019-10-01 NOTE — PROGRESS NOTES
Dr. Zelaya and Dr. Vences updated on patient EKG results and notified of patient temperature of 95.1 F. New orders rec'd

## 2019-10-01 NOTE — CARE PLAN
Problem: Venous Thromboembolism (VTW)/Deep Vein Thrombosis (DVT) Prevention:  Goal: Patient will participate in Venous Thrombosis (VTE)/Deep Vein Thrombosis (DVT)Prevention Measures  Outcome: PROGRESSING AS EXPECTED  Note:   SCDs in place     Problem: Fluid Volume:  Goal: Will maintain balanced intake and output  Outcome: PROGRESSING AS EXPECTED  Note:   IV maintenance fluids infusing per MD order. U/O adequate     Problem: Respiratory:  Goal: Respiratory status will improve  Outcome: PROGRESSING AS EXPECTED  Note:   Tolerating current ventilator settings.

## 2019-10-01 NOTE — PROGRESS NOTES
"Critical Care Progress Note    Date of admission  9/30/2019    Chief Complaint  34 y.o. female depression, migraine, back pain who presented 9/30/2019 with intentional drug overdose.  As the patient was recently intubated and paralyzed no family is available at bedside all history is obtained from chart and limited as such.  Patient presented to the ER earlier today altered and reportedly took ibuprofen, tizanidine, lexapro and Seroquel at approximately 2:00 this afternoon, the quantity of pills is described as \" the rest of them\".  Patient was evaluated in the ER and initially her mental status remained stable, EKG showed some QT prolongation for which magnesium was given.  Poison control was contacted for recommendations.  Unfortunately her mental status worsened this evening and she required intubation for airway protection.  At the time my exam she has been paralyzed therefore all findings are limited by this.  (Copied from original consult note)      Interval Problem Update  Reviewed last 24 hour events:              - acute events overnight              - Tm: Afebrile              - HR: 60s              - SBP: 100-150s              - Neuro: Awake writing notes              - GI: NPO              - UOP: Adequate              - Peguero: Y              - Lines: 2 PIV              - PPx: Pepcid, No VTE              - CXR (personally reviewed):               - Antibiotic Day    Infusions:  Propofol off since 0330    Review of Systems  Review of Systems   Unable to perform ROS: Intubated        Vital Signs for last 24 hours   Pulse:  [56-80] 69  Resp:  [15-25] 17  BP: ()/() 123/76  SpO2:  [97 %-100 %] 100 %    Hemodynamic parameters for last 24 hours       Respiratory Information for the last 24 hours  Francis Vent Mode: APVCMV  Rate (breaths/min): 20  Vt Target (mL): 420  PEEP/CPAP: 7.9  FiO2: 40  P Support: 5  P MEAN: 11  Length of Weaning Trial (Hours): 1  Control VTE (exp VT): 426    Physical Exam "   Physical Exam   Constitutional: She appears well-developed and well-nourished.   HENT:   Head: Atraumatic.   Mouth/Throat: Oropharynx is clear and moist.   Eyes: Pupils are equal, round, and reactive to light.   Cardiovascular: Regular rhythm. Exam reveals no gallop and no friction rub.   Pulmonary/Chest:   Clear to auscultation bilaterally   Abdominal: Soft. She exhibits no distension. There is no tenderness.   Musculoskeletal: She exhibits no edema.   Neurological:   Awake and nodding to questions appropriately following commands.  She is able to lift her head up off the pillow and hold it there easily.  There is no inducible clonus or hyperreflexia.   Skin: Skin is warm and dry.   Psychiatric:   Unable to assess       Medications  Current Facility-Administered Medications   Medication Dose Route Frequency Provider Last Rate Last Dose   • ampicillin/sulbactam (UNASYN) 3 g in  mL IVPB  3 g Intravenous Q6HRS Donovan Zelaya Jr. D.O. 200 mL/hr at 10/01/19 1251 3 g at 10/01/19 1251   • NS infusion   Intravenous Continuous Mike Vences M.D. 125 mL/hr at 10/01/19 0707     • Respiratory Care per Protocol   Nebulization Continuous RT PEGGY Duque Jr..O.       • ipratropium-albuterol (DUONEB) nebulizer solution  3 mL Nebulization Q2HRS PRN (RT) PEGGY Duque Jr..O.       • famotidine (PEPCID) tablet 20 mg  20 mg Enteral Tube Q12HRS PEGGY Duque Jr..O.        Or   • famotidine (PEPCID) injection 20 mg  20 mg Intravenous Q12HRS Donovan Zelaya Jr. D.O.   20 mg at 10/01/19 0523   • senna-docusate (PERICOLACE or SENOKOT S) 8.6-50 MG per tablet 2 Tab  2 Tab Enteral Tube BID PEGGY Duqeu Jr..O.   Stopped at 09/30/19 2130    And   • polyethylene glycol/lytes (MIRALAX) PACKET 1 Packet  1 Packet Enteral Tube QDAY PRN Donovan Zelaya Jr., PEGGY.O.        And   • magnesium hydroxide (MILK OF MAGNESIA) suspension 30 mL  30 mL Enteral Tube QDAY PRN Donovan Zelaya Jr., PEGGY.O.        And   • bisacodyl (DULCOLAX)  suppository 10 mg  10 mg Rectal QDAY PRN Donovan Zelaya Jr., D.O.       • MD Alert...ICU Electrolyte Replacement per Pharmacy   Other PHARMACY TO DOSE Donovan Zelaya Jr., D.O.       • lidocaine (XYLOCAINE) 1 % injection 1-2 mL  1-2 mL Tracheal Tube Q30 MIN PRN Donovan Zelaya Jr., D.O.       • fentaNYL (SUBLIMAZE) injection 25 mcg  25 mcg Intravenous Q HOUR PRN Donovan Zelaya Jr., D.O.        Or   • fentaNYL (SUBLIMAZE) injection 50 mcg  50 mcg Intravenous Q HOUR PRN Donovan Zelaya Jr., D.O.        Or   • fentaNYL (SUBLIMAZE) injection 100 mcg  100 mcg Intravenous Q HOUR PRN Donovan Zelaya Jr., D.O.       • propofol (DIPRIVAN) injection  0-80 mcg/kg/min Intravenous Continuous Donovan Zelaya Jr., D.O.   Stopped at 10/01/19 0320     Facility-Administered Medications Ordered in Other Encounters   Medication Dose Route Frequency Provider Last Rate Last Dose   • dexamethasone (DECADRON) injection (check route below)    Intra-Op Once PRN Tom Main M.D.   10 mg at 06/17/15 0931   • fentanyl (SUBLIMAZE) injection    Intra-Op Once PRN Tom Main M.D.   25 mcg at 06/17/15 0931   • midazolam (VERSED) 2 MG/2ML injection    Intra-Op Once PRN Tom Main M.D.   0.5 mg at 06/17/15 0931       Fluids    Intake/Output Summary (Last 24 hours) at 10/1/2019 1411  Last data filed at 10/1/2019 1200  Gross per 24 hour   Intake 4556.17 ml   Output 3285 ml   Net 1271.17 ml       Laboratory  Recent Labs     09/30/19  2239 10/01/19  0308 10/01/19  0712   ISTATAPH 7.312* 7.349* 7.331*   ISTATAPCO2 36.5 31.5 32.7   ISTATAPO2 132* 100* 122*   ISTATATCO2 20 18* 18*   PZHYKTK2CHJ 99 98 99   ISTATARTHCO3 18.5 17.3 17.3   ISTATARTBE -7* -7* -8*   ISTATTEMP 95.2 F 97.2 F 99.5 F   ISTATFIO2 50 40 40   ISTATSPEC Arterial Arterial Arterial   ISTATAPHTC 7.339* 7.360* 7.324*   YRJCXGYF7DM 121* 95* 125*     Recent Labs     09/30/19  1420   CPKTOTAL 69     Recent Labs     09/30/19  1420 09/30/19  2240 10/01/19  0530 10/01/19  1300   SODIUM  138  --  145 141   POTASSIUM 3.7  --  3.1* 4.1   CHLORIDE 104  --  119* 114*   CO2 21  --  17* 13*   BUN 12  --  12 17   CREATININE 0.89  --  0.79 1.23   MAGNESIUM  --  2.7* 1.9  --    PHOSPHORUS  --  2.9 2.4*  --    CALCIUM 9.0  --  6.0* 9.1     Recent Labs     09/30/19  1420 10/01/19  0530 10/01/19  1300   ALTSGPT 20 12  --    ASTSGOT 17 13  --    ALKPHOSPHAT 75 46  --    TBILIRUBIN 0.5 0.2  --    GLUCOSE 250* 74 76     Recent Labs     09/30/19  1420 10/01/19  0530   WBC 14.5* 9.2   NEUTSPOLYS 86.50* 79.40*   LYMPHOCYTES 8.50* 11.50*   MONOCYTES 3.90 7.70   EOSINOPHILS 0.10 0.90   BASOPHILS 0.40 0.20   ASTSGOT 17 13   ALTSGPT 20 12   ALKPHOSPHAT 75 46   TBILIRUBIN 0.5 0.2     Recent Labs     09/30/19  1420 10/01/19  0530   RBC 4.43 3.45*   HEMOGLOBIN 13.4 10.6*   HEMATOCRIT 40.1 32.3*   PLATELETCT 332 210       Imaging  X-Ray:  I have personally reviewed the images and compared with prior images. and My impression is: Endotracheal tube 3 cm above the justen.  There are no focal infiltrates effusions or pneumothorax.    Assessment/Plan  * Acute respiratory failure with hypoxia (HCC)- (present on admission)  Assessment & Plan  Intubated for airway protection on 9/30/19  Lung protective ventilation strategies  Titrate ventilator prescription to optimize oxygenation, ventilation, and acid base balance.  Proceed with ABC trial this morning  Anticipate extubation in the next 12-24 hours    Toxic metabolic encephalopathy- (present on admission)  Assessment & Plan  Due to poly-substance overdose, resolving.  Continue serial neurologic exams.    Suicide attempt (HCC)- (present on admission)  Assessment & Plan  Legal hold, psych consult    Polysubstance overdose- (present on admission)  Assessment & Plan  Ibuprofen, tizanidine, lexapro and Seroquel  No signs of serotonin syndrome  Monitor QTc and QRS  Monitor for renal impairment and bleeding with ibuprofen    Abnormal chest x-ray- (present on admission)  Assessment &  Plan  LLL opacity, afebrile, WBC normalized, procalcitonin 0.11  Most consistent with aspiration pneumonitis  Discontinue Unasyn    Prolonged Q-T interval on ECG- (present on admission)  Assessment & Plan  Optimize electrolytes, K, magnesium, calcium  Serial EKG  Cardiac Monitoring    Increased anion gap metabolic acidosis- (present on admission)  Assessment & Plan  Mild  Continue resuscitation  Monitor QRS       VTE:  Contraindicated  Ulcer: H2 Antagonist  Lines: None    I have performed a physical exam and reviewed and updated ROS and Plan today (10/1/2019). In review of yesterday's note (9/30/2019), there are no changes except as documented above.     Discussed patient condition and risk of morbidity and/or mortality with Hospitalist, RN, RT, Pharmacy, [employer] [facility] resident, Charge nurse / hot rounds and Patient       The patient remains critically ill.  I have assessed and reassessed the respiratory status and made ventilator adjustments based upon arterial blood gas analysis, ventilator waveforms and airway mechanics.  I have assessed and reassessed her hemodynamics, cardiovascular and neurologic status. This patient remains at high risk for worsening cardiopulmonary dysfunction and death without the above critical care interventions.     Critical care time 40 minutes in directly providing and coordinating critical care and extensive data review.  No time overlap and excludes procedures.

## 2019-10-01 NOTE — ASSESSMENT & PLAN NOTE
Gap improved.   She now has hyperchloremic non-anion gap metabolic acidosis likely from the Normal Saline she received.

## 2019-10-01 NOTE — PROGRESS NOTES
"[facility] ICU Progress Note      Admit Date: 9/30/2019    Resident(s): Mamta Gruber  Attending: CHETNA LEMOS/ Dr. Cook     Date & Time:   10/1/2019   4:55 PM       Patient ID:    Name:             Joy Mcnamara   YOB: 1985  Age:                 34 y.o.  female   MRN:               [id number]    HPI:  The patient is a 34 year old female with PMHx depression, migraine, chronic back pain presented to the ER after intentional drug overdose- Seroquel, Tizanidine, lexapro.The patient was intubated in the ER. She was found to be bradycardic, hypotensive down to 70's/50's. EKG showed prolonged QTc 532. Patient was transferred to the ICU for further management.    Consultants:  PMA: /   Psychiatry     Interval Events:  -Patient is admitted overnight for Intentional overdose of Seroquel, lexapro, tizanidine.  -Overnight Tmax- Afebrile, HR 60's, -150's.   - She wrote that she would not like any pain pills on the paper at the time of interview.  - Peguero : Urine output is adequate  - Chest X ray showed hazy infiltrate in the left lung base.  - On legal hold initiated in the ER, psychiatry is consulted       Review of Systems   Unable to perform ROS: Intubated     PHYSICAL EXAM  Vitals:    10/01/19 1300 10/01/19 1400 10/01/19 1500 10/01/19 1600   BP: 107/59 125/64  102/68   Pulse: 69 74 71 74   Resp: 19 (!) 35 18 (!) 29   Temp:  37.2 °C (98.9 °F)  37.3 °C (99.1 °F)   TempSrc:  Temporal  Temporal   SpO2: 97% 97% 98% 97%   Weight:       Height:         Body mass index is 33.79 kg/m².  /68   Pulse 74   Temp 37.3 °C (99.1 °F) (Temporal)   Resp (!) 29   Ht 1.753 m (5' 9\")   Wt 103.8 kg (228 lb 12.7 oz)   LMP 03/16/2017   SpO2 97%   BMI 33.79 kg/m²   O2 therapy: Pulse Oximetry: 97 %, O2 (LPM): 0, O2 Delivery: None (Room Air)    Physical Exam   Constitutional:   Well developed, Patient is intubated.    Eyes: Conjunctivae are normal. No scleral icterus.   Constricted pupils bilateral "    Neck: Neck supple.   ETT tube in place    Cardiovascular: Normal rate and regular rhythm. Exam reveals no gallop and no friction rub.   No murmur heard.  Pulmonary/Chest: She has no wheezes.   Intubated on mechanical ventilation    Abdominal: Soft. Bowel sounds are normal. She exhibits no distension. There is no tenderness.   Musculoskeletal: She exhibits no edema.   Neurological: No cranial nerve deficit.   Easily arousable,   Communicating - writing on paper , oriented x 4   Normal muscle strength bilaterally 5/5 upper and lower extremities   Absent spontaneous and Inducible clonus, absent Ocular clonus   DTR's 2+ bilaterally   Gait deferred      Skin: Skin is warm and dry.   Psychiatric:   Unable to assess    Nursing note and vitals reviewed.    Respiratory:  Francis Vent Mode: APVCMV  Respiration: (!) 29, Pulse Oximetry: 97 %, O2 Daily Delivery Respiratory : Silicone Nasal Cannula    Chest Tube Drains:    Recent Labs     09/30/19  2239 10/01/19  0308 10/01/19  0712   ISTATAPH 7.312* 7.349* 7.331*   ISTATAPCO2 36.5 31.5 32.7   ISTATAPO2 132* 100* 122*   ISTATATCO2 20 18* 18*   CZRCHRU4ZTH 99 98 99   ISTATARTHCO3 18.5 17.3 17.3   ISTATARTBE -7* -7* -8*   ISTATTEMP 95.2 F 97.2 F 99.5 F   ISTATFIO2 50 40 40   ISTATSPEC Arterial Arterial Arterial   ISTATAPHTC 7.339* 7.360* 7.324*   ZQCHAIVS3DU 121* 95* 125*       HemoDynamics:  Pulse: 74, Heart Rate (Monitored): 68 Blood Pressure: 102/68      Fluids:  Date 10/01/19 0700 - 10/02/19 0659   Shift 1656-5158 2815-6273 4663-0715 24 Hour Total   INTAKE   P.O. 370 100  470     P.O. 370 100  470   I.V. 1000 181.3  1181.3     Propofol Volume 0   0     Volume (mL) (NS infusion 1,000 mL) 0   0     Volume (mL) (NS infusion) 1000 156.3  1156.3     Volume (mL) (lactated ringers infusion)  25  25   IV Piggyback 815 0  815     Volume (mL) (potassium chloride (KCL) ivpb 10 mEq) 38.3   38.3     Volume (mL) (ampicillin/sulbactam (UNASYN) 3 g in  mL IVPB) 0   0     Volume (mL)  (potassium chloride (KCL) ivpb 10 mEq) 293.3 0  293.3     Volume (mL) (calcium CHLORIDE 2 g in D5W 100 mL IVPB) 483.3   483.3   Shift Total 2185 281.3  2466.2   OUTPUT   Urine 710   710     Number of Times Voided 0 x 0 x  0 x     Output (mL) ([REMOVED] Urethral Catheter Latex 16 Fr.) 710   710   Stool         Number of Times Stooled 0 x   0 x   Shift Total 710   710   NET 1475 281.3  1756.2        Intake/Output Summary (Last 24 hours) at 10/1/2019 1451  Last data filed at 10/1/2019 1200  Gross per 24 hour   Intake 4556.17 ml   Output 3285 ml   Net 1271.17 ml       Weight: 103.8 kg (228 lb 12.7 oz)  Body mass index is 33.79 kg/m².    Recent Labs     19  14219  2240 10/01/19  0530 10/01/19  1300   SODIUM 138  --  145 141   POTASSIUM 3.7  --  3.1* 4.1   CHLORIDE 104  --  119* 114*   CO2 21  --  17* 13*   BUN 12  --  12 17   CREATININE 0.89  --  0.79 1.23   MAGNESIUM  --  2.7* 1.9  --    PHOSPHORUS  --  2.9 2.4*  --    CALCIUM 9.0  --  6.0* 9.1       GI/Nutrition:  Recent Labs     09/30/19  1420 10/01/19  0530 10/01/19  1300   ALTSGPT 20 12  --    ASTSGOT 17 13  --    ALKPHOSPHAT 75 46  --    TBILIRUBIN 0.5 0.2  --    GLUCOSE 250* 74 76       Heme:  Recent Labs     09/30/19  1420 10/01/19  05   RBC 4.43 3.45*   HEMOGLOBIN 13.4 10.6*   HEMATOCRIT 40.1 32.3*   PLATELETCT 332 210       Infectious Disease:  Monitored Temp 2  Av.5 °C (97.7 °F)  Min: 35 °C (95 °F)  Max: 37.6 °C (99.7 °F)  Temp  Av.2 °C (99 °F)  Min: 37.2 °C (98.9 °F)  Max: 37.3 °C (99.1 °F)  Recent Labs     09/30/19  1420 10/01/19  0530   WBC 14.5* 9.2   NEUTSPOLYS 86.50* 79.40*   LYMPHOCYTES 8.50* 11.50*   MONOCYTES 3.90 7.70   EOSINOPHILS 0.10 0.90   BASOPHILS 0.40 0.20   ASTSGOT 17 13   ALTSGPT 20 12   ALKPHOSPHAT 75 46   TBILIRUBIN 0.5 0.2       Meds:  • ampicillin-sulbactam (UNASYN) IV  3 g Stopped (10/01/19 1321)   • LR   125 mL/hr at 10/01/19 1548   • Respiratory Care per Protocol       • ipratropium-albuterol  3 mL     •  famotidine  20 mg      Or   • famotidine  20 mg     • senna-docusate  2 Tab      And   • polyethylene glycol/lytes  1 Packet      And   • magnesium hydroxide  30 mL      And   • bisacodyl  10 mg     • MD Alert...Adult ICU Electrolyte Replacement per Pharmacy       • lidocaine  1-2 mL     • fentaNYL  25 mcg      Or   • fentaNYL  50 mcg      Or   • fentaNYL  100 mcg     • propofol  0-80 mcg/kg/min Stopped (10/01/19 0320)        Procedures:  Intubation - 9/31/19    Imaging:  DX-CHEST-PORTABLE (1 VIEW)   Final Result         1.  Hazy left lung base opacity, appearance suggests early infiltrate.      DX-CHEST-PORTABLE (1 VIEW)   Final Result      Low lung volumes with mild perihilar and basilar interstitial opacities probably related to hypoinflation.          Assessment and Plan:    * Acute respiratory failure with hypoxia (HCC)- (present on admission)  Assessment & Plan  - Patient was Intubated for airway protection on 9/30/19  - Optimize oxygenation and ventilation   - Plan to extubate within the next 24 hours      Toxic metabolic encephalopathy- (present on admission)  Assessment & Plan  -Secondary to drug overdose, requiring intubation in the emergency department  -Resolving since admission     Suicide attempt (HCC)- (present on admission)  Assessment & Plan  - On legal hold  - Consulted Psychiatry     Polysubstance overdose- (present on admission)  Assessment & Plan  -empty prescription bottles of ibuprofen, lexapro, tizanidine, seroquel found  -initially patient very hypotensive and bradycardic.QTc as been slowly increased since admission, now 532  -patient intubated in ED for inability to protect airway   - No signs of Serotonin syndrome   Plan:  -admit to ICU for close monitoring  -Telemetry to monitor for torsades  -EKG every hour until QTC below 500ms  -Optimize electrolytes  -Ventilated- sedated with propofol to suppress possibility of seizure-like activity.  High respiratory rate to compensate for metabolic  acidosis  -Continue to assess for serotonin syndrome  -Legal hold started in the ED, one-to-one sitter  -Psychiatry consulted      Abnormal chest x-ray- (present on admission)  Assessment & Plan  - Chest X ray showed infiltrate at the Left lower lung base  - pro calcitonin is normal  - On Unasyn for antibiotic coverage- discontinued   - High likely 2/2 aspiration        Prolonged Q-T interval on ECG- (present on admission)  Assessment & Plan  - Prolonged Qtc on EKG at the time of Admission   - Likely 2/2 Seroquel overdose  - Serial EKG monitoring  - Optimize electrolytes - K,Mg and Calcium      Increased anion gap metabolic acidosis- (present on admission)  Assessment & Plan  -Anion gap 13 on admission.  -CTM  - Ordered Serum osmolality, urine osm and urine electrolytes      DISPO: ICU    CODE STATUS: FULL     Quality Measures:  Peguero Catheter: Yes   DVT Prophylaxis: SCD's  Ulcer Prophylaxis: Famotidine  Antibiotics: Unasyn   Lines: PIV x 2     This note was created using voice recognition software. There may be unintended errors in spelling, grammar or content.

## 2019-10-01 NOTE — H&P
Endotracheal Intubation  Date: <__9/30/2019__>  Time: <__2100__>  Indication: altered mental status  Resident: <_Mike Vences___>  Attending: <__Mook Mario__>  A time-out was completed verifying correct patient, procedure, site, positioning, and special equipment if applicable. The patient was placed in a flat position. Sedation was obtained using etomidate 10mg, succinylcholine 100mg. The patient was easily ventilated using an ambu bag. The <GLIDESCOPE TECHNOLOGYwas used and inserted into the oropharynx at which time there was a Grade 1 view of the vocal cords. A 8.0-Cambodian endotracheal tube was inserted and visualized going through the vocal cords. The stylette was removed. Colorimetric change was visualized on the CO2 meter. Breath sounds were heard in both lung fields equally. The endotracheal tube was placed at 23 cm, measured at the teeth. <Attending/Resident> was present for the entire procedure.  A chest x-ray was ordered to assess for pneumothorax and verify endotrachealtube placement.  Estimated Blood Loss: <__0cc__>  The patient tolerated the procedure well and there were no complications.

## 2019-10-01 NOTE — PSYCHIATRY
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  PROLONGED QT INTERVAL  Compared to ECG 10/01/2019 01:37:16  ST (T wave) deviation no longer present     ISTAT ARTERIAL BLOOD GAS    Collection Time: 10/01/19  3:08 AM   Result Value Ref Range    Ph 7.349 (L) 7.400 - 7.500    Pco2 31.5 26.0 - 37.0 mmHg    Po2 100 (H) 64 - 87 mmHg    Tco2 18 (L) 20 - 33 mmol/L    S02 98 93 - 99 %    Hco3 17.3 17.0 - 25.0 mmol/L    BE -7 (L) -4 - 3 mmol/L    Body Temp 97.2 F degrees    O2 Therapy 40 %    iPF Ratio 250     Ph Temp Martín 7.360 (L) 7.400 - 7.500    Pco2 Temp Co 30.4 26.0 - 37.0 mmHg    Po2 Temp Cor 95 (H) 64 - 87 mmHg    Specimen Arterial     Action Range Triggered NO     Inst. Qualified Patient YES    ACCU-CHEK GLUCOSE    Collection Time: 10/01/19  5:26 AM   Result Value Ref Range    Glucose - Accu-Ck 76 65 - 99  mg/dL   CBC with Differential    Collection Time: 10/01/19  5:30 AM   Result Value Ref Range    WBC 9.2 4.8 - 10.8 K/uL    RBC 3.45 (L) 4.20 - 5.40 M/uL    Hemoglobin 10.6 (L) 12.0 - 16.0 g/dL    Hematocrit 32.3 (L) 37.0 - 47.0 %    MCV 93.0 81.4 - 97.8 fL    MCH 30.4 27.0 - 33.0 pg    MCHC 32.7 (L) 33.6 - 35.0 g/dL    RDW 48.2 35.9 - 50.0 fL    Platelet Count 210 164 - 446 K/uL    MPV 9.7 9.0 - 12.9 fL    Neutrophils-Polys 79.40 (H) 44.00 - 72.00 %    Lymphocytes 11.50 (L) 22.00 - 41.00 %    Monocytes 7.70 0.00 - 13.40 %    Eosinophils 0.90 0.00 - 6.90 %    Basophils 0.20 0.00 - 1.80 %    Immature Granulocytes 0.30 0.00 - 0.90 %    Nucleated RBC 0.00 /100 WBC    Neutrophils (Absolute) 7.33 (H) 2.00 - 7.15 K/uL    Lymphs (Absolute) 1.06 1.00 - 4.80 K/uL    Monos (Absolute) 0.71 0.00 - 0.85 K/uL    Eos (Absolute) 0.08 0.00 - 0.51 K/uL    Baso (Absolute) 0.02 0.00 - 0.12 K/uL    Immature Granulocytes (abs) 0.03 0.00 - 0.11 K/uL    NRBC (Absolute) 0.00 K/uL   Magnesium    Collection Time: 10/01/19  5:30 AM   Result Value Ref Range    Magnesium 1.9 1.5 - 2.5 mg/dL   Phosphorus    Collection Time: 10/01/19  5:30 AM   Result Value Ref Range    Phosphorus 2.4 (L) 2.5 - 4.5 mg/dL   Comp Metabolic Panel    Collection Time: 10/01/19  5:30 AM   Result Value Ref Range    Sodium 145 135 - 145 mmol/L    Potassium 3.1 (L) 3.6 - 5.5 mmol/L    Chloride 119 (H) 96 - 112 mmol/L    Co2 17 (L) 20 - 33 mmol/L    Anion Gap 9.0 0.0 - 11.9    Glucose 74 65 - 99 mg/dL    Bun 12 8 - 22 mg/dL    Creatinine 0.79 0.50 - 1.40 mg/dL    Calcium 6.0 (LL) 8.5 - 10.5 mg/dL    AST(SGOT) 13 12 - 45 U/L    ALT(SGPT) 12 2 - 50 U/L    Alkaline Phosphatase 46 30 - 99 U/L    Total Bilirubin 0.2 0.1 - 1.5 mg/dL    Albumin 3.0 (L) 3.2 - 4.9 g/dL    Total Protein 4.6 (L) 6.0 - 8.2 g/dL    Globulin 1.6 (L) 1.9 - 3.5 g/dL    A-G Ratio 1.9 g/dL   ESTIMATED GFR    Collection Time: 10/01/19  5:30 AM   Result Value Ref Range    GFR If  >60 >60  mL/min/1.73 m 2    GFR If Non African American >60 >60 mL/min/1.73 m 2   PROCALCITONIN    Collection Time: 10/01/19  5:30 AM   Result Value Ref Range    Procalcitonin 0.11 <0.25 ng/mL   EKG    Collection Time: 10/01/19  6:13 AM   Result Value Ref Range    Report       [facility]    Test Date:  2019-10-01  Pt Name:    ELOY HUMPHRIES            Department: [facility]  MRN:        [id number]                      Room:       [facility]  Gender:     Female                       Technician: [facility]  :        1985                   Requested By:MERLE HERMOSILLO JR  Order #:    [id number]                    Reading MD:    Measurements  Intervals                                Axis  Rate:       66                           P:          26  WY:         136                          QRS:        35  QRSD:       84                           T:          46  QT:         492  QTc:        516    Interpretive Statements  SINUS RHYTHM  PROLONGED QT INTERVAL  Compared to ECG 10/01/2019 02:09:50  Sinus bradycardia no longer present     ISTAT ARTERIAL BLOOD GAS    Collection Time: 10/01/19  7:12 AM   Result Value Ref Range    Ph 7.331 (L) 7.400 - 7.500    Pco2 32.7 26.0 - 37.0 mmHg    Po2 122 (H) 64 - 87 mmHg    Tco2 18 (L) 20 - 33 mmol/L    S02 99 93 - 99 %    Hco3 17.3 17.0 - 25.0 mmol/L    BE -8 (L) -4 - 3 mmol/L    Body Temp 99.5 F degrees    O2 Therapy 40 %    iPF Ratio 305     Ph Temp Martín 7.324 (L) 7.400 - 7.500    Pco2 Temp Co 33.4 26.0 - 37.0 mmHg    Po2 Temp Cor 125 (H) 64 - 87 mmHg    Specimen Arterial     Action Range Triggered NO     Inst. Qualified Patient YES    ISTAT SODIUM    Collection Time: 10/01/19  7:12 AM   Result Value Ref Range    Istat Sodium 143 135 - 145 mmol/L   ISTAT POTASSIUM    Collection Time: 10/01/19  7:12 AM   Result Value Ref Range    Istat Potassium 3.7 3.6 - 5.5 mmol/L   ISTAT IONIZED CA    Collection Time: 10/01/19  7:12 AM   Result Value Ref Range    Istat Ionized Calcium 1.09 (L) 1.10 - 1.30 mmol/L    ISTAT HEMATOCRIT AND HEMOGLOBIN    Collection Time: 10/01/19  7:12 AM   Result Value Ref Range    Istat Hematocrit 32 (L) 37 - 47 %    Istat Hemoglobin 10.9 (L) 12.0 - 16.0 g/dL   EKG    Collection Time: 10/01/19  7:57 AM   Result Value Ref Range    Report       [facility]    Test Date:  2019-10-01  Pt Name:    ELOY HUMPHRIES            Department: [facility]  MRN:        [id number]                      Room:       S130  Gender:     Female                       Technician: LEYLA  :        1985                   Requested By:MIKI TOSCANO  Order #:    [id number]                    Reading MD:    Measurements  Intervals                                Axis  Rate:       68                           P:          51  NE:         144                          QRS:        66  QRSD:       80                           T:          77  QT:         480  QTc:        511    Interpretive Statements  SINUS RHYTHM  ST ELEV, PROBABLE NORMAL EARLY REPOL PATTERN  PROLONGED QT INTERVAL  Compared to ECG 10/01/2019 06:13:28  ST (T wave) deviation now present            Assessment:Pt attempted suicide in the context of chronic depression and social-marital stressors. Pt continues to feel suicidal and is at high risk of danger tos elf. Needs psychiatric stabilization for her safety.     Dx:Major depressive disorder, severe, without psychotic features      Plan:  1- Legal hold: extended  2-continue to hold psychotropic medications at this time.  3-Please transfer pt to inpatient psychiatric hospital when medically cleared  4-Will continue to follow        Other:   Sitter: yes   Visitors:yes   Phone calls:yes   Personal items (specify):no

## 2019-10-01 NOTE — RESPIRATORY CARE
Extubation    Cuff leak noted: Yes  Stridor present: No     FiO2%: 40 % (10/01/19 0800)  O2 (LPM): 50 (09/30/19 2200)     Patient toleration: Very well with no complications encountered.  RCP Complete? Yes.  Events/Summary/Plan: PT extubated and placed on 2 LPM N/C per Dr. Cook's orders. PT tolerated well with no complications encountered or any respiratory distress noted. Will continue to monitor PT closely. (10/01/19 0978)

## 2019-10-01 NOTE — ED NOTES
Poison control contacted for follow up:    Seroquel can cause hypotension and tachycardia  Tizanidine can cause hypotension and bradycardia -- can also cause decreased respiratory drive. Fluids/pressors recommended, start pressors if fluids not working. Can drop down to 30-40 BPM with heart rate. Metabolize 20-40 hours, half life 2.5 hours. Give atropine if perfusion is compromised.     Metabolism of tizanidine will be slowed by Seroquel, symptoms will persist longer    Poison control recommends admission d/t pt remaining symptomatic post 6 hour obs.     ERP Sonderegger notified, orders obtained for rpt EKG and 1000ml NS bolus now.

## 2019-10-01 NOTE — ASSESSMENT & PLAN NOTE
- Chest X ray showed infiltrate at the Left lower lung base  - pro calcitonin is normal  - On Unasyn for antibiotic coverage- discontinued     She is asymptomatic: no fever, no leukocytosis and she is not needing supplemental oxygen.   Her pulmonary exam is normal.   She does not have pneumonia.

## 2019-10-01 NOTE — PROGRESS NOTES
Lab called with critical calcium of 6. Results read back to . Dr. Cook notified of critical results. New orders rec'd

## 2019-10-01 NOTE — ED NOTES
Pt sleeping. Chest rise and fall. Moving all extremities.    Sitter 1:1 in direct line of observation at bedside.

## 2019-10-01 NOTE — PROGRESS NOTES
Spouse came to unit. Notified she is unable to have visitors at this time. Pt requested psychiatry speak with him. Page sent to MD @ [phone] to please call spouse.

## 2019-10-01 NOTE — CARE PLAN
Problem: Communication  Goal: The ability to communicate needs accurately and effectively will improve  Outcome: PROGRESSING AS EXPECTED  Pt able to alert nursing staff of needs throughout time on the ventilator. Writing notes, able to respond appropriately to yes/no questions       Problem: Safety  Goal: Will remain free from injury  Outcome: PROGRESSING AS EXPECTED   L2K with appropriate suicide precautions in place. Further education regarding limitations will be provided to patient once psychiatry consult completed upon extubation

## 2019-10-01 NOTE — DISCHARGE PLANNING
Care Transition Team Assessment    Pt currently on legal hold, documentation sent to [facility], located within [facility] for further DC/transfer planning.     Information Source  Orientation : Oriented x 4  Information Given By: Patient  Informant's Name: Joy Mcnamara  Who is responsible for making decisions for patient? : Patient    Readmission Evaluation  Is this a readmission?: No    Elopement Risk  Legal Hold: Elopement Risk  Time of Legal Hold: 1338  Date of Legal Hold: 09/30/19  Elopement Risk: Not at Risk for Elopement  Wanderguard On: Unavailable  Personal Belongings: Hospital Clothing Only  Environmental Precautions: Sharp or Dangerous Items Removed         Discharge Preparedness  What is your plan after discharge?: Uncertain - pending medical team collaboration  What are your discharge supports?: Spouse  Prior Functional Level: Ambulatory  Difficulity with ADLs: None  Difficulity with IADLs: None    Functional Assesment  Prior Functional Level: Ambulatory    Finances  Financial Barriers to Discharge: No  Prescription Coverage: Yes              Advance Directive  Advance Directive?: None  Advance Directive offered?: AD Booklet refused         Psychological Assessment  History of Substance Abuse: Prescription opioids  Date Last Used - Prescription Opioids: 9/30/2019  Substance Abuse Comments: polysubstance drug OD  History of Psychiatric Problems: Yes(depression)  Non-compliant with Treatment: No  Newly Diagnosed Illness: No    Discharge Risks or Barriers  Discharge risks or barriers?: Substance abuse, Mental health, Non-adherence to medication or treatment  Patient risk factors: Active abuse / Neglect concerns, Mental health, Substance abuse    Anticipated Discharge Information  Anticipated discharge disposition: Other (comment)(TBD)  Discharge Address: TBD

## 2019-10-01 NOTE — CONSULTS
"Critical Care Consultation    Date of consult: 9/30/2019    Referring Physician  Mook Knapp M.D.    Reason for Consultation  Drug overdose, respiratory failure    History of Presenting Illness  34 y.o. female depression, migraine, back pain who presented 9/30/2019 with intentional drug overdose.  As the patient was recently intubated and paralyzed no family is available at bedside all history is obtained from chart and limited as such.  Patient presented to the ER earlier today altered and reportedly took ibuprofen, tizanidine, lexapro and Seroquel at approximately 2:00 this afternoon, the quantity of pills is described as \" the rest of them\".  Patient was evaluated in the ER and initially her mental status remained stable, EKG showed some QT prolongation for which magnesium was given.  Poison control was contacted for recommendations.  Unfortunately her mental status worsened this evening and she required intubation for airway protection.  At the time my exam she has been paralyzed therefore all findings are limited by this.    Code Status  Full Code    Review of Systems  Review of Systems   Unable to perform ROS: Intubated       Past Medical History   has a past medical history of Back pain, Depression, Depression (4/25/2014), Gynecological disorder, Migraine headache, Miscarriage, and Pain (2017). She also has no past medical history of Breast cancer (HCC) or Seizure disorder (HCC).    Surgical History   has a past surgical history that includes lumbar laminectomy diskectomy (6/10/2009); gyn surgery; primary c section; dental extraction(s); other orthopedic surgery; lumbar laminectomy diskectomy (10/3/2013); pr inj,foramen,l/s,1 level (10/8/2014); pr inj,foramen,l/s,1 level (Bilateral, 6/17/2015); pr inj,foramen,l/s,1 level (6/17/2015); and vaginal hysterectomy scope total (N/A, 3/27/2017).    Family History  family history includes Cancer in her maternal grandmother and paternal grandmother; Stroke in " her father and mother.    Social History   reports that she quit smoking about 12 years ago. Her smoking use included cigarettes. She quit after 4.00 years of use. She has never used smokeless tobacco. She reports that she does not drink alcohol or use drugs.    Medications  Home Medications     Reviewed by Zandra Pascal (Pharmacy [facility]) on 09/30/19 at 1519  Med List Status: Complete   Medication Last Dose Status   amphetamine-dextroamphetamine (ADDERALL) 10 MG Tab UNK Active   atomoxetine (STRATTERA) 40 MG capsule UNK Active   cyanocobalamin (VITAMIN B-12) 1000 MCG/ML Solution UNK Active   escitalopram (LEXAPRO) 20 MG tablet UNK Active   ibuprofen (MOTRIN) 800 MG Tab UNK Active   oxyCODONE immediate-release (ROXICODONE) 5 MG Tab UNK Active   QUEtiapine (SEROQUEL) 100 MG Tab UNK Active              Current Facility-Administered Medications   Medication Dose Route Frequency Provider Last Rate Last Dose   • magnesium sulfate IVPB premix 2 g  2 g Intravenous Once Mook Knapp M.D. 25 mL/hr at 09/30/19 2031 2 g at 09/30/19 2031   • senna-docusate (PERICOLACE or SENOKOT S) 8.6-50 MG per tablet 2 Tab  2 Tab Oral BID Mike Vences M.D.        And   • polyethylene glycol/lytes (MIRALAX) PACKET 1 Packet  1 Packet Oral QDAY PRN Mike Vences M.D.        And   • magnesium hydroxide (MILK OF MAGNESIA) suspension 30 mL  30 mL Oral QDAY PRN Mike Vences M.D.        And   • bisacodyl (DULCOLAX) suppository 10 mg  10 mg Rectal QDAY PRN Mike Vences M.D.       • NS infusion   Intravenous Continuous Mike Vences M.D.         Current Outpatient Medications   Medication Sig Dispense Refill   • amphetamine-dextroamphetamine (ADDERALL) 10 MG Tab Take 5 mg by mouth every day.     • QUEtiapine (SEROQUEL) 100 MG Tab Take 100 mg by mouth every bedtime.     • cyanocobalamin (VITAMIN B-12) 1000 MCG/ML Solution 1,000 mcg by Intramuscular route every 30 days.     • ibuprofen (MOTRIN) 800 MG Tab Take 800 mg by mouth every 8 hours as needed.     •  "atomoxetine (STRATTERA) 40 MG capsule Take 40 mg by mouth every day.     • oxyCODONE immediate-release (ROXICODONE) 5 MG Tab Take 5 mg by mouth every four hours as needed for Severe Pain.     • escitalopram (LEXAPRO) 20 MG tablet Take 1 Tab by mouth every day. 30 Tab 5     Facility-Administered Medications Ordered in Other Encounters   Medication Dose Route Frequency Provider Last Rate Last Dose   • bupivacaine 0.25% (SENSORCAINE-MARCAINE) pf injection    Intra-Op Once PRN Tom Main M.D.   4 mL at 06/17/15 0931   • dexamethasone (DECADRON) injection (check route below)    Intra-Op Once PRN Tom Main M.D.   10 mg at 06/17/15 0931   • fentanyl (SUBLIMAZE) injection    Intra-Op Once PRN Tom Main M.D.   25 mcg at 06/17/15 0931   • midazolam (VERSED) 2 MG/2ML injection    Intra-Op Once PRN Tom Main M.D.   0.5 mg at 06/17/15 0931       Allergies  Allergies   Allergen Reactions   • Sumatriptan Succinate      \"face burns & I can't see\"   • Tramadol Photosensitivity     Gives her migraines and makes her feel sick       Vital Signs last 24 hours  Temp:  [35.6 °C (96.1 °F)] 35.6 °C (96.1 °F)  Pulse:  [56-66] 61  Resp:  [15-30] 16  BP: ()/() 127/87  SpO2:  [99 %-100 %] 100 %    Physical Exam  Physical Exam   Constitutional: She appears well-developed and well-nourished. She appears distressed. She is intubated.   HENT:   Head: Normocephalic and atraumatic.   Right Ear: External ear normal.   Left Ear: External ear normal.   Nose: Nose normal.   ETT in position, dry mucus membranes   Eyes: Conjunctivae are normal.   Patient paralyzed - pupils non-reactive   Neck: Neck supple. No JVD present. No tracheal deviation present.   Cardiovascular: Normal rate, regular rhythm and intact distal pulses.   Pulmonary/Chest: Breath sounds normal. No accessory muscle usage. She is intubated. No respiratory distress.   Abdominal: Soft. Bowel sounds are normal. She exhibits no distension. There is no tenderness. "   Musculoskeletal: She exhibits no edema or deformity.   Neurological:   Paralyzed, sedated   Skin: Skin is warm and dry. No rash noted.   Psychiatric:   sedated   Nursing note and vitals reviewed.    Repeat examination following paralytic metabolism complete shows an alert patient that is able to follow commands in all 4 extremities.  She has no rigidity, no hyperreflexia, pupils are equal and round.    Fluids    Intake/Output Summary (Last 24 hours) at 2019  Last data filed at 2019  Gross per 24 hour   Intake 2000 ml   Output --   Net 2000 ml       Laboratory  Recent Results (from the past 48 hour(s))   EKG    Collection Time: 19  1:59 PM   Result Value Ref Range    Report       [facility]    Test Date:  2019  Pt Name:    ELOY HUMPHRIES            Department: ER  MRN:        [id number]                      Room:       [facility]  Gender:     Female                       Technician: [id number]  :        1985                   Requested By:ER TRIAGE PROTOCOL  Order #:    [id number]                    Reading MD:    Measurements  Intervals                                Axis  Rate:       59                           P:          50  IA:         169                          QRS:        2  QRSD:       92                           T:          30  QT:         484  QTc:        480    Interpretive Statements  Sinus bradycardia  Consider left ventricular hypertrophy  ST elev, probable normal early repol pattern  Compared to ECG 2013 09:58:54  ST (T wave) deviation now present  Sinus rhythm no longer present     Salicylate    Collection Time: 19  2:20 PM   Result Value Ref Range    Salicylates, Quant. 0 (L) 15 - 25 mg/dL   CBC WITH DIFFERENTIAL    Collection Time: 19  2:20 PM   Result Value Ref Range    WBC 14.5 (H) 4.8 - 10.8 K/uL    RBC 4.43 4.20 - 5.40 M/uL    Hemoglobin 13.4 12.0 - 16.0 g/dL    Hematocrit 40.1 37.0 - 47.0 %    MCV 90.5  81.4 - 97.8 fL    MCH 30.2 27.0 - 33.0 pg    MCHC 33.4 (L) 33.6 - 35.0 g/dL    RDW 45.7 35.9 - 50.0 fL    Platelet Count 332 164 - 446 K/uL    MPV 10.1 9.0 - 12.9 fL    Neutrophils-Polys 86.50 (H) 44.00 - 72.00 %    Lymphocytes 8.50 (L) 22.00 - 41.00 %    Monocytes 3.90 0.00 - 13.40 %    Eosinophils 0.10 0.00 - 6.90 %    Basophils 0.40 0.00 - 1.80 %    Immature Granulocytes 0.60 0.00 - 0.90 %    Nucleated RBC 0.00 /100 WBC    Neutrophils (Absolute) 12.58 (H) 2.00 - 7.15 K/uL    Lymphs (Absolute) 1.23 1.00 - 4.80 K/uL    Monos (Absolute) 0.57 0.00 - 0.85 K/uL    Eos (Absolute) 0.02 0.00 - 0.51 K/uL    Baso (Absolute) 0.06 0.00 - 0.12 K/uL    Immature Granulocytes (abs) 0.08 0.00 - 0.11 K/uL    NRBC (Absolute) 0.00 K/uL   Comp Metabolic Panel    Collection Time: 09/30/19  2:20 PM   Result Value Ref Range    Sodium 138 135 - 145 mmol/L    Potassium 3.7 3.6 - 5.5 mmol/L    Chloride 104 96 - 112 mmol/L    Co2 21 20 - 33 mmol/L    Anion Gap 13.0 (H) 0.0 - 11.9    Glucose 250 (H) 65 - 99 mg/dL    Bun 12 8 - 22 mg/dL    Creatinine 0.89 0.50 - 1.40 mg/dL    Calcium 9.0 8.5 - 10.5 mg/dL    AST(SGOT) 17 12 - 45 U/L    ALT(SGPT) 20 2 - 50 U/L    Alkaline Phosphatase 75 30 - 99 U/L    Total Bilirubin 0.5 0.1 - 1.5 mg/dL    Albumin 4.6 3.2 - 4.9 g/dL    Total Protein 7.0 6.0 - 8.2 g/dL    Globulin 2.4 1.9 - 3.5 g/dL    A-G Ratio 1.9 g/dL   HCG QUAL SERUM    Collection Time: 09/30/19  2:20 PM   Result Value Ref Range    Beta-Hcg Qualitative Serum Negative Negative   ACETAMINOPHEN    Collection Time: 09/30/19  2:20 PM   Result Value Ref Range    Acetaminophen -Tylenol <10 10 - 30 ug/mL   DIAGNOSTIC ALCOHOL    Collection Time: 09/30/19  2:20 PM   Result Value Ref Range    Diagnostic Alcohol 0.01 (H) 0.00 g/dL   ESTIMATED GFR    Collection Time: 09/30/19  2:20 PM   Result Value Ref Range    GFR If African American >60 >60 mL/min/1.73 m 2    GFR If Non African American >60 >60 mL/min/1.73 m 2   EKG    Collection Time: 09/30/19  7:54  PM   Result Value Ref Range    Report       [facility] Emergency Dept.    Test Date:  2019  Pt Name:    ELOY HUMPHRIES            Department: ER  MRN:        [id number]                      Room:       RD 04  Gender:     Female                       Technician: [id number]  :        1985                   Requested By:DAVIAN RODRIGUEZ  Order #:    [id number]                    Reading MD:    Measurements  Intervals                                Axis  Rate:       60                           P:          15  VT:         160                          QRS:        18  QRSD:       84                           T:          23  QT:         532  QTc:        532    Interpretive Statements  SINUS RHYTHM  EARLY PRECORDIAL R/S TRANSITION  PROLONGED QT INTERVAL  Compared to ECG 2019 13:59:19  Prolonged QT interval now present  Sinus bradycardia no longer present  ST (T wave) deviation no longer present         Imaging  DX-CHEST-PORTABLE (1 VIEW)   Final Result         1.  Hazy left lung base opacity, appearance suggests early infiltrate.      DX-CHEST-PORTABLE (1 VIEW)   Final Result      Low lung volumes with mild perihilar and basilar interstitial opacities probably related to hypoinflation.          Assessment/Plan  * Polysubstance overdose- (present on admission)  Assessment & Plan  ibuprofen, tizanidine, lexapro and Seroquel  Supportive care  Optimize electrolytes  monitor QTc and QRS  Monitor for renal impairment and bleeding with ibuprofen    Toxic metabolic encephalopathy- (present on admission)  Assessment & Plan  Due to poly-substance overdose  Sedate with propofol acutely and wean sedation early AM for SAT/SBT  Seizure precautions  Benzos PRN for seizure/aggitation    Prolonged Q-T interval on ECG- (present on admission)  Assessment & Plan  Optimize electrolytes  Serial EKG  Cardiac Monitoring    Suicide attempt (HCC)- (present on admission)  Assessment & Plan  Legal hold, psych  consult    Increased anion gap metabolic acidosis- (present on admission)  Assessment & Plan  Mild  Continue resuscitation  Monitor QRS    Abnormal chest x-ray- (present on admission)  Assessment & Plan  LLL opacity, unknown if symptomatic  ? Aspiration  Start Unasyn -> Augmentin when able to take PO/Enteric access obtained  Review if symptomatic when off vent    Acute respiratory failure with hypoxia (HCC)- (present on admission)  Assessment & Plan  Intubated for airway protection on 9/30/19  RT/O2 protocols  Daily and PRN ABGs  Titration of ventilator therapy based on ABGs and patient's status  Sedation as tolerated/indicated  Daily CXR  HOB >30 degrees and peridex for VAP prevention  Pepcid for GI prophylaxis  SAT/SBT when able (ABCDEF Bundle)  Early mobility    Likely to be extubated on 10/1/19, EARLY SAT/SBT to be completed      Discussed patient condition and risk of morbidity and/or mortality with RN, RT, Pharmacy, [facility] resident and ERP.      The patient remains critically ill.  Critical care time = 33 minutes in directly providing and coordinating critical care and extensive data review.  No time overlap and excludes procedures.

## 2019-10-01 NOTE — ASSESSMENT & PLAN NOTE
Intentional overdose with  ibuprofen, lexapro, tizanidine, Seroquel.  Needed brief intubation, electrolyte repair, & fluid resuscitation for hypotension and bradycardia.   Now stable on room air.  QTc duration improving.     - No signs of Serotonin syndrome     Plan:  -admit to ICU for close monitoring  -Telemetry to monitor for torsades  -Optimize electrolytes  -On Legal hold started in the ED  -Psychiatry consulted- legal hold extended, discharge to Mental health Facility. Pt is medically stable.

## 2019-10-01 NOTE — CARE PLAN
Ventilator Daily Summary    Vent Day # 2    Ventilator settings changed this shift: decrease FIO2 to 40%    Weaning trials:no     Respiratory Procedures: not at this time    Plan: Continue current ventilator settings and wean mechanical ventilation as tolerated per physician orders.

## 2019-10-01 NOTE — PROGRESS NOTES
2 RN skin check complete.   Devices in place: BP cuff, ETT, SCDs, PIVs, tele leads, pulse ox, aguilera catheter, stat lock  Skin assessed under devices: yes  Confirmed pressure ulcers found on:n/a  New potential pressure ulcers noted on:n/a  The following interventions in place: Q2H turns, reposition medical devices, float heels and elbows on pillows, ensure tubing is off of skin

## 2019-10-01 NOTE — PROGRESS NOTES
Bedside report received from My DELANEY at [id number]. Patient was intubated in the ED. Patient transported to [place] [id number] on monitor with ACLS RN, RT, and CCT. VSS during transport. Patient became increasingly agitated while moving her to ICU bed, attempting to grab ETT- Propofol started per MD order (see MAR). Bilateral soft wrist restraints in place per order.

## 2019-10-01 NOTE — RESPIRATORY CARE
Respiratory Therapy Update           Breath Sounds  RUL Breath Sounds: Clear (09/30/19 2104)  RML Breath Sounds: Clear (09/30/19 2104)  RLL Breath Sounds: Clear (09/30/19 2104)  ANDERSON Breath Sounds: Clear (09/30/19 2104)  LLL Breath Sounds: Clear (09/30/19 2104)      Events/Summary/Plan: pt intubated in ER by ERP for airway protection. pt placed on vent (09/30/19 2104)

## 2019-10-01 NOTE — ASSESSMENT & PLAN NOTE
RESOLVED  - Patient was Intubated for airway protection on 9/30/19  - Optimize oxygenation and ventilation   - Extubated 10/1/19, now on room air

## 2019-10-01 NOTE — ASSESSMENT & PLAN NOTE
- Prolonged Qtc on EKG at the time of Admission from Seroquel, Lexapro & Tizanidine OD.     QTc shortened to 485 on last check on 10/3.   This is still high. Recommend avoiding QTc prolonging meds until QTc is below 460 ms  No arrhythmia on telemetry.

## 2019-10-01 NOTE — ED PROVIDER NOTES
ED PROVIDER NOTE    Scribed for Mook Knapp M.D. by Sal Beach. 2019, 8:59 PM.    This is an addendum to the note on Joy Mcnamara. For further details and full chart entry, see the previously signed ED Provider Note written by Dr. Campos (ERP).      6:10 PM - I discussed the patient's case with Dr. Campos (ERP) who will transfer care of the patient to me at this time.       7:30 PM - Patient started becoming symptomatic with hypotension. I re-discussed the case with posion control and they advised admission since it had been nearly 6 hours since ingestion    7:50 PM Paged for [facility] at this time    8:04 Spoke with Dr. Vences, [facility] team, about the patient's condition. He agrees to admit the patient.    EK Lead EKG interpreted by me shows a normal sinus rhythm at a rate of 60. Axis normal. No ST elevations. New QT elongation at 532.  Patient now showing QT prolongation    8:46 PM I discussed the patient's case and the above findings with Dr. Vences ([facility] team) who will admit the patient.  He was seen the patient and feels the patient has become more somnolent and not protecting his airway.  Patient was immediately evaluated by myself and found to be unresponsive to painful stimulus and not protecting her airway without gag.    8:47 PM - Patient seen at bedside. Paged respiratory for intubation at this time.     8:56 PM - Assisted the resident with intubation at this time.    CRITICAL CARE  I provided critical care services, which included medication orders, frequent reevaluations of the patient's condition and response to treatment, ordering and reviewing test results, and discussing the case with various consultants.  The critical care time associated with the care of the patient was 30 minutes. Review chart for interventions. This time is exclusive of any other billable procedures.       DISPOSITION:  Patient will be admitted to Dr. Vences in guarded condition.      FINAL IMPRESSION    Respiratory failure,  Suicidal ideation  Drug ingestion  QT prolongation     Sal VARGHESE (Momoibjae), am scribing for, and in the presence of, LUIS MANUEL Espitia*.    Electronically signed by: Sal Beach (Marilee), 9/30/2019    Mook VARGHESE M.* personally performed the services described in this documentation, as scribed by Sal Beach in my presence, and it is both accurate and complete.    The note accurately reflects work and decisions made by me.  Mook Knapp  10/1/2019  3:14 AM

## 2019-10-01 NOTE — PROGRESS NOTES
Patient belongings locked in [facility] belongings closet. Direct observation in place with Q15 monitoring. Consult for psychiatry ordered by MD. Suicide precautions in place.

## 2019-10-01 NOTE — ASSESSMENT & PLAN NOTE
Ibuprofen, tizanidine, lexapro and Seroquel  No signs of serotonin syndrome  Monitor QTc and QRS  Monitor for renal impairment and bleeding with ibuprofen

## 2019-10-01 NOTE — H&P
Internal Medicine Admitting History and Physical    Note Author: Mike Vences M.D.       Name Joy Mcnamara     1985   Age/Sex 34 y.o. female   MRN [id number]   Code Status Full     After 5PM or if no immediate response to page, please call for cross-coverage  Attending/Team: Dr. Garcia Saunders See Patient List for primary contact information  Call [phone] to page    1st Call -day Intern (R1):   Dr. Vences 2nd Call - Day Sr. Resident (R2/R3):          Chief Complaint:   Drug overdose    HPI:    34 year old male unable to provide history heavily sedated    34 year old female no known past medical history brought in by EMS for altered mental status.  Chart review shows that patient admitted to taking multiple prescription medications, with empty bottles of ibuprofen, tizanidine, escitalopram, quetiapine found next to her.  Initially was drowsy by arousable and able to answer questions in the ED, but not by my assessment.  Vitals initially stable but had been bradycardic and hypotensive down to 70's/50's.  Poison controlled was contacted who recommended admission and monitoring and monitor for QTc prolongation.  During ED course progressively became more unresponsive to pain stimulus and no gag reflex, patient was intubated in the ED.  Labs shows leukocytosis, slight increased anion gap acidosis.  EKG shows QTC initialy 480 -> climbing up to 532.  IV mag 2g was started.      Review of Systems   Unable to perform ROS: Mental status change             Past Medical History (Chronic medical problem, known complications and current treatment)    Unable to obtain     Past Surgical History:  Past Surgical History:   Procedure Laterality Date   • VAGINAL HYSTERECTOMY SCOPE TOTAL N/A 3/27/2017    Procedure: VAGINAL HYSTERECTOMY SCOPE TOTAL right salpingectomy, partial left salpingectomy. Cystoscopy;  Surgeon: Zadya Santillan M.D.;  Location: SURGERY SAME DAY [facility];  Service:    • PB INJ,FORAMEN,L/S,1  "LEVEL Bilateral 6/17/2015    Procedure: TRANSFORAMINAL BILATERAL L5-S1 EPIDURAL WITH IV SEDATION;  Surgeon: Tom Main M.D.;  Location: SURGERY [facility];  Service: Pain Management   • PB INJ,FORAMEN,L/S,1 LEVEL  6/17/2015    Procedure: INJ-FORAMEN EPI LUM/SAC SNGL;  Surgeon: Tom Main M.D.;  Location: [facility];  Service: Pain Management   • PB INJ,FORAMEN,L/S,1 LEVEL  10/8/2014    Performed by Tom Main M.D. at [facility]   • LUMBAR LAMINECTOMY DISKECTOMY  10/3/2013    Performed by Estuardo Carmen M.D. at [facility] ORS   • LUMBAR LAMINECTOMY DISKECTOMY  6/10/2009    Performed by ESTUARDO CARMEN at [facility]   • DENTAL EXTRACTION(S)      wisdom   • GYN SURGERY      c section x2   • OTHER ORTHOPEDIC SURGERY     • PRIMARY C SECTION      x2       Current Outpatient Medications:  Home Medications     Reviewed by Zandra Pascal (Pharmacy Tech) on 09/30/19 at 1519  Med List Status: Complete   Medication Last Dose Status   amphetamine-dextroamphetamine (ADDERALL) 10 MG Tab UNK Active   atomoxetine (STRATTERA) 40 MG capsule UNK Active   cyanocobalamin (VITAMIN B-12) 1000 MCG/ML Solution UNK Active   escitalopram (LEXAPRO) 20 MG tablet UNK Active   ibuprofen (MOTRIN) 800 MG Tab UNK Active   oxyCODONE immediate-release (ROXICODONE) 5 MG Tab UNK Active   QUEtiapine (SEROQUEL) 100 MG Tab UNK Active                Medication Allergy/Sensitivities:  Allergies   Allergen Reactions   • Sumatriptan Succinate      \"face burns & I can't see\"   • Tramadol Photosensitivity     Gives her migraines and makes her feel sick         Family History (mandatory)   Family History   Problem Relation Age of Onset   • Stroke Mother         aneurysm - pt was 14 yrs old   • Stroke Father         TIA   • Cancer Maternal Grandmother         lung /breast   • Cancer Paternal Grandmother         breast / lung?       Social History (mandatory)   Social History     Socioeconomic " History   • Marital status:      Spouse name: Not on file   • Number of children: Not on file   • Years of education: Not on file   • Highest education level: Not on file   Occupational History   • Not on file   Social Needs   • Financial resource strain: Not on file   • Food insecurity:     Worry: Not on file     Inability: Not on file   • Transportation needs:     Medical: Not on file     Non-medical: Not on file   Tobacco Use   • Smoking status: Former Smoker     Years: 4.00     Types: Cigarettes     Last attempt to quit: 3/1/2007     Years since quittin.5   • Smokeless tobacco: Never Used   • Tobacco comment: 2007   Substance and Sexual Activity   • Alcohol use: No     Comment: denies ; family hx of alcoholism   • Drug use: No     Types: Marijuana, Methamphetamines   • Sexual activity: Yes     Birth control/protection: Injection     Comment: , two kids; special ed   Lifestyle   • Physical activity:     Days per week: Not on file     Minutes per session: Not on file   • Stress: Not on file   Relationships   • Social connections:     Talks on phone: Not on file     Gets together: Not on file     Attends [employer] service: Not on file     Active member of club or organization: Not on file     Attends meetings of clubs or organizations: Not on file     Relationship status: Not on file   • Intimate partner violence:     Fear of current or ex partner: Not on file     Emotionally abused: Not on file     Physically abused: Not on file     Forced sexual activity: Not on file   Other Topics Concern   • Not on file   Social History Narrative   • Not on file     Living situation: unknown  PCP : Filomena Patrick AChonPCHRISTOFER    Physical Exam     Vitals:    19 1945 194 19 2130   BP: 131/83 127/87  139/91   Pulse: (!) 58 61  (!) 59   Resp: 18 16  19   Temp:       TempSrc:       SpO2: 100% 100% 100% 100%   Weight:       Height:         Body mass index is 31.01  kg/m².  O2 therapy: Pulse Oximetry: 100 %, O2 (LPM): 2, O2 Delivery: Nasal Cannula    Physical Exam   Constitutional:   Heavily somnolent, unable to arouse with heavy pain   HENT:   Head: Normocephalic and atraumatic.   Eyes:   Constricted pupils   Neck: Neck supple.   Cardiovascular: Normal rate, regular rhythm and normal heart sounds. Exam reveals no gallop and no friction rub.   No murmur heard.  Pulmonary/Chest: Effort normal and breath sounds normal. No respiratory distress. She has no wheezes. She has no rales.   Abdominal: Soft. Bowel sounds are normal. She exhibits no distension. There is no tenderness. There is no rebound.   Musculoskeletal: Normal range of motion. She exhibits no edema.   Neurological:   heavliy sedated, not arousable, intuabted   Skin: Skin is warm and dry.         Data Review       Old Records Request:   Deferred  Current Records review/summary: Completed    Lab Data Review:  Recent Results (from the past 24 hour(s))   EKG    Collection Time: 19  1:59 PM   Result Value Ref Range    Report       [facility] Emergency Dept.    Test Date:  2019  Pt Name:    ELOY HUMPHRIES            Department: ER  MRN:        [id number]                      Room:       RD 04  Gender:     Female                       Technician: [id number]  :        1985                   Requested By:ER TRIAGE PROTOCOL  Order #:    [id number]                    Reading MD:    Measurements  Intervals                                Axis  Rate:       59                           P:          50  MN:         169                          QRS:        2  QRSD:       92                           T:          30  QT:         484  QTc:        480    Interpretive Statements  Sinus bradycardia  Consider left ventricular hypertrophy  ST elev, probable normal early repol pattern  Compared to ECG 2013 09:58:54  ST (T wave) deviation now present  Sinus rhythm no longer present     Salicylate     Collection Time: 09/30/19  2:20 PM   Result Value Ref Range    Salicylates, Quant. 0 (L) 15 - 25 mg/dL   CBC WITH DIFFERENTIAL    Collection Time: 09/30/19  2:20 PM   Result Value Ref Range    WBC 14.5 (H) 4.8 - 10.8 K/uL    RBC 4.43 4.20 - 5.40 M/uL    Hemoglobin 13.4 12.0 - 16.0 g/dL    Hematocrit 40.1 37.0 - 47.0 %    MCV 90.5 81.4 - 97.8 fL    MCH 30.2 27.0 - 33.0 pg    MCHC 33.4 (L) 33.6 - 35.0 g/dL    RDW 45.7 35.9 - 50.0 fL    Platelet Count 332 164 - 446 K/uL    MPV 10.1 9.0 - 12.9 fL    Neutrophils-Polys 86.50 (H) 44.00 - 72.00 %    Lymphocytes 8.50 (L) 22.00 - 41.00 %    Monocytes 3.90 0.00 - 13.40 %    Eosinophils 0.10 0.00 - 6.90 %    Basophils 0.40 0.00 - 1.80 %    Immature Granulocytes 0.60 0.00 - 0.90 %    Nucleated RBC 0.00 /100 WBC    Neutrophils (Absolute) 12.58 (H) 2.00 - 7.15 K/uL    Lymphs (Absolute) 1.23 1.00 - 4.80 K/uL    Monos (Absolute) 0.57 0.00 - 0.85 K/uL    Eos (Absolute) 0.02 0.00 - 0.51 K/uL    Baso (Absolute) 0.06 0.00 - 0.12 K/uL    Immature Granulocytes (abs) 0.08 0.00 - 0.11 K/uL    NRBC (Absolute) 0.00 K/uL   Comp Metabolic Panel    Collection Time: 09/30/19  2:20 PM   Result Value Ref Range    Sodium 138 135 - 145 mmol/L    Potassium 3.7 3.6 - 5.5 mmol/L    Chloride 104 96 - 112 mmol/L    Co2 21 20 - 33 mmol/L    Anion Gap 13.0 (H) 0.0 - 11.9    Glucose 250 (H) 65 - 99 mg/dL    Bun 12 8 - 22 mg/dL    Creatinine 0.89 0.50 - 1.40 mg/dL    Calcium 9.0 8.5 - 10.5 mg/dL    AST(SGOT) 17 12 - 45 U/L    ALT(SGPT) 20 2 - 50 U/L    Alkaline Phosphatase 75 30 - 99 U/L    Total Bilirubin 0.5 0.1 - 1.5 mg/dL    Albumin 4.6 3.2 - 4.9 g/dL    Total Protein 7.0 6.0 - 8.2 g/dL    Globulin 2.4 1.9 - 3.5 g/dL    A-G Ratio 1.9 g/dL   HCG QUAL SERUM    Collection Time: 09/30/19  2:20 PM   Result Value Ref Range    Beta-Hcg Qualitative Serum Negative Negative   ACETAMINOPHEN    Collection Time: 09/30/19  2:20 PM   Result Value Ref Range    Acetaminophen -Tylenol <10 10 - 30 ug/mL   DIAGNOSTIC ALCOHOL     Collection Time: 19  2:20 PM   Result Value Ref Range    Diagnostic Alcohol 0.01 (H) 0.00 g/dL   ESTIMATED GFR    Collection Time: 19  2:20 PM   Result Value Ref Range    GFR If African American >60 >60 mL/min/1.73 m 2    GFR If Non African American >60 >60 mL/min/1.73 m 2   CREATINE KINASE    Collection Time: 19  2:20 PM   Result Value Ref Range    CPK Total 69 0 - 154 U/L   EKG    Collection Time: 19  7:54 PM   Result Value Ref Range    Report       [facility] Emergency Dept.    Test Date:  2019  Pt Name:    [facility]            Department: ER  MRN:        [id number]                      Room:       [facility]  Gender:     Female                       Technician: [id number]  :        1985                   Requested By:DAVIAN RODRIGUEZ  Order #:    [id number]                    Reading MD:    Measurements  Intervals                                Axis  Rate:       60                           P:          15  AK:         160                          QRS:        18  QRSD:       84                           T:          23  QT:         532  QTc:        532    Interpretive Statements  SINUS RHYTHM  EARLY PRECORDIAL R/S TRANSITION  PROLONGED QT INTERVAL  Compared to ECG 2019 13:59:19  Prolonged QT interval now present  Sinus bradycardia no longer present  ST (T wave) deviation no longer present     EKG    Collection Time: 19  8:32 PM   Result Value Ref Range    Report       [facility] Emergency Dept.    Test Date:  2019  Pt Name:    [facility]            Department: ER  MRN:        [id number]                      Room:        04  Gender:     Female                       Technician: [id number]  :        1985                   Requested By:ARTURO HUERTAS  Order #:    [id number]                    Reading MD:    Measurements  Intervals                                Axis  Rate:       61                           P:           19  ME:         164                          QRS:        25  QRSD:       88                           T:          30  QT:         528  QTc:        532    Interpretive Statements  SINUS RHYTHM  PROLONGED QT INTERVAL  Compared to ECG 2019 19:54:52  No significant changes     EKG    Collection Time: 19  9:04 PM   Result Value Ref Range    Report       [facility].    Test Date:  2019  Pt Name:    ELOY HUMPHRIES            Department: ER  MRN:        [id number]                      Room:       [facility]  Gender:     Female                       Technician: [id number]  :        1985                   Requested By:ARTURO HUERTAS  Order #:    [id number]                    Reading MD:    Measurements  Intervals                                Axis  Rate:       65                           P:          55  ME:         160                          QRS:        48  QRSD:       88                           T:          43  QT:         520  QTc:        541    Interpretive Statements  SINUS RHYTHM  ST ELEVATION SUGGESTS PERICARDITIS  PROLONGED QT INTERVAL  Compared to ECG 2019 20:32:24  ST (T wave) deviation now present     URINE DRUG SCREEN    Collection Time: 19  9:06 PM   Result Value Ref Range    Amphetamines Urine Negative Negative    Barbiturates Positive (A) Negative    Benzodiazepines Positive (A) Negative    Cocaine Metabolite Negative Negative    Methadone Negative Negative    Opiates Negative Negative    Oxycodone Positive (A) Negative    Phencyclidine -Pcp Negative Negative    Propoxyphene Negative Negative    Cannabinoid Metab Negative Negative   URINALYSIS    Collection Time: 19  9:06 PM   Result Value Ref Range    Color Yellow     Character Clear     Specific Gravity 1.011 <1.035    Ph 6.0 5.0 - 8.0    Glucose 100 (A) Negative mg/dL    Ketones Negative Negative mg/dL    Protein Negative Negative mg/dL    Bilirubin Negative Negative    Urobilinogen, Urine  0.2 Negative    Nitrite Negative Negative    Leukocyte Esterase Negative Negative    Occult Blood Negative Negative    Micro Urine Req see below        Imaging/Procedures Review:    Independant Imaging Review: Completed  DX-CHEST-PORTABLE (1 VIEW)   Final Result      Low lung volumes with mild perihilar and basilar interstitial opacities probably related to hypoinflation.      DX-CHEST-PORTABLE (1 VIEW)    (Results Pending)            EKG:   EKG Independent Review: Completed  QTc:532, HR: 60, Normal Sinus Rhythm, no ST/T changes none    Records reviewed and summarized in current documentation :  Yes  [employer] teaching service handout given to patient:  No         Assessment/Plan     * Polysubstance overdose  Assessment & Plan  -empty prescription bottles of ibuprofen, lexapro, tizanidine, seroquel found  -initially patient very hypotensive and bradycardic.  Per poison control, this is more in line with tizanidine intoxication than antipsychotic.  However, also important to keep in view serotonin syndrome given seroquel plus lexapro ingestion.  As well, QTc as been slowly increased since admission, now 532 on admission to ICU  -patient intubated in ED for inability to protect airway too lethargic  Plan  -admit to ICU for close monitoring  -Telemetry to monitor for torsades  -continue IV mag infusion  -EKG every hour until QTC below 500ms  -Optimize electrolytes  -Ventilated- sedated with propofol to suppress possibility of seizure-like activity.  High respiratory rate to compensate for metabolic acidosis  -Continue to assess for serotonin syndrome  -Legal hold started in the ED, one-to-one sitter  -Psychiatry consult in the morning      Toxic metabolic encephalopathy  Assessment & Plan  Secondary to drug overdose, requiring intubation in the emergency department    Suicide attempt (HCC)  Assessment & Plan  See substance overdose    Increased anion gap metabolic acidosis  Assessment & Plan  Anion gap 13 on admission.   Likely secondary to ibuprofen overdose      Anticipated Hospital stay:  >2 midnights        Quality Measures  Quality-Core Measures   Reviewed items::  Labs reviewed, EKG reviewed and Medications reviewed  Peguero catheter::  Critically Ill - Requiring Accurate Measurement of Urinary Output  DVT prophylaxis - mechanical:  SCDs    PCP: ILYA Marie

## 2019-10-02 ENCOUNTER — APPOINTMENT (OUTPATIENT)
Dept: RADIOLOGY | Facility: MEDICAL CENTER | Age: 34
DRG: 091 | End: 2019-10-02
Attending: INTERNAL MEDICINE
Payer: COMMERCIAL

## 2019-10-02 PROBLEM — F32.2 SEVERE MAJOR DEPRESSIVE DISORDER (HCC): Status: ACTIVE | Noted: 2019-10-02

## 2019-10-02 LAB
ANION GAP SERPL CALC-SCNC: 15 MMOL/L (ref 0–11.9)
BASOPHILS # BLD AUTO: 0.4 % (ref 0–1.8)
BASOPHILS # BLD: 0.04 K/UL (ref 0–0.12)
BUN SERPL-MCNC: 19 MG/DL (ref 8–22)
CALCIUM SERPL-MCNC: 8.9 MG/DL (ref 8.5–10.5)
CHLORIDE SERPL-SCNC: 111 MMOL/L (ref 96–112)
CO2 SERPL-SCNC: 14 MMOL/L (ref 20–33)
CREAT SERPL-MCNC: 1.28 MG/DL (ref 0.5–1.4)
EKG IMPRESSION: NORMAL
EOSINOPHIL # BLD AUTO: 0 K/UL (ref 0–0.51)
EOSINOPHIL NFR BLD: 0 % (ref 0–6.9)
ERYTHROCYTE [DISTWIDTH] IN BLOOD BY AUTOMATED COUNT: 50.4 FL (ref 35.9–50)
GLUCOSE SERPL-MCNC: 84 MG/DL (ref 65–99)
HCT VFR BLD AUTO: 41.2 % (ref 37–47)
HGB BLD-MCNC: 12.8 G/DL (ref 12–16)
IMM GRANULOCYTES # BLD AUTO: 0.04 K/UL (ref 0–0.11)
IMM GRANULOCYTES NFR BLD AUTO: 0.4 % (ref 0–0.9)
LACTATE BLD-SCNC: 1.2 MMOL/L (ref 0.5–2)
LYMPHOCYTES # BLD AUTO: 0.73 K/UL (ref 1–4.8)
LYMPHOCYTES NFR BLD: 7.9 % (ref 22–41)
MAGNESIUM SERPL-MCNC: 2.3 MG/DL (ref 1.5–2.5)
MCH RBC QN AUTO: 29.7 PG (ref 27–33)
MCHC RBC AUTO-ENTMCNC: 31.1 G/DL (ref 33.6–35)
MCV RBC AUTO: 95.6 FL (ref 81.4–97.8)
MONOCYTES # BLD AUTO: 0.4 K/UL (ref 0–0.85)
MONOCYTES NFR BLD AUTO: 4.3 % (ref 0–13.4)
NEUTROPHILS # BLD AUTO: 8 K/UL (ref 2–7.15)
NEUTROPHILS NFR BLD: 87 % (ref 44–72)
NRBC # BLD AUTO: 0 K/UL
NRBC BLD-RTO: 0 /100 WBC
OSMOLALITY SERPL: 294 MOSM/KG H2O (ref 278–298)
PHOSPHATE SERPL-MCNC: 4.3 MG/DL (ref 2.5–4.5)
PLATELET # BLD AUTO: 232 K/UL (ref 164–446)
PMV BLD AUTO: 9.7 FL (ref 9–12.9)
POTASSIUM SERPL-SCNC: 4 MMOL/L (ref 3.6–5.5)
RBC # BLD AUTO: 4.31 M/UL (ref 4.2–5.4)
SODIUM SERPL-SCNC: 140 MMOL/L (ref 135–145)
T3FREE SERPL-MCNC: 2.74 PG/ML (ref 2.4–4.2)
T4 FREE SERPL-MCNC: 0.68 NG/DL (ref 0.53–1.43)
TRIGL SERPL-MCNC: 70 MG/DL (ref 0–149)
TSH SERPL DL<=0.005 MIU/L-ACNC: 0.26 UIU/ML (ref 0.38–5.33)
WBC # BLD AUTO: 9.2 K/UL (ref 4.8–10.8)

## 2019-10-02 PROCEDURE — 71045 X-RAY EXAM CHEST 1 VIEW: CPT

## 2019-10-02 PROCEDURE — 84478 ASSAY OF TRIGLYCERIDES: CPT

## 2019-10-02 PROCEDURE — 80048 BASIC METABOLIC PNL TOTAL CA: CPT

## 2019-10-02 PROCEDURE — 84100 ASSAY OF PHOSPHORUS: CPT

## 2019-10-02 PROCEDURE — 84481 FREE ASSAY (FT-3): CPT

## 2019-10-02 PROCEDURE — A9270 NON-COVERED ITEM OR SERVICE: HCPCS | Performed by: STUDENT IN AN ORGANIZED HEALTH CARE EDUCATION/TRAINING PROGRAM

## 2019-10-02 PROCEDURE — 700105 HCHG RX REV CODE 258: Performed by: STUDENT IN AN ORGANIZED HEALTH CARE EDUCATION/TRAINING PROGRAM

## 2019-10-02 PROCEDURE — 93010 ELECTROCARDIOGRAM REPORT: CPT | Performed by: INTERNAL MEDICINE

## 2019-10-02 PROCEDURE — 83735 ASSAY OF MAGNESIUM: CPT

## 2019-10-02 PROCEDURE — 84443 ASSAY THYROID STIM HORMONE: CPT

## 2019-10-02 PROCEDURE — 83930 ASSAY OF BLOOD OSMOLALITY: CPT

## 2019-10-02 PROCEDURE — 99232 SBSQ HOSP IP/OBS MODERATE 35: CPT | Mod: 25,GC | Performed by: INTERNAL MEDICINE

## 2019-10-02 PROCEDURE — 700111 HCHG RX REV CODE 636 W/ 250 OVERRIDE (IP): Performed by: STUDENT IN AN ORGANIZED HEALTH CARE EDUCATION/TRAINING PROGRAM

## 2019-10-02 PROCEDURE — 700102 HCHG RX REV CODE 250 W/ 637 OVERRIDE(OP): Performed by: STUDENT IN AN ORGANIZED HEALTH CARE EDUCATION/TRAINING PROGRAM

## 2019-10-02 PROCEDURE — 700111 HCHG RX REV CODE 636 W/ 250 OVERRIDE (IP): Performed by: INTERNAL MEDICINE

## 2019-10-02 PROCEDURE — 84439 ASSAY OF FREE THYROXINE: CPT

## 2019-10-02 PROCEDURE — 85025 COMPLETE CBC W/AUTO DIFF WBC: CPT

## 2019-10-02 PROCEDURE — 83605 ASSAY OF LACTIC ACID: CPT

## 2019-10-02 PROCEDURE — 770001 HCHG ROOM/CARE - MED/SURG/GYN PRIV*

## 2019-10-02 RX ORDER — ACETAMINOPHEN 325 MG/1
650 TABLET ORAL EVERY 6 HOURS PRN
Status: DISCONTINUED | OUTPATIENT
Start: 2019-10-02 | End: 2019-10-03

## 2019-10-02 RX ORDER — ENALAPRILAT 1.25 MG/ML
1.25 INJECTION INTRAVENOUS EVERY 6 HOURS PRN
Status: DISCONTINUED | OUTPATIENT
Start: 2019-10-02 | End: 2019-10-02

## 2019-10-02 RX ORDER — ACETAMINOPHEN 325 MG/1
650 TABLET ORAL ONCE
Status: COMPLETED | OUTPATIENT
Start: 2019-10-02 | End: 2019-10-02

## 2019-10-02 RX ORDER — HYDRALAZINE HYDROCHLORIDE 20 MG/ML
20 INJECTION INTRAMUSCULAR; INTRAVENOUS EVERY 6 HOURS PRN
Status: DISCONTINUED | OUTPATIENT
Start: 2019-10-02 | End: 2019-10-03 | Stop reason: HOSPADM

## 2019-10-02 RX ORDER — ENALAPRILAT 1.25 MG/ML
1.25 INJECTION INTRAVENOUS EVERY 6 HOURS PRN
Status: DISCONTINUED | OUTPATIENT
Start: 2019-10-02 | End: 2019-10-03 | Stop reason: HOSPADM

## 2019-10-02 RX ADMIN — ENALAPRILAT 1.25 MG: 1.25 INJECTION INTRAVENOUS at 20:18

## 2019-10-02 RX ADMIN — ONDANSETRON 4 MG: 2 INJECTION INTRAMUSCULAR; INTRAVENOUS at 01:40

## 2019-10-02 RX ADMIN — SODIUM CHLORIDE, POTASSIUM CHLORIDE, SODIUM LACTATE AND CALCIUM CHLORIDE: 600; 310; 30; 20 INJECTION, SOLUTION INTRAVENOUS at 00:20

## 2019-10-02 RX ADMIN — ACETAMINOPHEN 650 MG: 325 TABLET, FILM COATED ORAL at 16:52

## 2019-10-02 RX ADMIN — ONDANSETRON 4 MG: 2 INJECTION INTRAMUSCULAR; INTRAVENOUS at 19:43

## 2019-10-02 RX ADMIN — HYDRALAZINE HYDROCHLORIDE 20 MG: 20 INJECTION INTRAMUSCULAR; INTRAVENOUS at 18:25

## 2019-10-02 RX ADMIN — ACETAMINOPHEN 650 MG: 325 TABLET, FILM COATED ORAL at 22:56

## 2019-10-02 RX ADMIN — ACETAMINOPHEN 650 MG: 325 TABLET, FILM COATED ORAL at 08:13

## 2019-10-02 RX ADMIN — FAMOTIDINE 20 MG: 10 INJECTION INTRAVENOUS at 05:11

## 2019-10-02 ASSESSMENT — ENCOUNTER SYMPTOMS
FOCAL WEAKNESS: 0
NAUSEA: 0
BACK PAIN: 1
VOMITING: 0
ABDOMINAL PAIN: 0
SPEECH CHANGE: 0
HEADACHES: 1
COUGH: 1
SPUTUM PRODUCTION: 0
PALPITATIONS: 0
CHILLS: 0
FEVER: 0
WHEEZING: 0
NERVOUS/ANXIOUS: 0
SHORTNESS OF BREATH: 0
HEARTBURN: 0
DIZZINESS: 0
ORTHOPNEA: 0
BLURRED VISION: 0

## 2019-10-02 NOTE — PROGRESS NOTES
[facility] Progress Note      Admit Date: 9/30/2019    Resident(s): Mamta Gruber  Attending: CHETNA LEMOS/ Dr. Cook     Date & Time:   10/2/2019   11:03 AM       Patient ID:    Name:             Joy Mcnamara   YOB: 1985  Age:                 34 y.o.  female   MRN:               [id number]    HPI:  The patient is a 34 year old female with PMHx depression, migraine, chronic back pain presented to the ER after intentional drug overdose- Seroquel, Tizanidine, lexapro.The patient was intubated in the ER. She was found to be bradycardic, hypotensive down to 70's/50's. EKG showed prolonged QTc 532. Patient was transferred to the ICU for further management.    Consultants:  PMA: /   Psychiatry     Interval Events:  -Patient complains of headache this morning and some intermittent non productive cough. Denies any fever, chills, shortness of breath, dizziness.  - She didn't have appetite so she didn't eat for dinner except for some pudding.  -Overnight Tmax- Afebrile, HR 's -160's   - Discontinue Fentanyl , added Prn tylenol for headache  - Discontinue LR infusion  - Has Anion gap 15 bicarb 14 Potassium 4 Sodium 140 High an ion gap metabolic acidosis .  - GFR 47 worsening since admission   - ordered TSH   - On legal hold   - Pending Transfer to the floor     Review of Systems   Constitutional: Negative for chills and fever.   HENT: Positive for congestion.    Eyes: Negative for blurred vision.   Respiratory: Positive for cough. Negative for sputum production, shortness of breath and wheezing.    Cardiovascular: Negative for chest pain, palpitations, orthopnea and leg swelling.   Gastrointestinal: Negative for abdominal pain, heartburn, nausea and vomiting.   Genitourinary: Negative for dysuria, frequency and urgency.   Musculoskeletal: Positive for back pain.   Skin: Negative for itching and rash.   Neurological: Positive for headaches. Negative for dizziness, speech change  "and focal weakness.   Psychiatric/Behavioral: The patient is not nervous/anxious.      PHYSICAL EXAM  Vitals:    10/02/19 0600 10/02/19 0700 10/02/19 0800 10/02/19 0900   BP: 156/78   (!) 186/98   Pulse: 100 91 100 (!) 111   Resp: 16 18 18 12   Temp: 36.7 °C (98 °F)  36.4 °C (97.5 °F)    TempSrc: Temporal  Temporal    SpO2: 96% 96% 98% 90%   Weight:       Height:         Body mass index is 33.79 kg/m².  BP (!) 186/98   Pulse (!) 111   Temp 36.4 °C (97.5 °F) (Temporal)   Resp 12   Ht 1.753 m (5' 9\")   Wt 103.8 kg (228 lb 12.7 oz)   LMP 03/16/2017   SpO2 90%   BMI 33.79 kg/m²   O2 therapy: Pulse Oximetry: 90 %, O2 (LPM): 0, O2 Delivery: None (Room Air)    Physical Exam   Constitutional:   Well developed, Patient is intubated.    Eyes: Conjunctivae are normal. No scleral icterus.   Constricted pupils bilateral    Neck: Neck supple.   ETT tube in place    Cardiovascular: Normal rate and regular rhythm. Exam reveals no gallop and no friction rub.   No murmur heard.  Pulmonary/Chest: She has no wheezes.   Intubated on mechanical ventilation    Abdominal: Soft. Bowel sounds are normal. She exhibits no distension. There is no tenderness.   Musculoskeletal: She exhibits no edema.   Neurological: No cranial nerve deficit.   Easily arousable,   Communicating - writing on paper , oriented x 4   Normal muscle strength bilaterally 5/5 upper and lower extremities   Absent spontaneous and Inducible clonus, absent Ocular clonus   DTR's 2+ bilaterally   Gait deferred      Skin: Skin is warm and dry.   Psychiatric:   Unable to assess    Nursing note and vitals reviewed.    Respiratory:     Respiration: 12, Pulse Oximetry: 90 %    Chest Tube Drains:    Recent Labs     09/30/19  2239 10/01/19  0308 10/01/19  0712   ISTATAPH 7.312* 7.349* 7.331*   ISTATAPCO2 36.5 31.5 32.7   ISTATAPO2 132* 100* 122*   ISTATATCO2 20 18* 18*   WAPHMXO6HEE 99 98 99   ISTATARTHCO3 18.5 17.3 17.3   ISTATARTBE -7* -7* -8*   ISTATTEMP 95.2 F 97.2 F 99.5 " F   ISTATFIO2 50 40 40   ISTATSPEC Arterial Arterial Arterial   ISTATAPHTC 7.339* 7.360* 7.324*   BKOJEXXS7TL 121* 95* 125*       HemoDynamics:  Pulse: (!) 111 Blood Pressure: (!) 186/98      Fluids:  Date 10/02/19 0700 - 10/03/19 0659   Shift 4103-8088 6802-3432 6129-7661 24 Hour Total   INTAKE   P.O. 350   350     P.O. 350   350   I.V. 250   250     Volume (mL) (lactated ringers infusion) 250   250   Shift Total 600   600   OUTPUT   Urine 1100   1100     Number of Times Voided 2 x   2 x     Urine Void (mL) 1100   1100   Stool         Number of Times Stooled 0 x   0 x   Shift Total 1100   1100   NET -500   -500        Intake/Output Summary (Last 24 hours) at 10/1/2019 1451  Last data filed at 10/1/2019 1200  Gross per 24 hour   Intake 4556.17 ml   Output 3285 ml   Net 1271.17 ml          Body mass index is 33.79 kg/m².    Recent Labs     19  2240 10/01/19  0530 10/01/19  1300 10/02/19  0543   SODIUM  --  145 141 140   POTASSIUM  --  3.1* 4.1 4.0   CHLORIDE  --  119* 114* 111   CO2  --  17* 13* 14*   BUN  --   19   CREATININE  --  0.79 1.23 1.28   MAGNESIUM 2.7* 1.9  --  2.3   PHOSPHORUS 2.9 2.4*  --  4.3   CALCIUM  --  6.0* 9.1 8.9       GI/Nutrition:  Recent Labs     19  1420 10/01/19  0530 10/01/19  1300 10/02/19  0543   ALTSGPT 20 12  --   --    ASTSGOT 17 13  --   --    ALKPHOSPHAT 75 46  --   --    TBILIRUBIN 0.5 0.2  --   --    GLUCOSE 250* 74 76 84       Heme:  Recent Labs     19  1420 10/01/19  0530 10/02/19  0543   RBC 4.43 3.45* 4.31   HEMOGLOBIN 13.4 10.6* 12.8   HEMATOCRIT 40.1 32.3* 41.2   PLATELETCT 332 210 232       Infectious Disease:  Monitored Temp 2  Av.3 °C (99.1 °F)  Min: 37.3 °C (99.1 °F)  Max: 37.3 °C (99.1 °F)  Temp  Av.9 °C (98.4 °F)  Min: 36.4 °C (97.5 °F)  Max: 37.3 °C (99.1 °F)  Recent Labs     19  1420 10/01/19  0530 10/02/19  0543   WBC 14.5* 9.2 9.2   NEUTSPOLYS 86.50* 79.40* 87.00*   LYMPHOCYTES 8.50* 11.50* 7.90*   MONOCYTES 3.90 7.70 4.30    EOSINOPHILS 0.10 0.90 0.00   BASOPHILS 0.40 0.20 0.40   ASTSGOT 17 13  --    ALTSGPT 20 12  --    ALKPHOSPHAT 75 46  --    TBILIRUBIN 0.5 0.2  --        Meds:  • acetaminophen  650 mg     • ondansetron  4 mg     • Respiratory Care per Protocol       • ipratropium-albuterol  3 mL     • senna-docusate  2 Tab      And   • polyethylene glycol/lytes  1 Packet      And   • magnesium hydroxide  30 mL      And   • bisacodyl  10 mg     • MD Alert...Adult ICU Electrolyte Replacement per Pharmacy            Procedures:  Intubation - 9/31/19    Imaging:  DX-CHEST-PORTABLE (1 VIEW)   Final Result         1.  Hazy left lung base opacity, appearance suggests early infiltrate.      DX-CHEST-PORTABLE (1 VIEW)   Final Result         1.  Hazy left lung base opacity, appearance suggests early infiltrate.      DX-CHEST-PORTABLE (1 VIEW)   Final Result      Low lung volumes with mild perihilar and basilar interstitial opacities probably related to hypoinflation.          Assessment and Plan:    * Acute respiratory failure with hypoxia (HCC)- (present on admission)  Assessment & Plan  - Patient was Intubated for airway protection on 9/30/19  - Optimize oxygenation and ventilation   - Extubated 10/1/19, now on room air      Severe major depressive disorder (HCC)- (present on admission)  Assessment & Plan  - Patient has been identified to have depressive symptoms   - She has a history of Depression and was on lexapro at home  - Psychiatry onboard- Appreciate rec's  - Will hold on Psychotropic medications per Psychiatry     Toxic metabolic encephalopathy- (present on admission)  Assessment & Plan  -Secondary to drug overdose, requiring intubation in the emergency department  -Resolving since admission     Suicide attempt (HCC)- (present on admission)  Assessment & Plan  - On legal hold  - Consulted Psychiatry     Polysubstance overdose- (present on admission)  Assessment & Plan  -empty prescription bottles of ibuprofen, lexapro,  tizanidine, seroquel found  -initially patient very hypotensive and bradycardic.QTc as been slowly increased since admission, now 532  -patient intubated in ED for inability to protect airway   - No signs of Serotonin syndrome   Plan:  -admit to ICU for close monitoring  -Telemetry to monitor for torsades  -EKG every hour until QTC below 500ms  -Optimize electrolytes  -Ventilated- sedated with propofol to suppress possibility of seizure-like activity.  High respiratory rate to compensate for metabolic acidosis  -Continue to assess for serotonin syndrome  -Legal hold started in the ED, one-to-one sitter  -Psychiatry consulted- legal hold extended, discharge to Mental health Facility once medically cleared       Abnormal chest x-ray- (present on admission)  Assessment & Plan  - Chest X ray showed infiltrate at the Left lower lung base  - pro calcitonin is normal  - On Unasyn for antibiotic coverage- discontinued   - High likely 2/2 aspiration        Prolonged Q-T interval on ECG- (present on admission)  Assessment & Plan  - Prolonged Qtc on EKG at the time of Admission   - Likely 2/2 Seroquel overdose  - Serial EKG monitoring  - Optimize electrolytes - K,Mg and Calcium      Increased anion gap metabolic acidosis- (present on admission)  Assessment & Plan  - High An ion Gap with low bicarb consistent with high anion gap metabolic acidosis  - Normal serum and urine osmolality  - Patient has worsening kidney function since admission   - urine anion gap is positive   - Patient has HAGMA 2/2 NSAID overdose and component of RTA  - She will need Nephrology consult if the Kidney function does get worse in the next 24 hours       DISPO: transfer to the floor pending     CODE STATUS: FULL     Quality Measures:  Peguero Catheter: Yes   DVT Prophylaxis: SCD's  Ulcer Prophylaxis: Famotidine  Antibiotics: Unasyn   Lines: PIV x 2     This note was created using voice recognition software. There may be unintended errors in spelling,  grammar or content.

## 2019-10-02 NOTE — PROGRESS NOTES
Poison control called, updates given. They will be signing off at this time. Patient's case number is [id number] if there are any further questions or concerns.

## 2019-10-02 NOTE — PROGRESS NOTES
Dr. Bangura called back and stated that it is now okay for patient to have visitors including .  She stated that she had changed her mind by the time she put in her note yesterday.   will be called and updated that he can now visit.

## 2019-10-02 NOTE — PROGRESS NOTES
Page made to psych to clarify whether or not pt is allowed to have visitors at this time.  Per RN report, psych verbally stated that  is not allowed to visit, however in the note it states that visitors are ok.      Yesterday (prior to psych note being filed)  was informed that he was not allowed to visit.   requested that psych call him and explain why.  Per report, psych was notified but never called  to update.      0745- psych doctor called back and informed this RN that they would need to clarify with Dr. Bangura about the  being able to visit.  This RN requested once again that if the  is not allowed to visit, psych call him to give him an update.

## 2019-10-02 NOTE — CARE PLAN
Problem: Communication  Goal: The ability to communicate needs accurately and effectively will improve  Outcome: PROGRESSING AS EXPECTED  Note:   Calls appropriately, needs met at this time. Addressed questions and concerns.     Problem: Safety  Goal: Will remain free from falls  Outcome: PROGRESSING AS EXPECTED  Note:   Bed locked in lowest position, bed alarm on, call light within reach, treaded slipper socks on patient. OOB to commode with one assist, tolerates well.

## 2019-10-02 NOTE — CARE PLAN
Problem: Safety  Goal: Will remain free from injury  Note:   Patient is on Q15 checks for SI precautions.  Room currently in view from nurses station and UC desk.         Problem: Pain Management  Goal: Pain level will decrease to patient's comfort goal  Note:   Patient c/o headache this morning.  One time dose of tylenol ordered and administered.  PRN Tylenol ordered during rounds.

## 2019-10-03 VITALS
SYSTOLIC BLOOD PRESSURE: 153 MMHG | HEART RATE: 86 BPM | HEIGHT: 69 IN | WEIGHT: 228.79 LBS | TEMPERATURE: 99.2 F | DIASTOLIC BLOOD PRESSURE: 90 MMHG | RESPIRATION RATE: 16 BRPM | OXYGEN SATURATION: 97 % | BODY MASS INDEX: 33.89 KG/M2

## 2019-10-03 PROBLEM — I10 HYPERTENSION: Status: ACTIVE | Noted: 2019-10-03

## 2019-10-03 LAB
ANION GAP SERPL CALC-SCNC: 14 MMOL/L (ref 0–11.9)
BASOPHILS # BLD AUTO: 0.5 % (ref 0–1.8)
BASOPHILS # BLD: 0.05 K/UL (ref 0–0.12)
BUN SERPL-MCNC: 10 MG/DL (ref 8–22)
CALCIUM SERPL-MCNC: 9.6 MG/DL (ref 8.5–10.5)
CHLORIDE SERPL-SCNC: 112 MMOL/L (ref 96–112)
CO2 SERPL-SCNC: 17 MMOL/L (ref 20–33)
CREAT SERPL-MCNC: 0.98 MG/DL (ref 0.5–1.4)
EKG IMPRESSION: NORMAL
EOSINOPHIL # BLD AUTO: 0.01 K/UL (ref 0–0.51)
EOSINOPHIL NFR BLD: 0.1 % (ref 0–6.9)
ERYTHROCYTE [DISTWIDTH] IN BLOOD BY AUTOMATED COUNT: 45 FL (ref 35.9–50)
GLUCOSE SERPL-MCNC: 102 MG/DL (ref 65–99)
HCT VFR BLD AUTO: 42 % (ref 37–47)
HGB BLD-MCNC: 14.2 G/DL (ref 12–16)
IMM GRANULOCYTES # BLD AUTO: 0.02 K/UL (ref 0–0.11)
IMM GRANULOCYTES NFR BLD AUTO: 0.2 % (ref 0–0.9)
LYMPHOCYTES # BLD AUTO: 0.73 K/UL (ref 1–4.8)
LYMPHOCYTES NFR BLD: 7.9 % (ref 22–41)
MAGNESIUM SERPL-MCNC: 2.2 MG/DL (ref 1.5–2.5)
MCH RBC QN AUTO: 30.2 PG (ref 27–33)
MCHC RBC AUTO-ENTMCNC: 33.8 G/DL (ref 33.6–35)
MCV RBC AUTO: 89.4 FL (ref 81.4–97.8)
MONOCYTES # BLD AUTO: 0.49 K/UL (ref 0–0.85)
MONOCYTES NFR BLD AUTO: 5.3 % (ref 0–13.4)
NEUTROPHILS # BLD AUTO: 7.97 K/UL (ref 2–7.15)
NEUTROPHILS NFR BLD: 86 % (ref 44–72)
NRBC # BLD AUTO: 0 K/UL
NRBC BLD-RTO: 0 /100 WBC
PHOSPHATE SERPL-MCNC: 2.4 MG/DL (ref 2.5–4.5)
PLATELET # BLD AUTO: 310 K/UL (ref 164–446)
PMV BLD AUTO: 9.8 FL (ref 9–12.9)
POTASSIUM SERPL-SCNC: 3.4 MMOL/L (ref 3.6–5.5)
RBC # BLD AUTO: 4.7 M/UL (ref 4.2–5.4)
SODIUM SERPL-SCNC: 143 MMOL/L (ref 135–145)
WBC # BLD AUTO: 9.3 K/UL (ref 4.8–10.8)

## 2019-10-03 PROCEDURE — 700102 HCHG RX REV CODE 250 W/ 637 OVERRIDE(OP): Performed by: INTERNAL MEDICINE

## 2019-10-03 PROCEDURE — A9270 NON-COVERED ITEM OR SERVICE: HCPCS | Performed by: INTERNAL MEDICINE

## 2019-10-03 PROCEDURE — 83735 ASSAY OF MAGNESIUM: CPT

## 2019-10-03 PROCEDURE — 85025 COMPLETE CBC W/AUTO DIFF WBC: CPT

## 2019-10-03 PROCEDURE — 93010 ELECTROCARDIOGRAM REPORT: CPT | Performed by: INTERNAL MEDICINE

## 2019-10-03 PROCEDURE — 93005 ELECTROCARDIOGRAM TRACING: CPT | Performed by: INTERNAL MEDICINE

## 2019-10-03 PROCEDURE — 80048 BASIC METABOLIC PNL TOTAL CA: CPT

## 2019-10-03 PROCEDURE — 84100 ASSAY OF PHOSPHORUS: CPT

## 2019-10-03 PROCEDURE — 700111 HCHG RX REV CODE 636 W/ 250 OVERRIDE (IP): Performed by: INTERNAL MEDICINE

## 2019-10-03 PROCEDURE — 99238 HOSP IP/OBS DSCHRG MGMT 30/<: CPT | Mod: 25,GC | Performed by: INTERNAL MEDICINE

## 2019-10-03 PROCEDURE — 700105 HCHG RX REV CODE 258: Performed by: INTERNAL MEDICINE

## 2019-10-03 PROCEDURE — 700101 HCHG RX REV CODE 250: Performed by: INTERNAL MEDICINE

## 2019-10-03 RX ORDER — FLUTICASONE PROPIONATE 50 MCG
2 SPRAY, SUSPENSION (ML) NASAL DAILY
Status: DISCONTINUED | OUTPATIENT
Start: 2019-10-03 | End: 2019-10-03 | Stop reason: HOSPADM

## 2019-10-03 RX ORDER — FLUTICASONE PROPIONATE 50 MCG
2 SPRAY, SUSPENSION (ML) NASAL DAILY
Qty: 16 G | Refills: 0 | Status: SHIPPED | OUTPATIENT
Start: 2019-10-04

## 2019-10-03 RX ORDER — CLONIDINE HYDROCHLORIDE 0.1 MG/1
0.1 TABLET ORAL 3 TIMES DAILY
Status: DISCONTINUED | OUTPATIENT
Start: 2019-10-03 | End: 2019-10-03 | Stop reason: HOSPADM

## 2019-10-03 RX ORDER — KETOROLAC TROMETHAMINE 30 MG/ML
30 INJECTION, SOLUTION INTRAMUSCULAR; INTRAVENOUS EVERY 6 HOURS PRN
Status: DISCONTINUED | OUTPATIENT
Start: 2019-10-03 | End: 2019-10-03 | Stop reason: HOSPADM

## 2019-10-03 RX ORDER — DEXTROAMPHETAMINE SACCHARATE, AMPHETAMINE ASPARTATE, DEXTROAMPHETAMINE SULFATE AND AMPHETAMINE SULFATE 2.5; 2.5; 2.5; 2.5 MG/1; MG/1; MG/1; MG/1
10 TABLET ORAL DAILY
Status: DISCONTINUED | OUTPATIENT
Start: 2019-10-03 | End: 2019-10-03 | Stop reason: HOSPADM

## 2019-10-03 RX ORDER — ESCITALOPRAM OXALATE 10 MG/1
20 TABLET ORAL DAILY
Status: DISCONTINUED | OUTPATIENT
Start: 2019-10-03 | End: 2019-10-03 | Stop reason: HOSPADM

## 2019-10-03 RX ORDER — POTASSIUM CHLORIDE 20 MEQ/1
20 TABLET, EXTENDED RELEASE ORAL ONCE
Status: COMPLETED | OUTPATIENT
Start: 2019-10-03 | End: 2019-10-03

## 2019-10-03 RX ORDER — LISINOPRIL 5 MG/1
5 TABLET ORAL
Status: DISCONTINUED | OUTPATIENT
Start: 2019-10-03 | End: 2019-10-03 | Stop reason: HOSPADM

## 2019-10-03 RX ORDER — ACETAMINOPHEN 325 MG/1
650 TABLET ORAL EVERY 6 HOURS
Status: DISCONTINUED | OUTPATIENT
Start: 2019-10-03 | End: 2019-10-03 | Stop reason: HOSPADM

## 2019-10-03 RX ORDER — LABETALOL HYDROCHLORIDE 5 MG/ML
20 INJECTION, SOLUTION INTRAVENOUS ONCE
Status: COMPLETED | OUTPATIENT
Start: 2019-10-03 | End: 2019-10-03

## 2019-10-03 RX ORDER — QUETIAPINE FUMARATE 100 MG/1
100 TABLET, FILM COATED ORAL EVERY EVENING
Status: DISCONTINUED | OUTPATIENT
Start: 2019-10-03 | End: 2019-10-03 | Stop reason: HOSPADM

## 2019-10-03 RX ORDER — POTASSIUM CHLORIDE 20 MEQ/1
40 TABLET, EXTENDED RELEASE ORAL ONCE
Status: DISCONTINUED | OUTPATIENT
Start: 2019-10-03 | End: 2019-10-03

## 2019-10-03 RX ORDER — LISINOPRIL 5 MG/1
5 TABLET ORAL DAILY
Qty: 30 TAB | Refills: 0 | Status: SHIPPED | OUTPATIENT
Start: 2019-10-04 | End: 2019-11-04 | Stop reason: SDUPTHER

## 2019-10-03 RX ADMIN — LISINOPRIL 5 MG: 5 TABLET ORAL at 09:36

## 2019-10-03 RX ADMIN — LABETALOL HYDROCHLORIDE 20 MG: 5 INJECTION INTRAVENOUS at 08:24

## 2019-10-03 RX ADMIN — DEXTROAMPHETAMINE SACCHARATE, AMPHETAMINE ASPARTATE, DEXTROAMPHETAMINE SULFATE, AND AMPHETAMINE SULFATE 10 MG: 2.5; 2.5; 2.5; 2.5 TABLET ORAL at 11:15

## 2019-10-03 RX ADMIN — DIBASIC SODIUM PHOSPHATE, MONOBASIC POTASSIUM PHOSPHATE AND MONOBASIC SODIUM PHOSPHATE 1 TABLET: 852; 155; 130 TABLET ORAL at 13:21

## 2019-10-03 RX ADMIN — CLONIDINE HYDROCHLORIDE 0.1 MG: 0.1 TABLET ORAL at 13:21

## 2019-10-03 RX ADMIN — CLONIDINE HYDROCHLORIDE 0.1 MG: 0.1 TABLET ORAL at 08:21

## 2019-10-03 RX ADMIN — ACETAMINOPHEN 650 MG: 325 TABLET, FILM COATED ORAL at 11:15

## 2019-10-03 RX ADMIN — POTASSIUM CHLORIDE 20 MEQ: 20 TABLET, EXTENDED RELEASE ORAL at 08:21

## 2019-10-03 RX ADMIN — ENALAPRILAT 1.25 MG: 1.25 INJECTION INTRAVENOUS at 05:27

## 2019-10-03 RX ADMIN — KETOROLAC TROMETHAMINE 30 MG: 30 INJECTION, SOLUTION INTRAMUSCULAR at 03:32

## 2019-10-03 RX ADMIN — HYDRALAZINE HYDROCHLORIDE 20 MG: 20 INJECTION INTRAMUSCULAR; INTRAVENOUS at 01:30

## 2019-10-03 RX ADMIN — SODIUM PHOSPHATE, MONOBASIC, MONOHYDRATE AND SODIUM PHOSPHATE, DIBASIC, ANHYDROUS 15 MMOL: 276; 142 INJECTION, SOLUTION INTRAVENOUS at 08:22

## 2019-10-03 RX ADMIN — DIBASIC SODIUM PHOSPHATE, MONOBASIC POTASSIUM PHOSPHATE AND MONOBASIC SODIUM PHOSPHATE 1 TABLET: 852; 155; 130 TABLET ORAL at 08:21

## 2019-10-03 RX ADMIN — FLUTICASONE PROPIONATE 100 MCG: 50 SPRAY, METERED NASAL at 11:14

## 2019-10-03 ASSESSMENT — ENCOUNTER SYMPTOMS
HEARTBURN: 0
SPEECH CHANGE: 0
BLURRED VISION: 0
VOMITING: 0
NAUSEA: 0
WHEEZING: 0
HEADACHES: 1
DIZZINESS: 0
NERVOUS/ANXIOUS: 0
CHILLS: 0
FOCAL WEAKNESS: 0
COUGH: 1
ORTHOPNEA: 0
FEVER: 0
SPUTUM PRODUCTION: 0
BACK PAIN: 1
PALPITATIONS: 0
SHORTNESS OF BREATH: 0
ABDOMINAL PAIN: 0

## 2019-10-03 NOTE — DISCHARGE PLANNING
Anticipated Discharge Disposition: [facility]    Action: Requested MD OCAMPO summary. Faxed transport communication form and [facility] services to [facility].     Barriers to Discharge: transport time    Plan: DC to [facility]

## 2019-10-03 NOTE — RESPIRATORY CARE
COPD EDUCATION by COPD CLINICAL EDUCATOR  10/3/2019 at 3:32 PM by Brenda Rao     Patient reviewed by COPD education team. Patient does not have a history or diagnosis of COPD and is a non-smoker, therefore does not qualify for the COPD program.

## 2019-10-03 NOTE — DISCHARGE PLANNING
Referral and mc sent to [facility], [facility] and [facility].

## 2019-10-03 NOTE — DISCHARGE INSTRUCTIONS
Discharge Instructions    Discharged to other by ambulance with escort. Discharged via ambulance, hospital escort: Yes.  Special equipment needed: Not Applicable    Be sure to schedule a follow-up appointment with your primary care doctor or any specialists as instructed.     Discharge Plan:   Diet Plan: Discussed  Activity Level: Discussed  Confirmed Follow up Appointment: Patient to Call and Schedule Appointment  Confirmed Symptoms Management: Discussed  Medication Reconciliation Updated: Yes  Influenza Vaccine Indication: Not indicated: Previously immunized this influenza season and > 8 years of age    I understand that a diet low in cholesterol, fat, and sodium is recommended for good health. Unless I have been given specific instructions below for another diet, I accept this instruction as my diet prescription.   Other diet: Regular    Special Instructions: None    · Is patient discharged on Warfarin / Coumadin?   No     Depression / Suicide Risk    As you are discharged from this [facility] facility, it is important to learn how to keep safe from harming yourself.    Recognize the warning signs:  · Abrupt changes in personality, positive or negative- including increase in energy   · Giving away possessions  · Change in eating patterns- significant weight changes-  positive or negative  · Change in sleeping patterns- unable to sleep or sleeping all the time   · Unwillingness or inability to communicate  · Depression  · Unusual sadness, discouragement and loneliness  · Talk of wanting to die  · Neglect of personal appearance   · Rebelliousness- reckless behavior  · Withdrawal from people/activities they love  · Confusion- inability to concentrate     If you or a loved one observes any of these behaviors or has concerns about self-harm, here's what you can do:  · Talk about it- your feelings and reasons for harming yourself  · Remove any means that you might use to hurt yourself (examples: pills, rope,  "extension cords, firearm)  · Get professional help from the community (Mental Health, Substance Abuse, psychological counseling)  · Do not be alone:Call your Safe Contact- someone whom you trust who will be there for you.  · Call your local CRISIS HOTLINE 077-0562 or 450-514-3989  · Call your local Children's Mobile Crisis Response Team [place] (075) 529-3310 or www.[place].com  · Call the toll free National Suicide Prevention Hotlines   · National Suicide Prevention Lifeline 084-379-KSRF (8934)  · [employer] Hope Line Network 800-SUICIDE (711-6738)        Overdose, Adult  A person can overdose on alcohol, drugs or both by accident or on purpose. If it was on purpose, it is a serious matter. Professional help should be sought. If the overdose was an accident, certain steps should be taken to make sure that it never happens again.  ACCIDENTAL OVERDOSE  Overdosing on prescription medications can be a result of:  · Not understanding the instructions.  · Misreading the label.  · Forgetting that you took a dose and then taking another by mistake. This situation happens a lot.  To make sure this does not happen again:  · Clarify the correct dosage with your caregiver.  · Place the correct dosage in a \"pill-minder\" container (labeled for each day and time of day).  · Have someone dispense your medicine.  Please be sure to follow-up with your primary care doctor as directed.  INTENTIONAL OVERDOSE  If the overdose was on purpose, it is a serious situation. Taking more than the prescribed amount of medications (including taking someone else's prescription), abusing street drugs or drinking an amount of alcohol that requires medical treatment can show a variety of possible problems. These may indicate you:  · Are depressed or suicidal.  · Are abusing drugs, took too much or combined different drugs to experiment with the effects.  · Mixed alcohol with drugs and did not realize the danger of doing so (this is drug " "abuse).  · Are suffering addiction to drugs and/or alcohol (also known as chemical dependency).  · Binge drink.  If you have not been referred to a mental health professional for help, it is important that you get help right away. Only a professional can determine which problems may exist and what the best course of treatment may be. It is your responsibility to follow-up with further evaluation or treatment as directed.   Alcohol is responsible for a large number of overdoses and unintended deaths among college-age young adults. Binge drinking is consuming 4-5 drinks in a short period of time. The amount of alcohol in standard servings of wine (5 oz.), beer (12 oz.) and distilled spirits (1.5 oz., 80 proof) is the same. Beer or wine can be just as dangerous to the binge drinker as \"hard\" liquor can be.   CONSEQUENCES OF BINGE DRINKING  Alcohol poisoning is the most serious consequence of binge drinking. This is a severe and potentially fatal physical reaction to an alcohol overdose. When too much alcohol is consumed, the brain does not get enough oxygen. The lack of oxygen will eventually cause the brain to shut down the voluntary functions that regulate breathing and heart rate. Symptoms of alcohol poisoning include:  · Vomiting.  · Passing out (unconsciousness).  · Cold, clammy, pale or bluish skin.  · Slow or irregular breathing.  WHAT SHOULD I DO NEXT?  If you have a history of drug abuse or suffer chemical dependency (alcoholism, drug addiction or both), you might consider the following:  · Talk with a qualified substance abuse counselor and consider entering a treatment program.  · Go to a detox facility if necessary.  · If you were attending self-help group meetings, consider returning to them and go often.  · Explore other resources located near you (see sources listed below).  If you are unsure if you have a substance abuse problem, ask yourself the following questions:  · Have you been told by friends or " "family that drugs/alcohol has become a problem?  · Do you get into fights when drinking or using drugs?  · Do you have blackouts (not remembering what you do while using)?  · Do you lie about use or amounts of drugs or alcohol you consume?  · Do you need chemicals to get you going?  · Do you suffer in work or school performance because of drug or alcohol use?  · Do you get sick from drug or alcohol use but continue to use anyway?  · Do you need drugs or alcohol to relate to people or feel comfortable in social situations?  · Do you use drugs or alcohol to forget problems?  If you answered \"Yes\" to any of the above questions, it means you show signs of chemical dependency and a professional evaluation is suggested. The longer the use of drugs and alcohol continues, the problems will become greater.  SEEK IMMEDIATE MEDICAL CARE IF:   · You feel like you might repeat your problematic behavior.  · You need someone to talk to and feel that it should not wait.  · You feel you are a danger to yourself or someone else.  · You feel like you are having a new reaction to medications you are taking, or you are getting worse after leaving a care center.  · You have an overwhelming urge to drink or use drugs.  Addiction cannot be cured, but it can be treated successfully. Treatment centers are listed in the yellow pages under: Cocaine, Narcotics, and Alcoholics Anonymous. Most hospitals and clinics can refer you to a specialized care center. The  government maintains a toll-free number for obtaining treatment referrals: 1-681.657.3582 or 1-589.481.4132 (TDD) and maintains a website: http://findtreatment.samhsa.gov. Other websites for additional information are: www.mentalhealth.samhsa.gov. and www.[place].gov.  In [place] treatment resources are listed in each Province with listings available under [facility] [employer] [facility] or similar titles.  Document Released: 12/20/2004 Document Revised: 03/11/2013 Document Reviewed: " 11/11/2009  ExitCare® Patient Information ©2014 [employer].

## 2019-10-03 NOTE — CARE PLAN
Problem: Communication  Goal: The ability to communicate needs accurately and effectively will improve  Outcome: PROGRESSING AS EXPECTED  Intervention: Educate patient and significant other/support system about the plan of care, procedures, treatments, medications and allow for questions  Flowsheets (Taken 10/2/2019 2039)  Pt & Family Have Been Educated on Methods Available to Report Concerns Related to Care, Treatment, Services, and Patient Safety Issues: Yes     Problem: Safety  Goal: Will remain free from injury  Outcome: PROGRESSING AS EXPECTED     Problem: Infection  Goal: Will remain free from infection  Outcome: PROGRESSING AS EXPECTED     Problem: Pain Management  Goal: Pain level will decrease to patient's comfort goal  Outcome: PROGRESSING SLOWER THAN EXPECTED  Intervention: Educate and implement non-pharmacologic comfort measures. Examples: relaxation, distration, play therapy, activity therapy, massage, etc.  Note:   C/o HA 7/10. PRN tylenol available Q6. Discussed non pharmacological comfort measures. Heat pack given and rest promoted.

## 2019-10-03 NOTE — PROGRESS NOTES
Report given to ELAINA Arroyo from [facility]. All questions answered. Patient scheduled to be picked up at 1600.

## 2019-10-03 NOTE — PROGRESS NOTES
Patient discharged with [facility]. Patient did not have belongings. IV removed, tip intact. Patient escorted out walking with [facility]. Patient wished well.

## 2019-10-03 NOTE — DISCHARGE PLANNING
Anticipated Discharge Disposition: mental health facility    Action: Pt was medically cleared at 0900. Medical clearance paperwork received and sent to [facility] to begin referral process.    Barriers to Discharge: acceptance    Plan: Transfer to mental health facility once accepted.

## 2019-10-03 NOTE — PROGRESS NOTES
[facility] ICU Progress Note      Admit Date: 9/30/2019    Resident(s):Dr. Webb  Attending: CHETNA LEMOS/ Dr. Cook     Date & Time:   10/3/2019   2:27 PM       Patient ID:    Name:             Joy Mcnamara   YOB: 1985  Age:                 34 y.o.  female   MRN:               [id number]    HPI:  The patient is a 34 year old female with PMHx depression, migraine, chronic back pain who presented to the ER after intentional drug overdose on Seroquel, Tizanidine and Lexapro. The patient was intubated in the ER for airway protection. She was found to be bradycardic, hypotensive down to 70's/50's. EKG showed prolonged QTc 532. Patient was transferred to the ICU for further management. She received IV fluid resuscitation with improvement in her blood pressure.  She was extubated on 10/1 and tolerated room air with no desaturating event. Serial ECGs show improvement in QTc duration.       She is now medically stable to transfer to inpatient mental health facility.     Consultants:  PMA: /   Psychiatry     Interval Events:    She feels well today. She regrets her decision to attempt suicide.   Patient complains of tension-type headache this morning and some nasal lcongestion. Improved with Toradol and caffeine intake.   Denies any fever, chills, shortness of breath, dizziness.  Appetite improved  Remains hypertensive -145, improved with po scheduled AntiHTNsives PRNs,   Hyperchloremic anion gap metabolic acidosis: improving (she initially received NS then LR)  Renal function improved  On legal hold.     Review of Systems   Constitutional: Negative for chills and fever.   HENT: Positive for congestion.    Eyes: Negative for blurred vision.   Respiratory: Positive for cough. Negative for sputum production, shortness of breath and wheezing.    Cardiovascular: Negative for chest pain, palpitations, orthopnea and leg swelling.   Gastrointestinal: Negative for abdominal pain,  "heartburn, nausea and vomiting.   Genitourinary: Negative for dysuria, frequency and urgency.   Musculoskeletal: Positive for back pain.   Skin: Negative for itching and rash.   Neurological: Positive for headaches. Negative for dizziness, speech change and focal weakness.   Psychiatric/Behavioral: The patient is not nervous/anxious.      PHYSICAL EXAM  Vitals:    10/03/19 1000 10/03/19 1100 10/03/19 1200 10/03/19 1300   BP: 153/81 145/84 (!) 165/95 153/90   Pulse: 79 78 95 86   Resp: 16 16 16 16   Temp:       TempSrc:       SpO2:       Weight:       Height:         Body mass index is 33.79 kg/m².  /90   Pulse 86   Temp 37.3 °C (99.2 °F) (Temporal)   Resp 16   Ht 1.753 m (5' 9\")   Wt 103.8 kg (228 lb 12.7 oz)   LMP 03/16/2017   SpO2 97%   BMI 33.79 kg/m²   O2 therapy: Pulse Oximetry: 97 %, O2 Delivery: None (Room Air)    Physical Exam   Constitutional: She is oriented to person, place, and time and well-developed, well-nourished, and in no distress. No distress.   HENT:   Head: Normocephalic and atraumatic.   Mouth/Throat: Oropharynx is clear and moist. No oropharyngeal exudate.   Eyes: Pupils are equal, round, and reactive to light. Conjunctivae are normal. Right eye exhibits no discharge. Left eye exhibits no discharge. No scleral icterus.   Neck: Neck supple. No JVD present.   Cardiovascular: Normal rate, regular rhythm, normal heart sounds and intact distal pulses. Exam reveals no gallop and no friction rub.   No murmur heard.  Pulmonary/Chest: Effort normal and breath sounds normal. No stridor. No respiratory distress. She has no wheezes. She has no rales. She exhibits no tenderness.   Abdominal: Soft. Bowel sounds are normal. She exhibits no distension. There is no tenderness.   Musculoskeletal: She exhibits no edema.   Lymphadenopathy:     She has no cervical adenopathy.   Neurological: She is alert and oriented to person, place, and time. She has normal reflexes. No cranial nerve deficit. She " exhibits normal muscle tone. Gait normal. Coordination normal. GCS score is 15.   Skin: Skin is warm and dry. She is not diaphoretic.   Psychiatric: Mood, memory, affect and judgment normal.   Nursing note and vitals reviewed.    Respiratory:     Respiration: 16, Pulse Oximetry: 97 %        Recent Labs     19  2239 10/01/19  0308 10/01/19  0712   ISTATAPH 7.312* 7.349* 7.331*   ISTATAPCO2 36.5 31.5 32.7   ISTATAPO2 132* 100* 122*   ISTATATCO2 20 18* 18*   [id number] 99 98 99   ISTATARTHCO3 18.5 17.3 17.3   ISTATARTBE -7* -7* -8*   ISTATTEMP 95.2 F 97.2 F 99.5 F   ISTATFIO2 50 40 40   ISTATSPEC Arterial Arterial Arterial   ISTATAPHTC 7.339* 7.360* 7.324*   [id number] 121* 95* 125*       HemoDynamics:  Pulse: 86 Blood Pressure: 153/90      Fluids:     Intake/Output Summary (Last 24 hours) at 10/1/2019 1451  Last data filed at 10/1/2019 1200  Gross per 24 hour   Intake 4556.17 ml   Output 3285 ml   Net 1271.17 ml          Body mass index is 33.79 kg/m².    Recent Labs     10/01/19  0530 10/01/19  1300 10/02/19  0543 10/03/19  0350   SODIUM 145 141 140 143   POTASSIUM 3.1* 4.1 4.0 3.4*   CHLORIDE 119* 114* 111 112   CO2 17* 13* 14* 17*   BUN 12  19 10   CREATININE 0.79 1.23 1.28 0.98   MAGNESIUM 1.9  --  2.3 2.2   PHOSPHORUS 2.4*  --  4.3 2.4*   CALCIUM 6.0* 9.1 8.9 9.6       GI/Nutrition:  Recent Labs     10/01/19  0530 10/01/19  1300 10/02/19  0543 10/03/19  0350   ALTSGPT 12  --   --   --    ASTSGOT 13  --   --   --    ALKPHOSPHAT 46  --   --   --    TBILIRUBIN 0.2  --   --   --    GLUCOSE 74 76 84 102*       Heme:  Recent Labs     10/01/19  0530 10/02/19  0543 10/03/19  0350   RBC 3.45* 4.31 4.70   HEMOGLOBIN 10.6* 12.8 14.2   HEMATOCRIT 32.3* 41.2 42.0   PLATELETCT 210 232 310       Infectious Disease:  Temp  Av.9 °C (98.5 °F)  Min: 36.6 °C (97.9 °F)  Max: 37.3 °C (99.2 °F)  Recent Labs     10/01/19  0530 10/02/19  0543 10/03/19  0350   WBC 9.2 9.2 9.3   NEUTSPOLYS 79.40* 87.00* 86.00*   LYMPHOCYTES  11.50* 7.90* 7.90*   MONOCYTES 7.70 4.30 5.30   EOSINOPHILS 0.90 0.00 0.10   BASOPHILS 0.20 0.40 0.50   ASTSGOT 13  --   --    ALTSGPT 12  --   --    ALKPHOSPHAT 46  --   --    TBILIRUBIN 0.2  --   --        Meds:  • ketorolac  30 mg     • phosphorus  1 Tab     • lisinopril  5 mg     • cloNIDine  0.1 mg     • fluticasone  2 Spray     • amphetamine-dextroamphetamine  10 mg     • QUEtiapine  100 mg     • escitalopram  20 mg     • acetaminophen  650 mg     • hydrALAZINE  20 mg     • enalaprilat  1.25 mg     • ondansetron  4 mg     • Respiratory Care per Protocol       • ipratropium-albuterol  3 mL     • senna-docusate  2 Tab      And   • polyethylene glycol/lytes  1 Packet      And   • magnesium hydroxide  30 mL      And   • bisacodyl  10 mg     • MD Alert...Adult ICU Electrolyte Replacement per Pharmacy            Procedures:  Intubation - 9/31/19    Imaging:  DX-CHEST-PORTABLE (1 VIEW)   Final Result         1.  Hazy left lung base opacity, appearance suggests early infiltrate.      DX-CHEST-PORTABLE (1 VIEW)   Final Result         1.  Hazy left lung base opacity, appearance suggests early infiltrate.      DX-CHEST-PORTABLE (1 VIEW)   Final Result      Low lung volumes with mild perihilar and basilar interstitial opacities probably related to hypoinflation.          Assessment and Plan:    * Acute respiratory failure with hypoxia (HCC)- (present on admission)  Assessment & Plan  RESOLVED  - Patient was Intubated for airway protection on 9/30/19  - Optimize oxygenation and ventilation   - Extubated 10/1/19, now on room air      Severe major depressive disorder (HCC)- (present on admission)  Assessment & Plan  Reactive affect, still endorses depressed mood.   She is unwilling to resume Lexapro.       Toxic metabolic encephalopathy- (present on admission)  Assessment & Plan  -Secondary to drug overdose, requiring intubation in the emergency department  -RESOLVED    Suicide attempt (HCC)- (present on  admission)  Assessment & Plan  - On legal hold  - Medically stable to transfer to inpatient mental [facility] facility     Polysubstance overdose- (present on admission)  Assessment & Plan  Intentional overdose with  ibuprofen, lexapro, tizanidine, Seroquel.  Needed brief intubation, electrolyte repair, & fluid resuscitation for hypotension and bradycardia.   Now stable on room air.  QTc duration improving.     - No signs of Serotonin syndrome     Plan:  -admit to ICU for close monitoring  -Telemetry to monitor for torsades  -Optimize electrolytes  -On Legal hold started in the ED  -Psychiatry consulted- legal hold extended, discharge to Mental health Facility. Pt is medically stable.       Abnormal chest x-ray- (present on admission)  Assessment & Plan  - Chest X ray showed infiltrate at the Left lower lung base  - pro calcitonin is normal  - On Unasyn for antibiotic coverage- discontinued   - High likely 2/2 aspiration.    She is asymptomatic: no fever, no leukocytosis and she is not needing supplemental oxygen.   Her pulmonary exam is normal.   She does not have pneumonia.          Prolonged Q-T interval on ECG- (present on admission)  Assessment & Plan  - Prolonged Qtc on EKG at the time of Admission from Seroquel, Lexapro & Tizanidine OD.     QTc shortened to 485 on last check on 10/3.   This is still high. Recommend avoiding QTc prolonging meds until QTc is below 460 ms  No arrhythmia on telemetry.       Increased anion gap metabolic acidosis- (present on admission)  Assessment & Plan  Gap improved.   She now has hyperchloremic non-anion gap metabolic acidosis likely from the Normal Saline she received.      DISPO: Medically stable transfer to the inpatient mental [facility] facility   CODE STATUS: FULL      Quality Measures:  Peguero Catheter: Yes   DVT Prophylaxis: SCD's  Ulcer Prophylaxis: N/A  Antibiotics: none Lines: PIV x 2

## 2019-10-03 NOTE — DISCHARGE SUMMARY
Internal Medicine Discharge Summary  Note Author: Nish Webb M.D.       Name Joy Mcnamara     1985   Age/Sex 34 y.o. female   MRN [id number]         Admit Date:  2019       Discharge Date: 10/03/19, 2:28 PM        Service:   [facility] Team  Attending Physician(s):   Donovan Cerda       Senior Resident(s):   Dr. Gruber/Dr. Webb  PCP: ILYA Marie      Primary Diagnosis:   Intentional polysubstance overdose     Secondary Diagnoses:                Principal Problem:    Polysubstance overdose POA: Yes  Active Problems:    Suicide attempt (HCC) POA: Yes    Toxic metabolic encephalopathy POA: Yes    Severe major depressive disorder (HCC) POA: Yes    Hypertension POA: No    Increased anion gap metabolic acidosis POA: Yes    Prolonged Q-T interval on ECG POA: Yes    Abnormal chest x-ray POA: Yes    Acute respiratory failure with hypoxia (HCC) POA: Yes  Resolved Problems:    * No resolved hospital problems. *      Hospital Summary (Brief Narrative):       Joy Mcnamara is a 34 year old female with past medical history of depression, migraine, chronic back pain who was brought in to the Emergency Department by EMS after intentional drug overdose on Seroquel, Tizanidine and Lexapro. The patient was intubated in the ER for airway protection. She was found to be bradycardic and hypotensive with systolic blood pressures down to 70's/50's. EKG showed prolonged QTc 532. Patient was transferred to the ICU for further management. She received IV fluid resuscitation with improvement in her blood pressure. She was extubated on 10/1 and tolerated room air with no desaturating event. Serial ECGs show improvement in QTc duration.  She was seen by Psychiatry who put her on a legal hold.     Her hospital stay was complicated by pain-related severe hypertension. Her chronic back pain and headaches was managed with Fentanyl, Toradol and acetaminophen.  Adequate blood pressure control was achieved with Lisinopril 5 mg daily and scheduled clonidine. She also received as needed Labetalol. She had metabolic acidosis which improved with hyperventilation and fluid resuscitation. She also had hypokalemia and hypophosphatemia. She received appropriate supplementation.     She now has reactive affect, she talks about the future, and is looking forward to recovering in the inpatient mental health facility. She expresses regret for attempting suicide. She denies current suicidal or homicidal ideation.    Joy Mcnamara is now medically stable to transfer to inpatient mental health facility.     Patient /Hospital Summary (Details -- Problem Oriented) :          Hypertension  Assessment & Plan  Likely pain-related.       Plan:  Lisinopril 5 mg daily  PCP to consider titration as needed     Severe major depressive disorder (HCC)  Assessment & Plan  Reactive affect, still endorses depressed mood.   She is unwilling to resume Lexapro.       Toxic metabolic encephalopathy  Assessment & Plan  -Secondary to drug overdose, requiring intubation in the emergency department  -RESOLVED    Suicide attempt (HCC)  Assessment & Plan  - On legal hold  - Medically stable to transfer to inpatient mental health facility     Abnormal chest x-ray  Assessment & Plan  - Chest X ray showed infiltrate at the Left lower lung base  - pro calcitonin is normal  - On Unasyn for antibiotic coverage- discontinued     She is asymptomatic: no fever, no leukocytosis and she is not needing supplemental oxygen.   Her pulmonary exam is normal.   She does not have pneumonia.          Prolonged Q-T interval on ECG  Assessment & Plan  - Prolonged Qtc on EKG at the time of Admission from Seroquel, Lexapro & Tizanidine OD.     QTc shortened to 485 on last check on 10/3.   This is still high. Recommend avoiding QTc prolonging meds until QTc is below 460 ms  No arrhythmia on telemetry.       Increased anion gap  metabolic acidosis  Assessment & Plan  Gap improved.   She now has hyperchloremic non-anion gap metabolic acidosis likely from the Normal Saline she received.      * Polysubstance overdose  Assessment & Plan  Intentional overdose with  ibuprofen, lexapro, tizanidine, Seroquel.  Needed brief intubation, electrolyte repair, & fluid resuscitation for hypotension and bradycardia.   Now stable on room air.  QTc duration improving.     - No signs of Serotonin syndrome     Plan:  -admit to ICU for close monitoring  -Telemetry to monitor for torsades  -Optimize electrolytes  -On Legal hold started in the ED  -Psychiatry consulted- legal hold extended, discharge to Mental health Facility. Pt is medically stable.       Acute respiratory failure with hypoxia (HCC)  Assessment & Plan  RESOLVED  - Patient was Intubated for airway protection on 9/30/19  - Optimize oxygenation and ventilation   - Extubated 10/1/19, now on room air        Consultants:     Critical Care  Psychiatry     Procedures:        Intubation & Mechanical Ventilation 9/30 to 10/1       Imaging/ Testing:      DX-CHEST-PORTABLE (1 VIEW)   Final Result         1.  Hazy left lung base opacity, appearance suggests early infiltrate.      DX-CHEST-PORTABLE (1 VIEW)   Final Result         1.  Hazy left lung base opacity, appearance suggests early infiltrate.      DX-CHEST-PORTABLE (1 VIEW)   Final Result      Low lung volumes with mild perihilar and basilar interstitial opacities probably related to hypoinflation.            Discharge Medications:         Medication Reconciliation: Completed     Current Outpatient Medications   Medication Sig Dispense Refill   • [START ON 10/4/2019] lisinopril (PRINIVIL) 5 MG Tab Take 1 Tab by mouth every day. 30 Tab 0   • [START ON 10/4/2019] fluticasone (FLONASE) 50 MCG/ACT nasal spray Spray 2 Sprays in nose every day. 16 g 0            Medication List      START taking these medications      Instructions   fluticasone 50 MCG/ACT  nasal spray  Start taking on:  10/4/2019  Commonly known as:  FLONASE   Spray 2 Sprays in nose every day.  Dose:  2 Spray     lisinopril 5 MG Tabs  Start taking on:  10/4/2019  Commonly known as:  PRINIVIL   Take 1 Tab by mouth every day.  Dose:  5 mg        CONTINUE taking these medications      Instructions   amphetamine-dextroamphetamine 10 MG Tabs  Commonly known as:  ADDERALL   Take 5 mg by mouth every day.  Dose:  5 mg     atomoxetine 40 MG capsule  Commonly known as:  STRATTERA   Take 40 mg by mouth every day.  Dose:  40 mg     cyanocobalamin 1000 MCG/ML Soln  Commonly known as:  VITAMIN B-12   1,000 mcg by Intramuscular route every 30 days.  Dose:  1,000 mcg     escitalopram 20 MG tablet  Commonly known as:  LEXAPRO   Doctor's comments:  Dr. Deluca  Take 1 Tab by mouth every day.  Dose:  20 mg     ibuprofen 800 MG Tabs  Commonly known as:  MOTRIN   Take 800 mg by mouth every 8 hours as needed.  Dose:  800 mg     oxyCODONE immediate-release 5 MG Tabs  Commonly known as:  ROXICODONE   Take 5 mg by mouth every four hours as needed for Severe Pain.  Dose:  5 mg     QUEtiapine 100 MG Tabs  Commonly known as:  SEROQUEL   Take 100 mg by mouth every bedtime.  Dose:  100 mg            Disposition:   Medically stable to transfer to [facility] today.   Diet:   Regular  Activity:   As tolerated     Instructions:         The patient was instructed to return to the ER in the event of worsening symptoms. I have counseled the patient on the importance of compliance and the patient has agreed to proceed with all medical recommendations and follow up plan indicated above.   The patient understands that all medications come with benefits and risks. Risks may include permanent injury or death and these risks can be minimized with close reassessment and monitoring.        Primary Care Provider:    ILYA Marie  Discharge summary faxed to primary care provider:  Completed  Copy of discharge summary  given to the patient: Deferred      Follow up appointment details :      No future appointments.  No follow-up provider specified.      Pending Studies:            Time spent on discharge day patient visit, preparing discharge paperwork and arranging for patient follow up.    Summary of follow up issues:   Work with provider at [facility] to decide on anti-depressant choice.       Discharge Time (Minutes) :    45 min  Hospital Course Type:  Inpatient Stay >2 midnights      Condition on Discharge  Medically stable, awake, alert and ambulatory. Re-active affect, depressed mood but looks forward to recovery.   ______________________________________________________________________    Interval history/exam for day of discharge:    She feels well today. She regrets her decision to attempt suicide.   Patient complains of tension-type headache this morning and some nasal lcongestion. Improved with Toradol and caffeine intake.   Denies any fever, chills, shortness of breath, dizziness.  Appetite improved  Remains hypertensive -145, improved with po scheduled AntiHTNsives PRNs,   Hyperchloremic anion gap metabolic acidosis: improving (she initially received NS then LR)  Renal function improved  On legal hold.      Review of Systems   Constitutional: Negative for chills and fever.   HENT: Positive for congestion.    Eyes: Negative for blurred vision.   Respiratory: Positive for cough. Negative for sputum production, shortness of breath and wheezing.    Cardiovascular: Negative for chest pain, palpitations, orthopnea and leg swelling.   Gastrointestinal: Negative for abdominal pain, heartburn, nausea and vomiting.   Genitourinary: Negative for dysuria, frequency and urgency.   Musculoskeletal: Positive for back pain.   Skin: Negative for itching and rash.   Neurological: Positive for headaches. Negative for dizziness, speech change and focal weakness.   Psychiatric/Behavioral: The patient is not  "nervous/anxious.       PHYSICAL EXAM  Vitals          Vitals:     10/03/19 1000 10/03/19 1100 10/03/19 1200 10/03/19 1300   BP: 153/81 145/84 (!) 165/95 153/90   Pulse: 79 78 95 86   Resp: 16 16 16 16   Temp:           TempSrc:           SpO2:           Weight:           Height:                Body mass index is 33.79 kg/m².  /90   Pulse 86   Temp 37.3 °C (99.2 °F) (Temporal)   Resp 16   Ht 1.753 m (5' 9\")   Wt 103.8 kg (228 lb 12.7 oz)   LMP 03/16/2017   SpO2 97%   BMI 33.79 kg/m²   O2 therapy: Pulse Oximetry: 97 %, O2 Delivery: None (Room Air)     Physical Exam   Constitutional: She is oriented to person, place, and time and well-developed, well-nourished, and in no distress. No distress.   HENT:   Head: Normocephalic and atraumatic.   Mouth/Throat: Oropharynx is clear and moist. No oropharyngeal exudate.   Eyes: Pupils are equal, round, and reactive to light. Conjunctivae are normal. Right eye exhibits no discharge. Left eye exhibits no discharge. No scleral icterus.   Neck: Neck supple. No JVD present.   Cardiovascular: Normal rate, regular rhythm, normal heart sounds and intact distal pulses. Exam reveals no gallop and no friction rub.   No murmur heard.  Pulmonary/Chest: Effort normal and breath sounds normal. No stridor. No respiratory distress. She has no wheezes. She has no rales. She exhibits no tenderness.   Abdominal: Soft. Bowel sounds are normal. She exhibits no distension. There is no tenderness.   Musculoskeletal: She exhibits no edema.   Lymphadenopathy:     She has no cervical adenopathy.   Neurological: She is alert and oriented to person, place, and time. She has normal reflexes. No cranial nerve deficit. She exhibits normal muscle tone. Gait normal. Coordination normal. GCS score is 15.   Skin: Skin is warm and dry. She is not diaphoretic.   Psychiatric: Mood, memory, affect and judgment normal.   Nursing note and vitals reviewed.        Most Recent Labs:    Lab Results   Component " Value Date/Time    WBC 9.3 10/03/2019 03:50 AM    RBC 4.70 10/03/2019 03:50 AM    HEMOGLOBIN 14.2 10/03/2019 03:50 AM    HEMATOCRIT 42.0 10/03/2019 03:50 AM    MCV 89.4 10/03/2019 03:50 AM    MCH 30.2 10/03/2019 03:50 AM    MCHC 33.8 10/03/2019 03:50 AM    MPV 9.8 10/03/2019 03:50 AM    NEUTSPOLYS 86.00 (H) 10/03/2019 03:50 AM    LYMPHOCYTES 7.90 (L) 10/03/2019 03:50 AM    MONOCYTES 5.30 10/03/2019 03:50 AM    EOSINOPHILS 0.10 10/03/2019 03:50 AM    BASOPHILS 0.50 10/03/2019 03:50 AM      Lab Results   Component Value Date/Time    SODIUM 143 10/03/2019 03:50 AM    POTASSIUM 3.4 (L) 10/03/2019 03:50 AM    CHLORIDE 112 10/03/2019 03:50 AM    CO2 17 (L) 10/03/2019 03:50 AM    GLUCOSE 102 (H) 10/03/2019 03:50 AM    BUN 10 10/03/2019 03:50 AM    CREATININE 0.98 10/03/2019 03:50 AM    CREATININE 0.6 03/27/2007 06:55 PM      Lab Results   Component Value Date/Time    ALTSGPT 12 10/01/2019 05:30 AM    ASTSGOT 13 10/01/2019 05:30 AM    ALKPHOSPHAT 46 10/01/2019 05:30 AM    TBILIRUBIN 0.2 10/01/2019 05:30 AM    LIPASE 17 01/15/2015 11:15 AM    ALBUMIN 3.0 (L) 10/01/2019 05:30 AM    GLOBULIN 1.6 (L) 10/01/2019 05:30 AM    INR 1.00 09/24/2013 09:54 AM     Lab Results   Component Value Date/Time    PROTHROMBTM 13.4 09/24/2013 09:54 AM    INR 1.00 09/24/2013 09:54 AM

## 2019-10-03 NOTE — PROGRESS NOTES
"Pt c/o HA 10/10 \"I feel like my head is going to explode\"  Medicated with PRN tylenol previously and pt stated it was not helping. Emesis x2 \"from the headache being so bad\". Dr. Ebony holden. New orders rec'd. Refer to MAR.   "

## 2019-10-03 NOTE — DISCHARGE SUMMARY
Received Transport Form 1300  Spoke to Rubina at [facility]  Transport is scheduled for today at 1600 going to [facility].

## 2019-11-04 ENCOUNTER — OFFICE VISIT (OUTPATIENT)
Dept: MEDICAL GROUP | Facility: LAB | Age: 34
End: 2019-11-04
Payer: COMMERCIAL

## 2019-11-04 VITALS
HEART RATE: 72 BPM | DIASTOLIC BLOOD PRESSURE: 82 MMHG | BODY MASS INDEX: 30.34 KG/M2 | SYSTOLIC BLOOD PRESSURE: 138 MMHG | HEIGHT: 72 IN | TEMPERATURE: 98.2 F | WEIGHT: 224 LBS | OXYGEN SATURATION: 98 % | RESPIRATION RATE: 14 BRPM

## 2019-11-04 DIAGNOSIS — M51.36 DEGENERATIVE DISC DISEASE, LUMBAR: ICD-10-CM

## 2019-11-04 DIAGNOSIS — T50.902D: ICD-10-CM

## 2019-11-04 DIAGNOSIS — Z09 HOSPITAL DISCHARGE FOLLOW-UP: ICD-10-CM

## 2019-11-04 DIAGNOSIS — I10 ESSENTIAL HYPERTENSION: ICD-10-CM

## 2019-11-04 DIAGNOSIS — R41.840 ATTENTION DEFICIT: ICD-10-CM

## 2019-11-04 DIAGNOSIS — F41.1 GAD (GENERALIZED ANXIETY DISORDER): ICD-10-CM

## 2019-11-04 DIAGNOSIS — F33.1 MODERATE EPISODE OF RECURRENT MAJOR DEPRESSIVE DISORDER (HCC): ICD-10-CM

## 2019-11-04 DIAGNOSIS — T39.392D: ICD-10-CM

## 2019-11-04 DIAGNOSIS — T14.91XA SUICIDE ATTEMPT (HCC): ICD-10-CM

## 2019-11-04 PROBLEM — R94.31 PROLONGED Q-T INTERVAL ON ECG: Status: RESOLVED | Noted: 2019-09-30 | Resolved: 2019-11-04

## 2019-11-04 PROBLEM — E87.29 INCREASED ANION GAP METABOLIC ACIDOSIS: Status: RESOLVED | Noted: 2019-09-30 | Resolved: 2019-11-04

## 2019-11-04 PROBLEM — J96.01 ACUTE RESPIRATORY FAILURE WITH HYPOXIA (HCC): Status: RESOLVED | Noted: 2019-10-01 | Resolved: 2019-11-04

## 2019-11-04 PROBLEM — G92.8 TOXIC METABOLIC ENCEPHALOPATHY: Status: RESOLVED | Noted: 2019-09-30 | Resolved: 2019-11-04

## 2019-11-04 PROBLEM — R93.89 ABNORMAL CHEST X-RAY: Status: RESOLVED | Noted: 2019-10-01 | Resolved: 2019-11-04

## 2019-11-04 PROCEDURE — 99215 OFFICE O/P EST HI 40 MIN: CPT | Performed by: NURSE PRACTITIONER

## 2019-11-04 RX ORDER — DULOXETIN HYDROCHLORIDE 30 MG/1
30 CAPSULE, DELAYED RELEASE ORAL DAILY
Qty: 45 CAP | Refills: 1 | Status: SHIPPED | OUTPATIENT
Start: 2019-11-04 | End: 2019-12-03 | Stop reason: SDUPTHER

## 2019-11-04 RX ORDER — DEXTROAMPHETAMINE SACCHARATE, AMPHETAMINE ASPARTATE, DEXTROAMPHETAMINE SULFATE AND AMPHETAMINE SULFATE 2.5; 2.5; 2.5; 2.5 MG/1; MG/1; MG/1; MG/1
10 TABLET ORAL DAILY
Qty: 30 TAB | Refills: 0 | Status: SHIPPED | OUTPATIENT
Start: 2019-11-08 | End: 2019-12-03 | Stop reason: SDUPTHER

## 2019-11-04 RX ORDER — LISINOPRIL 10 MG/1
10 TABLET ORAL DAILY
Qty: 30 TAB | Refills: 0 | Status: SHIPPED | OUTPATIENT
Start: 2019-11-04 | End: 2019-12-03 | Stop reason: SDUPTHER

## 2019-11-04 RX ORDER — PROPRANOLOL HYDROCHLORIDE 20 MG/1
20 TABLET ORAL 2 TIMES DAILY
Qty: 60 TAB | Refills: 1 | Status: SHIPPED | OUTPATIENT
Start: 2019-11-04 | End: 2019-12-03 | Stop reason: SDUPTHER

## 2019-11-05 NOTE — PROGRESS NOTES
Chief Complaint   Patient presents with   • Hospital Follow-up       HPI  34-year-old established female here for hospital follow-up.  Unfortunately she intentionally overdosed on Seroquel, ibuprofen, tizanidine and Lexapro at home on September 30.  She was intubated in the ICU for several days, extubated on October 1.  She did have issues with QT prolongation which resolved.  She had a brief stent of decreased renal function which improved upon discharge.  She did not have any liver damage as evidenced by normal liver function testing.  She did not have GI problems while hospitalized and has not had any heartburn since.  Denies any black or bloody stools.    She was transferred from [facility] to [facility] which she states was unproductive.  Fortunately she tells me that she does not have any further suicidal or homicidal ideations.  She tells me that she has a good supportive family life.  She is a bit irritated that she cannot see her psychiatrist because of insurance reasons right now but is hoping to return in the next 2 months.  She will be seeing her therapist in the next few weeks.    HTN: This is a new issue.  Currently taking 5 mg lisinopril daily.  Checking BP at home 140-160 / .  HR good around 80.  Tells me that she has occasional heart tinges but these can be improved by moving her chest around.  Denies any trouble breathing.  Denies leg swelling.  Has chronic migraines, these have not worsened.    Chronic LBP: seeing [facility] - tizanidine dropped to 2 mg and had recent injections which helped.  Using butrans patch at 5 mcg / hr and this helps.  She has not prescribed any oral narcotics at this time.    Still feeling anxious and not sleeping well.  Denies any further SI.  Felt like a zombi on prozac.  paxil did not work when she was 13 and had suicidal attempt.  Does not remember feeling anything with effexor.  Was on lexapro when she had a suicidal attempt.  Would like to try something  for depression.  Has discontinued the use of Seroquel because she did not find it helpful.  She does need a refill of Adderall as she will not be able to see her psychiatrist due to finances until December.  She has been stable on Adderall for over a year.    Only daily medications right now are adderall 10 mg once daily, lisinopril 5 mg and her butrans patch.      Hospital labs:   Component      Latest Ref Rng & Units 10/1/2019 10/1/2019 10/1/2019 10/2/2019           5:30 AM  1:00 PM  1:00 PM  5:43 AM   Sodium      135 - 145 mmol/L  141  140   Potassium      3.6 - 5.5 mmol/L  4.1  4.0   Chloride      96 - 112 mmol/L  114 (H)  111   Co2      20 - 33 mmol/L  13 (L)  14 (L)   Glucose      65 - 99 mg/dL  76  84   Bun      8 - 22 mg/dL  17  19   Creatinine      0.50 - 1.40 mg/dL  1.23  1.28   Calcium      8.5 - 10.5 mg/dL  9.1  8.9   Anion Gap      0.0 - 11.9  14.0 (H)  15.0 (H)   GFR If African American      >60 mL/min/1.73 m 2 >60  60    GFR If Non African American      >60 mL/min/1.73 m 2 >60  49 (A)      Component      Latest Ref Rng & Units 10/2/2019 10/3/2019 10/3/2019           5:43 AM  3:50 AM  3:50 AM   Sodium      135 - 145 mmol/L  143    Potassium      3.6 - 5.5 mmol/L  3.4 (L)    Chloride      96 - 112 mmol/L  112    Co2      20 - 33 mmol/L  17 (L)    Glucose      65 - 99 mg/dL  102 (H)    Bun      8 - 22 mg/dL  10    Creatinine      0.50 - 1.40 mg/dL  0.98    Calcium      8.5 - 10.5 mg/dL  9.6    Anion Gap      0.0 - 11.9  14.0 (H)    GFR If African American      >60 mL/min/1.73 m 2 57 (A)  >60   GFR If Non African American      >60 mL/min/1.73 m 2 47 (A)  >60     Past medical, surgical, family, and social history is reviewed and updated in Epic chart by me today.   Medications and allergies reviewed and updated in Epic chart by me today.     ROS:   As documented in history of present illness above    Exam:  /82 (BP Location: Left arm, Patient Position: Sitting, BP Cuff Size: Large adult)   Pulse 72    Temp 36.8 °C (98.2 °F)   Resp 14   Ht 1.829 m (6')   Wt 101.6 kg (224 lb)   SpO2 98%   Constitutional: Alert, no distress, plus 3 vital signs  Skin:  Warm, dry, no rashes invisible areas  Eye: Equal, round and reactive, conjunctiva clear  ENMT: Lips without lesions, good dentition, oropharynx clear    Neck: Trachea midline, no masses, no thyromegaly  Respiratory: Unlabored respiration, lungs clear to auscultation, no wheezes, no rhonchi  Cardiovascular: Normal rate and rhythm, no murmur, no edema  Abdomen: Soft, nontender, no masses or hepatosplenomegaly  Psych: Alert, pleasant, well-groomed, normal affect.  Makes good eye contact.  Logically tells me the entire story.  Reiterates that she is not having any suicidal or homicidal ideations.    Assessment / Plan / Medical Decision makin. Hospital discharge follow-up  -Reviewed the patient's hospitalization with her in depth including lab work, imaging, ICU stays and discharge summary and she had no further questions.    2. Moderate episode of recurrent major depressive disorder (HCC)  -Trial of Cymbalta 30 mg, increasing to 60 mg after 2 weeks.  Discussed potential side effects of Cymbalta as well as length of onset efficacy.  Encouraged her to plan on seeing her psychiatrist within 4 to 5 weeks if financially possible.  I will see her back here in 4 weeks just in case she is unable to see her psychiatrist.  I encouraged her to return to [facility] or go to [facility] if she has any recurrence of suicidal ideations.  Her  is liking all of her pills and his gun safe which she does not know the code to.  - DULoxetine (CYMBALTA) 30 MG Cap DR Particles; Take 1 Cap by mouth every day. Increase to two pills after 14 days  Dispense: 45 Cap; Refill: 1    3. YOANA (generalized anxiety disorder)  -As above.  Also added on propanolol to help with her anxiety, her poor sleep, her blood pressure.  She has been on propanolol in the past with benefit.   Encouraged her to discontinue propanolol if her heart rate is consistently below 60 or blood pressure below 90/60.  - DULoxetine (CYMBALTA) 30 MG Cap DR Particles; Take 1 Cap by mouth every day. Increase to two pills after 14 days  Dispense: 45 Cap; Refill: 1  - propranolol (INDERAL) 20 MG Tab; Take 1 Tab by mouth 2 times a day.  Dispense: 60 Tab; Refill: 1    4. Attention deficit  -Reviewed her  profile which shows her last Adderall refill was October 9 by Dr. Deluca.  This was refilled for her for 1 month.  In prescribing controlled substances to this patient, I certify that I have obtained and reviewed the medical history of Joy Mcnamara. I have also made a good areli effort to obtain applicable records from other providers who have treated the patient and records did not demonstrate any increased risk of substance abuse that would prevent me from prescribing controlled substances.     I have conducted a physical exam and documented it. I have reviewed Ms. Mcnamara’s prescription history as maintained by the [place] Prescription Monitoring Program.     I have assessed the patient’s risk for abuse, dependency, and addiction using the validated Opioid Risk Tool available at https://www.mdcalc.com/qrqrgf-juga-qies-ort-narcotic-abuse.     Given the above, I believe the benefits of controlled substance therapy outweigh the risks. The reasons for prescribing controlled substances include non-narcotic, oral analgesic alternatives have been inadequate for pain control. Accordingly, I have discussed the risk and benefits, treatment plan, and alternative therapies with the patient.     - amphetamine-dextroamphetamine (ADDERALL) 10 MG Tab; Take 1 Tab by mouth every day for 30 days.  Dispense: 30 Tab; Refill: 0    5. Essential hypertension  -Borderline elevated today.  Encouraged her to increase lisinopril to 10 mg and added on propanolol twice daily.  She will keep track of her blood pressure twice daily at home  and email me for readings consistently over 140/90 or below 90/60.  Encouraged a low-sodium diet and exercise.  - lisinopril (PRINIVIL) 10 MG Tab; Take 1 Tab by mouth every day.  Dispense: 30 Tab; Refill: 0  - propranolol (INDERAL) 20 MG Tab; Take 1 Tab by mouth 2 times a day.  Dispense: 60 Tab; Refill: 1    6. Overdose of nonsteroidal anti-inflammatory drug (NSAID), intentional self-harm, subsequent encounter  -Recommend updated CMP prior to our next appointment.  She understands to avoid all NSAIDs.  She is staying well-hydrated.  - Comp Metabolic Panel; Future    7. Polysubstance overdose, intentional self-harm, subsequent encounter    8. Suicide attempt (HCC)    9. Degenerative disc disease, lumbar  -Followed by [facility]    10. Chronic migraine  -I did not prescribe her any further Fioricet as this was also included in her overdose attempt.  She may take propanolol for preventative therapy.    Total face to face time 40 minutes of which over 50% of this visit is spent in counseling, education and outlining a plan of treatment and coordination of care for the above conditions. This included but was not limited to discussion of medication options and potential risks related to the medications, referral and specialty care options. All patient questions were answered    I did discuss this case with Dr. Bolivar who agrees that propranolol will not cause further issues with QT prolongation.

## 2019-12-03 ENCOUNTER — TELEPHONE (OUTPATIENT)
Dept: MEDICAL GROUP | Facility: LAB | Age: 34
End: 2019-12-03

## 2019-12-03 DIAGNOSIS — I10 ESSENTIAL HYPERTENSION: ICD-10-CM

## 2019-12-03 DIAGNOSIS — F41.1 GAD (GENERALIZED ANXIETY DISORDER): ICD-10-CM

## 2019-12-03 DIAGNOSIS — F33.1 MODERATE EPISODE OF RECURRENT MAJOR DEPRESSIVE DISORDER (HCC): ICD-10-CM

## 2019-12-03 DIAGNOSIS — R41.840 ATTENTION DEFICIT: ICD-10-CM

## 2019-12-03 RX ORDER — PROPRANOLOL HYDROCHLORIDE 20 MG/1
TABLET ORAL
Qty: 60 TAB | Refills: 0 | Status: SHIPPED | OUTPATIENT
Start: 2019-12-03 | End: 2020-01-13

## 2019-12-03 RX ORDER — DULOXETIN HYDROCHLORIDE 60 MG/1
60 CAPSULE, DELAYED RELEASE ORAL DAILY
Qty: 30 CAP | Refills: 5 | Status: SHIPPED | OUTPATIENT
Start: 2019-12-03 | End: 2020-10-15

## 2019-12-03 RX ORDER — DEXTROAMPHETAMINE SACCHARATE, AMPHETAMINE ASPARTATE, DEXTROAMPHETAMINE SULFATE AND AMPHETAMINE SULFATE 2.5; 2.5; 2.5; 2.5 MG/1; MG/1; MG/1; MG/1
10 TABLET ORAL DAILY
Qty: 30 TAB | Refills: 0 | Status: SHIPPED | OUTPATIENT
Start: 2019-12-04 | End: 2020-01-03

## 2019-12-03 RX ORDER — LISINOPRIL 10 MG/1
TABLET ORAL
Qty: 30 TAB | Refills: 3 | Status: SHIPPED | OUTPATIENT
Start: 2019-12-03 | End: 2020-10-15

## 2019-12-03 NOTE — TELEPHONE ENCOUNTER
Was the patient seen in the last year in this department? Yes 11/4/19    Does patient have an active prescription for medications requested? No     Received Request Via: Pharmacy

## 2019-12-03 NOTE — TELEPHONE ENCOUNTER
Patient states that she has an appt with her psychiatrist next week and that she is not in the office this week and is requesting a early fill of her adderall because patient  is leaving out of town Wednesday.   Please advise     last Fill 11/6/19 #30

## 2020-01-07 ENCOUNTER — HOSPITAL ENCOUNTER (EMERGENCY)
Facility: MEDICAL CENTER | Age: 35
End: 2020-01-07
Attending: EMERGENCY MEDICINE
Payer: COMMERCIAL

## 2020-01-07 VITALS
OXYGEN SATURATION: 98 % | RESPIRATION RATE: 16 BRPM | HEIGHT: 72 IN | WEIGHT: 224 LBS | HEART RATE: 88 BPM | SYSTOLIC BLOOD PRESSURE: 120 MMHG | DIASTOLIC BLOOD PRESSURE: 60 MMHG | BODY MASS INDEX: 30.34 KG/M2 | TEMPERATURE: 98 F

## 2020-01-07 DIAGNOSIS — Z72.89 SELF-INJURIOUS BEHAVIOR: ICD-10-CM

## 2020-01-07 DIAGNOSIS — F43.21 SITUATIONAL DEPRESSION: ICD-10-CM

## 2020-01-07 LAB
AMPHET UR QL SCN: NEGATIVE
BARBITURATES UR QL SCN: NEGATIVE
BENZODIAZ UR QL SCN: NEGATIVE
BZE UR QL SCN: NEGATIVE
CANNABINOIDS UR QL SCN: NEGATIVE
METHADONE UR QL SCN: NEGATIVE
OPIATES UR QL SCN: NEGATIVE
OXYCODONE UR QL SCN: NEGATIVE
PCP UR QL SCN: NEGATIVE
POC BREATHALIZER: 0 PERCENT (ref 0–0.01)
PROPOXYPH UR QL SCN: NEGATIVE

## 2020-01-07 PROCEDURE — 99284 EMERGENCY DEPT VISIT MOD MDM: CPT

## 2020-01-07 PROCEDURE — 80307 DRUG TEST PRSMV CHEM ANLYZR: CPT

## 2020-01-07 PROCEDURE — 90791 PSYCH DIAGNOSTIC EVALUATION: CPT

## 2020-01-07 PROCEDURE — 302970 POC BREATHALIZER: Performed by: EMERGENCY MEDICINE

## 2020-01-07 ASSESSMENT — LIFESTYLE VARIABLES: DO YOU DRINK ALCOHOL: NO
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
## 2020-01-07 NOTE — DISCHARGE PLANNING
"    [facility]  Crisis/Safety Plan    Name:  Joy Mcnamara  MRN:  [id number]  Date:  2020    Warning signs that a crisis may be developing for me or I may be at risk:  1) dysregulated behavior  2) difficulty breathing and rapid heart rate  3) malaise and depression with any thoughts of self harm    Coping strategies I can use on my own (relaxation, physical activity, etc):  1) Identify primary emotions  2) mindfulness practice  3) 5 senses    Ways I can make my environment safe:  1) Secure all firearms in the home  2) Secure all sharp object and lighters that may be used for self harm  3) Secure medications    Things I want to tell myself when I feel a crisis developin) Validate feelings  2) Cheerleading statements  3) \"I am not alone.\"    People I can contact for support or distraction (and their phone numbers):  1) Matteo Mcnamara () [phone]  2) [facility] Therapist [phone]  3) See contacts below    If I’m not able to reach my support people, or the above strategies don’t help, I can contact the following professionals, agencies, or hotlines:  1) Crisis Call Center ():  [phone] -OR- [phone]  2) Crisis Text Line ():  Text CARE TO 212915  3)   4)     Landon Springer R.N.    "

## 2020-01-12 DIAGNOSIS — I10 ESSENTIAL HYPERTENSION: ICD-10-CM

## 2020-01-12 DIAGNOSIS — F41.1 GAD (GENERALIZED ANXIETY DISORDER): ICD-10-CM

## 2020-01-13 RX ORDER — PROPRANOLOL HYDROCHLORIDE 20 MG/1
TABLET ORAL
Qty: 60 TAB | Refills: 0 | Status: SHIPPED | OUTPATIENT
Start: 2020-01-13 | End: 2020-03-06

## 2020-01-13 NOTE — TELEPHONE ENCOUNTER
Was the patient seen in the last year in this department? Yes11/14/19    Does patient have an active prescription for medications requested? No     Received Request Via: Pharmacy

## 2020-03-05 ENCOUNTER — HOSPITAL ENCOUNTER (OUTPATIENT)
Dept: RADIOLOGY | Facility: MEDICAL CENTER | Age: 35
End: 2020-03-05
Attending: PHYSICIAN ASSISTANT
Payer: COMMERCIAL

## 2020-03-05 DIAGNOSIS — M48.062 SPINAL STENOSIS, LUMBAR REGION, WITH NEUROGENIC CLAUDICATION: ICD-10-CM

## 2020-03-05 PROCEDURE — 72148 MRI LUMBAR SPINE W/O DYE: CPT

## 2020-03-06 DIAGNOSIS — F41.1 GAD (GENERALIZED ANXIETY DISORDER): ICD-10-CM

## 2020-03-06 DIAGNOSIS — I10 ESSENTIAL HYPERTENSION: ICD-10-CM

## 2020-03-06 RX ORDER — PROPRANOLOL HYDROCHLORIDE 20 MG/1
TABLET ORAL
Qty: 60 TAB | Refills: 0 | Status: SHIPPED | OUTPATIENT
Start: 2020-03-06 | End: 2020-10-15

## 2020-03-06 NOTE — TELEPHONE ENCOUNTER
Received request via: Pharmacy    Was the patient seen in the last year in this department? Yes11/4/19    Does the patient have an active prescription (recently filled or refills available) for medication(s) requested? No

## 2020-03-10 ENCOUNTER — OFFICE VISIT (OUTPATIENT)
Dept: URGENT CARE | Facility: CLINIC | Age: 35
End: 2020-03-10
Payer: COMMERCIAL

## 2020-03-10 VITALS
SYSTOLIC BLOOD PRESSURE: 116 MMHG | OXYGEN SATURATION: 98 % | RESPIRATION RATE: 16 BRPM | DIASTOLIC BLOOD PRESSURE: 80 MMHG | BODY MASS INDEX: 31.83 KG/M2 | TEMPERATURE: 97.6 F | WEIGHT: 235 LBS | HEART RATE: 80 BPM | HEIGHT: 72 IN

## 2020-03-10 DIAGNOSIS — R05.9 COUGH: ICD-10-CM

## 2020-03-10 DIAGNOSIS — J40 BRONCHITIS: ICD-10-CM

## 2020-03-10 DIAGNOSIS — R23.8 SKIN IRRITATION: ICD-10-CM

## 2020-03-10 PROCEDURE — 99214 OFFICE O/P EST MOD 30 MIN: CPT | Performed by: NURSE PRACTITIONER

## 2020-03-10 RX ORDER — DEXTROMETHORPHAN HYDROBROMIDE AND PROMETHAZINE HYDROCHLORIDE 15; 6.25 MG/5ML; MG/5ML
5 SYRUP ORAL EVERY 4 HOURS PRN
Qty: 100 ML | Refills: 0 | Status: SHIPPED | OUTPATIENT
Start: 2020-03-10 | End: 2020-10-15

## 2020-03-10 RX ORDER — PREDNISONE 20 MG/1
TABLET ORAL
Qty: 10 TAB | Refills: 0 | Status: SHIPPED | OUTPATIENT
Start: 2020-03-10 | End: 2020-10-15

## 2020-03-10 RX ORDER — ALBUTEROL SULFATE 90 UG/1
2 AEROSOL, METERED RESPIRATORY (INHALATION) EVERY 6 HOURS PRN
Qty: 8.5 G | Refills: 0 | Status: SHIPPED | OUTPATIENT
Start: 2020-03-10 | End: 2020-10-15

## 2020-03-10 ASSESSMENT — ENCOUNTER SYMPTOMS
SINUS PAIN: 0
HEARTBURN: 0
FEVER: 0
COUGH: 1
WHEEZING: 0
VOMITING: 0
CHILLS: 0
SHORTNESS OF BREATH: 1
DIZZINESS: 0
SORE THROAT: 1
HEADACHES: 0
NAUSEA: 0
ABDOMINAL PAIN: 0
SPUTUM PRODUCTION: 0

## 2020-03-10 ASSESSMENT — FIBROSIS 4 INDEX: FIB4 SCORE: 0.44

## 2020-03-11 NOTE — PROGRESS NOTES
"Subjective:   Joy Mcnamara  is a 34 y.o. female who presents for Cough (x 4 days, nonproductive cough, hard to breath, low grade fever and sore throat) and Back Pain (x 4 days, back pain, itchy and tingling  \"Had shingles before\")        Cough   This is a new problem. Episode onset: 4 days ago. The problem has been gradually worsening. The cough is non-productive. Associated symptoms include a sore throat and shortness of breath. Pertinent negatives include no chills, fever, headaches, heartburn, rash or wheezing. Associated symptoms comments: Patient reports urgent care for new problem.  States of 4 days ago she started developing a nonproductive cough, difficulty breathing with loud deep respirations and subjective fever with a sore throat.  Patient also states that she had shingles in the past and feels that bilaterally around her bra line she is having an irritation, that feels like burning, itching and tingling.  Patient has not taken anything over-the-counter or done anything over-the-counter for rash wants to ensure that she does not have shingles and patient has been taking Mucinex with mild relief of symptoms. The symptoms are aggravated by lying down.     Review of Systems   Constitutional: Negative for chills, fever and malaise/fatigue.   HENT: Positive for sore throat. Negative for congestion and sinus pain.    Respiratory: Positive for cough and shortness of breath. Negative for sputum production and wheezing.         \"loud breaths that sound like rice krispies\"   Gastrointestinal: Negative for abdominal pain, heartburn, nausea and vomiting.   Skin: Negative for rash.   Neurological: Negative for dizziness and headaches.     Past Medical History:   Diagnosis Date   • Back pain    • Depression     s/p mom's death   • Depression 4/25/2014   • Gynecological disorder    • Migraine headache    • Miscarriage    • Pain 2017    low back; degenerative disk disease      Past Surgical History:   Procedure " Laterality Date   • VAGINAL HYSTERECTOMY SCOPE TOTAL N/A 3/27/2017    Procedure: VAGINAL HYSTERECTOMY SCOPE TOTAL right salpingectomy, partial left salpingectomy. Cystoscopy;  Surgeon: Zayda Santillan M.D.;  Location: SURGERY SAME DAY [facility];  Service:    • PB INJ,FORAMEN,L/S,1 LEVEL Bilateral 2015    Procedure: TRANSFORAMINAL BILATERAL L5-S1 EPIDURAL WITH IV SEDATION;  Surgeon: Tom Main M.D.;  Location: SURGERY [facility];  Service: Pain Management   • PB INJ,FORAMEN,L/S,1 LEVEL  2015    Procedure: INJ-FORAMEN EPI LUM/SAC SNGL;  Surgeon: Tom Main M.D.;  Location: SURGERY [facility];  Service: Pain Management   • PB INJ,FORAMEN,L/S,1 LEVEL  10/8/2014    Performed by Tom Main M.D. at [facility]   • LUMBAR LAMINECTOMY DISKECTOMY  10/3/2013    Performed by Estuardo Carmen M.D. at SURGERY [facility]   • LUMBAR LAMINECTOMY DISKECTOMY  6/10/2009    Performed by ESTUARDO CARMEN at [facility]   • DENTAL EXTRACTION(S)      wisdom   • GYN SURGERY      c section x2   • OTHER ORTHOPEDIC SURGERY     • PRIMARY C SECTION      x2      Social History     Socioeconomic History   • Marital status:      Spouse name: Not on file   • Number of children: Not on file   • Years of education: Not on file   • Highest education level: Not on file   Occupational History   • Not on file   Social Needs   • Financial resource strain: Not on file   • Food insecurity     Worry: Not on file     Inability: Not on file   • Transportation needs     Medical: Not on file     Non-medical: Not on file   Tobacco Use   • Smoking status: Former Smoker     Years: 4.00     Types: Cigarettes     Last attempt to quit: 3/1/2007     Years since quittin.0   • Smokeless tobacco: Never Used   • Tobacco comment: 2007   Substance and Sexual Activity   • Alcohol use: No     Comment: denies ; family hx of alcoholism   • Drug use: No     Types: Marijuana, Methamphetamines   •  Sexual activity: Yes     Birth control/protection: Injection     Comment: , 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; special ed   Lifestyle   • Physical activity     Days per week: Not on file     Minutes per session: Not on file   • Stress: Not on file   Relationships   • Social connections     Talks on phone: Not on file     Gets together: Not on file     Attends Confucianism service: Not on file     Active member of club or organization: Not on file     Attends meetings of clubs or organizations: Not on file     Relationship status: Not on file   • Intimate partner violence     Fear of current or ex partner: Not on file     Emotionally abused: Not on file     Physically abused: Not on file     Forced sexual activity: Not on file   Other Topics Concern   • Not on file   Social History Narrative   • Not on file    Sumatriptan succinate and Tramadol       Objective:   /80 (BP Location: Left arm, Patient Position: Sitting, BP Cuff Size: Large adult)   Pulse 80   Temp 36.4 °C (97.6 °F) (Temporal)   Resp 16   Ht 1.829 m (6')   Wt 106.6 kg (235 lb)   LMP 03/16/2017   SpO2 98%   BMI 31.87 kg/m²   Physical Exam  Vitals signs reviewed.   Constitutional:       Appearance: Normal appearance.   HENT:      Right Ear: Tympanic membrane, ear canal and external ear normal.      Left Ear: Tympanic membrane, ear canal and external ear normal.      Mouth/Throat:      Lips: Pink.      Mouth: Mucous membranes are moist.      Pharynx: Uvula midline.      Comments: Post nasal drip noted  Cardiovascular:      Rate and Rhythm: Normal rate and regular rhythm.      Heart sounds: Normal heart sounds.   Pulmonary:      Effort: Pulmonary effort is normal.      Breath sounds: Wheezing present. No rales.   Lymphadenopathy:      Cervical: Cervical adenopathy present.   Skin:     General: Skin is warm.      Comments: No irritation noticed to back, flanks or sides of patient    Neurological:      Mental Status: She is alert and oriented to person, place, and  time.   Psychiatric:         Mood and Affect: Mood normal.         Behavior: Behavior normal.         Thought Content: Thought content normal.         Judgment: Judgment normal.           Assessment/Plan:      1. Bronchitis  - predniSONE (DELTASONE) 20 MG Tab; Take one in the morning and evening for five days  Dispense: 10 Tab; Refill: 0  - albuterol 108 (90 Base) MCG/ACT Aero Soln inhalation aerosol; Inhale 2 Puffs by mouth every 6 hours as needed for Shortness of Breath.  Dispense: 8.5 g; Refill: 0    2. Cough  - promethazine-dextromethorphan (PROMETHAZINE-DM) 6.25-15 MG/5ML syrup; Take 5 mL by mouth every four hours as needed for Cough.  Dispense: 100 mL; Refill: 0    3. Skin irritation    Discussed physical examination findings as well as length of patient symptoms is likely viral in etiology.  Will treat with supportive care including increase fluids using electrolyte enriched beverages, over-the-counter NSAIDs and Tylenol per 's instructions as well as Mucinex to assist with expectoration along with steroid burst and albuterol inhaler. Will provide night time cough syrup as well    Sedating effects of cough syrup discussed.     Supportive care, differential diagnoses, and indications for immediate follow-up discussed with patient.    Pathogenesis of diagnosis discussed including typical length and natural progression. Patient expresses understanding and agrees to plan.    Instructed patient to return to clinic for worsening symptoms or symptoms that persist for 7 to 10 days     Please note that this dictation was created using voice recognition software. I have made every reasonable attempt to correct obvious errors, but I expect that there are errors of grammar and possibly content that I did not discover before finalizing the note.

## 2020-03-25 RX ORDER — BUTALBITAL, ACETAMINOPHEN AND CAFFEINE 300; 40; 50 MG/1; MG/1; MG/1
CAPSULE ORAL
Qty: 20 CAP | Refills: 0 | Status: SHIPPED
Start: 2020-03-25 | End: 2020-08-24 | Stop reason: SDUPTHER

## 2020-03-25 NOTE — TELEPHONE ENCOUNTER
Received request via: Pharmacy    Was the patient seen in the last year in this department? Yes  11/4/19  Does the patient have an active prescription (recently filled or refills available) for medication(s) requested? No

## 2020-08-24 RX ORDER — BUTALBITAL, ACETAMINOPHEN AND CAFFEINE 300; 40; 50 MG/1; MG/1; MG/1
CAPSULE ORAL
Qty: 20 CAP | Refills: 0 | Status: SHIPPED | OUTPATIENT
Start: 2020-08-24 | End: 2020-10-15 | Stop reason: SDUPTHER

## 2020-08-24 NOTE — TELEPHONE ENCOUNTER
Received request via: Patient    Was the patient seen in the last year in this department? Yes LOV 11/4/19  states no medication dispenses to display since 5/26/2020    Does the patient have an active prescription (recently filled or refills available) for medication(s) requested? No

## 2020-08-24 NOTE — TELEPHONE ENCOUNTER
Joy Mcnamara  to Filomena Patrick A.P.N.          8/24/20 10:59 AM  I have requested a refill of fioricet (acetaminophen/caffeine/butabital [id number]) through the pharmacy that was kicked back. How do I request this this with the pharmacy without it being kicked back?

## 2020-09-14 ENCOUNTER — TELEPHONE (OUTPATIENT)
Dept: MEDICAL GROUP | Facility: LAB | Age: 35
End: 2020-09-14

## 2020-09-14 NOTE — TELEPHONE ENCOUNTER
Patient called and LVM wanting to get a note to return to work. I called patient back and LVM for her to schedule an appt. Virtual or in person

## 2020-10-15 ENCOUNTER — OFFICE VISIT (OUTPATIENT)
Dept: MEDICAL GROUP | Facility: LAB | Age: 35
End: 2020-10-15
Payer: COMMERCIAL

## 2020-10-15 VITALS
RESPIRATION RATE: 12 BRPM | BODY MASS INDEX: 31.02 KG/M2 | HEART RATE: 68 BPM | HEIGHT: 72 IN | WEIGHT: 229 LBS | SYSTOLIC BLOOD PRESSURE: 138 MMHG | OXYGEN SATURATION: 98 % | TEMPERATURE: 97.1 F | DIASTOLIC BLOOD PRESSURE: 88 MMHG

## 2020-10-15 DIAGNOSIS — F98.8 ATTENTION DEFICIT DISORDER, UNSPECIFIED HYPERACTIVITY PRESENCE: ICD-10-CM

## 2020-10-15 DIAGNOSIS — F32.2 SEVERE MAJOR DEPRESSIVE DISORDER (HCC): ICD-10-CM

## 2020-10-15 DIAGNOSIS — G43.009 MIGRAINE WITHOUT AURA AND WITHOUT STATUS MIGRAINOSUS, NOT INTRACTABLE: ICD-10-CM

## 2020-10-15 DIAGNOSIS — Z00.00 WELL ADULT EXAM: ICD-10-CM

## 2020-10-15 PROCEDURE — 99395 PREV VISIT EST AGE 18-39: CPT | Performed by: NURSE PRACTITIONER

## 2020-10-15 RX ORDER — DEXTROAMPHETAMINE SACCHARATE, AMPHETAMINE ASPARTATE MONOHYDRATE, DEXTROAMPHETAMINE SULFATE AND AMPHETAMINE SULFATE 2.5; 2.5; 2.5; 2.5 MG/1; MG/1; MG/1; MG/1
10 CAPSULE, EXTENDED RELEASE ORAL DAILY
Qty: 30 CAP | Refills: 0 | Status: SHIPPED | OUTPATIENT
Start: 2020-11-02 | End: 2020-11-30 | Stop reason: SDUPTHER

## 2020-10-15 RX ORDER — DEXTROAMPHETAMINE/AMPHETAMINE 10 MG
CAPSULE, EXT RELEASE 24 HR ORAL DAILY
COMMUNITY
Start: 2020-10-02 | End: 2020-10-15 | Stop reason: SDUPTHER

## 2020-10-15 RX ORDER — BUTALBITAL, ACETAMINOPHEN AND CAFFEINE 300; 40; 50 MG/1; MG/1; MG/1
CAPSULE ORAL
Qty: 20 CAP | Refills: 0 | Status: SHIPPED | OUTPATIENT
Start: 2020-10-15 | End: 2020-11-30 | Stop reason: SDUPTHER

## 2020-10-15 RX ORDER — TIZANIDINE 4 MG/1
TABLET ORAL
COMMUNITY
Start: 2020-09-28 | End: 2021-10-13

## 2020-10-15 RX ORDER — OXYCODONE HYDROCHLORIDE 5 MG/1
TABLET ORAL
COMMUNITY
Start: 2020-09-18 | End: 2022-05-16

## 2020-10-15 RX ORDER — PROMETHAZINE HYDROCHLORIDE 25 MG/1
TABLET ORAL
COMMUNITY
Start: 2020-09-30 | End: 2023-01-23

## 2020-10-15 ASSESSMENT — PATIENT HEALTH QUESTIONNAIRE - PHQ9
5. POOR APPETITE OR OVEREATING: SEVERAL DAYS
1. LITTLE INTEREST OR PLEASURE IN DOING THINGS: SEVERAL DAYS
7. TROUBLE CONCENTRATING ON THINGS, SUCH AS READING THE NEWSPAPER OR WATCHING TELEVISION: NEARLY EVERY DAY
9. THOUGHTS THAT YOU WOULD BE BETTER OFF DEAD, OR OF HURTING YOURSELF: NOT AT ALL
2. FEELING DOWN, DEPRESSED, IRRITABLE, OR HOPELESS: SEVERAL DAYS
8. MOVING OR SPEAKING SO SLOWLY THAT OTHER PEOPLE COULD HAVE NOTICED. OR THE OPPOSITE, BEING SO FIGETY OR RESTLESS THAT YOU HAVE BEEN MOVING AROUND A LOT MORE THAN USUAL: MORE THAN HALF THE DAYS
6. FEELING BAD ABOUT YOURSELF - OR THAT YOU ARE A FAILURE OR HAVE LET YOURSELF OR YOUR FAMILY DOWN: SEVERAL DAYS
SUM OF ALL RESPONSES TO PHQ9 QUESTIONS 1 AND 2: 2
3. TROUBLE FALLING OR STAYING ASLEEP OR SLEEPING TOO MUCH: NEARLY EVERY DAY
SUM OF ALL RESPONSES TO PHQ QUESTIONS 1-9: 14
4. FEELING TIRED OR HAVING LITTLE ENERGY: MORE THAN HALF THE DAYS

## 2020-10-15 ASSESSMENT — FIBROSIS 4 INDEX: FIB4 SCORE: 0.45

## 2020-10-15 NOTE — PROGRESS NOTES
Chief Complaint   Patient presents with   • Annual Exam       HPI:  Joy is a 35 y.o.est female who presents for annual exam. Still followed closely by [facility] - scheduled this week with their office as well.  Hands ache all the time. Regarding her health maintenance:   Had hysterectomy with Dr. Santillan a few years ago - retains ovaries - denies pelvic bloating / pain.    Last Mammo:2014 - US / diag mammogram.   Last colonoscopy:not applicable - no family hx known. Denies bowel issues.    Bone density test:N\A   Last Lab: due for follow up   Last Td:current   Influenza vaccination:due - pt declines these chronically  Pneumococcal vaccination:not applicable   Hx STD''s: no   Regular exercise: yes    meds:     Current Outpatient Medications:   •  Diclofenac Sodium, WIN AA BID PRN P  •  ADDERALL XR (10MG), Take  by mouth every day. Take 1 capsule by mouth every day  •  oxyCODONE immediate-release, TK 1 T PO Q 8 H PRF PAIN  •  promethazine, TK 1 T PO QHS PRN N  •  tizanidine, TK 1 T PO BID PRF SPASM  •  cyanocobalamin, 1,000 mcg, Intramuscular, Q30 DAYS  •  fluticasone, 2 Spray, Nasal, DAILY (Patient not taking: Reported on 3/10/2020)      Allergies: No Known Allergies    family:   Family History   Problem Relation Age of Onset   • Stroke Mother         aneurysm - pt was 14 yrs old   • Stroke Father         TIA   • Cancer Maternal Grandmother         lung /breast   • Cancer Paternal Grandmother         breast / lung?       social hx:   Social History     Socioeconomic History   • Marital status:      Spouse name: Not on file   • Number of children: Not on file   • Years of education: Not on file   • Highest education level: Not on file   Occupational History   • Not on file   Social Needs   • Financial resource strain: Not on file   • Food insecurity     Worry: Not on file     Inability: Not on file   • Transportation needs     Medical: Not on file     Non-medical: Not on file   Tobacco Use   •  Smoking status: Former Smoker     Years: 4.00     Types: Cigarettes     Quit date: 3/1/2007     Years since quittin.6   • Smokeless tobacco: Never Used   • Tobacco comment: 2007   Substance and Sexual Activity   • Alcohol use: No     Comment: denies ; family hx of alcoholism   • Drug use: No     Types: Marijuana, Methamphetamines   • Sexual activity: Yes     Birth control/protection: Injection     Comment: , 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; special ed   Lifestyle   • Physical activity     Days per week: Not on file     Minutes per session: Not on file   • Stress: Not on file   Relationships   • Social connections     Talks on phone: Not on file     Gets together: Not on file     Attends Pentecostal service: Not on file     Active member of club or organization: Not on file     Attends meetings of clubs or organizations: Not on file     Relationship status: Not on file   • Intimate partner violence     Fear of current or ex partner: Not on file     Emotionally abused: Not on file     Physically abused: Not on file     Forced sexual activity: Not on file   Other Topics Concern   • Not on file   Social History Narrative   • Not on file       ROS:  No fever, chills, sweats.   No polydipsia, polyuria, temperature intolerance, significant weight changes   No visual changes, blurred vision.  No chest pain, palpitations, peripheral swelling   No chronic cough, shortness of breath, dyspnea with exertion.   No dysphagia, odynophagia, black or bloody stools.   No abdominal pain, nausea, persistent diarrhea, constipation   No dysuria, hematuria, incontinence. Denies nocturia  No rash, pruritis, pigment changes.   No focal weakness, syncope, headache, confusion, persistent numbness.   All other systems are reviewed and negative.    PHYSICAL EXAMINATION:  /88   Pulse 68   Temp 36.2 °C (97.1 °F)   Resp 12   Ht 1.829 m (6')   Wt 103.9 kg (229 lb)   SpO2 98%   General appearance:healthy, well developed, well nourished  Psych:  alert, no distress, cooperative  Eyes: EOM's normal, pupils equal, round, reactive to light  ENT: Ears: external ears normal to inspection and palpation, TM's clear, Nose/Sinuses: nose shows no deformity, asymmetry, or inflammation  Neck: no asymmetry, masses, or scars, no adenopathy, thyroid normal to palpation  Lungs:chest symmetric with normal A/P diameter, no chest deformities noted, normal respiratory rate and rhythm  Cardiovascular:regular rate and rhythm, S1 normal  Breasts: normal in size and symmetry, skin normal, physiologic fibronodularity  Abdomen: umbilicus normal, no masses palpable, no organomegaly  Musculoskeletal:ROM of all joints is normal, no evidence of joint instability  Lymphatic: None significantly enlarged  Skin: no rash, no edema  Neuro: mental status intact, cranial nerves 2-12 intact    ASSESSMENT/PLAN:  1.annual physical exam: HCM:    Encourage monthly self breast exam  Encourage daily exercise for at least 30 minutes  Recommend mammogram age 38.  Colonoscopy age 48  Recommend 1500 mg Calcium with 600 units vit d daily.    Recommend CBC, CMP, TSH, LP - fasting.  Refilled Fioricet, migraine symptomatically improved since hysterectomy, occurring every other month or so.  Also refilled Adderall until she is able to reestablish with a new psychiatrist, referral was placed today.  Reviewed her  profile which shows that her last Adderall refill was on October 5 by her previous psychiatrist that she chooses not to return to.  States that her depression is fairly controlled right now, denies SI or HI.  Pain management is through [facility] as above.

## 2020-11-30 ENCOUNTER — PATIENT MESSAGE (OUTPATIENT)
Dept: MEDICAL GROUP | Facility: LAB | Age: 35
End: 2020-11-30

## 2020-11-30 DIAGNOSIS — F98.8 ATTENTION DEFICIT DISORDER, UNSPECIFIED HYPERACTIVITY PRESENCE: ICD-10-CM

## 2020-11-30 DIAGNOSIS — G43.009 MIGRAINE WITHOUT AURA AND WITHOUT STATUS MIGRAINOSUS, NOT INTRACTABLE: ICD-10-CM

## 2020-11-30 RX ORDER — BUTALBITAL, ACETAMINOPHEN AND CAFFEINE 300; 40; 50 MG/1; MG/1; MG/1
CAPSULE ORAL
Qty: 20 CAP | Refills: 0 | Status: SHIPPED | OUTPATIENT
Start: 2020-11-30 | End: 2021-03-29 | Stop reason: SDUPTHER

## 2020-11-30 RX ORDER — CYANOCOBALAMIN 1000 UG/ML
1000 INJECTION, SOLUTION INTRAMUSCULAR; SUBCUTANEOUS
Qty: 1 ML | Refills: 1 | Status: SHIPPED | OUTPATIENT
Start: 2020-11-30 | End: 2021-03-29 | Stop reason: SDUPTHER

## 2020-11-30 RX ORDER — DEXTROAMPHETAMINE SACCHARATE, AMPHETAMINE ASPARTATE MONOHYDRATE, DEXTROAMPHETAMINE SULFATE AND AMPHETAMINE SULFATE 2.5; 2.5; 2.5; 2.5 MG/1; MG/1; MG/1; MG/1
10 CAPSULE, EXTENDED RELEASE ORAL DAILY
Qty: 30 CAP | Refills: 0 | Status: SHIPPED | OUTPATIENT
Start: 2020-11-30 | End: 2020-12-29 | Stop reason: SDUPTHER

## 2020-11-30 NOTE — TELEPHONE ENCOUNTER
----- Message from Joy Mcnamara sent at 11/30/2020  6:16 AM PST -----  Regarding: Prescription Question  Contact: [phone]  I need to request 3 prescriptions that I am unable to select through the request page. B12 injection, Adderall 10mg xr, and fioricett. I am due for Adderall today and have had a lot of trouble with migraines recently with the weather changing. Thank you.

## 2020-11-30 NOTE — PATIENT COMMUNICATION
Received request via: Patient    Was the patient seen in the last year in this department? Yes  10/15/2020  Does the patient have an active prescription (recently filled or refills available) for medication(s) requested? No

## 2020-12-12 NOTE — TELEPHONE ENCOUNTER
Was the patient seen in the last year in this department? Yes     Does patient have an active prescription for medications requested? No     Received Request Via: Pharmacy   Symptoms improved/No adverse reaction to first time med in ED/Alert and oriented to person, place and time

## 2020-12-29 ENCOUNTER — HOSPITAL ENCOUNTER (OUTPATIENT)
Dept: LAB | Facility: MEDICAL CENTER | Age: 35
End: 2020-12-29
Attending: NURSE PRACTITIONER
Payer: COMMERCIAL

## 2020-12-29 ENCOUNTER — PATIENT MESSAGE (OUTPATIENT)
Dept: MEDICAL GROUP | Facility: LAB | Age: 35
End: 2020-12-29

## 2020-12-29 DIAGNOSIS — Z00.00 WELL ADULT EXAM: ICD-10-CM

## 2020-12-29 DIAGNOSIS — F98.8 ATTENTION DEFICIT DISORDER, UNSPECIFIED HYPERACTIVITY PRESENCE: ICD-10-CM

## 2020-12-29 LAB
ALBUMIN SERPL BCP-MCNC: 4.5 G/DL (ref 3.2–4.9)
ALBUMIN/GLOB SERPL: 1.7 G/DL
ALP SERPL-CCNC: 75 U/L (ref 30–99)
ALT SERPL-CCNC: 21 U/L (ref 2–50)
ANION GAP SERPL CALC-SCNC: 11 MMOL/L (ref 7–16)
AST SERPL-CCNC: 19 U/L (ref 12–45)
BASOPHILS # BLD AUTO: 0.6 % (ref 0–1.8)
BASOPHILS # BLD: 0.04 K/UL (ref 0–0.12)
BILIRUB SERPL-MCNC: 0.4 MG/DL (ref 0.1–1.5)
BUN SERPL-MCNC: 11 MG/DL (ref 8–22)
CALCIUM SERPL-MCNC: 9.2 MG/DL (ref 8.5–10.5)
CHLORIDE SERPL-SCNC: 106 MMOL/L (ref 96–112)
CHOLEST SERPL-MCNC: 197 MG/DL (ref 100–199)
CO2 SERPL-SCNC: 26 MMOL/L (ref 20–33)
CREAT SERPL-MCNC: 0.62 MG/DL (ref 0.5–1.4)
EOSINOPHIL # BLD AUTO: 0.17 K/UL (ref 0–0.51)
EOSINOPHIL NFR BLD: 2.6 % (ref 0–6.9)
ERYTHROCYTE [DISTWIDTH] IN BLOOD BY AUTOMATED COUNT: 45.1 FL (ref 35.9–50)
FASTING STATUS PATIENT QL REPORTED: NORMAL
GLOBULIN SER CALC-MCNC: 2.6 G/DL (ref 1.9–3.5)
GLUCOSE SERPL-MCNC: 86 MG/DL (ref 65–99)
HCT VFR BLD AUTO: 41.2 % (ref 37–47)
HDLC SERPL-MCNC: 54 MG/DL
HGB BLD-MCNC: 13.7 G/DL (ref 12–16)
IMM GRANULOCYTES # BLD AUTO: 0.01 K/UL (ref 0–0.11)
IMM GRANULOCYTES NFR BLD AUTO: 0.2 % (ref 0–0.9)
LDLC SERPL CALC-MCNC: 133 MG/DL
LYMPHOCYTES # BLD AUTO: 1.72 K/UL (ref 1–4.8)
LYMPHOCYTES NFR BLD: 26.2 % (ref 22–41)
MCH RBC QN AUTO: 30.6 PG (ref 27–33)
MCHC RBC AUTO-ENTMCNC: 33.3 G/DL (ref 33.6–35)
MCV RBC AUTO: 92 FL (ref 81.4–97.8)
MONOCYTES # BLD AUTO: 0.49 K/UL (ref 0–0.85)
MONOCYTES NFR BLD AUTO: 7.5 % (ref 0–13.4)
NEUTROPHILS # BLD AUTO: 4.14 K/UL (ref 2–7.15)
NEUTROPHILS NFR BLD: 62.9 % (ref 44–72)
NRBC # BLD AUTO: 0 K/UL
NRBC BLD-RTO: 0 /100 WBC
PLATELET # BLD AUTO: 234 K/UL (ref 164–446)
PMV BLD AUTO: 10.5 FL (ref 9–12.9)
POTASSIUM SERPL-SCNC: 3.4 MMOL/L (ref 3.6–5.5)
PROT SERPL-MCNC: 7.1 G/DL (ref 6–8.2)
RBC # BLD AUTO: 4.48 M/UL (ref 4.2–5.4)
SODIUM SERPL-SCNC: 143 MMOL/L (ref 135–145)
TRIGL SERPL-MCNC: 50 MG/DL (ref 0–149)
TSH SERPL DL<=0.005 MIU/L-ACNC: 2.53 UIU/ML (ref 0.38–5.33)
WBC # BLD AUTO: 6.6 K/UL (ref 4.8–10.8)

## 2020-12-29 PROCEDURE — 84443 ASSAY THYROID STIM HORMONE: CPT

## 2020-12-29 PROCEDURE — 80061 LIPID PANEL: CPT

## 2020-12-29 PROCEDURE — 80053 COMPREHEN METABOLIC PANEL: CPT

## 2020-12-29 PROCEDURE — 85025 COMPLETE CBC W/AUTO DIFF WBC: CPT

## 2020-12-29 PROCEDURE — 36415 COLL VENOUS BLD VENIPUNCTURE: CPT

## 2020-12-29 RX ORDER — DEXTROAMPHETAMINE SACCHARATE, AMPHETAMINE ASPARTATE MONOHYDRATE, DEXTROAMPHETAMINE SULFATE AND AMPHETAMINE SULFATE 2.5; 2.5; 2.5; 2.5 MG/1; MG/1; MG/1; MG/1
10 CAPSULE, EXTENDED RELEASE ORAL DAILY
Qty: 30 CAP | Refills: 0 | Status: SHIPPED | OUTPATIENT
Start: 2020-12-29 | End: 2021-01-28 | Stop reason: SDUPTHER

## 2020-12-29 NOTE — PATIENT COMMUNICATION
Received request via: Patient    Was the patient seen in the last year in this department? Yes  LOV: 10/2020    Does the patient have an active prescription (recently filled or refills available) for medication(s) requested? No

## 2020-12-29 NOTE — TELEPHONE ENCOUNTER
From: Joy Mcnamara  To: ILYA Marie  Sent: 12/29/2020 3:24 PM PST  Subject: Prescription Question    Good afternoon, I will be due for my prescription refill this week for Adderall. I have not yet met with a new psychiatrist and am hoping to by next month. I cannot recall if I need to come in at 2 months or 3.

## 2020-12-30 ENCOUNTER — TELEPHONE (OUTPATIENT)
Dept: MEDICAL GROUP | Facility: LAB | Age: 35
End: 2020-12-30

## 2020-12-30 NOTE — TELEPHONE ENCOUNTER
Sent my chart to inquire about amphetamine-dextroamphetamine XR (ADDERALL XR, 10MG,) 10 MG CAPSULE SR 24 HR  Prior auth started  [id number]  May be excluded from benefits call [phone]  # on Rx [phone]

## 2021-01-12 ENCOUNTER — OFFICE VISIT (OUTPATIENT)
Dept: URGENT CARE | Facility: CLINIC | Age: 36
End: 2021-01-12
Payer: COMMERCIAL

## 2021-01-12 ENCOUNTER — HOSPITAL ENCOUNTER (OUTPATIENT)
Facility: MEDICAL CENTER | Age: 36
End: 2021-01-12
Attending: FAMILY MEDICINE
Payer: COMMERCIAL

## 2021-01-12 VITALS
DIASTOLIC BLOOD PRESSURE: 80 MMHG | TEMPERATURE: 97.1 F | RESPIRATION RATE: 16 BRPM | HEART RATE: 71 BPM | WEIGHT: 226 LBS | BODY MASS INDEX: 30.61 KG/M2 | HEIGHT: 72 IN | OXYGEN SATURATION: 98 % | SYSTOLIC BLOOD PRESSURE: 138 MMHG

## 2021-01-12 DIAGNOSIS — N30.00 ACUTE CYSTITIS WITHOUT HEMATURIA: ICD-10-CM

## 2021-01-12 LAB
APPEARANCE UR: CLEAR
BILIRUB UR STRIP-MCNC: NEGATIVE MG/DL
COLOR UR AUTO: YELLOW
GLUCOSE UR STRIP.AUTO-MCNC: NEGATIVE MG/DL
INT CON NEG: NORMAL
INT CON POS: NORMAL
KETONES UR STRIP.AUTO-MCNC: NEGATIVE MG/DL
LEUKOCYTE ESTERASE UR QL STRIP.AUTO: NEGATIVE
NITRITE UR QL STRIP.AUTO: NEGATIVE
PH UR STRIP.AUTO: 7 [PH] (ref 5–8)
POC URINE PREGNANCY TEST: NEGATIVE
PROT UR QL STRIP: NEGATIVE MG/DL
RBC UR QL AUTO: NEGATIVE
SP GR UR STRIP.AUTO: 1.02
UROBILINOGEN UR STRIP-MCNC: 0.2 MG/DL

## 2021-01-12 PROCEDURE — 99214 OFFICE O/P EST MOD 30 MIN: CPT | Performed by: FAMILY MEDICINE

## 2021-01-12 PROCEDURE — 87086 URINE CULTURE/COLONY COUNT: CPT

## 2021-01-12 PROCEDURE — 81025 URINE PREGNANCY TEST: CPT | Performed by: FAMILY MEDICINE

## 2021-01-12 PROCEDURE — 81002 URINALYSIS NONAUTO W/O SCOPE: CPT | Performed by: FAMILY MEDICINE

## 2021-01-12 RX ORDER — PHENAZOPYRIDINE HYDROCHLORIDE 200 MG/1
200 TABLET, FILM COATED ORAL 3 TIMES DAILY PRN
Qty: 6 TAB | Refills: 0 | Status: SHIPPED | OUTPATIENT
Start: 2021-01-12 | End: 2021-08-19

## 2021-01-12 RX ORDER — NITROFURANTOIN 25; 75 MG/1; MG/1
100 CAPSULE ORAL 2 TIMES DAILY
Qty: 10 CAP | Refills: 0 | Status: SHIPPED | OUTPATIENT
Start: 2021-01-12 | End: 2021-01-17

## 2021-01-12 ASSESSMENT — FIBROSIS 4 INDEX: FIB4 SCORE: 0.66

## 2021-01-13 DIAGNOSIS — N30.00 ACUTE CYSTITIS WITHOUT HEMATURIA: ICD-10-CM

## 2021-01-13 ASSESSMENT — ENCOUNTER SYMPTOMS
MYALGIAS: 0
VOMITING: 0
EYE REDNESS: 0
WEIGHT LOSS: 0
EYE DISCHARGE: 0
NAUSEA: 1

## 2021-01-13 NOTE — PROGRESS NOTES
Subjective:      Joy Mcnamara is a 35 y.o. female who presents with UTI (lower abd pain , x 3days )            3 days urinary burning urgency and frequency.  Pelvic pressure.  No fever or flank pain.  PMH cystitis with the same symptoms.  No PMH pyelonephritis.  Bowel movements are normal.  No vomiting.  Good appetite.  Moderate severity progressively worse.  No other aggravating or alleviating factors.      Review of Systems   Constitutional: Negative for malaise/fatigue and weight loss.   Eyes: Negative for discharge and redness.   Gastrointestinal: Positive for nausea (This is chronic intermittent for her without acute change). Negative for vomiting.   Musculoskeletal: Negative for joint pain and myalgias.   Skin: Negative for itching and rash.   .  Medications, Allergies, and current problem list reviewed today in Epic  Past surgical history: Hysterectomy.       Objective:     /80 (BP Location: Left arm, Patient Position: Sitting, BP Cuff Size: Adult)   Pulse 71   Temp 36.2 °C (97.1 °F) (Temporal)   Resp 16   Ht 1.829 m (6')   Wt 102.5 kg (226 lb)   LMP 03/16/2017   SpO2 98%   BMI 30.65 kg/m²      Physical Exam  Constitutional:       General: She is not in acute distress.     Appearance: She is well-developed.   HENT:      Head: Normocephalic and atraumatic.   Eyes:      Conjunctiva/sclera: Conjunctivae normal.   Cardiovascular:      Rate and Rhythm: Normal rate and regular rhythm.      Heart sounds: Normal heart sounds. No murmur.   Pulmonary:      Effort: Pulmonary effort is normal.      Breath sounds: Normal breath sounds. No wheezing.   Abdominal:      General: Bowel sounds are normal.      Palpations: Abdomen is soft.   Genitourinary:     Comments: Suprapubic tenderness.  No CVA tenderness.  Skin:     General: Skin is warm and dry.      Findings: No rash.   Neurological:      Mental Status: She is alert and oriented to person, place, and time.                 Assessment/Plan:        is normal  Pregnancy negative/this was resulted prior to knowledge of surgical history    1. Acute cystitis without hematuria    - nitrofurantoin (MACROBID) 100 MG Cap; Take 1 Cap by mouth 2 times a day for 5 days.  Dispense: 10 Cap; Refill: 0  - URINE CULTURE(NEW); Future    Clinically consistent with cystitis however urinalysis is unremarkable.  Given her PMH of cystitis with same symptoms will initiate antibiotic and follow-up urine culture.  DDX includes IC as well as GI etiology.

## 2021-01-15 LAB
BACTERIA UR CULT: NORMAL
SIGNIFICANT IND 70042: NORMAL
SITE SITE: NORMAL
SOURCE SOURCE: NORMAL

## 2021-01-28 ENCOUNTER — PATIENT MESSAGE (OUTPATIENT)
Dept: MEDICAL GROUP | Facility: LAB | Age: 36
End: 2021-01-28

## 2021-01-28 DIAGNOSIS — F98.8 ATTENTION DEFICIT DISORDER, UNSPECIFIED HYPERACTIVITY PRESENCE: ICD-10-CM

## 2021-01-28 RX ORDER — DEXTROAMPHETAMINE SACCHARATE, AMPHETAMINE ASPARTATE MONOHYDRATE, DEXTROAMPHETAMINE SULFATE AND AMPHETAMINE SULFATE 2.5; 2.5; 2.5; 2.5 MG/1; MG/1; MG/1; MG/1
10 CAPSULE, EXTENDED RELEASE ORAL DAILY
Qty: 30 CAP | Refills: 0 | Status: SHIPPED | OUTPATIENT
Start: 2021-01-28 | End: 2021-02-26 | Stop reason: SDUPTHER

## 2021-01-29 ENCOUNTER — PATIENT MESSAGE (OUTPATIENT)
Dept: MEDICAL GROUP | Facility: LAB | Age: 36
End: 2021-01-29

## 2021-01-29 DIAGNOSIS — N64.4 BREAST PAIN, RIGHT: ICD-10-CM

## 2021-02-26 DIAGNOSIS — F98.8 ATTENTION DEFICIT DISORDER, UNSPECIFIED HYPERACTIVITY PRESENCE: ICD-10-CM

## 2021-02-28 RX ORDER — DEXTROAMPHETAMINE SACCHARATE, AMPHETAMINE ASPARTATE MONOHYDRATE, DEXTROAMPHETAMINE SULFATE AND AMPHETAMINE SULFATE 2.5; 2.5; 2.5; 2.5 MG/1; MG/1; MG/1; MG/1
10 CAPSULE, EXTENDED RELEASE ORAL DAILY
Qty: 30 CAPSULE | Refills: 0 | Status: SHIPPED | OUTPATIENT
Start: 2021-02-28 | End: 2021-03-30

## 2021-03-29 ENCOUNTER — PATIENT MESSAGE (OUTPATIENT)
Dept: MEDICAL GROUP | Facility: LAB | Age: 36
End: 2021-03-29

## 2021-03-29 DIAGNOSIS — G43.009 MIGRAINE WITHOUT AURA AND WITHOUT STATUS MIGRAINOSUS, NOT INTRACTABLE: ICD-10-CM

## 2021-03-29 RX ORDER — BUTALBITAL, ACETAMINOPHEN AND CAFFEINE 300; 40; 50 MG/1; MG/1; MG/1
CAPSULE ORAL
Qty: 20 CAPSULE | Refills: 0 | Status: SHIPPED | OUTPATIENT
Start: 2021-03-29 | End: 2021-04-28 | Stop reason: SDUPTHER

## 2021-03-29 NOTE — TELEPHONE ENCOUNTER
From: Joy Mcnamara  To: Nurse Practicioner Filomena Patrick  Sent: 3/29/2021 10:00 AM PDT  Subject: Prescription Question    Can I please request to have nicholas sent in to the pharmacy [facility] on [address].

## 2021-03-31 ENCOUNTER — OFFICE VISIT (OUTPATIENT)
Dept: MEDICAL GROUP | Facility: LAB | Age: 36
End: 2021-03-31
Payer: COMMERCIAL

## 2021-03-31 VITALS
TEMPERATURE: 99 F | HEIGHT: 72 IN | WEIGHT: 226 LBS | DIASTOLIC BLOOD PRESSURE: 78 MMHG | OXYGEN SATURATION: 96 % | RESPIRATION RATE: 16 BRPM | BODY MASS INDEX: 30.61 KG/M2 | HEART RATE: 77 BPM | SYSTOLIC BLOOD PRESSURE: 120 MMHG

## 2021-03-31 DIAGNOSIS — F98.8 ATTENTION DEFICIT DISORDER, UNSPECIFIED HYPERACTIVITY PRESENCE: ICD-10-CM

## 2021-03-31 PROCEDURE — 99213 OFFICE O/P EST LOW 20 MIN: CPT | Performed by: FAMILY MEDICINE

## 2021-03-31 RX ORDER — PREGABALIN 75 MG/1
75 CAPSULE ORAL 2 TIMES DAILY
COMMUNITY
End: 2022-01-24

## 2021-03-31 RX ORDER — DEXTROAMPHETAMINE SACCHARATE, AMPHETAMINE ASPARTATE MONOHYDRATE, DEXTROAMPHETAMINE SULFATE AND AMPHETAMINE SULFATE 2.5; 2.5; 2.5; 2.5 MG/1; MG/1; MG/1; MG/1
10 CAPSULE, EXTENDED RELEASE ORAL EVERY MORNING
Qty: 30 CAPSULE | Refills: 0 | Status: SHIPPED | OUTPATIENT
Start: 2021-03-31 | End: 2021-04-30 | Stop reason: SDUPTHER

## 2021-03-31 ASSESSMENT — FIBROSIS 4 INDEX: FIB4 SCORE: 0.66

## 2021-03-31 NOTE — PROGRESS NOTES
Subjective:     CC: Med refill    HPI:   Joy presents today with:    ADHD  Has been stable on 10 mg Adderall XR daily.  She does have referral placed to establish with new psychiatrist and is working on getting this scheduled.  She reports symptoms have been well controlled on current medication.  Denies issues with sleep, agitation, or appetite.    PDMP reviewed.  She reports she just had a UDS done with her pain management provider about 3 weeks ago.      Medications, past medical history, allergies, and social history have been reviewed and updated.    ROS:  See HPI    Objective:       Exam:  /78 (BP Location: Left arm, Patient Position: Sitting, BP Cuff Size: Adult)   Pulse 77   Temp 37.2 °C (99 °F) (Temporal)   Resp 16   Ht 1.829 m (6')   Wt 103 kg (226 lb)   LMP 03/16/2017   SpO2 96%   BMI 30.65 kg/m²  Body mass index is 30.65 kg/m².    Constitutional: Alert. Well appearing. No distress.  Skin: Warm, dry, good turgor, no visible rashes.  ENMT: Moist mucous membranes.Neuro: Moves all four extremities. No facial droop.  Psych: Answers questions appropriately. Normal affect and mood.    Assessment & Plan:     35 y.o. female with the following -     1. Attention deficit disorder, unspecified hyperactivity presence  Chronic, stable.  Doing well on current dose of Adderall and this is refilled for 1 month.  She does plan to reestablish with psychiatry.  PDMP reviewed.  She just had a UDS done with pain management provider and records will be requested.  - amphetamine-dextroamphetamine XR (ADDERALL XR) 10 MG CAPSULE SR 24 HR; Take 1 capsule by mouth every morning for 30 days.  Dispense: 30 capsule; Refill: 0     Please note that this note was created using voice recognition software.

## 2021-04-28 DIAGNOSIS — G43.009 MIGRAINE WITHOUT AURA AND WITHOUT STATUS MIGRAINOSUS, NOT INTRACTABLE: ICD-10-CM

## 2021-04-29 ENCOUNTER — PATIENT MESSAGE (OUTPATIENT)
Dept: MEDICAL GROUP | Facility: LAB | Age: 36
End: 2021-04-29

## 2021-04-29 RX ORDER — BUTALBITAL, ACETAMINOPHEN AND CAFFEINE 300; 40; 50 MG/1; MG/1; MG/1
CAPSULE ORAL
Qty: 20 CAPSULE | Refills: 0 | Status: SHIPPED | OUTPATIENT
Start: 2021-04-29 | End: 2021-08-19 | Stop reason: SDUPTHER

## 2021-04-29 NOTE — TELEPHONE ENCOUNTER
----- Message from Joy Mcnamara sent at 4/29/2021 12:40 PM PDT -----  Regarding: Prescription Question  Contact: [phone]  I was able to get my fioricett request through, however I don't know if my Adderall request went through or not.

## 2021-04-29 NOTE — TELEPHONE ENCOUNTER
Received request via: Patient    Was the patient seen in the last year in this department? Yes  LOV 03/31/2021  Does the patient have an active prescription (recently filled or refills available) for medication(s) requested? No

## 2021-04-30 DIAGNOSIS — F98.8 ATTENTION DEFICIT DISORDER, UNSPECIFIED HYPERACTIVITY PRESENCE: ICD-10-CM

## 2021-04-30 RX ORDER — DEXTROAMPHETAMINE SACCHARATE, AMPHETAMINE ASPARTATE MONOHYDRATE, DEXTROAMPHETAMINE SULFATE AND AMPHETAMINE SULFATE 2.5; 2.5; 2.5; 2.5 MG/1; MG/1; MG/1; MG/1
10 CAPSULE, EXTENDED RELEASE ORAL EVERY MORNING
Qty: 30 CAPSULE | Refills: 0 | Status: SHIPPED | OUTPATIENT
Start: 2021-04-30 | End: 2021-05-27 | Stop reason: SDUPTHER

## 2021-06-16 ENCOUNTER — TELEMEDICINE (OUTPATIENT)
Dept: MEDICAL GROUP | Facility: LAB | Age: 36
End: 2021-06-16
Payer: COMMERCIAL

## 2021-06-16 VITALS
SYSTOLIC BLOOD PRESSURE: 131 MMHG | TEMPERATURE: 97.5 F | BODY MASS INDEX: 30.2 KG/M2 | HEIGHT: 72 IN | HEART RATE: 75 BPM | WEIGHT: 223 LBS | DIASTOLIC BLOOD PRESSURE: 90 MMHG

## 2021-06-16 DIAGNOSIS — K52.9 GASTROENTERITIS: ICD-10-CM

## 2021-06-16 DIAGNOSIS — Z11.59 SCREENING FOR VIRAL DISEASE: ICD-10-CM

## 2021-06-16 PROCEDURE — 99213 OFFICE O/P EST LOW 20 MIN: CPT | Mod: 95 | Performed by: FAMILY MEDICINE

## 2021-06-16 RX ORDER — AMOXICILLIN 250 MG/1
250 CAPSULE ORAL 3 TIMES DAILY
COMMUNITY
End: 2021-08-19

## 2021-06-16 ASSESSMENT — FIBROSIS 4 INDEX: FIB4 SCORE: 0.67

## 2021-06-16 NOTE — PROGRESS NOTES
"Virtual Visit: Established Patient   This visit was conducted via Zoom using secure and encrypted videoconferencing technology. The patient was in a private location in the state of [place].    The patient's identity was confirmed and verbal consent was obtained for this virtual visit.    Subjective:   CC:   Chief Complaint   Patient presents with   • Nausea     chills, body aches, diarrhea x 3 days        Joy Mcnamara is a 36 y.o. female presenting for evaluation and management of:    Chills and diarrhea  Sunburned 3 days ago and initially attributed chills to this.  However, she then developed severe abdominal cramping and diarrhea intermittently for the last 2 days.  No diarrhea today.  Continued chills but no measured fevers.  She had 5-6 episodes of watery diarrhea on the worst day, no blood in stool.  Two episodes of nonbloody vomiting.  Currently feeling improved but still has body aches, chills, and mild abdominal pain.  She is currently on amoxicillin for a broken tooth last week.  No recent hospital stay. No travel. No drinking unfiltered water.  She has not received her Covid vaccinations.    ROS See HPi    Allergies   Allergen Reactions   • Sumatriptan Succinate      \"face burns & I can't see\"   • Tramadol Photosensitivity     Gives her migraines and makes her feel sick       Current medicines (including changes today)  Current Outpatient Medications   Medication Sig Dispense Refill   • cyanocobalamin (VITAMIN B-12) 1000 MCG/ML Solution Inject 1 mL into the shoulder, thigh, or buttocks every 30 days. 1 mL 1   • amphetamine-dextroamphetamine XR (ADDERALL XR) 10 MG CAPSULE SR 24 HR Take 1 capsule by mouth every morning for 30 days. 30 capsule 0   • pregabalin (LYRICA) 75 MG Cap Take 75 mg by mouth 2 Times a Day.     • phenazopyridine (PYRIDIUM) 200 MG Tab Take 1 Tab by mouth 3 times a day as needed. (Patient not taking: Reported on 3/31/2021) 6 Tab 0   • Diclofenac Sodium 1 % Gel WIN AA BID PRN P   "   • oxyCODONE immediate-release (ROXICODONE) 5 MG Tab TK 1 T PO Q 8 H PRF PAIN     • promethazine (PHENERGAN) 25 MG Tab TK 1 T PO QHS PRN N     • tizanidine (ZANAFLEX) 4 MG Tab TK 1 T PO BID PRF SPASM     • fluticasone (FLONASE) 50 MCG/ACT nasal spray Spray 2 Sprays in nose every day. 16 g 0     No current facility-administered medications for this visit.       Patient Active Problem List    Diagnosis Date Noted   • Migraine without aura and without status migrainosus, not intractable 10/15/2020   • Hypertension 10/03/2019   • Severe major depressive disorder (HCC) 10/02/2019   • Polysubstance overdose 09/30/2019   • Suicide attempt (HCC) 09/30/2019   • Depression 04/25/2014   • Anxiety 03/28/2014   • Degenerative disc disease, lumbar 04/30/2012   • Chronic low back pain 04/30/2012       Family History   Problem Relation Age of Onset   • Stroke Mother         aneurysm - pt was 14 yrs old   • Stroke Father         TIA   • Cancer Maternal Grandmother         lung /breast   • Cancer Paternal Grandmother         breast / lung?       She  has a past medical history of Back pain, Depression, Depression (4/25/2014), Gynecological disorder, Migraine headache, Miscarriage, and Pain (2017). She also has no past medical history of Breast cancer ([facility]) or Seizure disorder ([facility]).  She  has a past surgical history that includes lumbar laminectomy diskectomy (6/10/2009); gyn surgery; primary c section; dental extraction(s); other orthopedic surgery; lumbar laminectomy diskectomy (10/3/2013); pr njx aa&/strd tfrml epi lumbar/sacral 1 level (10/8/2014); pr njx aa&/strd tfrml epi lumbar/sacral 1 level (Bilateral, 6/17/2015); pr njx aa&/strd tfrml epi lumbar/sacral 1 level (6/17/2015); and vaginal hysterectomy scope total (N/A, 3/27/2017).       Objective:   /90 Comment: Verbal  Pulse 75 Comment: Verbal  Temp 36.4 °C (97.5 °F) Comment: Verbal  Ht 1.829 m (6') Comment: Verbal  Wt 101 kg (223 lb) Comment: Verbal  LMP  03/16/2017   BMI 30.24 kg/m²     Physical Exam:  Constitutional: Alert, no distress, well-groomed.  Skin: No rashes in visible areas.  Eye: Round. Conjunctiva clear, lids normal. No icterus.   ENMT: Lips pink without lesions, good dentition, moist mucous membranes. Phonation normal.  Neck: No masses, no thyromegaly. Moves freely without pain.  Respiratory: Unlabored respiratory effort, no cough or audible wheeze  Psych: Alert and oriented x3, normal affect and mood.       Assessment and Plan:   The following treatment plan was discussed:     1. Gastroenteritis  Chills, watery diarrhea and vomiting for 3 days that are improving.  Her only risk factor for bacteria or protozoal cause would be current amoxicillin course.  Would consider C. difficile testing if she does not continue to improve.  Discussed continued conservative measures including hydration, rest, over-the-counter antidiarrheals.  ER precautions given.  - COVID/SARS CoV-2 PCR; Future    2. Screening for viral disease  She has not received Covid vaccination.  Discussed that her symptoms would not necessarily be typical but have been described in Covid infection.  She will go ahead and get testing done at the drive-through, discussed self-isolation until results are in.  - COVID/SARS CoV-2 PCR; Future     Other orders  - amoxicillin (AMOXIL) 250 MG Cap; Take 250 mg by mouth 3 times a day.      Follow-up: Return if symptoms worsen or fail to improve.

## 2021-06-17 ENCOUNTER — HOSPITAL ENCOUNTER (OUTPATIENT)
Dept: LAB | Facility: MEDICAL CENTER | Age: 36
End: 2021-06-17
Attending: FAMILY MEDICINE
Payer: COMMERCIAL

## 2021-06-17 DIAGNOSIS — Z11.59 SCREENING FOR VIRAL DISEASE: ICD-10-CM

## 2021-06-17 DIAGNOSIS — K52.9 GASTROENTERITIS: ICD-10-CM

## 2021-06-17 LAB
COVID ORDER STATUS COVID19: NORMAL
SARS-COV-2 RNA RESP QL NAA+PROBE: NOTDETECTED
SPECIMEN SOURCE: NORMAL

## 2021-06-17 PROCEDURE — C9803 HOPD COVID-19 SPEC COLLECT: HCPCS

## 2021-06-17 PROCEDURE — U0003 INFECTIOUS AGENT DETECTION BY NUCLEIC ACID (DNA OR RNA); SEVERE ACUTE RESPIRATORY SYNDROME CORONAVIRUS 2 (SARS-COV-2) (CORONAVIRUS DISEASE [COVID-19]), AMPLIFIED PROBE TECHNIQUE, MAKING USE OF HIGH THROUGHPUT TECHNOLOGIES AS DESCRIBED BY CMS-2020-01-R: HCPCS

## 2021-06-17 PROCEDURE — U0005 INFEC AGEN DETEC AMPLI PROBE: HCPCS

## 2021-06-18 DIAGNOSIS — F98.8 ATTENTION DEFICIT DISORDER, UNSPECIFIED HYPERACTIVITY PRESENCE: ICD-10-CM

## 2021-06-18 RX ORDER — DEXTROAMPHETAMINE SACCHARATE, AMPHETAMINE ASPARTATE MONOHYDRATE, DEXTROAMPHETAMINE SULFATE AND AMPHETAMINE SULFATE 2.5; 2.5; 2.5; 2.5 MG/1; MG/1; MG/1; MG/1
10 CAPSULE, EXTENDED RELEASE ORAL EVERY MORNING
Qty: 30 CAPSULE | Refills: 0 | Status: SHIPPED | OUTPATIENT
Start: 2021-06-18 | End: 2021-07-16 | Stop reason: SDUPTHER

## 2021-06-18 NOTE — TELEPHONE ENCOUNTER
Received request via: Pharmacy    Was the patient seen in the last year in this department? Yes  6/16/2021  Does the patient have an active prescription (recently filled or refills available) for medication(s) requested? No

## 2021-07-16 DIAGNOSIS — F98.8 ATTENTION DEFICIT DISORDER, UNSPECIFIED HYPERACTIVITY PRESENCE: ICD-10-CM

## 2021-07-19 RX ORDER — DEXTROAMPHETAMINE SACCHARATE, AMPHETAMINE ASPARTATE MONOHYDRATE, DEXTROAMPHETAMINE SULFATE AND AMPHETAMINE SULFATE 2.5; 2.5; 2.5; 2.5 MG/1; MG/1; MG/1; MG/1
10 CAPSULE, EXTENDED RELEASE ORAL EVERY MORNING
Qty: 30 CAPSULE | Refills: 0 | Status: SHIPPED | OUTPATIENT
Start: 2021-07-19 | End: 2021-07-20 | Stop reason: SDUPTHER

## 2021-07-20 DIAGNOSIS — F98.8 ATTENTION DEFICIT DISORDER, UNSPECIFIED HYPERACTIVITY PRESENCE: ICD-10-CM

## 2021-07-20 RX ORDER — DEXTROAMPHETAMINE SACCHARATE, AMPHETAMINE ASPARTATE MONOHYDRATE, DEXTROAMPHETAMINE SULFATE AND AMPHETAMINE SULFATE 2.5; 2.5; 2.5; 2.5 MG/1; MG/1; MG/1; MG/1
10 CAPSULE, EXTENDED RELEASE ORAL EVERY MORNING
Qty: 30 CAPSULE | Refills: 0 | Status: SHIPPED | OUTPATIENT
Start: 2021-07-20 | End: 2021-08-19 | Stop reason: SDUPTHER

## 2021-07-20 NOTE — TELEPHONE ENCOUNTER
----- Message from Joy Mcnamara sent at 7/20/2021 11:11 AM PDT -----  Regarding: RE: Prescription Question  Contact: [phone]  I requested my Adderall be sent in. I sent this request on 7/16/21 and received a message yesterday in 7/19/21 that it had been reviewed and approved however the message did not say which location it would be going to be filled at.

## 2021-08-19 ENCOUNTER — OFFICE VISIT (OUTPATIENT)
Dept: MEDICAL GROUP | Facility: LAB | Age: 36
End: 2021-08-19
Payer: COMMERCIAL

## 2021-08-19 VITALS
HEIGHT: 72 IN | WEIGHT: 227 LBS | TEMPERATURE: 96.6 F | OXYGEN SATURATION: 97 % | SYSTOLIC BLOOD PRESSURE: 136 MMHG | BODY MASS INDEX: 30.75 KG/M2 | HEART RATE: 80 BPM | RESPIRATION RATE: 12 BRPM | DIASTOLIC BLOOD PRESSURE: 90 MMHG

## 2021-08-19 DIAGNOSIS — G47.9 SLEEP DISTURBANCE: ICD-10-CM

## 2021-08-19 DIAGNOSIS — M54.41 CHRONIC BILATERAL LOW BACK PAIN WITH BILATERAL SCIATICA: ICD-10-CM

## 2021-08-19 DIAGNOSIS — M54.42 CHRONIC BILATERAL LOW BACK PAIN WITH BILATERAL SCIATICA: ICD-10-CM

## 2021-08-19 DIAGNOSIS — G43.009 MIGRAINE WITHOUT AURA AND WITHOUT STATUS MIGRAINOSUS, NOT INTRACTABLE: ICD-10-CM

## 2021-08-19 DIAGNOSIS — F98.8 ATTENTION DEFICIT DISORDER (ADD) IN ADULT: ICD-10-CM

## 2021-08-19 DIAGNOSIS — F98.8 ATTENTION DEFICIT DISORDER, UNSPECIFIED HYPERACTIVITY PRESENCE: ICD-10-CM

## 2021-08-19 DIAGNOSIS — G89.29 CHRONIC BILATERAL LOW BACK PAIN WITH BILATERAL SCIATICA: ICD-10-CM

## 2021-08-19 DIAGNOSIS — Z79.899 CONTROLLED SUBSTANCE AGREEMENT SIGNED: ICD-10-CM

## 2021-08-19 PROCEDURE — 99214 OFFICE O/P EST MOD 30 MIN: CPT | Performed by: NURSE PRACTITIONER

## 2021-08-19 RX ORDER — HYDROXYZINE PAMOATE 25 MG/1
25-50 CAPSULE ORAL EVERY EVENING
Qty: 60 CAPSULE | Refills: 2 | Status: SHIPPED | OUTPATIENT
Start: 2021-08-19 | End: 2021-10-11

## 2021-08-19 RX ORDER — DEXTROAMPHETAMINE SACCHARATE, AMPHETAMINE ASPARTATE MONOHYDRATE, DEXTROAMPHETAMINE SULFATE AND AMPHETAMINE SULFATE 2.5; 2.5; 2.5; 2.5 MG/1; MG/1; MG/1; MG/1
10 CAPSULE, EXTENDED RELEASE ORAL EVERY MORNING
Qty: 30 CAPSULE | Refills: 0 | Status: SHIPPED | OUTPATIENT
Start: 2021-08-19 | End: 2021-09-18

## 2021-08-19 RX ORDER — TIZANIDINE 2 MG/1
2 TABLET ORAL EVERY 6 HOURS PRN
COMMUNITY
Start: 2021-06-21 | End: 2022-01-24

## 2021-08-19 RX ORDER — BUTALBITAL, ACETAMINOPHEN AND CAFFEINE 300; 40; 50 MG/1; MG/1; MG/1
CAPSULE ORAL
Qty: 20 CAPSULE | Refills: 0 | Status: SHIPPED | OUTPATIENT
Start: 2021-08-19 | End: 2021-09-20

## 2021-08-19 RX ORDER — DEXTROAMPHETAMINE SACCHARATE, AMPHETAMINE ASPARTATE MONOHYDRATE, DEXTROAMPHETAMINE SULFATE AND AMPHETAMINE SULFATE 2.5; 2.5; 2.5; 2.5 MG/1; MG/1; MG/1; MG/1
10 CAPSULE, EXTENDED RELEASE ORAL EVERY MORNING
Qty: 30 CAPSULE | Refills: 0 | Status: SHIPPED | OUTPATIENT
Start: 2021-09-18 | End: 2021-10-13

## 2021-08-19 RX ORDER — DEXTROAMPHETAMINE SACCHARATE, AMPHETAMINE ASPARTATE MONOHYDRATE, DEXTROAMPHETAMINE SULFATE AND AMPHETAMINE SULFATE 2.5; 2.5; 2.5; 2.5 MG/1; MG/1; MG/1; MG/1
10 CAPSULE, EXTENDED RELEASE ORAL EVERY MORNING
Qty: 30 CAPSULE | Refills: 0 | Status: SHIPPED | OUTPATIENT
Start: 2021-10-18 | End: 2021-11-15 | Stop reason: SDUPTHER

## 2021-08-19 ASSESSMENT — FIBROSIS 4 INDEX: FIB4 SCORE: 0.67

## 2021-08-19 NOTE — ASSESSMENT & PLAN NOTE
Chronic issue.  Controlled with adderall 10 mg XL daily.  New job at [employer] x 2 weeks - takes adderall between 6-7 am and works until 5 pm.  Denies any negative side effects of adderall.

## 2021-08-19 NOTE — PROGRESS NOTES
Chief Complaint   Patient presents with   • Medication Management       HPI  Joy is a 35 yo est female here to f/u on chronic issues:     Attention deficit disorder (ADD) in adult  Chronic issue.  Controlled with adderall 10 mg XL daily.  New job at [employer] x 2 weeks - takes adderall between 6-7 am and works until 5 pm.  Denies any negative side effects of adderall.     Sleep disturbance  Chronic issue.  Struggles to fall and stay asleep.  Also panics in the middle of the night at least 1-2 in a 2 week time span.  Vistaril has helped in the past.  No longer seeing Dr. Deluca b/c of insurance issues.      Migraine without aura and without status migrainosus, not intractable  Chronic issue / intermittent. Fioricet works to abort her headaches and she needs a refill.  Allergic to imitrex.      Chronic low back pain  Chronic issue and cared for by [facility] with tizanidine / oxycodone and lyrica.      Past medical, surgical, family, and social history is reviewed and updated in Epic chart by me today.   Medications and allergies reviewed and updated in Epic chart by me today.     ROS:   As documented in history of present illness above    Exam:  /90 (BP Location: Left arm, Patient Position: Sitting, BP Cuff Size: Large adult)   Pulse 80   Temp 35.9 °C (96.6 °F)   Resp 12   Ht 1.829 m (6')   Wt 103 kg (227 lb)   SpO2 97%   Constitutional: Alert, no distress, plus 3 vital signs  Skin:  Warm, dry, no rashes invisible areas  Eye: Equal, round and reactive, conjunctiva clear  ENMT: Lips without lesions  Respiratory: Unlabored respiration  Cardiovascular: Normal rate   Psych: Alert, pleasant, well-groomed, normal affect    Assessment / Plan / Medical Decision makin. Attention deficit disorder (ADD) in adult  -stable.  Continue same.  uds updated today.     2. Sleep disturbance  -Trial of adding back on hydroxyzine 25 to 50 mg prior to bedtime, allowing at least 8 hours for sleep.  Discuss potential side  effects and how to take this medication.  She will notify me if this is not helpful.  I also encouraged her to reestablish with Dr. Deluca when she has a chance.  - hydrOXYzine pamoate (VISTARIL) 25 MG Cap; Take 1-2 Capsules by mouth every evening. For sleep  Dispense: 60 Capsule; Refill: 2    3. Migraine without aura and without status migrainosus, not intractable  -Refilled Fioricet for as needed use.  Prefers to stay off of any new prescription medication such as a preventative migraine medicine and is not having more than 15 migraine days per month.  - acetaminophen/caffeine/butalbital 300-40-50 mg (FIORICET) -40 MG Cap capsule; TAKE 1 CAPSULE BY MOUTH TWICE DAILY AS NEEDED FOR HEADACHE FOR 30 DAYS. R51.0  Dispense: 20 Capsule; Refill: 0    5. Chronic bilateral low back pain with bilateral sciatica  -Chronic but followed closely by [facility] [place].    Follow-up 6 months in person.

## 2021-08-19 NOTE — ASSESSMENT & PLAN NOTE
Chronic issue.  Struggles to fall and stay asleep.  Also panics in the middle of the night at least 1-2 in a 2 week time span.  Vistaril has helped in the past.  No longer seeing Dr. Deluca b/c of insurance issues.

## 2021-08-19 NOTE — ASSESSMENT & PLAN NOTE
Chronic issue / intermittent. Fioricet works to abort her headaches and she needs a refill.  Allergic to imitrex.

## 2021-09-18 DIAGNOSIS — G43.009 MIGRAINE WITHOUT AURA AND WITHOUT STATUS MIGRAINOSUS, NOT INTRACTABLE: ICD-10-CM

## 2021-09-20 RX ORDER — BUTALBITAL, ACETAMINOPHEN AND CAFFEINE 300; 40; 50 MG/1; MG/1; MG/1
CAPSULE ORAL
Qty: 20 CAPSULE | Refills: 0 | Status: SHIPPED | OUTPATIENT
Start: 2021-09-20 | End: 2021-11-15 | Stop reason: SDUPTHER

## 2021-09-20 NOTE — TELEPHONE ENCOUNTER
Received request via: Pharmacy    Was the patient seen in the last year in this department? Yes  8/19/2021  Does the patient have an active prescription (recently filled or refills available) for medication(s) requested? No

## 2021-10-10 DIAGNOSIS — G47.9 SLEEP DISTURBANCE: ICD-10-CM

## 2021-10-11 RX ORDER — HYDROXYZINE PAMOATE 25 MG/1
25-50 CAPSULE ORAL EVERY EVENING
Qty: 60 CAPSULE | Refills: 2 | Status: SHIPPED | OUTPATIENT
Start: 2021-10-11 | End: 2022-01-24

## 2021-10-11 NOTE — TELEPHONE ENCOUNTER
Received request via: Pharmacy    Was the patient seen in the last year in this department? Yes  8/19/21  Does the patient have an active prescription (recently filled or refills available) for medication(s) requested? No

## 2021-10-13 ENCOUNTER — OFFICE VISIT (OUTPATIENT)
Dept: URGENT CARE | Facility: CLINIC | Age: 36
End: 2021-10-13
Payer: COMMERCIAL

## 2021-10-13 ENCOUNTER — HOSPITAL ENCOUNTER (OUTPATIENT)
Facility: MEDICAL CENTER | Age: 36
End: 2021-10-13
Attending: PHYSICIAN ASSISTANT
Payer: COMMERCIAL

## 2021-10-13 VITALS
DIASTOLIC BLOOD PRESSURE: 82 MMHG | RESPIRATION RATE: 16 BRPM | OXYGEN SATURATION: 97 % | SYSTOLIC BLOOD PRESSURE: 124 MMHG | WEIGHT: 227 LBS | TEMPERATURE: 97.9 F | HEIGHT: 72 IN | BODY MASS INDEX: 30.75 KG/M2 | HEART RATE: 82 BPM

## 2021-10-13 DIAGNOSIS — Z20.822 EXPOSURE TO COVID-19 VIRUS: ICD-10-CM

## 2021-10-13 PROCEDURE — 99213 OFFICE O/P EST LOW 20 MIN: CPT | Mod: CS | Performed by: PHYSICIAN ASSISTANT

## 2021-10-13 PROCEDURE — U0003 INFECTIOUS AGENT DETECTION BY NUCLEIC ACID (DNA OR RNA); SEVERE ACUTE RESPIRATORY SYNDROME CORONAVIRUS 2 (SARS-COV-2) (CORONAVIRUS DISEASE [COVID-19]), AMPLIFIED PROBE TECHNIQUE, MAKING USE OF HIGH THROUGHPUT TECHNOLOGIES AS DESCRIBED BY CMS-2020-01-R: HCPCS

## 2021-10-13 PROCEDURE — U0005 INFEC AGEN DETEC AMPLI PROBE: HCPCS

## 2021-10-13 ASSESSMENT — FIBROSIS 4 INDEX: FIB4 SCORE: 0.67

## 2021-10-13 NOTE — PROGRESS NOTES
"Subjective:     Joy Mcnamara  is a 36 y.o. female who presents for Coronavirus Screening (exposed to family memeber at home tested positive for covid-19)       HPI  Patient presents to urgent care with concern for possible COVID-19.  Patient's  with whom she shares the household has been ill for a little over a week and has tested positive for Covid.  Patient is currently asymptomatic at this time and is more than 5 days from exposure during patient  time of contagion.  Patient is not Covid vaccinated.    Review of Systems   All other systems reviewed and are negative.    Allergies   Allergen Reactions   • Sumatriptan Succinate      \"face burns & I can't see\"   • Tramadol Photosensitivity     Gives her migraines and makes her feel sick     Past medical history, family history, surgical history and social history are reviewed and updated in the record today.     Objective:   /82 (BP Location: Left arm, Patient Position: Sitting, BP Cuff Size: Large adult)   Pulse 82   Temp 36.6 °C (97.9 °F) (Temporal)   Resp 16   Ht 1.829 m (6')   Wt 103 kg (227 lb)   LMP 03/16/2017   SpO2 97%   BMI 30.79 kg/m²   Physical Exam  Vitals and nursing note reviewed.   Constitutional:       Appearance: She is well-developed.   HENT:      Head: Normocephalic and atraumatic.      Right Ear: Tympanic membrane, ear canal and external ear normal.      Left Ear: Tympanic membrane, ear canal and external ear normal.      Nose: Nose normal.      Mouth/Throat:      Mouth: Mucous membranes are moist.   Eyes:      Extraocular Movements: Extraocular movements intact.      Conjunctiva/sclera: Conjunctivae normal.      Pupils: Pupils are equal, round, and reactive to light.   Cardiovascular:      Rate and Rhythm: Normal rate and regular rhythm.      Heart sounds: Normal heart sounds.   Pulmonary:      Effort: Pulmonary effort is normal.      Breath sounds: Normal breath sounds.   Abdominal:      General: Bowel sounds " are normal.      Palpations: Abdomen is soft.      Tenderness: There is no abdominal tenderness.   Musculoskeletal:         General: Normal range of motion.      Cervical back: Normal range of motion and neck supple.   Lymphadenopathy:      Cervical: No cervical adenopathy.   Skin:     General: Skin is warm and dry.      Findings: No rash.   Neurological:      Mental Status: She is alert and oriented to person, place, and time.      Cranial Nerves: Cranial nerves are intact.      Sensory: Sensation is intact.      Motor: Motor function is intact.      Coordination: Coordination is intact.   Psychiatric:         Attention and Perception: Attention normal.         Mood and Affect: Mood normal.         Speech: Speech normal.         Behavior: Behavior normal.         Thought Content: Thought content normal.         Judgment: Judgment normal.          Assessment/Plan:   1. Exposure to COVID-19 virus  - SARS-CoV-2 PCR (24 hour In-House): Collect NP swab in VTM; Future    .Testing performed for COVID-19.  Patient/guardian is given printed /MyChart instructions regarding self-isolation.  Work/school note is provided with specific return to work/school protocols.  Reviewed with patient/guardian that if they do test positive they will be contacted by their local health department regarding return to work/school protocols.  Results will also be released to patient/guardian in MyChart or called to the patient/guardian directly.  Encouraged mask use, frequent handwashing, wiping down hard surfaces, etc.    Prior Covid testing performed on 6/17/2 1 - for COVID-19 reviewed by myself as part of today's visit.    Differential diagnosis, natural history, supportive care, and indications for immediate follow-up discussed.  Red flag warning symptoms and strict ER/follow-up precautions given.  The patient demonstrated a good understanding and agreed with the treatment plan.  Please note that this note was created using voice  recognition speech to text software. Every effort has been made to correct obvious errors.  However, I expect there are errors of grammar and possibly context that were not discovered prior to finalizing the note  SChon Javed PA-C

## 2021-10-13 NOTE — LETTER
October 13, 2021         Patient: Joy Mcnamara   YOB: 1985   Date of Visit: 10/13/2021           To Whom it May Concern:  Concern for COVID-19 illness has been identified and testing is in progress.  The results are available through our electronic delivery system called [employer].    We are asking employers to excuse absence while they follow self isolation protocol per CDC guidelines    • At least 10 days since symptoms first appeared AND  • At least 24 hours with no fever greater than 100.4 without fever reducing medication AND  • Symptoms have improved.     If results are negative, you must continue to follow the self-isolation protocol. You may return to work when you have no fever for at least 24 hours without the use of fever-reducing medication, and symptoms have improved.     If the results of testing are positive then you will be contacted by your [facility] department for further instructions on duration of self-isolation and return to work. In general, this will also follow the CDC guidelines with minimum of 10 days from the onset of symptoms, and symptoms are improving without fever.       This is the only note that will be provided from [facility] for this visit. Please schedule a visit with a primary care provider if FMLA, disability, or unemployment paperwork is required.       Sincerely,    Eunice Javed, P.A.-C.  [phone]    Electronically Signed

## 2021-10-14 DIAGNOSIS — Z20.822 EXPOSURE TO COVID-19 VIRUS: ICD-10-CM

## 2021-10-15 ENCOUNTER — OFFICE VISIT (OUTPATIENT)
Dept: URGENT CARE | Facility: CLINIC | Age: 36
End: 2021-10-15
Payer: COMMERCIAL

## 2021-10-15 VITALS
HEART RATE: 108 BPM | OXYGEN SATURATION: 99 % | RESPIRATION RATE: 16 BRPM | DIASTOLIC BLOOD PRESSURE: 102 MMHG | SYSTOLIC BLOOD PRESSURE: 152 MMHG | TEMPERATURE: 100.4 F

## 2021-10-15 DIAGNOSIS — U07.1 COVID-19: ICD-10-CM

## 2021-10-15 LAB
EXTERNAL QUALITY CONTROL: NORMAL
SARS-COV+SARS-COV-2 AG RESP QL IA.RAPID: POSITIVE

## 2021-10-15 PROCEDURE — 87426 SARSCOV CORONAVIRUS AG IA: CPT | Performed by: PHYSICIAN ASSISTANT

## 2021-10-15 PROCEDURE — 99213 OFFICE O/P EST LOW 20 MIN: CPT | Mod: CS | Performed by: PHYSICIAN ASSISTANT

## 2021-10-15 RX ORDER — ALBUTEROL SULFATE 90 UG/1
2 AEROSOL, METERED RESPIRATORY (INHALATION) EVERY 6 HOURS PRN
Qty: 8.5 G | Refills: 0 | Status: SHIPPED | OUTPATIENT
Start: 2021-10-15 | End: 2023-01-23

## 2021-10-15 ASSESSMENT — ENCOUNTER SYMPTOMS
WHEEZING: 0
NAUSEA: 0
SHORTNESS OF BREATH: 0
HEADACHES: 1
MYALGIAS: 1
VOMITING: 0
SORE THROAT: 0
FEVER: 1
DIAPHORESIS: 0
SPUTUM PRODUCTION: 0
PALPITATIONS: 0
DIZZINESS: 0
ABDOMINAL PAIN: 0
SINUS PAIN: 0
DIARRHEA: 0
STRIDOR: 0
CHILLS: 1
COUGH: 1

## 2021-10-16 NOTE — PROGRESS NOTES
Subjective:   Joy Mcnamara is a 36 y.o. female who presents for Pain (intense pain on arms, abdomin, has hx of shingles, no rash, congestion, cough, fever)    HPI:  This is a very pleasant 36-year-old female presenting to the clinic for coronavirus screening.  Patient states to members of her household are currently at home positive with COVID-19.  Patient has been feeling ill for the last 2 days.  She was seen in clinic 2 days ago and had a negative rapid and PCR COVID-19 test.  Presenting today with continued symptoms.  States she has generalized body aches and pains.  States it feels like previous episodes of shingles however it is on all extremities.  She also has sinus congestion, cough and fever.  Not experiencing any chest pain or shortness of breath at this time.  She has been taking her oxycodone, promethazine and tizanidine for pain and this is providing minimal improvement.        Review of Systems   Constitutional: Positive for chills, fever and malaise/fatigue. Negative for diaphoresis.   HENT: Positive for congestion. Negative for ear pain, sinus pain and sore throat.    Respiratory: Positive for cough. Negative for sputum production, shortness of breath, wheezing and stridor.    Cardiovascular: Negative for chest pain and palpitations.   Gastrointestinal: Negative for abdominal pain, diarrhea, nausea and vomiting.   Musculoskeletal: Positive for myalgias.   Neurological: Positive for headaches. Negative for dizziness.   Endo/Heme/Allergies: Negative for environmental allergies.       Medications:    • acetaminophen/caffeine/butalbital 300-40-50 mg Caps  • amphetamine-dextroamphetamine XR Cp24  • cyanocobalamin Soln  • Diclofenac Sodium Gel  • fluticasone  • hydrOXYzine pamoate Caps  • oxyCODONE immediate-release Tabs  • pregabalin Caps  • promethazine Tabs  • tizanidine    Allergies: Sumatriptan succinate and Tramadol    Problem List: Joy Mcnamara does not have any pertinent problems  on file.    Surgical History:  Past Surgical History:   Procedure Laterality Date   • VAGINAL HYSTERECTOMY SCOPE TOTAL N/A 3/27/2017    Procedure: VAGINAL HYSTERECTOMY SCOPE TOTAL right salpingectomy, partial left salpingectomy. Cystoscopy;  Surgeon: Zayda Santillan M.D.;  Location: SURGERY SAME DAY [facility];  Service:    • KS NJX AA&/STRD TFRML EPI LUMBAR/SACRAL 1 LEVEL Bilateral 6/17/2015    Procedure: TRANSFORAMINAL BILATERAL L5-S1 EPIDURAL WITH IV SEDATION;  Surgeon: Tom Main M.D.;  Location: SURGERY [facility];  Service: Pain Management   • KS NJX AA&/STRD TFRML EPI LUMBAR/SACRAL 1 LEVEL  6/17/2015    Procedure: INJ-FORAMEN EPI LUM/SAC SNGL;  Surgeon: Tom Main M.D.;  Location: [facility];  Service: Pain Management   • KS NJX AA&/STRD TFRML EPI LUMBAR/SACRAL 1 LEVEL  10/8/2014    Performed by Tom Main M.D. at [facility]   • LUMBAR LAMINECTOMY DISKECTOMY  10/3/2013    Performed by Estuardo Jack M.D. at [facility]   • LUMBAR LAMINECTOMY DISKECTOMY  6/10/2009    Performed by ESTUARDO JACK at [facility]   • DENTAL EXTRACTION(S)      wisdom   • GYN SURGERY      c section x2   • OTHER ORTHOPEDIC SURGERY     • PRIMARY C SECTION      x2       Past Social Hx: Joy Mcnamara  reports that she quit smoking about 14 years ago. Her smoking use included cigarettes. She quit after 4.00 years of use. She has never used smokeless tobacco. She reports that she does not drink alcohol and does not use drugs.     Past Family Hx:  Joy Mcnamara family history includes Cancer in her maternal grandmother and paternal grandmother; Stroke in her father and mother.     Problem list, medications, and allergies reviewed by myself today in Epic.     Objective:     /102 (BP Location: Left arm, Patient Position: Sitting, BP Cuff Size: Adult)   Pulse (!) 108   Temp 38 °C (100.4 °F) (Temporal)   Resp 16   LMP 03/16/2017   SpO2 99%      Physical Exam  Constitutional:       General: She is not in acute distress.     Appearance: Normal appearance. She is ill-appearing. She is not toxic-appearing or diaphoretic.   HENT:      Head: Normocephalic and atraumatic.      Right Ear: Tympanic membrane, ear canal and external ear normal.      Left Ear: Tympanic membrane, ear canal and external ear normal.      Nose: Nose normal. No congestion or rhinorrhea.      Mouth/Throat:      Mouth: Mucous membranes are moist.      Pharynx: No oropharyngeal exudate or posterior oropharyngeal erythema.   Eyes:      Conjunctiva/sclera: Conjunctivae normal.   Cardiovascular:      Rate and Rhythm: Regular rhythm. Tachycardia present.      Pulses: Normal pulses.      Heart sounds: Normal heart sounds.   Pulmonary:      Effort: Pulmonary effort is normal.      Breath sounds: Normal breath sounds. No wheezing, rhonchi or rales.   Musculoskeletal:      Cervical back: Normal range of motion. No muscular tenderness.   Lymphadenopathy:      Cervical: No cervical adenopathy.   Skin:     General: Skin is warm and dry.      Capillary Refill: Capillary refill takes less than 2 seconds.   Neurological:      Mental Status: She is alert.   Psychiatric:         Mood and Affect: Mood normal.         Thought Content: Thought content normal.       POCT Covid: Negative    Assessment/Plan:     Comments/MDM:     • Patient tested positive for COVID-19 in clinic today.  • Supportive care recommendations discussed.  • Tylenol and ibuprofen for pain and fever.  • Increase fluid intake.  • Her albuterol prescription was refilled.  • Red flag symptoms and signs for emergent follow-up discussed.  Isolation precautions discussed.  Call with questions or concerns.     Diagnosis and associated orders:     1. COVID-19    - POCT SARS-COV Antigen LISA (Symptomatic Only)  - albuterol 108 (90 Base) MCG/ACT Aero Soln inhalation aerosol; Inhale 2 Puffs every 6 hours as needed for Shortness of Breath.   Dispense: 8.5 g; Refill: 0           Differential diagnosis, natural history, supportive care, and indications for immediate follow-up discussed.    Advised the patient to follow-up with the primary care physician for recheck, reevaluation, and consideration of further management.    Please note that this dictation was created using voice recognition software. I have made reasonable attempt to correct obvious errors, but I expect that there are errors of grammar and possibly content that I did not discover before finalizing the note.    This note was electronically signed by CAESAR Fitzgerald PA-C

## 2021-11-15 DIAGNOSIS — G43.009 MIGRAINE WITHOUT AURA AND WITHOUT STATUS MIGRAINOSUS, NOT INTRACTABLE: ICD-10-CM

## 2021-11-15 DIAGNOSIS — F98.8 ATTENTION DEFICIT DISORDER, UNSPECIFIED HYPERACTIVITY PRESENCE: ICD-10-CM

## 2021-11-15 RX ORDER — DEXTROAMPHETAMINE SACCHARATE, AMPHETAMINE ASPARTATE MONOHYDRATE, DEXTROAMPHETAMINE SULFATE AND AMPHETAMINE SULFATE 2.5; 2.5; 2.5; 2.5 MG/1; MG/1; MG/1; MG/1
10 CAPSULE, EXTENDED RELEASE ORAL EVERY MORNING
Qty: 30 CAPSULE | Refills: 0 | Status: SHIPPED | OUTPATIENT
Start: 2021-11-15 | End: 2021-11-16 | Stop reason: SDUPTHER

## 2021-11-15 RX ORDER — CYANOCOBALAMIN 1000 UG/ML
1000 INJECTION, SOLUTION INTRAMUSCULAR; SUBCUTANEOUS
Qty: 1 ML | Refills: 1 | Status: SHIPPED | OUTPATIENT
Start: 2021-11-15 | End: 2021-11-16 | Stop reason: SDUPTHER

## 2021-11-15 RX ORDER — CYANOCOBALAMIN 1000 UG/ML
1000 INJECTION, SOLUTION INTRAMUSCULAR; SUBCUTANEOUS
Qty: 1 ML | Refills: 1 | Status: SHIPPED | OUTPATIENT
Start: 2021-11-15 | End: 2021-11-15 | Stop reason: SDUPTHER

## 2021-11-15 RX ORDER — BUTALBITAL, ACETAMINOPHEN AND CAFFEINE 300; 40; 50 MG/1; MG/1; MG/1
1 CAPSULE ORAL EVERY 6 HOURS PRN
Qty: 20 CAPSULE | Refills: 0 | Status: SHIPPED | OUTPATIENT
Start: 2021-11-15 | End: 2021-12-15

## 2021-11-15 NOTE — TELEPHONE ENCOUNTER
Received request via: Patient    Was the patient seen in the last year in this department? Yes  LOV 08/19/2021  Does the patient have an active prescription (recently filled or refills available) for medication(s) requested? No

## 2021-11-16 DIAGNOSIS — F98.8 ATTENTION DEFICIT DISORDER, UNSPECIFIED HYPERACTIVITY PRESENCE: ICD-10-CM

## 2021-11-16 RX ORDER — DEXTROAMPHETAMINE SACCHARATE, AMPHETAMINE ASPARTATE MONOHYDRATE, DEXTROAMPHETAMINE SULFATE AND AMPHETAMINE SULFATE 2.5; 2.5; 2.5; 2.5 MG/1; MG/1; MG/1; MG/1
10 CAPSULE, EXTENDED RELEASE ORAL EVERY MORNING
Qty: 30 CAPSULE | Refills: 0 | Status: SHIPPED | OUTPATIENT
Start: 2021-11-16 | End: 2021-12-13 | Stop reason: SDUPTHER

## 2021-11-16 RX ORDER — CYANOCOBALAMIN 1000 UG/ML
1000 INJECTION, SOLUTION INTRAMUSCULAR; SUBCUTANEOUS
Qty: 1 ML | Refills: 1 | Status: SHIPPED | OUTPATIENT
Start: 2021-11-16 | End: 2021-11-17

## 2021-11-16 NOTE — TELEPHONE ENCOUNTER
----- Message from Joy Mcnamara sent at 11/16/2021  1:35 PM PST -----  Regarding: Pharmacy change/update  I recently changed to [facility] pharmacy at [address] [place] [address]. My ins no longer is contracted with the pharmacy I previously used. I requested refills yesterday for Adderall 10mg and Vitamin B12 injection. The B12 was sent to [facility] and they will not transfer this medication at my request. Can a new order please be sent to [address]?

## 2021-11-17 RX ORDER — CYANOCOBALAMIN 1000 UG/ML
1000 INJECTION, SOLUTION INTRAMUSCULAR; SUBCUTANEOUS
Qty: 3 ML | Refills: 1 | Status: SHIPPED | OUTPATIENT
Start: 2021-11-17 | End: 2021-12-14 | Stop reason: SDUPTHER

## 2021-11-17 NOTE — TELEPHONE ENCOUNTER
Received request via: Pharmacy    Was the patient seen in the last year in this department? Yes  LOV 08/19/2021  Does the patient have an active prescription (recently filled or refills available) for medication(s) requested? No

## 2021-12-13 DIAGNOSIS — F98.8 ATTENTION DEFICIT DISORDER, UNSPECIFIED HYPERACTIVITY PRESENCE: ICD-10-CM

## 2021-12-14 RX ORDER — CYANOCOBALAMIN 1000 UG/ML
1000 INJECTION, SOLUTION INTRAMUSCULAR; SUBCUTANEOUS
Qty: 3 ML | Refills: 1 | Status: SHIPPED | OUTPATIENT
Start: 2021-12-14 | End: 2024-01-26 | Stop reason: SDUPTHER

## 2021-12-14 RX ORDER — DEXTROAMPHETAMINE SACCHARATE, AMPHETAMINE ASPARTATE MONOHYDRATE, DEXTROAMPHETAMINE SULFATE AND AMPHETAMINE SULFATE 2.5; 2.5; 2.5; 2.5 MG/1; MG/1; MG/1; MG/1
10 CAPSULE, EXTENDED RELEASE ORAL EVERY MORNING
Qty: 30 CAPSULE | Refills: 0 | Status: SHIPPED | OUTPATIENT
Start: 2021-12-14 | End: 2022-01-11 | Stop reason: SDUPTHER

## 2021-12-14 NOTE — TELEPHONE ENCOUNTER
(Oldest Message First)    Joy Mcnamara  to Filomena Patrick A.P.N. MB      11/16/21 1:35 PM  I recently changed to [facility] pharmacy at [address] [place] [address]. My ins no longer is contracted with the pharmacy I previously used. I requested refills yesterday for Adderall 10mg and Vitamin B12 injection. The B12 was sent to [facility] and they will not transfer this medication at my request. Can a new order please be sent to [address]?

## 2022-01-11 DIAGNOSIS — F98.8 ATTENTION DEFICIT DISORDER, UNSPECIFIED HYPERACTIVITY PRESENCE: ICD-10-CM

## 2022-01-12 RX ORDER — DEXTROAMPHETAMINE SACCHARATE, AMPHETAMINE ASPARTATE MONOHYDRATE, DEXTROAMPHETAMINE SULFATE AND AMPHETAMINE SULFATE 2.5; 2.5; 2.5; 2.5 MG/1; MG/1; MG/1; MG/1
10 CAPSULE, EXTENDED RELEASE ORAL EVERY MORNING
Qty: 30 CAPSULE | Refills: 0 | Status: SHIPPED | OUTPATIENT
Start: 2022-01-12 | End: 2022-01-24

## 2022-01-24 ENCOUNTER — OFFICE VISIT (OUTPATIENT)
Dept: MEDICAL GROUP | Facility: LAB | Age: 37
End: 2022-01-24
Payer: COMMERCIAL

## 2022-01-24 VITALS
HEART RATE: 94 BPM | SYSTOLIC BLOOD PRESSURE: 158 MMHG | TEMPERATURE: 97.3 F | RESPIRATION RATE: 12 BRPM | DIASTOLIC BLOOD PRESSURE: 90 MMHG | HEIGHT: 72 IN | WEIGHT: 240 LBS | BODY MASS INDEX: 32.51 KG/M2 | OXYGEN SATURATION: 99 %

## 2022-01-24 DIAGNOSIS — F98.8 ATTENTION DEFICIT DISORDER (ADD) IN ADULT: ICD-10-CM

## 2022-01-24 DIAGNOSIS — G43.009 MIGRAINE WITHOUT AURA AND WITHOUT STATUS MIGRAINOSUS, NOT INTRACTABLE: ICD-10-CM

## 2022-01-24 DIAGNOSIS — Z79.899 CONTROLLED SUBSTANCE AGREEMENT SIGNED: ICD-10-CM

## 2022-01-24 PROCEDURE — 99214 OFFICE O/P EST MOD 30 MIN: CPT | Performed by: NURSE PRACTITIONER

## 2022-01-24 RX ORDER — DEXTROAMPHETAMINE SACCHARATE, AMPHETAMINE ASPARTATE, DEXTROAMPHETAMINE SULFATE AND AMPHETAMINE SULFATE 2.5; 2.5; 2.5; 2.5 MG/1; MG/1; MG/1; MG/1
10 TABLET ORAL 2 TIMES DAILY
Qty: 60 TABLET | Refills: 0 | Status: SHIPPED | OUTPATIENT
Start: 2022-01-24 | End: 2022-02-22 | Stop reason: SDUPTHER

## 2022-01-24 RX ORDER — TIZANIDINE 4 MG/1
TABLET ORAL
COMMUNITY
Start: 2022-01-05 | End: 2022-06-20 | Stop reason: SDUPTHER

## 2022-01-24 RX ORDER — PREGABALIN 100 MG/1
100 CAPSULE ORAL 2 TIMES DAILY
COMMUNITY
Start: 2021-12-27 | End: 2022-06-20 | Stop reason: SDUPTHER

## 2022-01-24 RX ORDER — BUTALBITAL, ASPIRIN, AND CAFFEINE 325; 50; 40 MG/1; MG/1; MG/1
1 CAPSULE ORAL 2 TIMES DAILY PRN
Qty: 30 CAPSULE | Refills: 2 | Status: SHIPPED | OUTPATIENT
Start: 2022-01-24 | End: 2022-05-16 | Stop reason: SDUPTHER

## 2022-01-24 ASSESSMENT — PATIENT HEALTH QUESTIONNAIRE - PHQ9
SUM OF ALL RESPONSES TO PHQ9 QUESTIONS 1 AND 2: 0
SUM OF ALL RESPONSES TO PHQ QUESTIONS 1-9: 5
9. THOUGHTS THAT YOU WOULD BE BETTER OFF DEAD, OR OF HURTING YOURSELF: NOT AT ALL
3. TROUBLE FALLING OR STAYING ASLEEP OR SLEEPING TOO MUCH: MORE THAN HALF THE DAYS
4. FEELING TIRED OR HAVING LITTLE ENERGY: SEVERAL DAYS
2. FEELING DOWN, DEPRESSED, IRRITABLE, OR HOPELESS: NOT AT ALL
7. TROUBLE CONCENTRATING ON THINGS, SUCH AS READING THE NEWSPAPER OR WATCHING TELEVISION: NOT AT ALL
5. POOR APPETITE OR OVEREATING: NOT AT ALL
1. LITTLE INTEREST OR PLEASURE IN DOING THINGS: NOT AT ALL
8. MOVING OR SPEAKING SO SLOWLY THAT OTHER PEOPLE COULD HAVE NOTICED. OR THE OPPOSITE, BEING SO FIGETY OR RESTLESS THAT YOU HAVE BEEN MOVING AROUND A LOT MORE THAN USUAL: MORE THAN HALF THE DAYS
6. FEELING BAD ABOUT YOURSELF - OR THAT YOU ARE A FAILURE OR HAVE LET YOURSELF OR YOUR FAMILY DOWN: NOT AL ALL

## 2022-01-24 ASSESSMENT — FIBROSIS 4 INDEX: FIB4 SCORE: 0.67

## 2022-01-24 NOTE — ASSESSMENT & PLAN NOTE
Chronic issue.  Feels that her adderall only works 1/2 the day.  Looses focus and errors occur during the last 1/2 of her day.  Taking adderall 10 mg XR daily.

## 2022-01-24 NOTE — PROGRESS NOTES
Chief Complaint   Patient presents with   • Medication Management     HPI:  Joy is a 35 yo est female here to follow-up on attention deficit disorder and migraines, both chronic issues.  Attention deficit disorder (ADD) in adult  Chronic issue.  Newly working at the [employer] instead of [employer].  Enjoying her new job.  Feels that her adderall only works 1/2 the day.  Looses focus and errors occur during the last 1/2 of her day.  Currently taking adderall 10 mg XR daily.      Migraines: Chronic issue.  Requesting to change from Fioricet to Fiorinal as she likes to do this almost yearly, which typically works better for her switching these up.  Finds Fioricet or Fiorinal like a miracle medication for her migraines.  She is allergic to Imitrex.  Last refill of Fioricet was in September.  Denies heartburn.      Exam:  /90 (BP Location: Right arm, Patient Position: Sitting, BP Cuff Size: Large adult)   Pulse 94   Temp 36.3 °C (97.3 °F)   Resp 12   Ht 1.829 m (6')   Wt 109 kg (240 lb)   SpO2 99%     Gen. appears healthy in no distress   Skin appropriate for sex and age   Neck trachea is midline  Lungs unlabored breathing  Heart regular rate  Neuro gait and station normal   Psych appropriate, calm, interactive, well-groomed    A/P:    1. Attention deficit disorder (ADD) in adult  amphetamine-dextroamphetamine (ADDERALL) 10 MG Tab   2. Controlled substance agreement signed  Pain Management Screen   3. Migraine without aura and without status migrainosus, not intractable  ASPIRIN-CAFFEINE-BUTALBITAL (FIORINAL) -40 MG Cap   Trial of changing Adderall to 10 mg twice daily from once daily XR.  Discussed length of onset efficacy as well as half-life of immediate release versus extended release Adderall.  She will notify me via email or telephone over the next month in regards to which Adderall works best for her.  Updated urine drug screen today.  She is prescribed opiates by her pain management doctor as  well.    Changed from Fioricet to Fiorinal today.  Discussed GI and renal precautions with aspirin therapy as well as blood thinning properties.      In prescribing controlled substances to this patient, I certify that I have obtained and reviewed the medical history of Joygene Perryieri. I have also made a good areli effort to obtain applicable records from other providers who have treated the patient and records did not demonstrate any increased risk of substance abuse that would prevent me from prescribing controlled substances.     I have conducted a physical exam and documented it. I have reviewed Ms. Mcnamara’s prescription history as maintained by the [place] Prescription Monitoring Program.

## 2022-02-02 ENCOUNTER — PATIENT MESSAGE (OUTPATIENT)
Dept: MEDICAL GROUP | Facility: LAB | Age: 37
End: 2022-02-02

## 2022-02-02 DIAGNOSIS — M25.561 ACUTE PAIN OF RIGHT KNEE: ICD-10-CM

## 2022-02-04 ENCOUNTER — HOSPITAL ENCOUNTER (OUTPATIENT)
Dept: RADIOLOGY | Facility: MEDICAL CENTER | Age: 37
End: 2022-02-04
Attending: NURSE PRACTITIONER
Payer: COMMERCIAL

## 2022-02-04 DIAGNOSIS — M25.561 ACUTE PAIN OF RIGHT KNEE: ICD-10-CM

## 2022-02-04 PROCEDURE — 73564 X-RAY EXAM KNEE 4 OR MORE: CPT | Mod: RT

## 2022-02-13 ENCOUNTER — HOSPITAL ENCOUNTER (OUTPATIENT)
Dept: RADIOLOGY | Facility: MEDICAL CENTER | Age: 37
End: 2022-02-13
Attending: PHYSICIAN ASSISTANT
Payer: COMMERCIAL

## 2022-02-13 DIAGNOSIS — M54.12 RADICULOPATHY, CERVICAL: ICD-10-CM

## 2022-02-13 PROCEDURE — 72141 MRI NECK SPINE W/O DYE: CPT

## 2022-02-22 DIAGNOSIS — F98.8 ATTENTION DEFICIT DISORDER (ADD) IN ADULT: ICD-10-CM

## 2022-02-22 RX ORDER — DEXTROAMPHETAMINE SACCHARATE, AMPHETAMINE ASPARTATE, DEXTROAMPHETAMINE SULFATE AND AMPHETAMINE SULFATE 2.5; 2.5; 2.5; 2.5 MG/1; MG/1; MG/1; MG/1
10 TABLET ORAL 2 TIMES DAILY
Qty: 60 TABLET | Refills: 0 | Status: SHIPPED | OUTPATIENT
Start: 2022-02-23 | End: 2022-03-22 | Stop reason: SDUPTHER

## 2022-02-22 NOTE — TELEPHONE ENCOUNTER
Received request via: Pharmacy    Was the patient seen in the last year in this department? Yes  1/24/2022  Does the patient have an active prescription (recently filled or refills available) for medication(s) requested? No

## 2022-04-18 DIAGNOSIS — F98.8 ATTENTION DEFICIT DISORDER (ADD) IN ADULT: ICD-10-CM

## 2022-04-18 RX ORDER — DEXTROAMPHETAMINE SACCHARATE, AMPHETAMINE ASPARTATE, DEXTROAMPHETAMINE SULFATE AND AMPHETAMINE SULFATE 2.5; 2.5; 2.5; 2.5 MG/1; MG/1; MG/1; MG/1
10 TABLET ORAL 2 TIMES DAILY
Qty: 60 TABLET | Refills: 0 | Status: SHIPPED | OUTPATIENT
Start: 2022-04-18 | End: 2022-05-16 | Stop reason: SDUPTHER

## 2022-05-16 ENCOUNTER — OFFICE VISIT (OUTPATIENT)
Dept: MEDICAL GROUP | Facility: LAB | Age: 37
End: 2022-05-16
Payer: COMMERCIAL

## 2022-05-16 VITALS
BODY MASS INDEX: 33.18 KG/M2 | DIASTOLIC BLOOD PRESSURE: 96 MMHG | TEMPERATURE: 97.3 F | OXYGEN SATURATION: 97 % | SYSTOLIC BLOOD PRESSURE: 148 MMHG | RESPIRATION RATE: 12 BRPM | HEART RATE: 82 BPM | WEIGHT: 245 LBS | HEIGHT: 72 IN

## 2022-05-16 DIAGNOSIS — G43.009 MIGRAINE WITHOUT AURA AND WITHOUT STATUS MIGRAINOSUS, NOT INTRACTABLE: ICD-10-CM

## 2022-05-16 DIAGNOSIS — I10 ESSENTIAL HYPERTENSION: ICD-10-CM

## 2022-05-16 DIAGNOSIS — F41.1 GAD (GENERALIZED ANXIETY DISORDER): ICD-10-CM

## 2022-05-16 DIAGNOSIS — N64.4 BREAST PAIN, RIGHT: ICD-10-CM

## 2022-05-16 DIAGNOSIS — F98.8 ATTENTION DEFICIT DISORDER (ADD) IN ADULT: ICD-10-CM

## 2022-05-16 DIAGNOSIS — Z00.00 PREVENTATIVE HEALTH CARE: ICD-10-CM

## 2022-05-16 PROCEDURE — 99214 OFFICE O/P EST MOD 30 MIN: CPT | Performed by: NURSE PRACTITIONER

## 2022-05-16 RX ORDER — DEXTROAMPHETAMINE SACCHARATE, AMPHETAMINE ASPARTATE, DEXTROAMPHETAMINE SULFATE AND AMPHETAMINE SULFATE 2.5; 2.5; 2.5; 2.5 MG/1; MG/1; MG/1; MG/1
10 TABLET ORAL 2 TIMES DAILY
Qty: 60 TABLET | Refills: 0 | Status: SHIPPED | OUTPATIENT
Start: 2022-06-21 | End: 2022-06-20 | Stop reason: SDUPTHER

## 2022-05-16 RX ORDER — DEXTROAMPHETAMINE SACCHARATE, AMPHETAMINE ASPARTATE, DEXTROAMPHETAMINE SULFATE AND AMPHETAMINE SULFATE 2.5; 2.5; 2.5; 2.5 MG/1; MG/1; MG/1; MG/1
10 TABLET ORAL 2 TIMES DAILY
Qty: 60 TABLET | Refills: 0 | Status: SHIPPED | OUTPATIENT
Start: 2022-05-16 | End: 2022-06-15

## 2022-05-16 RX ORDER — PROPRANOLOL HYDROCHLORIDE 20 MG/1
20 TABLET ORAL 2 TIMES DAILY
Qty: 180 TABLET | Refills: 3 | Status: SHIPPED | OUTPATIENT
Start: 2022-05-16 | End: 2022-08-16 | Stop reason: SDUPTHER

## 2022-05-16 RX ORDER — DEXTROAMPHETAMINE SACCHARATE, AMPHETAMINE ASPARTATE, DEXTROAMPHETAMINE SULFATE AND AMPHETAMINE SULFATE 2.5; 2.5; 2.5; 2.5 MG/1; MG/1; MG/1; MG/1
10 TABLET ORAL 2 TIMES DAILY
Qty: 60 TABLET | Refills: 0 | Status: SHIPPED | OUTPATIENT
Start: 2022-07-21 | End: 2022-08-16

## 2022-05-16 RX ORDER — BUTALBITAL, ASPIRIN, AND CAFFEINE 325; 50; 40 MG/1; MG/1; MG/1
1 CAPSULE ORAL 2 TIMES DAILY PRN
Qty: 30 CAPSULE | Refills: 2 | Status: SHIPPED | OUTPATIENT
Start: 2022-05-16 | End: 2022-06-15

## 2022-05-16 ASSESSMENT — FIBROSIS 4 INDEX: FIB4 SCORE: 0.69

## 2022-05-16 NOTE — PROGRESS NOTES
"CC  f/u      HPI:  Joy is a 37-year-old established female here to follow-up on attention deficit disorder, last seen January 24.  At her last visit we changed her Adderall from extended release to immediate release as she felt that the XR was wearing off too quickly.  She is happy to report that she is doing really well with change from extended release to immediate release and productive at her job.  Denies any negative side effects of Adderall.    Elevated BP readings: Chronic history of hypertension.  States that she probably needs to go back on propanolol.  Mentions weight gain.  Off propranolol for months.  Has been going to dentist for issues and they have been concerned about her BP readings.  Fortunately denies chest pain, vision changes or trouble breathing.  Denies lower extremity swelling.    Breast pain: New issue.  Breast pain on the right side and mainly coming from lateral side and ending at nipple.  Has not felt a mass.   Hx of hysterectomy.  Pain x 5-6 months.  Retains ovaries.  Both grandmothers had breast CA. No other associated symptoms.    Migraines: Chronic issue.  Requesting refill of Fiorinal which works well for her.  Allergic to triptan's.      Exam:  BP (!) 148/96 (BP Location: Left arm, Patient Position: Sitting, BP Cuff Size: Large adult)   Pulse 82   Temp 36.3 °C (97.3 °F)   Resp 12   Ht 1.829 m (6')   Wt 111 kg (245 lb)   SpO2 97%   Gen. appears healthy in no distress   Skin appropriate for sex and age   Neck trachea is midline  Lungs unlabored breathing  Heart regular rate  Breast exam she does have fibrocystic breast tissue but not extremely dense.  I do not appreciate any masses.  I do not appreciate any irregularities to her right breast on exam.  Neuro gait and station normal   Psych appropriate, calm, interactive, well-groomed    A/P:  \"  1. Attention deficit disorder (ADD) in adult  amphetamine-dextroamphetamine (ADDERALL) 10 MG Tab    amphetamine-dextroamphetamine " "(ADDERALL) 10 MG Tab    amphetamine-dextroamphetamine (ADDERALL) 10 MG Tab   2. Essential hypertension  propranolol (INDERAL) 20 MG Tab   3. YOANA (generalized anxiety disorder)  propranolol (INDERAL) 20 MG Tab   4. Breast pain, right  US-BREAST LIMITED-RIGHT    MA DIAGNOSTIC MAMMO RIGHT W/CAD   5. Migraine without aura and without status migrainosus, not intractable  ASPIRIN-CAFFEINE-BUTALBITAL (FIORINAL) -40 MG Cap   6. Preventative health care  Comp Metabolic Panel    CBC WITH DIFFERENTIAL    Lipid Profile    TSH    HEMOGLOBIN A1C   \"  Refilled Adderall.  Stable.  UDS up-to-date. In prescribing controlled substances to this patient, I certify that I have obtained and reviewed the medical history of Mercedes Tadeo Perryieri. I have also made a good areli effort to obtain applicable records from other providers who have treated the patient and records did not demonstrate any increased risk of substance abuse that would prevent me from prescribing controlled substances.     I have conducted a physical exam and documented it. I have reviewed Ms. Mcnamara’s prescription history as maintained by the [place] Prescription Monitoring Program.     Recommend diagnostic mammogram with ultrasound of right breast.  Discussed that the radiologist will speak with her after this returns regarding results.    Migraines stable.  Refilled Fiorinal for as needed use.  Discussed potential rebound migraines with daily use of Fiorinal which she does avoid.  Last prescription of Fiorinal was given in January 2022 with 2 refills.    Blood pressure is certainly elevated.  Added back on propanolol 20 mg twice daily in hopes that this will also help with her migraines, chronic anxiety and hypertension.  Encouraged her to reenter an exercise program, lower her salt intake and lose weight.  She will email me regarding blood pressure readings over the next 2 to 3 weeks.  This goal is to goal blood pressure readings consistently below 130/90, " preferably below 120/80.    Follow-up 6 months

## 2022-06-20 DIAGNOSIS — M54.42 CHRONIC BILATERAL LOW BACK PAIN WITH BILATERAL SCIATICA: ICD-10-CM

## 2022-06-20 DIAGNOSIS — F98.8 ATTENTION DEFICIT DISORDER (ADD) IN ADULT: ICD-10-CM

## 2022-06-20 DIAGNOSIS — M54.41 CHRONIC BILATERAL LOW BACK PAIN WITH BILATERAL SCIATICA: ICD-10-CM

## 2022-06-20 DIAGNOSIS — G89.29 CHRONIC BILATERAL LOW BACK PAIN WITH BILATERAL SCIATICA: ICD-10-CM

## 2022-06-20 RX ORDER — DEXTROAMPHETAMINE SACCHARATE, AMPHETAMINE ASPARTATE, DEXTROAMPHETAMINE SULFATE AND AMPHETAMINE SULFATE 2.5; 2.5; 2.5; 2.5 MG/1; MG/1; MG/1; MG/1
10 TABLET ORAL 2 TIMES DAILY
Qty: 60 TABLET | Refills: 0 | Status: SHIPPED | OUTPATIENT
Start: 2022-06-20 | End: 2022-08-16 | Stop reason: SDUPTHER

## 2022-06-20 RX ORDER — PREGABALIN 100 MG/1
100 CAPSULE ORAL 2 TIMES DAILY
Qty: 60 CAPSULE | Refills: 2 | Status: SHIPPED | OUTPATIENT
Start: 2022-06-20 | End: 2022-08-16 | Stop reason: SDUPTHER

## 2022-06-20 RX ORDER — TIZANIDINE 4 MG/1
TABLET ORAL
Qty: 30 TABLET | Refills: 2 | Status: SHIPPED | OUTPATIENT
Start: 2022-06-20 | End: 2022-08-11 | Stop reason: SDUPTHER

## 2022-07-25 ENCOUNTER — HOSPITAL ENCOUNTER (OUTPATIENT)
Dept: LAB | Facility: MEDICAL CENTER | Age: 37
End: 2022-07-25
Attending: NURSE PRACTITIONER
Payer: COMMERCIAL

## 2022-07-25 DIAGNOSIS — Z00.00 PREVENTATIVE HEALTH CARE: ICD-10-CM

## 2022-07-25 LAB
ALBUMIN SERPL BCP-MCNC: 4.5 G/DL (ref 3.2–4.9)
ALBUMIN/GLOB SERPL: 1.7 G/DL
ALP SERPL-CCNC: 69 U/L (ref 30–99)
ALT SERPL-CCNC: 13 U/L (ref 2–50)
ANION GAP SERPL CALC-SCNC: 13 MMOL/L (ref 7–16)
AST SERPL-CCNC: 13 U/L (ref 12–45)
BASOPHILS # BLD AUTO: 0.7 % (ref 0–1.8)
BASOPHILS # BLD: 0.1 K/UL (ref 0–0.12)
BILIRUB SERPL-MCNC: 0.3 MG/DL (ref 0.1–1.5)
BUN SERPL-MCNC: 9 MG/DL (ref 8–22)
CALCIUM SERPL-MCNC: 9 MG/DL (ref 8.5–10.5)
CHLORIDE SERPL-SCNC: 103 MMOL/L (ref 96–112)
CHOLEST SERPL-MCNC: 201 MG/DL (ref 100–199)
CO2 SERPL-SCNC: 25 MMOL/L (ref 20–33)
CREAT SERPL-MCNC: 0.73 MG/DL (ref 0.5–1.4)
EOSINOPHIL # BLD AUTO: 0.18 K/UL (ref 0–0.51)
EOSINOPHIL NFR BLD: 1.3 % (ref 0–6.9)
ERYTHROCYTE [DISTWIDTH] IN BLOOD BY AUTOMATED COUNT: 47.8 FL (ref 35.9–50)
EST. AVERAGE GLUCOSE BLD GHB EST-MCNC: 97 MG/DL
FASTING STATUS PATIENT QL REPORTED: NORMAL
GFR SERPLBLD CREATININE-BSD FMLA CKD-EPI: 107 ML/MIN/1.73 M 2
GLOBULIN SER CALC-MCNC: 2.6 G/DL (ref 1.9–3.5)
GLUCOSE SERPL-MCNC: 86 MG/DL (ref 65–99)
HBA1C MFR BLD: 5 % (ref 4–5.6)
HCT VFR BLD AUTO: 43.3 % (ref 37–47)
HDLC SERPL-MCNC: 58 MG/DL
HGB BLD-MCNC: 14.3 G/DL (ref 12–16)
IMM GRANULOCYTES # BLD AUTO: 0.08 K/UL (ref 0–0.11)
IMM GRANULOCYTES NFR BLD AUTO: 0.6 % (ref 0–0.9)
LDLC SERPL CALC-MCNC: 126 MG/DL
LYMPHOCYTES # BLD AUTO: 2.05 K/UL (ref 1–4.8)
LYMPHOCYTES NFR BLD: 15.3 % (ref 22–41)
MCH RBC QN AUTO: 31.6 PG (ref 27–33)
MCHC RBC AUTO-ENTMCNC: 33 G/DL (ref 33.6–35)
MCV RBC AUTO: 95.6 FL (ref 81.4–97.8)
MONOCYTES # BLD AUTO: 0.71 K/UL (ref 0–0.85)
MONOCYTES NFR BLD AUTO: 5.3 % (ref 0–13.4)
NEUTROPHILS # BLD AUTO: 10.31 K/UL (ref 2–7.15)
NEUTROPHILS NFR BLD: 76.8 % (ref 44–72)
NRBC # BLD AUTO: 0 K/UL
NRBC BLD-RTO: 0 /100 WBC
PLATELET # BLD AUTO: 302 K/UL (ref 164–446)
PMV BLD AUTO: 10.1 FL (ref 9–12.9)
POTASSIUM SERPL-SCNC: 3.7 MMOL/L (ref 3.6–5.5)
PROT SERPL-MCNC: 7.1 G/DL (ref 6–8.2)
RBC # BLD AUTO: 4.53 M/UL (ref 4.2–5.4)
SODIUM SERPL-SCNC: 141 MMOL/L (ref 135–145)
TRIGL SERPL-MCNC: 84 MG/DL (ref 0–149)
TSH SERPL DL<=0.005 MIU/L-ACNC: 2.55 UIU/ML (ref 0.38–5.33)
WBC # BLD AUTO: 13.4 K/UL (ref 4.8–10.8)

## 2022-07-25 PROCEDURE — 83036 HEMOGLOBIN GLYCOSYLATED A1C: CPT

## 2022-07-25 PROCEDURE — 85025 COMPLETE CBC W/AUTO DIFF WBC: CPT

## 2022-07-25 PROCEDURE — 36415 COLL VENOUS BLD VENIPUNCTURE: CPT

## 2022-07-25 PROCEDURE — 80061 LIPID PANEL: CPT

## 2022-07-25 PROCEDURE — 84443 ASSAY THYROID STIM HORMONE: CPT

## 2022-07-25 PROCEDURE — 80053 COMPREHEN METABOLIC PANEL: CPT

## 2022-08-11 RX ORDER — TIZANIDINE 4 MG/1
TABLET ORAL
Qty: 30 TABLET | Refills: 2 | Status: SHIPPED | OUTPATIENT
Start: 2022-08-11 | End: 2022-10-03

## 2022-08-11 NOTE — TELEPHONE ENCOUNTER
Received request via: Pharmacy    Was the patient seen in the last year in this department? Yes  5/16/2022  Does the patient have an active prescription (recently filled or refills available) for medication(s) requested? No

## 2022-08-16 ENCOUNTER — TELEPHONE (OUTPATIENT)
Dept: MEDICAL GROUP | Facility: LAB | Age: 37
End: 2022-08-16

## 2022-08-16 ENCOUNTER — OFFICE VISIT (OUTPATIENT)
Dept: MEDICAL GROUP | Facility: LAB | Age: 37
End: 2022-08-16
Payer: COMMERCIAL

## 2022-08-16 VITALS
SYSTOLIC BLOOD PRESSURE: 160 MMHG | WEIGHT: 245 LBS | HEART RATE: 92 BPM | OXYGEN SATURATION: 95 % | RESPIRATION RATE: 16 BRPM | BODY MASS INDEX: 33.18 KG/M2 | HEIGHT: 72 IN | DIASTOLIC BLOOD PRESSURE: 100 MMHG | TEMPERATURE: 96.8 F

## 2022-08-16 DIAGNOSIS — F41.1 GAD (GENERALIZED ANXIETY DISORDER): ICD-10-CM

## 2022-08-16 DIAGNOSIS — I10 ESSENTIAL HYPERTENSION: ICD-10-CM

## 2022-08-16 DIAGNOSIS — I10 PRIMARY HYPERTENSION: ICD-10-CM

## 2022-08-16 DIAGNOSIS — G89.29 CHRONIC BILATERAL LOW BACK PAIN WITH BILATERAL SCIATICA: ICD-10-CM

## 2022-08-16 DIAGNOSIS — F32.2 SEVERE MAJOR DEPRESSIVE DISORDER (HCC): ICD-10-CM

## 2022-08-16 DIAGNOSIS — M54.41 CHRONIC BILATERAL LOW BACK PAIN WITH BILATERAL SCIATICA: ICD-10-CM

## 2022-08-16 DIAGNOSIS — F51.01 PRIMARY INSOMNIA: ICD-10-CM

## 2022-08-16 DIAGNOSIS — F98.8 ATTENTION DEFICIT DISORDER (ADD) IN ADULT: ICD-10-CM

## 2022-08-16 DIAGNOSIS — M54.42 CHRONIC BILATERAL LOW BACK PAIN WITH BILATERAL SCIATICA: ICD-10-CM

## 2022-08-16 DIAGNOSIS — G43.009 MIGRAINE WITHOUT AURA AND WITHOUT STATUS MIGRAINOSUS, NOT INTRACTABLE: ICD-10-CM

## 2022-08-16 PROCEDURE — 99214 OFFICE O/P EST MOD 30 MIN: CPT | Performed by: NURSE PRACTITIONER

## 2022-08-16 RX ORDER — PROPRANOLOL HYDROCHLORIDE 40 MG/1
40 TABLET ORAL 2 TIMES DAILY
Qty: 60 TABLET | Refills: 5 | Status: SHIPPED | OUTPATIENT
Start: 2022-08-16 | End: 2023-09-14

## 2022-08-16 RX ORDER — AMLODIPINE BESYLATE 5 MG/1
5 TABLET ORAL DAILY
Qty: 30 TABLET | Refills: 5 | Status: SHIPPED | OUTPATIENT
Start: 2022-08-16 | End: 2022-09-29 | Stop reason: SDUPTHER

## 2022-08-16 RX ORDER — PREGABALIN 100 MG/1
100 CAPSULE ORAL 2 TIMES DAILY
Qty: 60 CAPSULE | Refills: 2 | Status: SHIPPED | OUTPATIENT
Start: 2022-08-16 | End: 2022-09-15

## 2022-08-16 RX ORDER — DEXTROAMPHETAMINE SACCHARATE, AMPHETAMINE ASPARTATE, DEXTROAMPHETAMINE SULFATE AND AMPHETAMINE SULFATE 2.5; 2.5; 2.5; 2.5 MG/1; MG/1; MG/1; MG/1
10 TABLET ORAL 2 TIMES DAILY
Qty: 60 TABLET | Refills: 0 | Status: SHIPPED | OUTPATIENT
Start: 2022-08-20 | End: 2022-09-19 | Stop reason: SDUPTHER

## 2022-08-16 ASSESSMENT — FIBROSIS 4 INDEX: FIB4 SCORE: 0.47

## 2022-08-16 NOTE — TELEPHONE ENCOUNTER
DOCUMENTATION OF PAR STATUS:    1. Name of Medication & Dose: NURTEC     2. Name of Prescription Coverage Company & phone #: [employer] [id number]    3. Date Prior Auth Submitted: 8/16/22    4. What information was given to obtain insurance decision? OV NOTES FAXED    5. Prior Auth Status? ;Status:Approved;Review Type:Prior Auth;Coverage Start Date:07/17/2022;Coverage End Date:08/16/2023;     6. Patient Notified: yes

## 2022-08-16 NOTE — PROGRESS NOTES
Chief Complaint   Patient presents with    Hypertension    Migraine    Sleep Problem     HPI:  Joy is a 36 yo est female here with c/o worsening HTN and to discuss moods:     Hypertension  Chronic issue.  Taking propranolol 20 mg bid.  Home BP readings on average 150/95 and as high as 160/100.  Migraines and anxiety worsening.      Has chronic dx of MDD, YOANA, ADD and insomnia.  Taking a week off work lately has helped moods.  Working at [employer].  Working on exercise / diet.  Ready to re-est with psy.  Denies SI or HI.      Chronic pain:  controlled right now with lyrica / tizanidine.      Mgiraines:  chronic issue.  Getting about 1 migraine per week or every other week.  Has tried Maxalt and imitrex with caused her to loose vision and pins / needles in hands / feet.  Trying to limit fioricet.   Wants to know if there is a new abortive medication for migraines.       Current Outpatient Medications:     tizanidine, TAKE 1 TABLET BY MOUTH TWICE DAILY FOR MUSCLE SPASMS    amphetamine-dextroamphetamine, 10 mg, Oral, BID    propranolol, 20 mg, Oral, BID    albuterol, 2 Puff, Inhalation, Q6HRS PRN    Diclofenac Sodium, WIN AA BID PRN P    promethazine, TK 1 T PO QHS PRN N    fluticasone, 2 Spray, Nasal, DAILY      Exam:  BP (!) 160/100 (BP Location: Right arm, Patient Position: Sitting, BP Cuff Size: Large adult)   Pulse 92   Temp 36 °C (96.8 °F)   Resp 16   Ht 1.829 m (6')   Wt 111 kg (245 lb)   SpO2 95%   Gen: NAD  Resp: nonlabored.  Able to speak in full sentences  Psy: pleasant / cooperative.   CV RRR without murmur  Neuro:  Alert and oriented x 3      A/P:  1. Primary hypertension  amLODIPine (NORVASC) 5 MG Tab      2. Essential hypertension  propranolol (INDERAL) 40 MG Tab      3. YOANA (generalized anxiety disorder)  propranolol (INDERAL) 40 MG Tab    Referral to Psychiatry      4. Attention deficit disorder (ADD) in adult  Referral to Psychiatry    amphetamine-dextroamphetamine (ADDERALL) 10 MG Tab      5.  Severe major depressive disorder (HCC)  Referral to Psychiatry      6. Primary insomnia  Referral to Psychiatry      7. Chronic bilateral low back pain with bilateral sciatica        8. Migraine without aura and without status migrainosus, not intractable          Unfortunately blood pressure is uncontrolled.  Continue propanolol but increase to 40 mg twice daily which should help with migraines, anxiety and blood pressure as well as tachycardia.  Added in amlodipine 5 mg once daily at night in hopes of lowering her diastolic blood pressure consistently below 90.  Discussed goal blood pressure readings around 120/80.  Labs are up-to-date.  Encouraged her to continue with efforts at exercise, low-salt diet and weight loss.  Follow-up 1 month regarding blood pressure.  Side effects of all medications prescribed today were discussed with the patient including how to take the medications and proper dosage. Discussed repercussions of not taking the medications as prescribed. Instructed to call the office should she have any negative side effects or problems with the medications.    Refilled Adderall.  Referred to psychiatry in regards to multiple psychiatric diagnoses including general anxiety disorder, major depressive disorder, attention deficit disorder and insomnia.  Fortunately at this point she denies any suicidal or homicidal ideations, last suicide attempt was 2018.  She prefers to refrain from any new medications for moods today, instead she is working hard on diet, exercise and lifestyle.    Failed/allergic to triptans.  Hopefully insurance will cover Nurtec abortively so that she does not rely on Fioricet long-term.  Also we discussed that increasing propanolol should help decrease migraine frequency.

## 2022-08-16 NOTE — ASSESSMENT & PLAN NOTE
Chronic issue.  Taking propranolol 20 mg bid.  Home BP readings on average 150/95 and as high as 160/100.  Migraines and anxiety worsening.

## 2022-09-19 DIAGNOSIS — F98.8 ATTENTION DEFICIT DISORDER (ADD) IN ADULT: ICD-10-CM

## 2022-09-19 RX ORDER — DEXTROAMPHETAMINE SACCHARATE, AMPHETAMINE ASPARTATE, DEXTROAMPHETAMINE SULFATE AND AMPHETAMINE SULFATE 2.5; 2.5; 2.5; 2.5 MG/1; MG/1; MG/1; MG/1
10 TABLET ORAL 2 TIMES DAILY
Qty: 60 TABLET | Refills: 0 | Status: SHIPPED | OUTPATIENT
Start: 2022-09-19 | End: 2022-09-29 | Stop reason: SDUPTHER

## 2022-09-29 ENCOUNTER — OFFICE VISIT (OUTPATIENT)
Dept: MEDICAL GROUP | Facility: LAB | Age: 37
End: 2022-09-29
Payer: COMMERCIAL

## 2022-09-29 VITALS
HEIGHT: 72 IN | WEIGHT: 248 LBS | HEART RATE: 66 BPM | SYSTOLIC BLOOD PRESSURE: 128 MMHG | OXYGEN SATURATION: 96 % | BODY MASS INDEX: 33.59 KG/M2 | TEMPERATURE: 96.4 F | DIASTOLIC BLOOD PRESSURE: 80 MMHG | RESPIRATION RATE: 12 BRPM

## 2022-09-29 DIAGNOSIS — M54.41 CHRONIC BILATERAL LOW BACK PAIN WITH BILATERAL SCIATICA: ICD-10-CM

## 2022-09-29 DIAGNOSIS — M54.42 CHRONIC BILATERAL LOW BACK PAIN WITH BILATERAL SCIATICA: ICD-10-CM

## 2022-09-29 DIAGNOSIS — G43.009 MIGRAINE WITHOUT AURA AND WITHOUT STATUS MIGRAINOSUS, NOT INTRACTABLE: ICD-10-CM

## 2022-09-29 DIAGNOSIS — I10 PRIMARY HYPERTENSION: ICD-10-CM

## 2022-09-29 DIAGNOSIS — F98.8 ATTENTION DEFICIT DISORDER (ADD) IN ADULT: ICD-10-CM

## 2022-09-29 DIAGNOSIS — G89.29 CHRONIC BILATERAL LOW BACK PAIN WITH BILATERAL SCIATICA: ICD-10-CM

## 2022-09-29 PROCEDURE — 99214 OFFICE O/P EST MOD 30 MIN: CPT | Performed by: NURSE PRACTITIONER

## 2022-09-29 RX ORDER — DEXTROAMPHETAMINE SACCHARATE, AMPHETAMINE ASPARTATE, DEXTROAMPHETAMINE SULFATE AND AMPHETAMINE SULFATE 2.5; 2.5; 2.5; 2.5 MG/1; MG/1; MG/1; MG/1
10 TABLET ORAL 2 TIMES DAILY
Qty: 60 TABLET | Refills: 0 | Status: SHIPPED | OUTPATIENT
Start: 2022-11-18 | End: 2023-03-15 | Stop reason: SDUPTHER

## 2022-09-29 RX ORDER — DEXTROAMPHETAMINE SACCHARATE, AMPHETAMINE ASPARTATE, DEXTROAMPHETAMINE SULFATE AND AMPHETAMINE SULFATE 2.5; 2.5; 2.5; 2.5 MG/1; MG/1; MG/1; MG/1
10 TABLET ORAL 2 TIMES DAILY
Qty: 60 TABLET | Refills: 0 | Status: SHIPPED | OUTPATIENT
Start: 2022-12-18 | End: 2022-12-15

## 2022-09-29 RX ORDER — AMLODIPINE BESYLATE 10 MG/1
10 TABLET ORAL DAILY
Qty: 30 TABLET | Refills: 5 | Status: SHIPPED | OUTPATIENT
Start: 2022-09-29 | End: 2022-11-22

## 2022-09-29 RX ORDER — DEXTROAMPHETAMINE SACCHARATE, AMPHETAMINE ASPARTATE, DEXTROAMPHETAMINE SULFATE AND AMPHETAMINE SULFATE 2.5; 2.5; 2.5; 2.5 MG/1; MG/1; MG/1; MG/1
10 TABLET ORAL 2 TIMES DAILY
Qty: 60 TABLET | Refills: 0 | Status: SHIPPED | OUTPATIENT
Start: 2022-10-19 | End: 2022-11-18

## 2022-09-29 RX ORDER — PREGABALIN 100 MG/1
100 CAPSULE ORAL 2 TIMES DAILY
COMMUNITY
Start: 2022-09-17 | End: 2022-12-12

## 2022-09-29 ASSESSMENT — FIBROSIS 4 INDEX: FIB4 SCORE: 0.47

## 2022-09-29 NOTE — PROGRESS NOTES
Chief Complaint   Patient presents with    Hypertension     Follow up        HPI:  Joy is a 36 yo est female here to f/u on chronic issues.     #1- Fell at air races and landed on knee -  put her on diclofenac for a month which has been helpful.  Denies GI side effects of diclofenac.     #2- HTN:  chronic issue.  Range at home 100/60 - 170/110 - typically worse in the evening.  Riding elliptical 2-3 mornings per week x the past 2 months - 5-15 minutes.  Has cut out fast food and is watching salt.  Nonsmoker.     Migraines:  happy to report that [email or web address] takes migraine away within 20 minutes.     Chronic back pain:  awaiting another nerve ablation.  Continues on lyrica 100 mg bid.        Exam:  /80 (BP Location: Right arm, Patient Position: Sitting, BP Cuff Size: Large adult)   Pulse 66   Temp (!) 35.8 °C (96.4 °F)   Resp 12   Ht 1.829 m (6')   Wt 112 kg (248 lb)   SpO2 96%   Gen: NAD  Resp: nonlabored.  Able to speak in full sentences  Psy: pleasant / cooperative.   Neuro:  Alert and oriented x 3    A/P:  1. Attention deficit disorder (ADD) in adult  -stable, continue same.  In prescribing controlled substances to this patient, I certify that I have obtained and reviewed the medical history of Joy Mcnamara. I have also made a good areli effort to obtain applicable records from other providers who have treated the patient and records did not demonstrate any increased risk of substance abuse that would prevent me from prescribing controlled substances.     I have conducted a physical exam and documented it. I have reviewed Ms. Mcnamara’s prescription history as maintained by the [place] Prescription Monitoring Program.   treatment plan, and alternative therapies with the patient.    - amphetamine-dextroamphetamine (ADDERALL) 10 MG Tab; Take 1 Tablet by mouth 2 times a day for 30 days.  Dispense: 60 Tablet; Refill: 0  - amphetamine-dextroamphetamine (ADDERALL) 10 MG Tab; Take 1 Tablet by mouth  2 times a day for 30 days.  Dispense: 60 Tablet; Refill: 0  - amphetamine-dextroamphetamine (ADDERALL) 10 MG Tab; Take 1 Tablet by mouth 2 times a day for 30 days.  Dispense: 60 Tablet; Refill: 0    2. Primary hypertension  -under control here today but very high evening home readings.  Labs UTD and normal renal function. Increased amlodipine to 10 mg daily.  Continue propranolol 40 mg bid also.  Discussed that we can certainly increase propanolol if needed as her heart rate is typically around 70.  Discussed that increasing propanolol may help with decreasing anxiety and migraine frequency but we will start with increasing amlodipine as it is a more powerful antihypertensive.  Increase exercise to 20 min 4 d per week if possible.  Low salt.  Watch out for ankle swelling -discussed that lower extremity edema can be a side effect of 10 mg amlodipine and she will contact me if this is problematic.   - amLODIPine (NORVASC) 10 MG Tab; Take 1 Tablet by mouth every day. At night for hypertension  Dispense: 30 Tablet; Refill: 5    3. Migraine without aura and without status migrainosus, not intractable  -stable.  Nurtec helps abort migraine!  Continue propanolol preventatively.    4. Chronic bilateral low back pain with bilateral sciatica  -worsening but followed by pain management.  Waiting on ablation.

## 2022-10-03 DIAGNOSIS — G43.009 MIGRAINE WITHOUT AURA AND WITHOUT STATUS MIGRAINOSUS, NOT INTRACTABLE: ICD-10-CM

## 2022-10-03 RX ORDER — TIZANIDINE 4 MG/1
TABLET ORAL
Qty: 30 TABLET | Refills: 0 | Status: SHIPPED | OUTPATIENT
Start: 2022-10-03 | End: 2022-10-26

## 2022-10-03 RX ORDER — RIMEGEPANT SULFATE 75 MG/75MG
TABLET, ORALLY DISINTEGRATING ORAL
Qty: 9 TABLET | Refills: 0 | Status: SHIPPED | OUTPATIENT
Start: 2022-10-03 | End: 2022-11-05 | Stop reason: SDUPTHER

## 2022-10-18 ENCOUNTER — TELEPHONE (OUTPATIENT)
Dept: MEDICAL GROUP | Facility: LAB | Age: 37
End: 2022-10-18
Payer: COMMERCIAL

## 2022-10-18 ENCOUNTER — OFFICE VISIT (OUTPATIENT)
Dept: URGENT CARE | Facility: CLINIC | Age: 37
End: 2022-10-18
Payer: COMMERCIAL

## 2022-10-18 VITALS
SYSTOLIC BLOOD PRESSURE: 146 MMHG | OXYGEN SATURATION: 99 % | RESPIRATION RATE: 16 BRPM | TEMPERATURE: 97.5 F | HEIGHT: 72 IN | BODY MASS INDEX: 34.19 KG/M2 | DIASTOLIC BLOOD PRESSURE: 98 MMHG | WEIGHT: 252.4 LBS | HEART RATE: 64 BPM

## 2022-10-18 DIAGNOSIS — R03.0 ELEVATED BLOOD PRESSURE READING: ICD-10-CM

## 2022-10-18 DIAGNOSIS — L27.0 DRUG ERUPTION: ICD-10-CM

## 2022-10-18 DIAGNOSIS — K04.7 TOOTH INFECTION: ICD-10-CM

## 2022-10-18 PROCEDURE — 99214 OFFICE O/P EST MOD 30 MIN: CPT | Performed by: PHYSICIAN ASSISTANT

## 2022-10-18 RX ORDER — DEXAMETHASONE SODIUM PHOSPHATE 10 MG/ML
10 INJECTION INTRAMUSCULAR; INTRAVENOUS ONCE
Status: DISCONTINUED | OUTPATIENT
Start: 2022-10-18 | End: 2022-10-18

## 2022-10-18 RX ORDER — AMOXICILLIN 500 MG/1
500 CAPSULE ORAL 3 TIMES DAILY
COMMUNITY
End: 2022-10-18

## 2022-10-18 RX ORDER — DEXAMETHASONE SODIUM PHOSPHATE 10 MG/ML
10 INJECTION INTRAMUSCULAR; INTRAVENOUS ONCE
Status: COMPLETED | OUTPATIENT
Start: 2022-10-18 | End: 2022-10-18

## 2022-10-18 RX ORDER — METHYLPREDNISOLONE 4 MG/1
TABLET ORAL
Qty: 21 TABLET | Refills: 0 | Status: SHIPPED | OUTPATIENT
Start: 2022-10-18 | End: 2022-12-15

## 2022-10-18 RX ORDER — CLINDAMYCIN HYDROCHLORIDE 300 MG/1
300 CAPSULE ORAL 3 TIMES DAILY
Qty: 21 CAPSULE | Refills: 0 | Status: SHIPPED | OUTPATIENT
Start: 2022-10-18 | End: 2022-10-25

## 2022-10-18 RX ADMIN — DEXAMETHASONE SODIUM PHOSPHATE 10 MG: 10 INJECTION INTRAMUSCULAR; INTRAVENOUS at 18:19

## 2022-10-18 ASSESSMENT — FIBROSIS 4 INDEX: FIB4 SCORE: 0.47

## 2022-10-18 NOTE — TELEPHONE ENCOUNTER
1. Caller Name: SUNNY/ Joy Mcnamara                          Call Back Number: [phone]      How would the patient prefer to be contacted with a response: MyChart message  Or  phone call/ tried to call patient and went to / sent my chart   2. What are the patient's symptoms (location & severity)? Big red blotchy skin / swelling below the knees and elevated Blood pressure    3. Is this a new symptom Yes    4. When did it start? Today / sent my chart to patient to confirm when this started and when she started taking amoxicillin and who prescribed this.     Started taking amoxicillin for tooth pain / not sure if this is an allergic reaction she states that she has taken this in the past without any reactions

## 2022-10-19 ASSESSMENT — ENCOUNTER SYMPTOMS
RESPIRATORY NEGATIVE: 1
NAUSEA: 0
CHILLS: 0
FEVER: 0
ABDOMINAL PAIN: 0
DIARRHEA: 0
VOMITING: 0

## 2022-10-19 NOTE — PROGRESS NOTES
Subjective:   Joy Mcnamara is a 37 y.o. female who presents for Edema (X 5 days, swelling and redness on both legs and arms.    Started taking amoxicillin x 5 days and is not sure if is related. )        Patient presents for evaluation of diffuse red rash on extremities and mild swelling of arms and legs that began 5 days ago shortly after she began a course of amoxicillin for unknown tooth infection.  Denies prior medication reactions in the past.  Her mom does have history of a similar reaction with penicillins.  Patient denies facial swelling, shortness of breath, wheezing, chest pain, difficulty breathing, nausea, vomiting, abdominal pain, diarrhea.  She tried taking Benadryl with mild symptomatic relief.  Patient did recently start taking amlodipine (2 months ago) and wonders if some of the swelling could be related to this medication.    Review of Systems   Constitutional:  Negative for chills and fever.   Respiratory: Negative.     Gastrointestinal:  Negative for abdominal pain, diarrhea, nausea and vomiting.   Skin:  Positive for rash. Negative for itching.     PMH:  has a past medical history of Back pain, Depression, Depression (4/25/2014), Gynecological disorder, Migraine headache, Miscarriage, and Pain (2017).    She has no past medical history of Breast cancer (HCC) or Seizure disorder ([facility]).  MEDS:   Current Outpatient Medications:     methylPREDNISolone (MEDROL DOSEPAK) 4 MG Tablet Therapy Pack, Follow schedule on package instructions., Disp: 21 Tablet, Rfl: 0    clindamycin (CLEOCIN) 300 MG Cap, Take 1 Capsule by mouth 3 times a day for 7 days., Disp: 21 Capsule, Rfl: 0    tizanidine (ZANAFLEX) 4 MG Tab, TAKE 1 TABLET BY MOUTH TWICE DAILY FOR MUSCLE SPASM, Disp: 30 Tablet, Rfl: 0    NURTEC 75 MG TABLET DISPERSIBLE, DISSOLVE 1 TABLET BY MOUTH ONE TIME AS NEEDED (AT ONSET OF MIGRAINE) FOR UP TO 1 DOSE, Disp: 9 Tablet, Rfl: 0    diclofenac CR (VOLTAREN-XR) 100 MG TABLET SR 24 HR tablet, TAKE 1  "TABLET BY MOUTH ONCE DAILY AS NEEDED FOR PAIN, Disp: , Rfl:     pregabalin (LYRICA) 100 MG Cap, Take 100 mg by mouth 2 times a day., Disp: , Rfl:     [START ON 12/18/2022] amphetamine-dextroamphetamine (ADDERALL) 10 MG Tab, Take 1 Tablet by mouth 2 times a day for 30 days., Disp: 60 Tablet, Rfl: 0    amLODIPine (NORVASC) 10 MG Tab, Take 1 Tablet by mouth every day. At night for hypertension, Disp: 30 Tablet, Rfl: 5    propranolol (INDERAL) 40 MG Tab, Take 1 Tablet by mouth 2 times a day., Disp: 60 Tablet, Rfl: 5    albuterol 108 (90 Base) MCG/ACT Aero Soln inhalation aerosol, Inhale 2 Puffs every 6 hours as needed for Shortness of Breath., Disp: 8.5 g, Rfl: 0    promethazine (PHENERGAN) 25 MG Tab, TK 1 T PO QHS PRN N, Disp: , Rfl:     [START ON 11/18/2022] amphetamine-dextroamphetamine (ADDERALL) 10 MG Tab, Take 1 Tablet by mouth 2 times a day for 30 days., Disp: 60 Tablet, Rfl: 0    amphetamine-dextroamphetamine (ADDERALL) 10 MG Tab, Take 1 Tablet by mouth 2 times a day for 30 days., Disp: 60 Tablet, Rfl: 0    fluticasone (FLONASE) 50 MCG/ACT nasal spray, Spray 2 Sprays in nose every day., Disp: 16 g, Rfl: 0  ALLERGIES:   Allergies   Allergen Reactions    Sumatriptan Succinate      \"face burns & I can't see\"    Amoxicillin Rash     Rash on extremities and swelling of arms and legs    Tramadol Photosensitivity     Gives her migraines and makes her feel sick     SURGHX:   Past Surgical History:   Procedure Laterality Date    VAGINAL HYSTERECTOMY SCOPE TOTAL N/A 3/27/2017    Procedure: VAGINAL HYSTERECTOMY SCOPE TOTAL right salpingectomy, partial left salpingectomy. Cystoscopy;  Surgeon: Zayda Santillan M.D.;  Location: SURGERY SAME DAY [facility] ORS;  Service:     AZ NJX AA&/STRD TFRML EPI LUMBAR/SACRAL 1 LEVEL Bilateral 6/17/2015    Procedure: TRANSFORAMINAL BILATERAL L5-S1 EPIDURAL WITH IV SEDATION;  Surgeon: Tom Main M.D.;  Location: [facility];  Service: Pain Management    AZ MARION AA&/STRD " TFRML EPI LUMBAR/SACRAL 1 LEVEL  6/17/2015    Procedure: INJ-FORAMEN EPI LUM/SAC SNGL;  Surgeon: Tom Main M.D.;  Location: [facility]GERY [facility];  Service: Pain Management    OK NJX AA&/STRD TFRML EPI LUMBAR/SACRAL 1 LEVEL  10/8/2014    Performed by Tom Main M.D. at [facility]    LUMBAR LAMINECTOMY DISKECTOMY  10/3/2013    Performed by Estuardo Carmen M.D. at SURGERY [facility]    LUMBAR LAMINECTOMY DISKECTOMY  6/10/2009    Performed by ESTUARDO CARMEN at SURGERY [facility] ORS    DENTAL EXTRACTION(S)      wisdom    GYN SURGERY      c section x2    OTHER ORTHOPEDIC SURGERY      PRIMARY C SECTION      x2     SOCHX:  reports that she quit smoking about 15 years ago. Her smoking use included cigarettes. She has never used smokeless tobacco. She reports that she does not drink alcohol and does not use drugs.  FH: Family history was reviewed, no pertinent findings to report   Objective:   BP (!) 146/98 (BP Location: Left arm, Patient Position: Sitting, BP Cuff Size: Large adult)   Pulse 64   Temp 36.4 °C (97.5 °F) (Temporal)   Resp 16   Ht 1.829 m (6')   Wt 114 kg (252 lb 6.4 oz)   LMP 03/16/2017   SpO2 99%   BMI 34.23 kg/m²   Physical Exam  Vitals reviewed.   Constitutional:       General: She is not in acute distress.     Appearance: Normal appearance. She is well-developed. She is not toxic-appearing.   HENT:      Head: Normocephalic and atraumatic.      Right Ear: External ear normal.      Left Ear: External ear normal.      Nose: Nose normal.      Mouth/Throat:        Comments: No other intraoral lesions or edema.  Cardiovascular:      Rate and Rhythm: Normal rate and regular rhythm.      Heart sounds: Normal heart sounds, S1 normal and S2 normal.   Pulmonary:      Effort: Pulmonary effort is normal. No respiratory distress.      Breath sounds: Normal breath sounds. No stridor. No decreased breath sounds, wheezing, rhonchi or rales.   Skin:     General: Skin is dry.       Comments: Diffuse erythematous macular papular rash on the upper and lower extremities bilaterally.  No vesicles.  Nontender to palpation.  No desquamation.  There is a mild generalized nonpitting edema of the upper and lower extremities bilaterally.  Radial pulses 2+ bilaterally.  Posterior tibial pulses 2+ bilaterally.   Neurological:      Comments: Alert and oriented.    Psychiatric:         Speech: Speech normal.         Behavior: Behavior normal.         Assessment/Plan:   1. Drug eruption  - methylPREDNISolone (MEDROL DOSEPAK) 4 MG Tablet Therapy Pack; Follow schedule on package instructions.  Dispense: 21 Tablet; Refill: 0  - dexamethasone (DECADRON) injection (check route below) 10 mg    2. Tooth infection  - clindamycin (CLEOCIN) 300 MG Cap; Take 1 Capsule by mouth 3 times a day for 7 days.  Dispense: 21 Capsule; Refill: 0    3. Elevated blood pressure reading    1.  History and exam today consistent with drug eruption-likely amoxicillin.  Patient stopped on amoxicillin.  Decadron 10 mg IM administered in clinic and patient started on Medrol Dosepak tomorrow.  Additionally I would like her to take Zyrtec nightly until symptoms resolve.  Recommend immediate reevaluation with any new or worsening symptoms.  I have lower extremity edema persists despite resolution of a rash recommend that she follow-up with PCP later this week for further evaluation and management.    2.  Patient restarted on clindamycin.  Follow-up with dentist as scheduled for definitive management.    3.  Patient currently on an antihypertensive and is following with her PCP.  Continue to monitor blood pressures and follow-up with PCP as planned.  Differential diagnosis, natural history, supportive care, and indications for immediate follow-up discussed.

## 2022-10-26 RX ORDER — TIZANIDINE 4 MG/1
TABLET ORAL
Qty: 30 TABLET | Refills: 0 | Status: SHIPPED | OUTPATIENT
Start: 2022-10-26 | End: 2022-11-08

## 2022-10-26 NOTE — TELEPHONE ENCOUNTER
Received request via: Pharmacy    Was the patient seen in the last year in this department? Yes  9/29/22  Does the patient have an active prescription (recently filled or refills available) for medication(s) requested? No

## 2022-11-04 DIAGNOSIS — G43.009 MIGRAINE WITHOUT AURA AND WITHOUT STATUS MIGRAINOSUS, NOT INTRACTABLE: ICD-10-CM

## 2022-11-05 RX ORDER — RIMEGEPANT SULFATE 75 MG/75MG
TABLET, ORALLY DISINTEGRATING ORAL
Qty: 9 TABLET | Refills: 0 | Status: SHIPPED | OUTPATIENT
Start: 2022-11-05 | End: 2022-12-07

## 2022-11-08 RX ORDER — TIZANIDINE 4 MG/1
TABLET ORAL
Qty: 30 TABLET | Refills: 0 | Status: SHIPPED | OUTPATIENT
Start: 2022-11-08 | End: 2022-12-07

## 2022-11-18 ENCOUNTER — PATIENT MESSAGE (OUTPATIENT)
Dept: MEDICAL GROUP | Facility: LAB | Age: 37
End: 2022-11-18
Payer: COMMERCIAL

## 2022-11-18 DIAGNOSIS — F98.8 ATTENTION DEFICIT DISORDER (ADD) IN ADULT: ICD-10-CM

## 2022-11-20 RX ORDER — LISDEXAMFETAMINE DIMESYLATE 20 MG/1
20 CAPSULE ORAL DAILY
Qty: 30 CAPSULE | Refills: 0 | Status: SHIPPED | OUTPATIENT
Start: 2022-11-20 | End: 2022-12-20

## 2022-11-21 ENCOUNTER — TELEPHONE (OUTPATIENT)
Dept: MEDICAL GROUP | Facility: LAB | Age: 37
End: 2022-11-21
Payer: COMMERCIAL

## 2022-11-21 NOTE — TELEPHONE ENCOUNTER
DOCUMENTATION OF PAR STATUS:    1. Name of Medication & Dose: Vyvanse     2. Name of Prescription Coverage Company & phone #: cover my meds/ [id number]    3. Date Prior Auth Submitted: 11/21/22    4. What information was given to obtain insurance decision? OV NOTES FAXED    5. Prior Auth Status? approved    6. Patient Notified: yes

## 2022-11-22 ENCOUNTER — HOSPITAL ENCOUNTER (OUTPATIENT)
Dept: LAB | Facility: MEDICAL CENTER | Age: 37
End: 2022-11-22
Attending: FAMILY MEDICINE
Payer: COMMERCIAL

## 2022-11-22 ENCOUNTER — OFFICE VISIT (OUTPATIENT)
Dept: MEDICAL GROUP | Facility: LAB | Age: 37
End: 2022-11-22
Payer: COMMERCIAL

## 2022-11-22 ENCOUNTER — HOSPITAL ENCOUNTER (OUTPATIENT)
Facility: MEDICAL CENTER | Age: 37
End: 2022-11-22
Attending: PHYSICIAN ASSISTANT
Payer: COMMERCIAL

## 2022-11-22 VITALS
HEART RATE: 76 BPM | DIASTOLIC BLOOD PRESSURE: 72 MMHG | RESPIRATION RATE: 12 BRPM | WEIGHT: 253.8 LBS | SYSTOLIC BLOOD PRESSURE: 122 MMHG | TEMPERATURE: 98.3 F | BODY MASS INDEX: 34.38 KG/M2 | OXYGEN SATURATION: 95 % | HEIGHT: 72 IN

## 2022-11-22 DIAGNOSIS — M25.561 PAIN AND SWELLING OF RIGHT KNEE: ICD-10-CM

## 2022-11-22 DIAGNOSIS — E87.6 HYPOKALEMIA: ICD-10-CM

## 2022-11-22 DIAGNOSIS — I10 PRIMARY HYPERTENSION: ICD-10-CM

## 2022-11-22 DIAGNOSIS — M25.461 PAIN AND SWELLING OF RIGHT KNEE: ICD-10-CM

## 2022-11-22 DIAGNOSIS — D64.9 NORMOCYTIC ANEMIA: ICD-10-CM

## 2022-11-22 LAB
ALBUMIN SERPL BCP-MCNC: 4.3 G/DL (ref 3.2–4.9)
ALBUMIN/GLOB SERPL: 1.7 G/DL
ALP SERPL-CCNC: 71 U/L (ref 30–99)
ALT SERPL-CCNC: 22 U/L (ref 2–50)
ANION GAP SERPL CALC-SCNC: 11 MMOL/L (ref 7–16)
APPEARANCE FLD: NORMAL
AST SERPL-CCNC: 20 U/L (ref 12–45)
BASOPHILS # BLD AUTO: 0.9 % (ref 0–1.8)
BASOPHILS # BLD: 0.07 K/UL (ref 0–0.12)
BILIRUB SERPL-MCNC: 0.3 MG/DL (ref 0.1–1.5)
BODY FLD TYPE: NORMAL
BUN SERPL-MCNC: 11 MG/DL (ref 8–22)
CALCIUM SERPL-MCNC: 8.5 MG/DL (ref 8.5–10.5)
CHLORIDE SERPL-SCNC: 104 MMOL/L (ref 96–112)
CO2 SERPL-SCNC: 23 MMOL/L (ref 20–33)
COLOR FLD: NORMAL
CREAT SERPL-MCNC: 0.67 MG/DL (ref 0.5–1.4)
CRYSTALS FLD MICRO: NORMAL
EOSINOPHIL # BLD AUTO: 0.19 K/UL (ref 0–0.51)
EOSINOPHIL NFR BLD: 2.5 % (ref 0–6.9)
EOSINOPHIL NFR FLD: 2 %
ERYTHROCYTE [DISTWIDTH] IN BLOOD BY AUTOMATED COUNT: 52.3 FL (ref 35.9–50)
GFR SERPLBLD CREATININE-BSD FMLA CKD-EPI: 113 ML/MIN/1.73 M 2
GLOBULIN SER CALC-MCNC: 2.5 G/DL (ref 1.9–3.5)
GLUCOSE SERPL-MCNC: 116 MG/DL (ref 65–99)
GRAM STN SPEC: NORMAL
HCT VFR BLD AUTO: 34.3 % (ref 37–47)
HGB BLD-MCNC: 11.3 G/DL (ref 12–16)
IMM GRANULOCYTES # BLD AUTO: 0.05 K/UL (ref 0–0.11)
IMM GRANULOCYTES NFR BLD AUTO: 0.7 % (ref 0–0.9)
LYMPHOCYTES # BLD AUTO: 1.52 K/UL (ref 1–4.8)
LYMPHOCYTES NFR BLD: 20.1 % (ref 22–41)
LYMPHOCYTES NFR FLD: 16 %
MCH RBC QN AUTO: 32.1 PG (ref 27–33)
MCHC RBC AUTO-ENTMCNC: 32.9 G/DL (ref 33.6–35)
MCV RBC AUTO: 97.4 FL (ref 81.4–97.8)
MONOCYTES # BLD AUTO: 0.54 K/UL (ref 0–0.85)
MONOCYTES NFR BLD AUTO: 7.1 % (ref 0–13.4)
MONONUC CELLS NFR FLD: 1 %
NEUTROPHILS # BLD AUTO: 5.2 K/UL (ref 2–7.15)
NEUTROPHILS NFR BLD: 68.7 % (ref 44–72)
NEUTROPHILS NFR FLD: 81 %
NRBC # BLD AUTO: 0 K/UL
NRBC BLD-RTO: 0 /100 WBC
PLATELET # BLD AUTO: 300 K/UL (ref 164–446)
PMV BLD AUTO: 10.4 FL (ref 9–12.9)
POTASSIUM SERPL-SCNC: 3.5 MMOL/L (ref 3.6–5.5)
PROT SERPL-MCNC: 6.8 G/DL (ref 6–8.2)
RBC # BLD AUTO: 3.52 M/UL (ref 4.2–5.4)
RBC # FLD: 1056 CELLS/UL
SIGNIFICANT IND 70042: NORMAL
SITE SITE: NORMAL
SODIUM SERPL-SCNC: 138 MMOL/L (ref 135–145)
SOURCE SOURCE: NORMAL
URATE SERPL-MCNC: 4.3 MG/DL (ref 1.9–8.2)
WBC # BLD AUTO: 7.6 K/UL (ref 4.8–10.8)
WBC # FLD: 1785 CELLS/UL

## 2022-11-22 PROCEDURE — 84550 ASSAY OF BLOOD/URIC ACID: CPT

## 2022-11-22 PROCEDURE — 80053 COMPREHEN METABOLIC PANEL: CPT

## 2022-11-22 PROCEDURE — 87075 CULTR BACTERIA EXCEPT BLOOD: CPT

## 2022-11-22 PROCEDURE — 89060 EXAM SYNOVIAL FLUID CRYSTALS: CPT

## 2022-11-22 PROCEDURE — 87070 CULTURE OTHR SPECIMN AEROBIC: CPT

## 2022-11-22 PROCEDURE — 36415 COLL VENOUS BLD VENIPUNCTURE: CPT

## 2022-11-22 PROCEDURE — 87205 SMEAR GRAM STAIN: CPT

## 2022-11-22 PROCEDURE — 85025 COMPLETE CBC W/AUTO DIFF WBC: CPT

## 2022-11-22 PROCEDURE — 89051 BODY FLUID CELL COUNT: CPT

## 2022-11-22 PROCEDURE — 99214 OFFICE O/P EST MOD 30 MIN: CPT | Performed by: FAMILY MEDICINE

## 2022-11-22 RX ORDER — LOSARTAN POTASSIUM 50 MG/1
50 TABLET ORAL DAILY
Qty: 90 TABLET | Refills: 1 | Status: SHIPPED | OUTPATIENT
Start: 2022-11-22 | End: 2022-12-15 | Stop reason: SDUPTHER

## 2022-11-22 ASSESSMENT — FIBROSIS 4 INDEX: FIB4 SCORE: 0.47

## 2022-11-22 NOTE — PROGRESS NOTES
Subjective:     CC: HTN, right knee/leg swelling    HPI:   Joy presents today to follow-up on blood pressure and discuss right leg/knee swelling.    Her amlodipine was increased to 10 mg daily in September and she was reports since then she has not seen much improvement in blood pressure.  Home average is still 150s/100s.     She is also noting persistent swelling in both lower legs at the end of the day, minimal swelling in the morning but this will progress throughout the day.    She is also had an issue with recurrent swelling/effusion to the right knee.  This was initially worked up in February of this year with x-ray showing no bony pathology but joint effusion.  There was no acute injury. She did have a arthrocentesis through pain management urgent care but reports nothing ever came of this.  She reports that most nights/evenings her knee will become very swollen, red, and even warm.  It will be painful but then this will generally resolve by the next morning.  No fevers.  She does have a history of gout.  She takes 800 mg ibuprofen a few times most days.    Medications, past medical history, allergies, and social history have been reviewed and updated.      Objective:       Exam:  /72 (BP Location: Right arm, Patient Position: Sitting, BP Cuff Size: Large adult)   Pulse 76   Temp 36.8 °C (98.3 °F)   Resp 12   Ht 1.829 m (6')   Wt 115 kg (253 lb 12.8 oz)   LMP 03/16/2017   SpO2 95%   BMI 34.42 kg/m²  Body mass index is 34.42 kg/m².    Constitutional: Alert. Well appearing. No distress.  Skin: Warm, dry, good turgor, no visible rashes.  Eye: Equal, round and reactive to light, conjunctiva clear, lids normal.  Respiratory: Normal effort.   Ext: Trace edema to bilateral lower extremities.  No erythema, no calf tenderness.  MSK: Mild joint effusion to the right knee, no erythema or warmth.  Range of motion is intact.  No laxity.  No joint line tenderness.  Neuro: Moves all four extremities. No  facial droop.  Psych: Answers questions appropriately. Normal affect and mood.    Assessment & Plan:     37 y.o. female with the following -     1. Pain and swelling of right knee  Recurrent issue going on for 9+ months.  No acute injury.  She reports swelling, erythema, and warmth that will occur at night and then resolve during the day.  Does report likely history of gout.  Past arthrocentesis with pain management urgent care but no specific diagnosis came of this.  Minimal joint effusion today and exam is otherwise normal.  Suspect this could be due to soft tissue injury versus gout.  Exam today is not consistent with septic joint.  Will check labs including uric acid.  I did recommend she try to see Ortho or Ortho urgent care when joint is more swollen and a diagnostic arthrocentesis could be considered.  - CBC WITH DIFFERENTIAL; Future  - Comp Metabolic Panel; Future  - URIC ACID; Future    2. Primary hypertension  Uncontrolled on home log and she is getting worsening lower extremity edema with amlodipine.  Stop amlodipine and start losartan.  We will use 50 mg as I do not think 25 mg would be sufficient.  Follow-up with PCP in 3 to 4 weeks.  - losartan (COZAAR) 50 MG Tab; Take 1 Tablet by mouth every day.  Dispense: 90 Tablet; Refill: 1      Please note that this note was created using voice recognition software.

## 2022-11-25 LAB
BACTERIA FLD AEROBE CULT: NORMAL
GRAM STN SPEC: NORMAL
SIGNIFICANT IND 70042: NORMAL
SITE SITE: NORMAL
SOURCE SOURCE: NORMAL

## 2022-11-27 LAB
BACTERIA SPEC ANAEROBE CULT: NORMAL
SIGNIFICANT IND 70042: NORMAL
SITE SITE: NORMAL
SOURCE SOURCE: NORMAL

## 2022-12-06 DIAGNOSIS — G43.009 MIGRAINE WITHOUT AURA AND WITHOUT STATUS MIGRAINOSUS, NOT INTRACTABLE: ICD-10-CM

## 2022-12-07 RX ORDER — TIZANIDINE 4 MG/1
TABLET ORAL
Qty: 30 TABLET | Refills: 0 | Status: SHIPPED | OUTPATIENT
Start: 2022-12-07 | End: 2022-12-15 | Stop reason: SDUPTHER

## 2022-12-07 RX ORDER — RIMEGEPANT SULFATE 75 MG/75MG
TABLET, ORALLY DISINTEGRATING ORAL
Qty: 9 TABLET | Refills: 0 | Status: SHIPPED | OUTPATIENT
Start: 2022-12-07 | End: 2023-01-09

## 2022-12-07 NOTE — TELEPHONE ENCOUNTER
Received request via: Pharmacy    Was the patient seen in the last year in this department? Yes  LOV 11/22/2022  Does the patient have an active prescription (recently filled or refills available) for medication(s) requested? No    Does the patient have long-term Plus and need 100 day supply (blood pressure, diabetes and cholesterol meds only)? Medication is not for cholesterol, blood pressure or diabetes and Patient does not have SCP

## 2022-12-13 ENCOUNTER — HOSPITAL ENCOUNTER (OUTPATIENT)
Dept: RADIOLOGY | Facility: MEDICAL CENTER | Age: 37
End: 2022-12-13
Attending: PHYSICIAN ASSISTANT
Payer: COMMERCIAL

## 2022-12-13 DIAGNOSIS — G89.29 CHRONIC PAIN OF RIGHT KNEE: ICD-10-CM

## 2022-12-13 DIAGNOSIS — M25.561 CHRONIC PAIN OF RIGHT KNEE: ICD-10-CM

## 2022-12-13 PROCEDURE — 73721 MRI JNT OF LWR EXTRE W/O DYE: CPT

## 2022-12-15 ENCOUNTER — OFFICE VISIT (OUTPATIENT)
Dept: MEDICAL GROUP | Facility: LAB | Age: 37
End: 2022-12-15
Payer: COMMERCIAL

## 2022-12-15 VITALS
BODY MASS INDEX: 34.67 KG/M2 | WEIGHT: 256 LBS | SYSTOLIC BLOOD PRESSURE: 114 MMHG | OXYGEN SATURATION: 98 % | TEMPERATURE: 96.8 F | HEART RATE: 66 BPM | DIASTOLIC BLOOD PRESSURE: 72 MMHG | HEIGHT: 72 IN | RESPIRATION RATE: 16 BRPM

## 2022-12-15 DIAGNOSIS — M25.562 CHRONIC PAIN OF LEFT KNEE: ICD-10-CM

## 2022-12-15 DIAGNOSIS — M54.42 CHRONIC BILATERAL LOW BACK PAIN WITH BILATERAL SCIATICA: ICD-10-CM

## 2022-12-15 DIAGNOSIS — G89.29 CHRONIC BILATERAL LOW BACK PAIN WITH BILATERAL SCIATICA: ICD-10-CM

## 2022-12-15 DIAGNOSIS — G89.29 CHRONIC PAIN OF LEFT KNEE: ICD-10-CM

## 2022-12-15 DIAGNOSIS — I10 PRIMARY HYPERTENSION: ICD-10-CM

## 2022-12-15 DIAGNOSIS — M54.41 CHRONIC BILATERAL LOW BACK PAIN WITH BILATERAL SCIATICA: ICD-10-CM

## 2022-12-15 DIAGNOSIS — F98.8 ATTENTION DEFICIT DISORDER (ADD) IN ADULT: ICD-10-CM

## 2022-12-15 PROCEDURE — 99214 OFFICE O/P EST MOD 30 MIN: CPT | Performed by: NURSE PRACTITIONER

## 2022-12-15 RX ORDER — METHYLPHENIDATE HYDROCHLORIDE 10 MG/1
10 TABLET ORAL 2 TIMES DAILY
Qty: 60 TABLET | Refills: 0 | Status: SHIPPED | OUTPATIENT
Start: 2022-12-15 | End: 2023-02-17 | Stop reason: SDUPTHER

## 2022-12-15 RX ORDER — LOSARTAN POTASSIUM 100 MG/1
100 TABLET ORAL DAILY
Qty: 90 TABLET | Refills: 1 | Status: SHIPPED | OUTPATIENT
Start: 2022-12-15 | End: 2023-09-14

## 2022-12-15 RX ORDER — TIZANIDINE 4 MG/1
4 TABLET ORAL 2 TIMES DAILY
Qty: 60 TABLET | Refills: 2 | Status: SHIPPED | OUTPATIENT
Start: 2022-12-15 | End: 2023-03-15

## 2022-12-15 ASSESSMENT — FIBROSIS 4 INDEX: FIB4 SCORE: 0.55

## 2022-12-15 NOTE — PROGRESS NOTES
Chief Complaint   Patient presents with    Knee Pain     Got MRI, appt with DHIRAJ yesterday       HPI:  Joy is a 37-year-old established female here with a couple of issues:  #1- Left knee pain: saw DHIRAJ  yesterday and MRI is normal with exception of bursitis / small effusion.  Was referred to PT which is expensive for her and she plans on using u-tube.  Wondering about diclofenac versus over-the-counter NSAIDs for swelling and pain control.  #2- attention deficit d/o: has been doing a trial of vyvanse which works well for her but is expensive.   Has done fine on ritalin in the past.  Adderall currently unavailable in most pharmacies.  Without medication for ADHD, she struggles with accomplishing normal activities of daily living and work duties.  #3- HTN:  chronic issue.  Taking 50 mg losartan.   Amlodipine was stopped b/c of lower extremity swelling although this didn't go away with stopping amlodipine.  BP readings at home -ranging from 160/100 down to 120/70.  #4 - abnormal cbc:  done 11/2022 but donated whole blood one week prior and blood sugar was elevated but she was not fasting - had pop tart for breakfast.      Exam:  /72 (BP Location: Left arm, Patient Position: Sitting, BP Cuff Size: Adult)   Pulse 66   Temp 36 °C (96.8 °F) (Temporal)   Resp 16   Ht 1.829 m (6')   Wt 116 kg (256 lb)   SpO2 98%   Gen: NAD  Resp: nonlabored.  Able to speak in full sentences  Psy: pleasant / cooperative.   Neuro:  Alert and oriented x 3    A/P:  1. Attention deficit disorder (ADD) in adult  -adderall unavaible, vyvanse expensive.  Trial of ritalin temporarily until adderall is available, hopefully next month.  Pt has taken ritalin in the past without negative side effects but we discussed increased risk of anxiety / HTN / insomnia / shakiness and she will let me know if she can not tolerate ritalin this month.   In prescribing controlled substances to this patient, I certify that I have obtained and reviewed  the medical history of Joy Mcnamara. I have also made a good areli effort to obtain applicable records from other providers who have treated the patient and records did not demonstrate any increased risk of substance abuse that would prevent me from prescribing controlled substances.     I have conducted a physical exam and documented it. I have reviewed Ms. Mcnamara’s prescription history as maintained by the [place] Prescription Monitoring Program.     I have assessed the patient’s risk for abuse, dependency, and addiction using the validated Opioid Risk Tool available at https://www.mdcalc.com/bvuoug-kutf-hvkr-ort-narcotic-abuse.     Given the above, I believe the benefits of controlled substance therapy outweigh the risks. The reasons for prescribing controlled substances include non-narcotic, oral analgesic alternatives have been inadequate for pain control. Accordingly, I have discussed the risk and benefits, treatment plan, and alternative therapies with the patient.      - methylphenidate (RITALIN) 10 MG Tab; Take 1 Tablet by mouth 2 times a day for 30 days.  Dispense: 60 Tablet; Refill: 0    2. Primary hypertension  -controlled today but not on home monitor.  Increased losartan to 100 mg and encouraged her to take 100 mg of blood pressures are consistently greater than 135/85, otherwise 50 mg.  Encouraged her to work on some weight loss, low-salt diet, overall eating healthier with more fiber, fruits and vegetables.  Follow-up 3 months, sooner with problems.  - losartan (COZAAR) 100 MG Tab; Take 1 Tablet by mouth every day.  Dispense: 90 Tablet; Refill: 1    3. Chronic bilateral low back pain with bilateral sciatica  -Refilled tizanidine.  Otherwise back pain management is through her spine specialist.    - tizanidine (ZANAFLEX) 4 MG Tab; Take 1 Tablet by mouth 2 times a day.  Dispense: 60 Tablet; Refill: 2    4. Chronic pain of left knee  -At this point she prefers to stick with over-the-counter  NSAIDs versus a prescription for diclofenac or meloxicam.  -Reviewed recent records from [facility].

## 2022-12-22 ENCOUNTER — PATIENT MESSAGE (OUTPATIENT)
Dept: MEDICAL GROUP | Facility: LAB | Age: 37
End: 2022-12-22
Payer: COMMERCIAL

## 2022-12-22 DIAGNOSIS — F98.8 ATTENTION DEFICIT DISORDER (ADD) IN ADULT: ICD-10-CM

## 2022-12-22 RX ORDER — LISDEXAMFETAMINE DIMESYLATE 20 MG/1
20 CAPSULE ORAL DAILY
Qty: 30 CAPSULE | Refills: 0 | Status: SHIPPED | OUTPATIENT
Start: 2022-12-22 | End: 2023-03-27 | Stop reason: SDUPTHER

## 2023-01-08 DIAGNOSIS — G43.009 MIGRAINE WITHOUT AURA AND WITHOUT STATUS MIGRAINOSUS, NOT INTRACTABLE: ICD-10-CM

## 2023-01-09 RX ORDER — RIMEGEPANT SULFATE 75 MG/75MG
TABLET, ORALLY DISINTEGRATING ORAL
Qty: 9 TABLET | Refills: 0 | Status: SHIPPED | OUTPATIENT
Start: 2023-01-09 | End: 2023-02-21

## 2023-01-09 NOTE — TELEPHONE ENCOUNTER
Received request via: Pharmacy    Was the patient seen in the last year in this department? Yes  12/15/2022  Does the patient have an active prescription (recently filled or refills available) for medication(s) requested? No    Does the patient have long-term Plus and need 100 day supply (blood pressure, diabetes and cholesterol meds only)? Patient does not have SCP  
none

## 2023-01-23 ENCOUNTER — OFFICE VISIT (OUTPATIENT)
Dept: URGENT CARE | Facility: CLINIC | Age: 38
End: 2023-01-23
Payer: COMMERCIAL

## 2023-01-23 VITALS
RESPIRATION RATE: 16 BRPM | WEIGHT: 250 LBS | DIASTOLIC BLOOD PRESSURE: 90 MMHG | HEART RATE: 96 BPM | SYSTOLIC BLOOD PRESSURE: 134 MMHG | TEMPERATURE: 101.2 F | BODY MASS INDEX: 33.86 KG/M2 | HEIGHT: 72 IN | OXYGEN SATURATION: 100 %

## 2023-01-23 DIAGNOSIS — R50.9 FEVER, UNSPECIFIED FEVER CAUSE: ICD-10-CM

## 2023-01-23 DIAGNOSIS — K04.7 DENTAL INFECTION: ICD-10-CM

## 2023-01-23 DIAGNOSIS — K08.89 PAIN, DENTAL: ICD-10-CM

## 2023-01-23 PROCEDURE — 99213 OFFICE O/P EST LOW 20 MIN: CPT | Performed by: FAMILY MEDICINE

## 2023-01-23 RX ORDER — KETOROLAC TROMETHAMINE 30 MG/ML
30 INJECTION, SOLUTION INTRAMUSCULAR; INTRAVENOUS ONCE
Status: COMPLETED | OUTPATIENT
Start: 2023-01-23 | End: 2023-01-23

## 2023-01-23 RX ORDER — CLINDAMYCIN HYDROCHLORIDE 300 MG/1
300 CAPSULE ORAL 3 TIMES DAILY
Qty: 21 CAPSULE | Refills: 0 | Status: SHIPPED | OUTPATIENT
Start: 2023-01-23 | End: 2023-01-30

## 2023-01-23 RX ADMIN — KETOROLAC TROMETHAMINE 30 MG: 30 INJECTION, SOLUTION INTRAMUSCULAR; INTRAVENOUS at 17:51

## 2023-01-23 ASSESSMENT — FIBROSIS 4 INDEX: FIB4 SCORE: 0.55

## 2023-01-23 NOTE — LETTER
January 23, 2023         Patient: Joy Mcnamraa   YOB: 1985   Date of Visit: 1/23/2023           To Whom it May Concern:    Joy Mcnamara was seen in my clinic on 1/23/2023. Please excuse from work today.     Sincerely,           Maximino Zamarripa M.D.  Electronically Signed

## 2023-01-24 NOTE — PROGRESS NOTES
Subjective     Joy Mcnamara is a 37 y.o. female who presents with Oral Swelling (Last night , possible infection , dr stokes )            1 day right lower molar pain.  Gum and cheek swelling.  Fever.  No trismus.  She has had problems with dental infection in the past and has responded to clindamycin.  No other aggravating or alleviating factors.      Review of Systems   Constitutional:  Negative for malaise/fatigue and weight loss.   Eyes:  Negative for discharge and redness.   Gastrointestinal:  Negative for nausea and vomiting.   Musculoskeletal:  Negative for joint pain and myalgias.   Skin:  Negative for itching and rash.            Objective     BP (!) 134/90 (BP Location: Left arm, Patient Position: Sitting, BP Cuff Size: Large adult)   Pulse 96   Temp (!) 38.4 °C (101.2 °F) (Oral)   Resp 16   Ht 1.829 m (6')   Wt 113 kg (250 lb)   LMP 03/16/2017   SpO2 100%   BMI 33.91 kg/m²      Physical Exam  Constitutional:       General: She is not in acute distress.     Appearance: She is well-developed.   HENT:      Head: Normocephalic and atraumatic.      Mouth/Throat:     Eyes:      Conjunctiva/sclera: Conjunctivae normal.   Cardiovascular:      Rate and Rhythm: Normal rate and regular rhythm.      Heart sounds: Normal heart sounds. No murmur heard.  Pulmonary:      Effort: Pulmonary effort is normal.      Breath sounds: Normal breath sounds. No wheezing.   Skin:     General: Skin is warm and dry.      Findings: No rash.   Neurological:      Mental Status: She is alert.                           Assessment & Plan        1. Dental infection    - clindamycin (CLEOCIN) 300 MG Cap; Take 1 Capsule by mouth 3 times a day for 7 days.  Dispense: 21 Capsule; Refill: 0    2. Fever, unspecified fever cause      3. Pain, dental    - ketorolac (TORADOL) injection 30 mg     Differential diagnosis, natural history, supportive care, and indications for immediate follow-up were discussed.     F/u dentist

## 2023-01-25 ASSESSMENT — ENCOUNTER SYMPTOMS
MYALGIAS: 0
VOMITING: 0
NAUSEA: 0
EYE DISCHARGE: 0
EYE REDNESS: 0
WEIGHT LOSS: 0

## 2023-02-17 ENCOUNTER — PATIENT MESSAGE (OUTPATIENT)
Dept: MEDICAL GROUP | Facility: LAB | Age: 38
End: 2023-02-17
Payer: COMMERCIAL

## 2023-02-17 DIAGNOSIS — G43.009 MIGRAINE WITHOUT AURA AND WITHOUT STATUS MIGRAINOSUS, NOT INTRACTABLE: ICD-10-CM

## 2023-02-17 DIAGNOSIS — F98.8 ATTENTION DEFICIT DISORDER (ADD) IN ADULT: ICD-10-CM

## 2023-02-17 NOTE — TELEPHONE ENCOUNTER
Received request via: Pharmacy    Was the patient seen in the last year in this department? Yes  LOV 12/15/2022  Does the patient have an active prescription (recently filled or refills available) for medication(s) requested? No    Does the patient have snf Plus and need 100 day supply (blood pressure, diabetes and cholesterol meds only)? Patient does not have SCP

## 2023-02-20 ENCOUNTER — PATIENT MESSAGE (OUTPATIENT)
Dept: MEDICAL GROUP | Facility: LAB | Age: 38
End: 2023-02-20
Payer: COMMERCIAL

## 2023-02-21 ENCOUNTER — TELEPHONE (OUTPATIENT)
Dept: MEDICAL GROUP | Facility: LAB | Age: 38
End: 2023-02-21
Payer: COMMERCIAL

## 2023-02-21 RX ORDER — RIMEGEPANT SULFATE 75 MG/75MG
TABLET, ORALLY DISINTEGRATING ORAL
Qty: 9 TABLET | Refills: 0 | Status: SHIPPED | OUTPATIENT
Start: 2023-02-21 | End: 2023-03-28

## 2023-02-21 RX ORDER — METHYLPHENIDATE HYDROCHLORIDE 10 MG/1
10 TABLET ORAL 2 TIMES DAILY
Qty: 60 TABLET | Refills: 0 | Status: SHIPPED | OUTPATIENT
Start: 2023-02-21 | End: 2023-03-23

## 2023-02-22 ENCOUNTER — PATIENT MESSAGE (OUTPATIENT)
Dept: MEDICAL GROUP | Facility: LAB | Age: 38
End: 2023-02-22
Payer: COMMERCIAL

## 2023-03-15 ENCOUNTER — OFFICE VISIT (OUTPATIENT)
Dept: MEDICAL GROUP | Facility: LAB | Age: 38
End: 2023-03-15
Payer: COMMERCIAL

## 2023-03-15 VITALS
WEIGHT: 259 LBS | BODY MASS INDEX: 35.08 KG/M2 | SYSTOLIC BLOOD PRESSURE: 134 MMHG | HEIGHT: 72 IN | DIASTOLIC BLOOD PRESSURE: 84 MMHG | HEART RATE: 68 BPM | TEMPERATURE: 97.4 F | OXYGEN SATURATION: 98 % | RESPIRATION RATE: 12 BRPM

## 2023-03-15 DIAGNOSIS — M54.42 CHRONIC BILATERAL LOW BACK PAIN WITH BILATERAL SCIATICA: ICD-10-CM

## 2023-03-15 DIAGNOSIS — M54.41 CHRONIC BILATERAL LOW BACK PAIN WITH BILATERAL SCIATICA: ICD-10-CM

## 2023-03-15 DIAGNOSIS — G89.29 CHRONIC BILATERAL LOW BACK PAIN WITH BILATERAL SCIATICA: ICD-10-CM

## 2023-03-15 DIAGNOSIS — I10 PRIMARY HYPERTENSION: ICD-10-CM

## 2023-03-15 DIAGNOSIS — G43.009 MIGRAINE WITHOUT AURA AND WITHOUT STATUS MIGRAINOSUS, NOT INTRACTABLE: ICD-10-CM

## 2023-03-15 DIAGNOSIS — F98.8 ATTENTION DEFICIT DISORDER (ADD) IN ADULT: ICD-10-CM

## 2023-03-15 PROCEDURE — 99214 OFFICE O/P EST MOD 30 MIN: CPT | Performed by: NURSE PRACTITIONER

## 2023-03-15 RX ORDER — DEXTROAMPHETAMINE SACCHARATE, AMPHETAMINE ASPARTATE, DEXTROAMPHETAMINE SULFATE AND AMPHETAMINE SULFATE 2.5; 2.5; 2.5; 2.5 MG/1; MG/1; MG/1; MG/1
10 TABLET ORAL 2 TIMES DAILY
Qty: 60 TABLET | Refills: 0 | Status: SHIPPED | OUTPATIENT
Start: 2023-03-27 | End: 2023-05-01 | Stop reason: SDUPTHER

## 2023-03-15 RX ORDER — ONDANSETRON 4 MG/1
4 TABLET, FILM COATED ORAL EVERY 8 HOURS PRN
Qty: 20 TABLET | Refills: 2 | Status: SHIPPED | OUTPATIENT
Start: 2023-03-15 | End: 2024-03-25 | Stop reason: SDUPTHER

## 2023-03-15 RX ORDER — PREGABALIN 75 MG/1
75 CAPSULE ORAL 4 TIMES DAILY
Qty: 120 CAPSULE | Refills: 2 | Status: SHIPPED | OUTPATIENT
Start: 2023-03-15 | End: 2023-06-12

## 2023-03-15 RX ORDER — TIZANIDINE 4 MG/1
TABLET ORAL
Qty: 60 TABLET | Refills: 0 | Status: SHIPPED | OUTPATIENT
Start: 2023-03-15 | End: 2023-04-22

## 2023-03-15 ASSESSMENT — PATIENT HEALTH QUESTIONNAIRE - PHQ9: CLINICAL INTERPRETATION OF PHQ2 SCORE: 0

## 2023-03-15 ASSESSMENT — FIBROSIS 4 INDEX: FIB4 SCORE: 0.55

## 2023-03-15 NOTE — ASSESSMENT & PLAN NOTE
Worsening lately.  Seeing someone through worker's comp b/c of fall in work parking lot in Jan.  Wants to change lyrica dosage to 75 mg qid which has worked well for her in the past.

## 2023-03-15 NOTE — ASSESSMENT & PLAN NOTE
Chronic issue.  Has struggled to obtain adderall and ritalin due to shortage of both.  Tried to rx vyvanse but this was too expensive.

## 2023-03-15 NOTE — TELEPHONE ENCOUNTER
Received request via: Pharmacy    Was the patient seen in the last year in this department? Yes  3/15/23  Does the patient have an active prescription (recently filled or refills available) for medication(s) requested? No    Does the patient have custodial Plus and need 100 day supply (blood pressure, diabetes and cholesterol meds only)? Medication is not for cholesterol, blood pressure or diabetes

## 2023-03-15 NOTE — ASSESSMENT & PLAN NOTE
Chronic issue.  Taking losartan 100 mg nightly and propranolol 40 mg bid. BP has been elevated lately - up to 170/100 at times.  Under stress with back injury 1/2023 and increase in migraines.  Has gained a bit of weight and isn't able to exercise regularly.

## 2023-03-27 ENCOUNTER — PATIENT MESSAGE (OUTPATIENT)
Dept: MEDICAL GROUP | Facility: LAB | Age: 38
End: 2023-03-27
Payer: COMMERCIAL

## 2023-03-27 DIAGNOSIS — G43.009 MIGRAINE WITHOUT AURA AND WITHOUT STATUS MIGRAINOSUS, NOT INTRACTABLE: ICD-10-CM

## 2023-03-27 DIAGNOSIS — F98.8 ATTENTION DEFICIT DISORDER (ADD) IN ADULT: ICD-10-CM

## 2023-03-27 NOTE — TELEPHONE ENCOUNTER
Received request via: Pharmacy    Was the patient seen in the last year in this department? Yes 03/15/23    Does the patient have an active prescription (recently filled or refills available) for medication(s) requested? No    Does the patient have correction Plus and need 100 day supply (blood pressure, diabetes and cholesterol meds only)? Patient does not have SCP

## 2023-03-28 RX ORDER — LISDEXAMFETAMINE DIMESYLATE 20 MG/1
20 CAPSULE ORAL DAILY
Qty: 30 CAPSULE | Refills: 0 | Status: SHIPPED | OUTPATIENT
Start: 2023-03-28 | End: 2023-04-27

## 2023-03-28 RX ORDER — RIMEGEPANT SULFATE 75 MG/75MG
TABLET, ORALLY DISINTEGRATING ORAL
Qty: 9 TABLET | Refills: 0 | Status: SHIPPED | OUTPATIENT
Start: 2023-03-28 | End: 2023-05-31 | Stop reason: SDUPTHER

## 2023-04-21 DIAGNOSIS — M54.42 CHRONIC BILATERAL LOW BACK PAIN WITH BILATERAL SCIATICA: ICD-10-CM

## 2023-04-21 DIAGNOSIS — M54.41 CHRONIC BILATERAL LOW BACK PAIN WITH BILATERAL SCIATICA: ICD-10-CM

## 2023-04-21 DIAGNOSIS — G89.29 CHRONIC BILATERAL LOW BACK PAIN WITH BILATERAL SCIATICA: ICD-10-CM

## 2023-04-21 NOTE — TELEPHONE ENCOUNTER
Received request via: Pharmacy    Was the patient seen in the last year in this department? Yes  3/15/23  Does the patient have an active prescription (recently filled or refills available) for medication(s) requested? No    Does the patient have detention Plus and need 100 day supply (blood pressure, diabetes and cholesterol meds only)? Medication is not for cholesterol, blood pressure or diabetes

## 2023-04-22 RX ORDER — TIZANIDINE 4 MG/1
TABLET ORAL
Qty: 60 TABLET | Refills: 0 | Status: SHIPPED | OUTPATIENT
Start: 2023-04-22 | End: 2023-05-22

## 2023-05-08 NOTE — PROGRESS NOTES
Chief Complaint   Patient presents with    Medication Management    Migraine     ER follow up        HPI:  Joy is a 38 yo est female here to f/u on a few things:     Hypertension  Chronic issue.  Taking losartan 100 mg nightly and propranolol 40 mg bid. BP has been elevated lately - up to 170/100 at times.  Under stress with back injury 1/2023 and increase in migraines.  Has gained a bit of weight and isn't able to exercise regularly.      Attention deficit disorder (ADD) in adult  Chronic issue.  Has struggled to obtain adderall and ritalin due to shortage of both.  Tried to rx vyvanse but this was too expensive.      Chronic low back pain  Worsening lately.  Seeing someone through worker's comp b/c of fall in work parking lot in Jan.  Wants to change lyrica dosage to 75 mg qid which has worked well for her in the past.  [employer] pain management specialist requested that she continue to obtain her Lyrica through our office.    Migraine:   Chronic issue.  Worsening.  Feels migraines are worsening related to increase in back pain.  Also has a stressful job with the [employer].  Gets very nauseated with these and promethazine makes her too sleepy - wants to try a different medication for nausea.  Using nurtec abortively.  Not seeing neurology.  On propranolol preventatively.         Exam:  /84 (BP Location: Right arm, Patient Position: Sitting, BP Cuff Size: Large adult)   Pulse 68   Temp 36.3 °C (97.4 °F)   Resp 12   Ht 1.829 m (6')   Wt 117 kg (259 lb)   SpO2 98%   Gen: NAD  Resp: nonlabored.  Able to speak in full sentences  Psy: pleasant / cooperative.   Neuro:  Alert and oriented x 3    A/P:  1. Primary hypertension  Continue losartan 100 mg nightly.  Added verapamil for HTN control and to hopefully help migraine prevention.  Take nightly.  Discussed side effects such as hypotension.  She will contact me with BP readings in the next week.  Goal BP below 140/90.    - verapamil SR (CALAN-SR) 120 MG  CR tablet; Take 1 Tablet by mouth every day. At night for BP and migraine prevention.  Dispense: 30 Tablet; Refill: 3  -I encouraged her to reenter an exercise program as tolerated.  Also encouraged her to lower her salt and work on portion control/weight loss.    2. Migraine without aura and without status migrainosus, not intractable  -continue nurtec abortively.  Continue propanolol 40 mg twice daily for prevention and added on verapamil 120 mg at night.  She will contact me if blood pressure readings at home are dropping below 100/60 or heart rate is dropping below 45.  - verapamil SR (CALAN-SR) 120 MG CR tablet; Take 1 Tablet by mouth every day. At night for BP and migraine prevention.  Dispense: 30 Tablet; Refill: 3    3. Attention deficit disorder (ADD) in adult  -Currently stable when she can obtain her Adderall.  Struggling to obtain stimulants for ADD control.  She was told by her pharmacist to send through a prescription of Adderall and then they can inform her of what they have in stock.  She will contact me regarding what she needs with supply shortage and Adderall versus Ritalin.  Vyvanse was expensive for her.  - amphetamine-dextroamphetamine (ADDERALL) 10 MG Tab; Take 1 Tablet by mouth 2 times a day for 30 days.  Dispense: 60 Tablet; Refill: 0    4. Chronic bilateral low back pain with bilateral sciatica  -Currently being followed by Worker's Comp.   No/Unable to asses

## 2023-05-22 DIAGNOSIS — M54.42 CHRONIC BILATERAL LOW BACK PAIN WITH BILATERAL SCIATICA: ICD-10-CM

## 2023-05-22 DIAGNOSIS — M54.41 CHRONIC BILATERAL LOW BACK PAIN WITH BILATERAL SCIATICA: ICD-10-CM

## 2023-05-22 DIAGNOSIS — G89.29 CHRONIC BILATERAL LOW BACK PAIN WITH BILATERAL SCIATICA: ICD-10-CM

## 2023-05-22 RX ORDER — TIZANIDINE 4 MG/1
TABLET ORAL
Qty: 60 TABLET | Refills: 0 | Status: SHIPPED | OUTPATIENT
Start: 2023-05-22 | End: 2023-06-15

## 2023-05-22 NOTE — TELEPHONE ENCOUNTER
Received request via: Pharmacy    Was the patient seen in the last year in this department? Yes  3/15/23  Does the patient have an active prescription (recently filled or refills available) for medication(s) requested? No    Does the patient have skilled nursing Plus and need 100 day supply (blood pressure, diabetes and cholesterol meds only)? Medication is not for cholesterol, blood pressure or diabetes

## 2023-05-31 DIAGNOSIS — G43.009 MIGRAINE WITHOUT AURA AND WITHOUT STATUS MIGRAINOSUS, NOT INTRACTABLE: ICD-10-CM

## 2023-05-31 DIAGNOSIS — F98.8 ATTENTION DEFICIT DISORDER (ADD) IN ADULT: ICD-10-CM

## 2023-05-31 RX ORDER — DEXTROAMPHETAMINE SACCHARATE, AMPHETAMINE ASPARTATE, DEXTROAMPHETAMINE SULFATE AND AMPHETAMINE SULFATE 2.5; 2.5; 2.5; 2.5 MG/1; MG/1; MG/1; MG/1
10 TABLET ORAL 2 TIMES DAILY
Qty: 60 TABLET | Refills: 0 | Status: SHIPPED | OUTPATIENT
Start: 2023-05-31 | End: 2023-06-29 | Stop reason: SDUPTHER

## 2023-05-31 RX ORDER — RIMEGEPANT SULFATE 75 MG/75MG
75 TABLET, ORALLY DISINTEGRATING ORAL ONCE
Qty: 9 TABLET | Refills: 0 | Status: SHIPPED | OUTPATIENT
Start: 2023-05-31 | End: 2023-06-22

## 2023-05-31 NOTE — TELEPHONE ENCOUNTER
Received request via: Patient    Was the patient seen in the last year in this department? Yes  LOV 03/15/2023  Does the patient have an active prescription (recently filled or refills available) for medication(s) requested? No    Does the patient have care home Plus and need 100 day supply (blood pressure, diabetes and cholesterol meds only)? Medication is not for cholesterol, blood pressure or diabetes and Patient does not have SCP

## 2023-06-12 DIAGNOSIS — M54.41 CHRONIC BILATERAL LOW BACK PAIN WITH BILATERAL SCIATICA: ICD-10-CM

## 2023-06-12 DIAGNOSIS — M54.42 CHRONIC BILATERAL LOW BACK PAIN WITH BILATERAL SCIATICA: ICD-10-CM

## 2023-06-12 DIAGNOSIS — G89.29 CHRONIC BILATERAL LOW BACK PAIN WITH BILATERAL SCIATICA: ICD-10-CM

## 2023-06-12 RX ORDER — PREGABALIN 75 MG/1
CAPSULE ORAL
Qty: 120 CAPSULE | Refills: 2 | Status: SHIPPED | OUTPATIENT
Start: 2023-06-12 | End: 2023-09-05

## 2023-06-15 DIAGNOSIS — M54.41 CHRONIC BILATERAL LOW BACK PAIN WITH BILATERAL SCIATICA: ICD-10-CM

## 2023-06-15 DIAGNOSIS — G89.29 CHRONIC BILATERAL LOW BACK PAIN WITH BILATERAL SCIATICA: ICD-10-CM

## 2023-06-15 DIAGNOSIS — M54.42 CHRONIC BILATERAL LOW BACK PAIN WITH BILATERAL SCIATICA: ICD-10-CM

## 2023-06-15 RX ORDER — TIZANIDINE 4 MG/1
TABLET ORAL
Qty: 60 TABLET | Refills: 0 | Status: SHIPPED | OUTPATIENT
Start: 2023-06-15 | End: 2023-06-29 | Stop reason: SDUPTHER

## 2023-06-15 NOTE — TELEPHONE ENCOUNTER
Received request via: Pharmacy    Was the patient seen in the last year in this department? Yes  3/15/23  Does the patient have an active prescription (recently filled or refills available) for medication(s) requested? No    Does the patient have [facility] Plus and need 100 day supply (blood pressure, diabetes and cholesterol meds only)? Medication is not for cholesterol, blood pressure or diabetes

## 2023-06-21 DIAGNOSIS — G43.009 MIGRAINE WITHOUT AURA AND WITHOUT STATUS MIGRAINOSUS, NOT INTRACTABLE: ICD-10-CM

## 2023-06-22 RX ORDER — RIMEGEPANT SULFATE 75 MG/75MG
TABLET, ORALLY DISINTEGRATING ORAL
Qty: 9 TABLET | Refills: 0 | Status: SHIPPED | OUTPATIENT
Start: 2023-06-22 | End: 2023-06-29 | Stop reason: SDUPTHER

## 2023-06-29 DIAGNOSIS — G89.29 CHRONIC BILATERAL LOW BACK PAIN WITH BILATERAL SCIATICA: ICD-10-CM

## 2023-06-29 DIAGNOSIS — M54.42 CHRONIC BILATERAL LOW BACK PAIN WITH BILATERAL SCIATICA: ICD-10-CM

## 2023-06-29 DIAGNOSIS — F98.8 ATTENTION DEFICIT DISORDER (ADD) IN ADULT: ICD-10-CM

## 2023-06-29 DIAGNOSIS — M54.41 CHRONIC BILATERAL LOW BACK PAIN WITH BILATERAL SCIATICA: ICD-10-CM

## 2023-06-29 DIAGNOSIS — G43.009 MIGRAINE WITHOUT AURA AND WITHOUT STATUS MIGRAINOSUS, NOT INTRACTABLE: ICD-10-CM

## 2023-06-29 RX ORDER — RIMEGEPANT SULFATE 75 MG/75MG
75 TABLET, ORALLY DISINTEGRATING ORAL
Qty: 9 TABLET | Refills: 3 | Status: SHIPPED | OUTPATIENT
Start: 2023-06-29 | End: 2023-08-18

## 2023-06-29 RX ORDER — DEXTROAMPHETAMINE SACCHARATE, AMPHETAMINE ASPARTATE, DEXTROAMPHETAMINE SULFATE AND AMPHETAMINE SULFATE 2.5; 2.5; 2.5; 2.5 MG/1; MG/1; MG/1; MG/1
10 TABLET ORAL 2 TIMES DAILY
Qty: 60 TABLET | Refills: 0 | Status: SHIPPED | OUTPATIENT
Start: 2023-06-29 | End: 2023-07-27 | Stop reason: SDUPTHER

## 2023-06-29 RX ORDER — TIZANIDINE 4 MG/1
4 TABLET ORAL 2 TIMES DAILY
Qty: 60 TABLET | Refills: 0 | Status: SHIPPED | OUTPATIENT
Start: 2023-06-29 | End: 2023-08-23

## 2023-06-29 NOTE — TELEPHONE ENCOUNTER
Received request via: Patient    Was the patient seen in the last year in this department? Yes  LOV 03/15/2023  Does the patient have an active prescription (recently filled or refills available) for medication(s) requested? No    Does the patient have [facility]FP Plus and need 100 day supply (blood pressure, diabetes and cholesterol meds only)? Medication is not for cholesterol, blood pressure or diabetes and Patient does not have SCP

## 2023-07-20 ENCOUNTER — TELEPHONE (OUTPATIENT)
Dept: MEDICAL GROUP | Facility: LAB | Age: 38
End: 2023-07-20
Payer: COMMERCIAL

## 2023-07-20 NOTE — TELEPHONE ENCOUNTER
DOCUMENTATION OF PAR STATUS:    1. Name of Medication & Dose:  Nurtec 75MG dispersible tablets     2. Name of Prescription Coverage Company & phone #: cover my meds/   (Key: [id number]) - [id number]   3. Date Prior Auth Submitted: 7/20/23    4. What information was given to obtain insurance decision? Ov notes faxed    MERCEDES YANDEL Key: [id number] - [place] Case ID: [id number]  Need help? Call us at [phone]  Outcome   Approvedtoday  CaseId:[id number];Status:Approved;Review Type:Prior Auth;Coverage Start Date:06/20/2023;Coverage End Date:07/19/2024;

## 2023-07-27 DIAGNOSIS — F98.8 ATTENTION DEFICIT DISORDER (ADD) IN ADULT: ICD-10-CM

## 2023-07-27 RX ORDER — DEXTROAMPHETAMINE SACCHARATE, AMPHETAMINE ASPARTATE, DEXTROAMPHETAMINE SULFATE AND AMPHETAMINE SULFATE 2.5; 2.5; 2.5; 2.5 MG/1; MG/1; MG/1; MG/1
10 TABLET ORAL 2 TIMES DAILY
Qty: 60 TABLET | Refills: 0 | Status: SHIPPED | OUTPATIENT
Start: 2023-07-27 | End: 2023-08-23 | Stop reason: SDUPTHER

## 2023-07-27 NOTE — TELEPHONE ENCOUNTER
Received request via: Patient    Was the patient seen in the last year in this department? Yes  LOV: 3/15/2023  Does the patient have an active prescription (recently filled or refills available) for medication(s) requested? No    Does the patient have long term Plus and need 100 day supply (blood pressure, diabetes and cholesterol meds only)? Patient does not have SCP

## 2023-08-17 DIAGNOSIS — G43.009 MIGRAINE WITHOUT AURA AND WITHOUT STATUS MIGRAINOSUS, NOT INTRACTABLE: ICD-10-CM

## 2023-08-17 NOTE — TELEPHONE ENCOUNTER
Received request via: Pharmacy    Was the patient seen in the last year in this department? Yes  LOV: 3/15/2023  Does the patient have an active prescription (recently filled or refills available) for medication(s) requested? No    Does the patient have detention Plus and need 100 day supply (blood pressure, diabetes and cholesterol meds only)? Patient does not have SCP

## 2023-08-18 RX ORDER — RIMEGEPANT SULFATE 75 MG/75MG
TABLET, ORALLY DISINTEGRATING ORAL
Qty: 9 TABLET | Refills: 0 | Status: SHIPPED | OUTPATIENT
Start: 2023-08-18 | End: 2023-12-26

## 2023-08-23 DIAGNOSIS — F98.8 ATTENTION DEFICIT DISORDER (ADD) IN ADULT: ICD-10-CM

## 2023-08-23 DIAGNOSIS — G89.29 CHRONIC BILATERAL LOW BACK PAIN WITH BILATERAL SCIATICA: ICD-10-CM

## 2023-08-23 DIAGNOSIS — M54.42 CHRONIC BILATERAL LOW BACK PAIN WITH BILATERAL SCIATICA: ICD-10-CM

## 2023-08-23 DIAGNOSIS — M54.41 CHRONIC BILATERAL LOW BACK PAIN WITH BILATERAL SCIATICA: ICD-10-CM

## 2023-08-23 RX ORDER — DEXTROAMPHETAMINE SACCHARATE, AMPHETAMINE ASPARTATE, DEXTROAMPHETAMINE SULFATE AND AMPHETAMINE SULFATE 2.5; 2.5; 2.5; 2.5 MG/1; MG/1; MG/1; MG/1
10 TABLET ORAL 2 TIMES DAILY
Qty: 60 TABLET | Refills: 0 | Status: SHIPPED | OUTPATIENT
Start: 2023-08-23 | End: 2023-09-14 | Stop reason: SDUPTHER

## 2023-08-23 RX ORDER — TIZANIDINE 4 MG/1
4 TABLET ORAL 2 TIMES DAILY
Qty: 60 TABLET | Refills: 0 | Status: SHIPPED | OUTPATIENT
Start: 2023-08-23 | End: 2023-10-19

## 2023-08-23 NOTE — TELEPHONE ENCOUNTER
Received request via: Patient    Was the patient seen in the last year in this department? Yes    Does the patient have an active prescription (recently filled or refills available) for medication(s) requested? No    Does the patient have care home Plus and need 100 day supply (blood pressure, diabetes and cholesterol meds only)? Medication is not for cholesterol, blood pressure or diabetes

## 2023-09-05 DIAGNOSIS — M54.41 CHRONIC BILATERAL LOW BACK PAIN WITH BILATERAL SCIATICA: ICD-10-CM

## 2023-09-05 DIAGNOSIS — G89.29 CHRONIC BILATERAL LOW BACK PAIN WITH BILATERAL SCIATICA: ICD-10-CM

## 2023-09-05 DIAGNOSIS — M54.42 CHRONIC BILATERAL LOW BACK PAIN WITH BILATERAL SCIATICA: ICD-10-CM

## 2023-09-05 RX ORDER — PREGABALIN 75 MG/1
CAPSULE ORAL
Qty: 120 CAPSULE | Refills: 2 | Status: SHIPPED | OUTPATIENT
Start: 2023-09-05 | End: 2023-10-05

## 2023-09-14 ENCOUNTER — OFFICE VISIT (OUTPATIENT)
Dept: MEDICAL GROUP | Facility: LAB | Age: 38
End: 2023-09-14
Payer: COMMERCIAL

## 2023-09-14 VITALS
HEIGHT: 72 IN | BODY MASS INDEX: 34.67 KG/M2 | HEART RATE: 90 BPM | SYSTOLIC BLOOD PRESSURE: 148 MMHG | TEMPERATURE: 97.2 F | OXYGEN SATURATION: 95 % | DIASTOLIC BLOOD PRESSURE: 84 MMHG | WEIGHT: 256 LBS | RESPIRATION RATE: 16 BRPM

## 2023-09-14 DIAGNOSIS — Z79.899 CONTROLLED SUBSTANCE AGREEMENT SIGNED: ICD-10-CM

## 2023-09-14 DIAGNOSIS — M50.30 DDD (DEGENERATIVE DISC DISEASE), CERVICAL: ICD-10-CM

## 2023-09-14 DIAGNOSIS — I10 PRIMARY HYPERTENSION: ICD-10-CM

## 2023-09-14 DIAGNOSIS — G43.009 MIGRAINE WITHOUT AURA AND WITHOUT STATUS MIGRAINOSUS, NOT INTRACTABLE: ICD-10-CM

## 2023-09-14 DIAGNOSIS — F98.8 ATTENTION DEFICIT DISORDER (ADD) IN ADULT: ICD-10-CM

## 2023-09-14 PROCEDURE — 3079F DIAST BP 80-89 MM HG: CPT | Performed by: NURSE PRACTITIONER

## 2023-09-14 PROCEDURE — 99214 OFFICE O/P EST MOD 30 MIN: CPT | Performed by: NURSE PRACTITIONER

## 2023-09-14 PROCEDURE — 3077F SYST BP >= 140 MM HG: CPT | Performed by: NURSE PRACTITIONER

## 2023-09-14 RX ORDER — DEXTROAMPHETAMINE SACCHARATE, AMPHETAMINE ASPARTATE, DEXTROAMPHETAMINE SULFATE AND AMPHETAMINE SULFATE 2.5; 2.5; 2.5; 2.5 MG/1; MG/1; MG/1; MG/1
10 TABLET ORAL 2 TIMES DAILY
Qty: 60 TABLET | Refills: 0 | Status: SHIPPED | OUTPATIENT
Start: 2023-10-23 | End: 2024-01-23 | Stop reason: SDUPTHER

## 2023-09-14 RX ORDER — PROMETHAZINE HYDROCHLORIDE 25 MG/1
TABLET ORAL
Qty: 30 TABLET | Refills: 2 | Status: SHIPPED | OUTPATIENT
Start: 2023-09-14 | End: 2023-11-28

## 2023-09-14 RX ORDER — DEXTROAMPHETAMINE SACCHARATE, AMPHETAMINE ASPARTATE, DEXTROAMPHETAMINE SULFATE AND AMPHETAMINE SULFATE 2.5; 2.5; 2.5; 2.5 MG/1; MG/1; MG/1; MG/1
10 TABLET ORAL 2 TIMES DAILY
Qty: 60 TABLET | Refills: 0 | Status: SHIPPED | OUTPATIENT
Start: 2023-09-23 | End: 2024-01-23 | Stop reason: SDUPTHER

## 2023-09-14 RX ORDER — DEXTROAMPHETAMINE SACCHARATE, AMPHETAMINE ASPARTATE, DEXTROAMPHETAMINE SULFATE AND AMPHETAMINE SULFATE 2.5; 2.5; 2.5; 2.5 MG/1; MG/1; MG/1; MG/1
10 TABLET ORAL 2 TIMES DAILY
Qty: 60 TABLET | Refills: 0 | Status: SHIPPED | OUTPATIENT
Start: 2023-11-22 | End: 2023-12-28 | Stop reason: SDUPTHER

## 2023-09-14 ASSESSMENT — FIBROSIS 4 INDEX: FIB4 SCORE: 0.57

## 2023-09-14 NOTE — PROGRESS NOTES
CC  F/u    HPI:  Joy is a 37 yo est female here for f/u:     #1-lumbar and cervical DDD: Chronic issues.  Pain worsened.  Followed by orthopedic spine surgery.  Pending surgery with Dr. Mcadams at [facility] 9/22/2023.     #2-hypertension / migraines:  chronic issues.  Off verapamil, losartan and propranolol.  Using beet powder and BP readings have been good x 2 mo - 120/80-90's.  Migraine frequency did not worsen off verapamil / propranolol. Using nurtec for abortive tx about once per week.  Walking dog every morning.     #3-attention deficit disorder: Longstanding issue most of her childhood and adult life.  Responds well to 10 mg Adderall twice daily.  Takes holiday on Sundays from Adderall.  Adderall does not cause her any negative side effects.    Exam:   BP (!) 148/84 (BP Location: Right arm, Patient Position: Sitting, BP Cuff Size: Large adult)   Pulse 90   Temp 36.2 °C (97.2 °F)   Resp 16   Ht 1.829 m (6')   Wt 116 kg (256 lb)   LMP 03/16/2017   SpO2 95%   BMI 34.72 kg/m²   Gen. appears healthy in no distress   Skin appropriate for sex and age   Neck trachea is midline  Lungs unlabored breathing  Heart regular rate  Neuro gait and station normal   Psych appropriate, calm, interactive, well-groomed        Assessment / Plan:  1. Primary hypertension        2. Attention deficit disorder (ADD) in adult  amphetamine-dextroamphetamine (ADDERALL) 10 MG Tab    amphetamine-dextroamphetamine (ADDERALL) 10 MG Tab    amphetamine-dextroamphetamine (ADDERALL) 10 MG Tab      3. Migraine without aura and without status migrainosus, not intractable        4. Controlled substance agreement signed  Pain Management Screen    Controlled Substance Treatment Agreement      Blood pressure slightly elevated today.  Encouraged her to contact me if home readings are consistently greater than 135/85.    Migraines somewhat controlled, needs about 4 Nurtec per month which is appropriate to be off of preventative migraine  medication.  Refilled promethazine for her to use as needed for nausea associated with migraines, Zofran is not as helpful for her.    Scheduled with Dr. Woodward to have spine surgery.    ADD control stable.  Refilled Adderall.  Updated CSA and UDS.  Reviewed  profile.  Follow-up 6 months.    In prescribing controlled substances to this patient, I certify that I have obtained and reviewed the medical history of Joy Mcnamara. I have also made a good areli effort to obtain applicable records from other providers who have treated the patient and records did not demonstrate any increased risk of substance abuse that would prevent me from prescribing controlled substances.     I have conducted a physical exam and documented it. I have reviewed Ms. Mcnamara’s prescription history as maintained by the [place] Prescription Monitoring Program.

## 2023-10-18 DIAGNOSIS — G89.29 CHRONIC BILATERAL LOW BACK PAIN WITH BILATERAL SCIATICA: ICD-10-CM

## 2023-10-18 DIAGNOSIS — M54.42 CHRONIC BILATERAL LOW BACK PAIN WITH BILATERAL SCIATICA: ICD-10-CM

## 2023-10-18 DIAGNOSIS — M54.41 CHRONIC BILATERAL LOW BACK PAIN WITH BILATERAL SCIATICA: ICD-10-CM

## 2023-10-18 NOTE — TELEPHONE ENCOUNTER
0725 
Bedside and Verbal shift change report rec'd from [facility] RN(offgoing nurse). Report included the following information SBAR, Kardex, MAR and Recent Results. [address] Wound vac dressing changed. Home wound vac applied. [address] Daughter, Emigdio Maciel, notified of discharge. Discharge instructions given and verbalized understanding. Will notify when ems arrives for transport. Max Peterson 6 Discharged home via EMS. Wound Vac in place Received request via: Pharmacy    Was the patient seen in the last year in this department? Yes    Does the patient have an active prescription (recently filled or refills available) for medication(s) requested? yes    Does the patient have prison Plus and need 100 day supply (blood pressure, diabetes and cholesterol meds only)? Patient does not have SCP   Requested Prescriptions     Pending Prescriptions Disp Refills    tizanidine (ZANAFLEX) 4 MG Tab [Pharmacy Med Name: tiZANidine HCl 4 MG Oral Tablet] 60 Tablet 0     Sig: Take 1 tablet by mouth twice daily

## 2023-10-19 RX ORDER — TIZANIDINE 4 MG/1
4 TABLET ORAL 2 TIMES DAILY
Qty: 60 TABLET | Refills: 0 | Status: SHIPPED | OUTPATIENT
Start: 2023-10-19 | End: 2023-11-28

## 2023-10-31 DIAGNOSIS — F98.8 ATTENTION DEFICIT DISORDER (ADD) IN ADULT: ICD-10-CM

## 2023-10-31 RX ORDER — DEXTROAMPHETAMINE SACCHARATE, AMPHETAMINE ASPARTATE, DEXTROAMPHETAMINE SULFATE AND AMPHETAMINE SULFATE 2.5; 2.5; 2.5; 2.5 MG/1; MG/1; MG/1; MG/1
10 TABLET ORAL 2 TIMES DAILY
Qty: 60 TABLET | Refills: 0 | Status: CANCELLED | OUTPATIENT
Start: 2023-10-31 | End: 2023-11-30

## 2023-11-30 ENCOUNTER — OFFICE VISIT (OUTPATIENT)
Dept: MEDICAL GROUP | Facility: LAB | Age: 38
End: 2023-11-30
Payer: COMMERCIAL

## 2023-11-30 VITALS
OXYGEN SATURATION: 93 % | HEART RATE: 65 BPM | HEIGHT: 72 IN | WEIGHT: 250 LBS | BODY MASS INDEX: 33.86 KG/M2 | TEMPERATURE: 97.4 F | RESPIRATION RATE: 12 BRPM | SYSTOLIC BLOOD PRESSURE: 118 MMHG | DIASTOLIC BLOOD PRESSURE: 78 MMHG

## 2023-11-30 DIAGNOSIS — R74.8 ELEVATED LIVER ENZYMES: ICD-10-CM

## 2023-11-30 DIAGNOSIS — E87.6 HYPOKALEMIA: ICD-10-CM

## 2023-11-30 DIAGNOSIS — I10 PRIMARY HYPERTENSION: ICD-10-CM

## 2023-11-30 PROBLEM — M75.51 BURSITIS OF RIGHT SHOULDER: Status: ACTIVE | Noted: 2023-06-11

## 2023-11-30 PROBLEM — M48.02 CERVICAL STENOSIS OF SPINAL CANAL: Status: ACTIVE | Noted: 2023-09-22

## 2023-11-30 PROBLEM — S43.431A ANTERIOR TO POSTERIOR TEAR OF SUPERIOR GLENOID LABRUM OF RIGHT SHOULDER: Status: ACTIVE | Noted: 2023-06-11

## 2023-11-30 PROCEDURE — 3078F DIAST BP <80 MM HG: CPT | Performed by: STUDENT IN AN ORGANIZED HEALTH CARE EDUCATION/TRAINING PROGRAM

## 2023-11-30 PROCEDURE — 99214 OFFICE O/P EST MOD 30 MIN: CPT | Performed by: STUDENT IN AN ORGANIZED HEALTH CARE EDUCATION/TRAINING PROGRAM

## 2023-11-30 PROCEDURE — 3074F SYST BP LT 130 MM HG: CPT | Performed by: STUDENT IN AN ORGANIZED HEALTH CARE EDUCATION/TRAINING PROGRAM

## 2023-11-30 RX ORDER — PREGABALIN 75 MG/1
75 CAPSULE ORAL 4 TIMES DAILY
COMMUNITY
End: 2023-12-04

## 2023-11-30 RX ORDER — HYDROCODONE BITARTRATE AND ACETAMINOPHEN 5; 325 MG/1; MG/1
1 TABLET ORAL EVERY 8 HOURS PRN
COMMUNITY
Start: 2023-11-29 | End: 2024-01-23

## 2023-11-30 RX ORDER — PROPRANOLOL HYDROCHLORIDE 40 MG/1
40 TABLET ORAL 2 TIMES DAILY
COMMUNITY
Start: 2023-11-29 | End: 2023-11-30 | Stop reason: SDUPTHER

## 2023-11-30 RX ORDER — PROPRANOLOL HYDROCHLORIDE 40 MG/1
40 TABLET ORAL 2 TIMES DAILY
Qty: 180 TABLET | Refills: 3 | Status: SHIPPED | OUTPATIENT
Start: 2023-11-30 | End: 2024-01-23 | Stop reason: SDUPTHER

## 2023-11-30 ASSESSMENT — ENCOUNTER SYMPTOMS
DIZZINESS: 0
COUGH: 0
FEVER: 0
HEADACHES: 0
BACK PAIN: 1
WEIGHT LOSS: 0
SHORTNESS OF BREATH: 0
DIARRHEA: 0
NERVOUS/ANXIOUS: 1
ABDOMINAL PAIN: 0
CHILLS: 0
NAUSEA: 0
VOMITING: 0
PALPITATIONS: 0

## 2023-11-30 ASSESSMENT — FIBROSIS 4 INDEX: FIB4 SCORE: 0.59

## 2023-11-30 NOTE — PATIENT INSTRUCTIONS
Ideal blood pressure <130/80 and at least <140/90.  If <100 for the top number we are over treating.    non-fasting labs due next week

## 2023-12-04 DIAGNOSIS — M48.02 CERVICAL STENOSIS OF SPINAL CANAL: ICD-10-CM

## 2023-12-04 RX ORDER — PREGABALIN 75 MG/1
75 CAPSULE ORAL 4 TIMES DAILY
Qty: 120 CAPSULE | Refills: 2 | Status: SHIPPED | OUTPATIENT
Start: 2023-12-04 | End: 2024-01-23 | Stop reason: SDUPTHER

## 2023-12-20 DIAGNOSIS — G89.29 CHRONIC BILATERAL LOW BACK PAIN WITH BILATERAL SCIATICA: ICD-10-CM

## 2023-12-20 DIAGNOSIS — M54.42 CHRONIC BILATERAL LOW BACK PAIN WITH BILATERAL SCIATICA: ICD-10-CM

## 2023-12-20 DIAGNOSIS — M54.41 CHRONIC BILATERAL LOW BACK PAIN WITH BILATERAL SCIATICA: ICD-10-CM

## 2023-12-20 RX ORDER — PROMETHAZINE HYDROCHLORIDE 25 MG/1
TABLET ORAL
Qty: 30 TABLET | Refills: 0 | Status: SHIPPED | OUTPATIENT
Start: 2023-12-20 | End: 2024-01-23 | Stop reason: SDUPTHER

## 2023-12-20 RX ORDER — TIZANIDINE 4 MG/1
4 TABLET ORAL 2 TIMES DAILY
Qty: 60 TABLET | Refills: 0 | Status: SHIPPED | OUTPATIENT
Start: 2023-12-20 | End: 2024-01-23 | Stop reason: SDUPTHER

## 2023-12-25 DIAGNOSIS — G43.009 MIGRAINE WITHOUT AURA AND WITHOUT STATUS MIGRAINOSUS, NOT INTRACTABLE: ICD-10-CM

## 2023-12-26 RX ORDER — RIMEGEPANT SULFATE 75 MG/75MG
TABLET, ORALLY DISINTEGRATING ORAL
Qty: 9 TABLET | Refills: 0 | Status: SHIPPED | OUTPATIENT
Start: 2023-12-26 | End: 2024-01-28 | Stop reason: SDUPTHER

## 2023-12-26 NOTE — TELEPHONE ENCOUNTER
Please return in 7 days to have your sutures removed.     Received request via: Pharmacy    Was the patient seen in the last year in this department? Yes    Does the patient have an active prescription (recently filled or refills available) for medication(s) requested? No    Does the patient have alf Plus and need 100 day supply (blood pressure, diabetes and cholesterol meds only)? Patient does not have SCP

## 2023-12-28 ENCOUNTER — PATIENT MESSAGE (OUTPATIENT)
Dept: MEDICAL GROUP | Facility: LAB | Age: 38
End: 2023-12-28
Payer: COMMERCIAL

## 2023-12-28 DIAGNOSIS — F98.8 ATTENTION DEFICIT DISORDER (ADD) IN ADULT: ICD-10-CM

## 2023-12-28 DIAGNOSIS — Z12.31 SCREENING MAMMOGRAM FOR BREAST CANCER: ICD-10-CM

## 2023-12-28 RX ORDER — DEXTROAMPHETAMINE SACCHARATE, AMPHETAMINE ASPARTATE, DEXTROAMPHETAMINE SULFATE AND AMPHETAMINE SULFATE 2.5; 2.5; 2.5; 2.5 MG/1; MG/1; MG/1; MG/1
10 TABLET ORAL 2 TIMES DAILY
Qty: 60 TABLET | Refills: 0 | Status: SHIPPED | OUTPATIENT
Start: 2023-12-28 | End: 2024-01-23 | Stop reason: SDUPTHER

## 2024-01-04 DIAGNOSIS — N64.4 BREAST PAIN: ICD-10-CM

## 2024-01-04 DIAGNOSIS — R52 BREAKTHROUGH PAIN: ICD-10-CM

## 2024-01-05 ENCOUNTER — HOSPITAL ENCOUNTER (OUTPATIENT)
Dept: LAB | Facility: MEDICAL CENTER | Age: 39
End: 2024-01-05
Attending: STUDENT IN AN ORGANIZED HEALTH CARE EDUCATION/TRAINING PROGRAM
Payer: COMMERCIAL

## 2024-01-05 DIAGNOSIS — I10 PRIMARY HYPERTENSION: ICD-10-CM

## 2024-01-05 DIAGNOSIS — R74.8 ELEVATED LIVER ENZYMES: ICD-10-CM

## 2024-01-05 DIAGNOSIS — E87.6 HYPOKALEMIA: ICD-10-CM

## 2024-01-05 LAB
ALBUMIN SERPL BCP-MCNC: 4.1 G/DL (ref 3.2–4.9)
ALBUMIN/GLOB SERPL: 1.5 G/DL
ALP SERPL-CCNC: 76 U/L (ref 30–99)
ALT SERPL-CCNC: 16 U/L (ref 2–50)
ANION GAP SERPL CALC-SCNC: 11 MMOL/L (ref 7–16)
AST SERPL-CCNC: 20 U/L (ref 12–45)
BILIRUB SERPL-MCNC: 0.4 MG/DL (ref 0.1–1.5)
BUN SERPL-MCNC: 11 MG/DL (ref 8–22)
CALCIUM ALBUM COR SERPL-MCNC: 9 MG/DL (ref 8.5–10.5)
CALCIUM SERPL-MCNC: 9.1 MG/DL (ref 8.5–10.5)
CHLORIDE SERPL-SCNC: 109 MMOL/L (ref 96–112)
CO2 SERPL-SCNC: 23 MMOL/L (ref 20–33)
CREAT SERPL-MCNC: 0.56 MG/DL (ref 0.5–1.4)
GFR SERPLBLD CREATININE-BSD FMLA CKD-EPI: 118 ML/MIN/1.73 M 2
GLOBULIN SER CALC-MCNC: 2.7 G/DL (ref 1.9–3.5)
GLUCOSE SERPL-MCNC: 93 MG/DL (ref 65–99)
MAGNESIUM SERPL-MCNC: 2.3 MG/DL (ref 1.5–2.5)
POTASSIUM SERPL-SCNC: 4.1 MMOL/L (ref 3.6–5.5)
PROT SERPL-MCNC: 6.8 G/DL (ref 6–8.2)
SODIUM SERPL-SCNC: 143 MMOL/L (ref 135–145)

## 2024-01-05 PROCEDURE — 84244 ASSAY OF RENIN: CPT

## 2024-01-05 PROCEDURE — 82088 ASSAY OF ALDOSTERONE: CPT

## 2024-01-05 PROCEDURE — 36415 COLL VENOUS BLD VENIPUNCTURE: CPT

## 2024-01-05 PROCEDURE — 80053 COMPREHEN METABOLIC PANEL: CPT

## 2024-01-05 PROCEDURE — 83735 ASSAY OF MAGNESIUM: CPT

## 2024-01-07 LAB — ALDOST SERPL-MCNC: 20.2 NG/DL

## 2024-01-08 LAB — RENIN PLAS-CCNC: 1.9 NG/ML/HR

## 2024-01-23 ENCOUNTER — OFFICE VISIT (OUTPATIENT)
Dept: MEDICAL GROUP | Facility: LAB | Age: 39
End: 2024-01-23
Payer: COMMERCIAL

## 2024-01-23 VITALS
DIASTOLIC BLOOD PRESSURE: 100 MMHG | BODY MASS INDEX: 32.37 KG/M2 | TEMPERATURE: 96.2 F | SYSTOLIC BLOOD PRESSURE: 140 MMHG | HEART RATE: 68 BPM | OXYGEN SATURATION: 96 % | RESPIRATION RATE: 16 BRPM | HEIGHT: 72 IN | WEIGHT: 239 LBS

## 2024-01-23 DIAGNOSIS — Z98.1 S/P LUMBAR FUSION: ICD-10-CM

## 2024-01-23 DIAGNOSIS — R20.2 PARESTHESIA OF LEFT FOOT: ICD-10-CM

## 2024-01-23 DIAGNOSIS — M54.42 CHRONIC BILATERAL LOW BACK PAIN WITH BILATERAL SCIATICA: ICD-10-CM

## 2024-01-23 DIAGNOSIS — G89.29 CHRONIC BILATERAL LOW BACK PAIN WITH BILATERAL SCIATICA: ICD-10-CM

## 2024-01-23 DIAGNOSIS — F98.8 ATTENTION DEFICIT DISORDER (ADD) IN ADULT: ICD-10-CM

## 2024-01-23 DIAGNOSIS — M54.41 CHRONIC BILATERAL LOW BACK PAIN WITH BILATERAL SCIATICA: ICD-10-CM

## 2024-01-23 DIAGNOSIS — I10 PRIMARY HYPERTENSION: ICD-10-CM

## 2024-01-23 PROCEDURE — 99214 OFFICE O/P EST MOD 30 MIN: CPT | Performed by: NURSE PRACTITIONER

## 2024-01-23 PROCEDURE — 3077F SYST BP >= 140 MM HG: CPT | Performed by: NURSE PRACTITIONER

## 2024-01-23 PROCEDURE — 3080F DIAST BP >= 90 MM HG: CPT | Performed by: NURSE PRACTITIONER

## 2024-01-23 RX ORDER — DEXTROAMPHETAMINE SACCHARATE, AMPHETAMINE ASPARTATE, DEXTROAMPHETAMINE SULFATE AND AMPHETAMINE SULFATE 2.5; 2.5; 2.5; 2.5 MG/1; MG/1; MG/1; MG/1
10 TABLET ORAL 2 TIMES DAILY
Qty: 60 TABLET | Refills: 0 | Status: SHIPPED | OUTPATIENT
Start: 2024-01-27 | End: 2024-02-26

## 2024-01-23 RX ORDER — AMLODIPINE BESYLATE 5 MG/1
5 TABLET ORAL DAILY
Qty: 30 TABLET | Refills: 5 | Status: SHIPPED | OUTPATIENT
Start: 2024-01-23

## 2024-01-23 RX ORDER — DEXTROAMPHETAMINE SACCHARATE, AMPHETAMINE ASPARTATE, DEXTROAMPHETAMINE SULFATE AND AMPHETAMINE SULFATE 2.5; 2.5; 2.5; 2.5 MG/1; MG/1; MG/1; MG/1
10 TABLET ORAL 2 TIMES DAILY
Qty: 60 TABLET | Refills: 0 | Status: SHIPPED | OUTPATIENT
Start: 2024-03-27 | End: 2024-04-26

## 2024-01-23 RX ORDER — TIZANIDINE 4 MG/1
4 TABLET ORAL 2 TIMES DAILY
Qty: 60 TABLET | Refills: 0 | Status: SHIPPED | OUTPATIENT
Start: 2024-01-23 | End: 2024-02-22

## 2024-01-23 RX ORDER — PREGABALIN 150 MG/1
150 CAPSULE ORAL 3 TIMES DAILY
Qty: 90 CAPSULE | Refills: 2 | Status: SHIPPED | OUTPATIENT
Start: 2024-01-23 | End: 2024-01-29 | Stop reason: SDUPTHER

## 2024-01-23 RX ORDER — PROMETHAZINE HYDROCHLORIDE 25 MG/1
TABLET ORAL
Qty: 30 TABLET | Refills: 0 | Status: SHIPPED | OUTPATIENT
Start: 2024-01-23 | End: 2024-02-22

## 2024-01-23 RX ORDER — PROPRANOLOL HYDROCHLORIDE 40 MG/1
40 TABLET ORAL 2 TIMES DAILY
Qty: 180 TABLET | Refills: 3 | Status: SHIPPED | OUTPATIENT
Start: 2024-01-23

## 2024-01-23 RX ORDER — DEXTROAMPHETAMINE SACCHARATE, AMPHETAMINE ASPARTATE, DEXTROAMPHETAMINE SULFATE AND AMPHETAMINE SULFATE 2.5; 2.5; 2.5; 2.5 MG/1; MG/1; MG/1; MG/1
10 TABLET ORAL 2 TIMES DAILY
Qty: 60 TABLET | Refills: 0 | Status: SHIPPED | OUTPATIENT
Start: 2024-02-26 | End: 2024-03-27

## 2024-01-23 RX ORDER — AMITRIPTYLINE HYDROCHLORIDE 25 MG/1
25 TABLET, FILM COATED ORAL NIGHTLY
Qty: 30 TABLET | Refills: 2 | Status: SHIPPED | OUTPATIENT
Start: 2024-01-23

## 2024-01-23 ASSESSMENT — PATIENT HEALTH QUESTIONNAIRE - PHQ9
8. MOVING OR SPEAKING SO SLOWLY THAT OTHER PEOPLE COULD HAVE NOTICED. OR THE OPPOSITE, BEING SO FIGETY OR RESTLESS THAT YOU HAVE BEEN MOVING AROUND A LOT MORE THAN USUAL: NOT AT ALL
2. FEELING DOWN, DEPRESSED, IRRITABLE, OR HOPELESS: NOT AT ALL
5. POOR APPETITE OR OVEREATING: NOT AT ALL
7. TROUBLE CONCENTRATING ON THINGS, SUCH AS READING THE NEWSPAPER OR WATCHING TELEVISION: NOT AT ALL
SUM OF ALL RESPONSES TO PHQ9 QUESTIONS 1 AND 2: 0
4. FEELING TIRED OR HAVING LITTLE ENERGY: NOT AT ALL
1. LITTLE INTEREST OR PLEASURE IN DOING THINGS: NOT AT ALL
6. FEELING BAD ABOUT YOURSELF - OR THAT YOU ARE A FAILURE OR HAVE LET YOURSELF OR YOUR FAMILY DOWN: NOT AL ALL
SUM OF ALL RESPONSES TO PHQ QUESTIONS 1-9: 0
3. TROUBLE FALLING OR STAYING ASLEEP OR SLEEPING TOO MUCH: NOT AT ALL
9. THOUGHTS THAT YOU WOULD BE BETTER OFF DEAD, OR OF HURTING YOURSELF: NOT AT ALL

## 2024-01-23 ASSESSMENT — FIBROSIS 4 INDEX: FIB4 SCORE: .975609756097560976

## 2024-01-23 NOTE — ASSESSMENT & PLAN NOTE
Chronic issue.  Taking 40 mg propranolol bid -120-130/80-90 at home.  Neg lab w/u with DR. Tamez at beginning of month.

## 2024-01-23 NOTE — PROGRESS NOTES
Chief Complaint   Patient presents with    Follow-Up     HPI:  Joy is a 37 yo est female here to f/u on chronic issues:     S/P lumbar fusion - Dr. Woodward 2023  Went well in Dec.  Just tender to low back - burning pain and sciatica improved.  [facility]  Pain regimen: lyrica morning / noon/ night.  Pain is worse lying down.  Has foot numbness / tingling / burning.  Off opiate therapy.  Requesting an increase in her Lyrica, responds well to 150 mg prior to bedtime.  Lyrica does not make her tired.  Infectious having trouble sleeping.    Hypertension  Chronic issue.  Taking 40 mg propranolol bid -120-130/80-90 at home.  Neg lab w/u with DR. Tamez at beginning of month.      Attention deficit disorder (ADD) in adult  Chronic issue.  Stable on 10 mg adderall bid without neg side effects.        Exam:   BP (!) 140/100 (BP Location: Left arm, Patient Position: Sitting, BP Cuff Size: Large adult)   Pulse 68   Temp (!) 35.7 °C (96.2 °F)   Resp 16   Ht 1.829 m (6')   Wt 108 kg (239 lb)   LMP 03/16/2017   SpO2 96%   BMI 32.41 kg/m²   Gen: NAD  Resp: nonlabored.  Able to speak in full sentences  Psy: pleasant / cooperative.   Neuro:  Alert and oriented x 3    Assessment / Plan:  1. S/P lumbar fusion - Dr. Woodward 2023 now with left foot paresthesia (likely to improve, per Dr. Woodward)  -pain improved.  Off opiates.  Requested increased lyrica dose as nighttime pain in foot is difficult and 150 mg has lowered this pain level.  Again discussed with her that 150 mg Lyrica 3 times daily could make her sleepy and she states that she does not feel sleepy on any dosage of Lyrica.  She is interested in going back down to lower dosages of Lyrica in a few months if the paresthesia in her foot improves.  - pregabalin (LYRICA) 150 MG Cap; Take 1 Capsule by mouth 3 times a day for 30 days.  Dispense: 90 Capsule; Refill: 2  - amitriptyline (ELAVIL) 25 MG Tab; Take 1 Tablet by mouth every evening. For sleep.   Take 45 min before bedtime and allow 8 hours for sleep  Dispense: 30 Tablet; Refill: 2    2. Attention deficit disorder (ADD) in adult  -Stable.  Continue same.  Reviewed  profile.  - amphetamine-dextroamphetamine (ADDERALL) 10 MG Tab; Take 1 Tablet by mouth 2 times a day for 30 days.  Dispense: 60 Tablet; Refill: 0  - amphetamine-dextroamphetamine (ADDERALL) 10 MG Tab; Take 1 Tablet by mouth 2 times a day for 30 days.  Dispense: 60 Tablet; Refill: 0  - amphetamine-dextroamphetamine (ADDERALL) 10 MG Tab; Take 1 Tablet by mouth 2 times a day for 30 days.  Dispense: 60 Tablet; Refill: 0    3. Primary hypertension  -Not controlled.  Recheck by myself was 150/100.  She was agreeable to starting amlodipine 5 mg nightly to lower her diastolic blood pressure in particular.  Continue propranolol 40 mg twice daily which we discussed will also help with migraine prevention.  Goal blood pressure readings consistently below 130/80, preferably around 110/70 especially considering her young age.  She will send me a [email or web address] message over the next week regarding blood pressure readings.    In prescribing controlled substances to this patient, I certify that I have obtained and reviewed the medical history of Joy Mcnamara. I have also made a good areli effort to obtain applicable records from other providers who have treated the patient and records did not demonstrate any increased risk of substance abuse that would prevent me from prescribing controlled substances.     I have conducted a physical exam and documented it. I have reviewed Ms. Mcnamara’s prescription history as maintained by the [place] Prescription Monitoring Program.

## 2024-01-23 NOTE — TELEPHONE ENCOUNTER
Received request via: Pharmacy    Was the patient seen in the last year in this department? Yes    Does the patient have an active prescription (recently filled or refills available) for medication(s) requested? No    Pharmacy Name: [facility], [address],[place]    Does the patient have custodial Plus and need 100 day supply (blood pressure, diabetes and cholesterol meds only)? Patient does not have SCP

## 2024-01-23 NOTE — ASSESSMENT & PLAN NOTE
Went well in Dec.  Just tender to low back - burning pain and sciatica improved.  In [facility].  Pain regimen: lyrica morning / noon/ night.  Pain is worse lying down.  Has foot numbness / tingling / burning.  Off opiate therapy.

## 2024-01-26 DIAGNOSIS — G43.009 MIGRAINE WITHOUT AURA AND WITHOUT STATUS MIGRAINOSUS, NOT INTRACTABLE: ICD-10-CM

## 2024-01-26 NOTE — TELEPHONE ENCOUNTER
Received request via: Patient    Was the patient seen in the last year in this department? Yes  1/23/24  Does the patient have an active prescription (recently filled or refills available) for medication(s) requested? No    Pharmacy Name: [facility]    Does the patient have snf Plus and need 100 day supply (blood pressure, diabetes and cholesterol meds only)? Medication is not for cholesterol, blood pressure or diabetes

## 2024-01-26 NOTE — TELEPHONE ENCOUNTER
Received request via: Pharmacy    Was the patient seen in the last year in this department? Yes  1/23/24  Does the patient have an active prescription (recently filled or refills available) for medication(s) requested? No    Pharmacy Name: [facility]    Does the patient have long term Plus and need 100 day supply (blood pressure, diabetes and cholesterol meds only)? Medication is not for cholesterol, blood pressure or diabetes

## 2024-01-28 RX ORDER — RIMEGEPANT SULFATE 75 MG/75MG
TABLET, ORALLY DISINTEGRATING ORAL
Qty: 9 TABLET | Refills: 0 | Status: SHIPPED | OUTPATIENT
Start: 2024-01-28 | End: 2024-02-23

## 2024-01-29 ENCOUNTER — PATIENT MESSAGE (OUTPATIENT)
Dept: MEDICAL GROUP | Facility: LAB | Age: 39
End: 2024-01-29
Payer: COMMERCIAL

## 2024-01-29 DIAGNOSIS — Z98.1 S/P LUMBAR FUSION: ICD-10-CM

## 2024-01-29 RX ORDER — CYANOCOBALAMIN 1000 UG/ML
1000 INJECTION, SOLUTION INTRAMUSCULAR; SUBCUTANEOUS
Qty: 3 ML | Refills: 1 | Status: SHIPPED | OUTPATIENT
Start: 2024-01-29 | End: 2024-03-25 | Stop reason: SDUPTHER

## 2024-01-29 RX ORDER — PREGABALIN 150 MG/1
150 CAPSULE ORAL 3 TIMES DAILY
Qty: 90 CAPSULE | Refills: 2 | Status: SHIPPED | OUTPATIENT
Start: 2024-01-30 | End: 2024-02-29

## 2024-02-22 DIAGNOSIS — M54.41 CHRONIC BILATERAL LOW BACK PAIN WITH BILATERAL SCIATICA: ICD-10-CM

## 2024-02-22 DIAGNOSIS — G89.29 CHRONIC BILATERAL LOW BACK PAIN WITH BILATERAL SCIATICA: ICD-10-CM

## 2024-02-22 DIAGNOSIS — M54.42 CHRONIC BILATERAL LOW BACK PAIN WITH BILATERAL SCIATICA: ICD-10-CM

## 2024-02-22 RX ORDER — PROMETHAZINE HYDROCHLORIDE 25 MG/1
TABLET ORAL
Qty: 30 TABLET | Refills: 0 | Status: SHIPPED | OUTPATIENT
Start: 2024-02-22 | End: 2024-03-25 | Stop reason: SDUPTHER

## 2024-02-22 RX ORDER — TIZANIDINE 4 MG/1
4 TABLET ORAL 2 TIMES DAILY
Qty: 60 TABLET | Refills: 0 | Status: SHIPPED | OUTPATIENT
Start: 2024-02-22 | End: 2024-03-25 | Stop reason: SDUPTHER

## 2024-02-23 DIAGNOSIS — G43.009 MIGRAINE WITHOUT AURA AND WITHOUT STATUS MIGRAINOSUS, NOT INTRACTABLE: ICD-10-CM

## 2024-02-23 RX ORDER — RIMEGEPANT SULFATE 75 MG/75MG
TABLET, ORALLY DISINTEGRATING ORAL
Qty: 9 TABLET | Refills: 0 | Status: SHIPPED | OUTPATIENT
Start: 2024-02-23 | End: 2024-03-25 | Stop reason: SDUPTHER

## 2024-02-23 NOTE — TELEPHONE ENCOUNTER
Received request via: Pharmacy    Was the patient seen in the last year in this department? Yes    Does the patient have an active prescription (recently filled or refills available) for medication(s) requested? No    Pharmacy Name: [facility]    Does the patient have half-way Plus and need 100 day supply (blood pressure, diabetes and cholesterol meds only)? Patient does not have SCP    Nurtec 75 MG Oral Tablet Disintegrating

## 2024-03-25 DIAGNOSIS — G43.009 MIGRAINE WITHOUT AURA AND WITHOUT STATUS MIGRAINOSUS, NOT INTRACTABLE: ICD-10-CM

## 2024-03-25 DIAGNOSIS — M54.42 CHRONIC BILATERAL LOW BACK PAIN WITH BILATERAL SCIATICA: ICD-10-CM

## 2024-03-25 DIAGNOSIS — M54.41 CHRONIC BILATERAL LOW BACK PAIN WITH BILATERAL SCIATICA: ICD-10-CM

## 2024-03-25 DIAGNOSIS — G89.29 CHRONIC BILATERAL LOW BACK PAIN WITH BILATERAL SCIATICA: ICD-10-CM

## 2024-03-25 RX ORDER — CYANOCOBALAMIN 1000 UG/ML
1000 INJECTION, SOLUTION INTRAMUSCULAR; SUBCUTANEOUS
Qty: 3 ML | Refills: 1 | Status: SHIPPED | OUTPATIENT
Start: 2024-03-25

## 2024-03-25 RX ORDER — RIMEGEPANT SULFATE 75 MG/75MG
TABLET, ORALLY DISINTEGRATING ORAL
Qty: 9 TABLET | Refills: 0 | Status: SHIPPED | OUTPATIENT
Start: 2024-03-25

## 2024-03-25 RX ORDER — ONDANSETRON 4 MG/1
4 TABLET, FILM COATED ORAL EVERY 8 HOURS PRN
Qty: 20 TABLET | Refills: 2 | Status: SHIPPED | OUTPATIENT
Start: 2024-03-25

## 2024-03-25 RX ORDER — TIZANIDINE 4 MG/1
4 TABLET ORAL 2 TIMES DAILY
Qty: 60 TABLET | Refills: 0 | Status: SHIPPED | OUTPATIENT
Start: 2024-03-25

## 2024-03-25 RX ORDER — PROMETHAZINE HYDROCHLORIDE 25 MG/1
TABLET ORAL
Qty: 30 TABLET | Refills: 0 | Status: SHIPPED | OUTPATIENT
Start: 2024-03-25

## 2024-04-25 DIAGNOSIS — M54.41 CHRONIC BILATERAL LOW BACK PAIN WITH BILATERAL SCIATICA: ICD-10-CM

## 2024-04-25 DIAGNOSIS — M54.42 CHRONIC BILATERAL LOW BACK PAIN WITH BILATERAL SCIATICA: ICD-10-CM

## 2024-04-25 DIAGNOSIS — G89.29 CHRONIC BILATERAL LOW BACK PAIN WITH BILATERAL SCIATICA: ICD-10-CM

## 2024-04-26 DIAGNOSIS — F98.8 ATTENTION DEFICIT DISORDER (ADD) IN ADULT: ICD-10-CM

## 2024-04-26 RX ORDER — PROMETHAZINE HYDROCHLORIDE 25 MG/1
TABLET ORAL
Qty: 30 TABLET | Refills: 0 | Status: SHIPPED | OUTPATIENT
Start: 2024-04-26

## 2024-04-26 RX ORDER — TIZANIDINE 4 MG/1
4 TABLET ORAL 2 TIMES DAILY
Qty: 60 TABLET | Refills: 0 | Status: SHIPPED | OUTPATIENT
Start: 2024-04-26

## 2024-04-26 RX ORDER — DEXTROAMPHETAMINE SACCHARATE, AMPHETAMINE ASPARTATE, DEXTROAMPHETAMINE SULFATE AND AMPHETAMINE SULFATE 2.5; 2.5; 2.5; 2.5 MG/1; MG/1; MG/1; MG/1
10 TABLET ORAL 2 TIMES DAILY
Qty: 60 TABLET | Refills: 0 | Status: SHIPPED | OUTPATIENT
Start: 2024-04-26 | End: 2024-05-26

## 2024-04-26 NOTE — TELEPHONE ENCOUNTER
Received request via: Patient    Was the patient seen in the last year in this department? Yes    Does the patient have an active prescription (recently filled or refills available) for medication(s) requested? No    Pharmacy Name: [facility]    Does the patient have halfway Plus and need 100 day supply (blood pressure, diabetes and cholesterol meds only)? Patient does not have SCP

## 2024-04-26 NOTE — TELEPHONE ENCOUNTER
Received request via: Pharmacy    Was the patient seen in the last year in this department? Yes 1/23/2024    Does the patient have an active prescription (recently filled or refills available) for medication(s) requested? No    Pharmacy Name: [id number] - [place], [place] - [address]     Does the patient have detention Plus and need 100 day supply (blood pressure, diabetes and cholesterol meds only)? Patient does not have SCP

## 2024-05-21 DIAGNOSIS — G43.009 MIGRAINE WITHOUT AURA AND WITHOUT STATUS MIGRAINOSUS, NOT INTRACTABLE: ICD-10-CM

## 2024-05-21 RX ORDER — RIMEGEPANT SULFATE 75 MG/75MG
TABLET, ORALLY DISINTEGRATING ORAL
Qty: 9 TABLET | Refills: 0 | Status: SHIPPED | OUTPATIENT
Start: 2024-05-21

## 2024-05-24 DIAGNOSIS — M54.41 CHRONIC BILATERAL LOW BACK PAIN WITH BILATERAL SCIATICA: ICD-10-CM

## 2024-05-24 DIAGNOSIS — M54.42 CHRONIC BILATERAL LOW BACK PAIN WITH BILATERAL SCIATICA: ICD-10-CM

## 2024-05-24 DIAGNOSIS — G89.29 CHRONIC BILATERAL LOW BACK PAIN WITH BILATERAL SCIATICA: ICD-10-CM

## 2024-05-24 NOTE — TELEPHONE ENCOUNTER
Received request via: Pharmacy    Was the patient seen in the last year in this department? Yes    Does the patient have an active prescription (recently filled or refills available) for medication(s) requested? No    Pharmacy Name: [facility], [address]    Does the patient have assisted Plus and need 100 day supply (blood pressure, diabetes and cholesterol meds only)? Patient does not have SCP

## 2024-05-27 DIAGNOSIS — F98.8 ATTENTION DEFICIT DISORDER (ADD) IN ADULT: ICD-10-CM

## 2024-05-27 RX ORDER — PROMETHAZINE HYDROCHLORIDE 25 MG/1
TABLET ORAL
Qty: 30 TABLET | Refills: 0 | Status: SHIPPED | OUTPATIENT
Start: 2024-05-27

## 2024-05-27 RX ORDER — TIZANIDINE 4 MG/1
4 TABLET ORAL 2 TIMES DAILY
Qty: 60 TABLET | Refills: 0 | Status: SHIPPED | OUTPATIENT
Start: 2024-05-27

## 2024-05-28 RX ORDER — DEXTROAMPHETAMINE SACCHARATE, AMPHETAMINE ASPARTATE, DEXTROAMPHETAMINE SULFATE AND AMPHETAMINE SULFATE 2.5; 2.5; 2.5; 2.5 MG/1; MG/1; MG/1; MG/1
10 TABLET ORAL 2 TIMES DAILY
Qty: 60 TABLET | Refills: 0 | Status: SHIPPED | OUTPATIENT
Start: 2024-05-28 | End: 2024-06-27

## 2024-05-28 NOTE — TELEPHONE ENCOUNTER
Received request via: Pharmacy    Was the patient seen in the last year in this department? Yes  LOV : 1/23/2024   Does the patient have an active prescription (recently filled or refills available) for medication(s) requested? No    Pharmacy Name: [facility]     Does the patient have care home Plus and need 100 day supply (blood pressure, diabetes and cholesterol meds only)? Patient does not have SCP

## 2024-05-30 NOTE — IP AVS SNAPSHOT
Rivalry Access Code: Activation code not generated  Current Rivalry Status: Active    X[employer]  A secure, online tool to manage your health information     [facility]’s Rivalry® is a secure, online tool that connects you to your personalized health information from the privacy of your home -- day or night - making it very easy for you to manage your healthcare. Once the activation process is completed, you can even access your medical information using the Rivalry rajiv, which is available for free in the Apple Rajiv store or Google Play store.     Rivalry provides the following levels of access (as shown below):   My Chart Features   [facility] Primary Care Doctor [facility]  Specialists [facility]  Urgent  Care Non-[facility]  Primary Care  Doctor   Email your healthcare team securely and privately 24/7 X X X X   Manage appointments: schedule your next appointment; view details of past/upcoming appointments X      Request prescription refills. X      View recent personal medical records, including lab and immunizations X X X X   View health record, including health history, allergies, medications X X X X   Read reports about your outpatient visits, procedures, consult and ER notes X X X X   See your discharge summary, which is a recap of your hospital and/or ER visit that includes your diagnosis, lab results, and care plan. X X       How to register for Rivalry:  1. Go to  [email or web address].  2. Click on the Sign Up Now box, which takes you to the New Member Sign Up page. You will need to provide the following information:  a. Enter your Rivalry Access Code exactly as it appears at the top of this page. (You will not need to use this code after you’ve completed the sign-up process. If you do not sign up before the expiration date, you must request a new code.)   b. Enter your date of birth.   c. Enter your home email address.   d. Click Submit, and follow the next screen’s instructions.  3. Create a Rivalry ID. This will  be your CatchMe! login ID and cannot be changed, so think of one that is secure and easy to remember.  4. Create a CatchMe! password. You can change your password at any time.  5. Enter your Password Reset Question and Answer. This can be used at a later time if you forget your password.   6. Enter your e-mail address. This allows you to receive e-mail notifications when new information is available in CatchMe!.  7. Click Sign Up. You can now view your health information.    For assistance activating your C[employer]Me! account, call [phone]         stated

## 2024-06-15 ENCOUNTER — OFFICE VISIT (OUTPATIENT)
Dept: URGENT CARE | Facility: CLINIC | Age: 39
End: 2024-06-15
Payer: COMMERCIAL

## 2024-06-15 VITALS
DIASTOLIC BLOOD PRESSURE: 100 MMHG | HEART RATE: 97 BPM | RESPIRATION RATE: 18 BRPM | OXYGEN SATURATION: 96 % | SYSTOLIC BLOOD PRESSURE: 130 MMHG | BODY MASS INDEX: 34.17 KG/M2 | TEMPERATURE: 98.6 F | WEIGHT: 252.25 LBS | HEIGHT: 72 IN

## 2024-06-15 DIAGNOSIS — R05.1 ACUTE COUGH: ICD-10-CM

## 2024-06-15 DIAGNOSIS — J32.9 BACTERIAL SINUSITIS: ICD-10-CM

## 2024-06-15 DIAGNOSIS — B96.89 BACTERIAL SINUSITIS: ICD-10-CM

## 2024-06-15 DIAGNOSIS — H10.9 BACTERIAL CONJUNCTIVITIS: ICD-10-CM

## 2024-06-15 DIAGNOSIS — I10 ELEVATED BLOOD PRESSURE READING WITH DIAGNOSIS OF HYPERTENSION: ICD-10-CM

## 2024-06-15 LAB
FLUAV RNA SPEC QL NAA+PROBE: NEGATIVE
FLUBV RNA SPEC QL NAA+PROBE: NEGATIVE
RSV RNA SPEC QL NAA+PROBE: NEGATIVE
SARS-COV-2 RNA RESP QL NAA+PROBE: NEGATIVE

## 2024-06-15 PROCEDURE — 3080F DIAST BP >= 90 MM HG: CPT

## 2024-06-15 PROCEDURE — 0241U POCT CEPHEID COV-2, FLU A/B, RSV - PCR: CPT

## 2024-06-15 PROCEDURE — 99214 OFFICE O/P EST MOD 30 MIN: CPT

## 2024-06-15 PROCEDURE — 3075F SYST BP GE 130 - 139MM HG: CPT

## 2024-06-15 RX ORDER — BENZONATATE 100 MG/1
100 CAPSULE ORAL 3 TIMES DAILY PRN
Qty: 60 CAPSULE | Refills: 0 | Status: SHIPPED | OUTPATIENT
Start: 2024-06-15 | End: 2024-06-25

## 2024-06-15 RX ORDER — POLYMYXIN B SULFATE AND TRIMETHOPRIM 1; 10000 MG/ML; [USP'U]/ML
1 SOLUTION OPHTHALMIC 4 TIMES DAILY
Qty: 10 ML | Refills: 0 | Status: SHIPPED | OUTPATIENT
Start: 2024-06-15 | End: 2024-06-20

## 2024-06-15 RX ORDER — DOXYCYCLINE 100 MG/1
100 CAPSULE ORAL 2 TIMES DAILY
Qty: 14 CAPSULE | Refills: 0 | Status: SHIPPED | OUTPATIENT
Start: 2024-06-15 | End: 2024-06-22

## 2024-06-15 RX ORDER — FLUTICASONE PROPIONATE 50 MCG
2 SPRAY, SUSPENSION (ML) NASAL DAILY
Qty: 9.9 ML | Refills: 0 | Status: SHIPPED | OUTPATIENT
Start: 2024-06-15

## 2024-06-15 ASSESSMENT — ENCOUNTER SYMPTOMS
SORE THROAT: 1
FEVER: 0
SINUS PAIN: 1
SPUTUM PRODUCTION: 1
COUGH: 1
EYE DISCHARGE: 1
EYE REDNESS: 1

## 2024-06-15 ASSESSMENT — FIBROSIS 4 INDEX: FIB4 SCORE: 1

## 2024-06-15 NOTE — LETTER
Lizbet 15, 2024    To Whom It May Concern:         This is confirmation that Joy Mcnamara attended her scheduled appointment with Roya Mejia P.A.-C. on 6/15/24. Please excuse her absence from work Monday.          If you have any questions please do not hesitate to call me at the phone number listed below.    Sincerely,          Roya Mejia P.A.-C.  [phone]

## 2024-06-15 NOTE — PROGRESS NOTES
Subjective:     CHIEF COMPLAINT  Chief Complaint   Patient presents with    Pharyngitis    Sinus Problem    Cough     1 week       HPI  Joy Mcnamara is a very pleasant 39 y.o. female who presents with 1 week of cough, nasal congestion with green mucus, sinus discomfort, sneezing, sore throat, and fatigue.  She has felt feverish without any measured temperature elevations at home.  She has tried managing her symptoms with Sudafed  and Mucinex with mild improvement in symptoms.  She has also noticed that her eyes have appeared red with mucoid discharge in the mornings.  She has not been wearing contact lenses. 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  She feels that her symptoms are worsening.       REVIEW OF SYSTEMS  Review of Systems   Constitutional:  Positive for malaise/fatigue. Negative for fever.   HENT:  Positive for congestion, sinus pain and sore throat. Negative for ear pain.    Eyes:  Positive for discharge and redness.   Respiratory:  Positive for cough and sputum production.        PAST MEDICAL HISTORY  Patient Active Problem List    Diagnosis Date Noted    S/P lumbar fusion - Dr. Woodward 2023 01/23/2024    Paresthesia of left foot 01/23/2024    Hypokalemia 11/30/2023    Elevated liver enzymes 11/30/2023    Cervical stenosis of spinal canal 09/22/2023    Bursitis of right shoulder 06/11/2023    Anterior to posterior tear of superior glenoid labrum of right shoulder 06/11/2023    Attention deficit disorder (ADD) in adult 08/19/2021    Sleep disturbance 08/19/2021    Migraine without aura and without status migrainosus, not intractable 10/15/2020    Hypertension 10/03/2019    Severe major depressive disorder (HCC) 10/02/2019    Polysubstance overdose 09/30/2019    Suicide attempt (HCC) 09/30/2019    Depression 04/25/2014    Anxiety 03/28/2014    Degenerative disc disease, lumbar 04/30/2012    Chronic low back pain 04/30/2012       SURGICAL HISTORY   has a past  "surgical history that includes lumbar laminectomy diskectomy (06/10/2009); gyn surgery; primary c section; dental extraction(s); other orthopedic surgery; lumbar laminectomy diskectomy (10/03/2013); njx aa&/strd tfrml epi lumbar/sacral 1 level (10/08/2014); njx aa&/strd tfrml epi lumbar/sacral 1 level (Bilateral, 2015); njx aa&/strd tfrml epi lumbar/sacral 1 level (2015); vaginal hysterectomy scope total (N/A, 2017); and lumbar fusion posterior percutaneous (Bilateral).    ALLERGIES  Allergies   Allergen Reactions    Sumatriptan Succinate      \"face burns & I can't see\"    Amoxicillin Rash     Rash on extremities and swelling of arms and legs    Tramadol Photosensitivity     Gives her migraines and makes her feel sick       CURRENT MEDICATIONS  Home Medications       Reviewed by DENISE Perry-JORDON (Physician Assistant) on 06/15/24 at 0918  Med List Status: <None>     Medication Last Dose Status   amitriptyline (ELAVIL) 25 MG Tab  Active   amLODIPine (NORVASC) 5 MG Tab  Active   amphetamine-dextroamphetamine (ADDERALL) 10 MG Tab  Active   asa/apap/caffeine (EXCEDRIN) 250-250-65 MG Tab PRN Active   cyanocobalamin (VITAMIN B-12) 1000 MCG/ML Solution  Active   fluticasone (FLONASE) 50 MCG/ACT nasal spray Taking Active   mupirocin (BACTROBAN) 2 % Ointment Taking Active   ondansetron (ZOFRAN) 4 MG Tab tablet  Active   promethazine (PHENERGAN) 25 MG Tab  Active   propranolol (INDERAL) 40 MG Tab  Active   Rimegepant Sulfate (NURTEC) 75 MG TABLET DISPERSIBLE  Active   tizanidine (ZANAFLEX) 4 MG Tab  Active                    SOCIAL HISTORY  Social History     Tobacco Use    Smoking status: Former     Current packs/day: 0.00     Types: Cigarettes     Start date: 3/1/2003     Quit date: 3/1/2007     Years since quittin.3    Smokeless tobacco: Never    Tobacco comments:     2007   Vaping Use    Vaping status: Never Used   Substance and Sexual Activity    Alcohol use: No     Comment: denies " ; family hx of alcoholism    Drug use: No     Types: Marijuana, Methamphetamines    Sexual activity: Yes     Birth control/protection: Injection     Comment: , 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          Objective:     VITAL SIGNS: BP (!) 130/100 (BP Location: Left arm, Patient Position: Sitting, BP Cuff Size: Adult)   Pulse 97   Temp 37 °C (98.6 °F) (Temporal)   Resp 18   Ht 1.829 m (6')   Wt 114 kg (252 lb 4 oz)   LMP 03/16/2017   SpO2 96%   BMI 34.21 kg/m²     PHYSICAL EXAM  Physical Exam  Vitals reviewed.   Constitutional:       General: She is not in acute distress.     Appearance: Normal appearance. She is ill-appearing. She is not toxic-appearing or diaphoretic.   HENT:      Head: Normocephalic and atraumatic.      Right Ear: Tympanic membrane, ear canal and external ear normal.      Left Ear: Tympanic membrane, ear canal and external ear normal.      Nose: Congestion present.      Mouth/Throat:      Mouth: Mucous membranes are moist.      Pharynx: No oropharyngeal exudate or posterior oropharyngeal erythema.      Comments: Uvula midline. Tonsils not visualized  Eyes:      General:         Right eye: Discharge present.         Left eye: Discharge present.     Extraocular Movements: Extraocular movements intact.      Pupils: Pupils are equal, round, and reactive to light.      Comments: Conjunctiva are mildly injected bilaterally with scant discharge present in eyelashes   Cardiovascular:      Rate and Rhythm: Normal rate and regular rhythm.      Heart sounds: Normal heart sounds.   Pulmonary:      Effort: Pulmonary effort is normal. No respiratory distress.      Breath sounds: Normal breath sounds. No stridor. No wheezing, rhonchi or rales.    Lymphadenopathy:      Cervical: No cervical adenopathy.   Skin:     General: Skin is warm and dry.      Coloration: Skin is not pale.      Findings: No rash.   Neurological:      General: No focal deficit present.      Mental Status: She is alert.   Psychiatric:         Mood and Affect: Mood normal.         Assessment/Plan:     1. Elevated blood pressure reading with diagnosis of hypertension    2. Acute cough  - POCT CoV-2, Flu A/B, RSV by PCR  - benzonatate (TESSALON) 100 MG Cap; Take 1 Capsule by mouth 3 times a day as needed for Cough.  Dispense: 60 Capsule; Refill: 0    3. Bacterial conjunctivitis  - polymixin-trimethoprim (POLYTRIM) 49694-6.1 UNIT/ML-% Solution; Administer 1 Drop into both eyes 4 times a day for 5 days.  Dispense: 10 mL; Refill: 0    4. Bacterial sinusitis  - doxycycline (MONODOX) 100 MG capsule; Take 1 Capsule by mouth 2 times a day for 7 days.  Dispense: 14 Capsule; Refill: 0  - fluticasone (FLONASE) 50 MCG/ACT nasal spray; Administer 2 Sprays into affected nostril(S) every day.  Dispense: 9.9 mL; Refill: 0  -Follow up with PCP regarding elevated blood pressure obtained in office today  -DASH diet  -Tylenol over-the-counter as needed for discomfort  -Flonase over-the-counter for congestion  -Return to clinic if symptoms worsen or fail to resolve        MDM/Comments:  Patient has a history of hypertension. Discussed with patient keeping a blood pressure log to monitor daily blood pressure readings and following the DASH diet. Patient will follow up with their PCP. Additionally, discussed the risks of uncontrolled hypertension. Patient is not displaying systemic signs of hypertension at this time. Patient has stable vital signs and is non-toxic appearing.  Viral testing for COVID, influenza, and RSV performed in office with negative results.  Patient provided doxycycline for bacterial sinusitis.  Provided Flonase.  Patient provided benzonatate for cough relief.  Patient provided Polytrim  eyedrops for bacterial conjunctivitis.  Discussed supportive care with hydration, rest, Tylenol as needed. Patient demonstrated understanding of treatment plan at this time and will RTC if symptoms worsen or fail to resolve.       Differential diagnosis, natural history, supportive care, and indications for immediate follow-up discussed. All questions answered. Patient agrees with the plan of care.    Follow-up as needed if symptoms worsen or fail to improve to PCP, Urgent care or Emergency Room.    I have personally reviewed prior external notes and test results pertinent to today's visit.  I have independently reviewed and interpreted all diagnostics ordered during this urgent care acute visit.   Discussed management options (risks,benefits, and alternatives to treatment). Pt expresses understanding and the treatment plan was agreed upon. Questions were encouraged and answered to pt's satisfaction.    Please note that this dictation was created using voice recognition software. I have made a reasonable attempt to correct obvious errors, but I expect that there are errors of grammar and possibly content that I did not discover before finalizing the note.

## 2024-06-20 ENCOUNTER — TELEPHONE (OUTPATIENT)
Dept: MEDICAL GROUP | Facility: LAB | Age: 39
End: 2024-06-20
Payer: COMMERCIAL

## 2024-06-22 DIAGNOSIS — M54.41 CHRONIC BILATERAL LOW BACK PAIN WITH BILATERAL SCIATICA: ICD-10-CM

## 2024-06-22 DIAGNOSIS — M54.42 CHRONIC BILATERAL LOW BACK PAIN WITH BILATERAL SCIATICA: ICD-10-CM

## 2024-06-22 DIAGNOSIS — G89.29 CHRONIC BILATERAL LOW BACK PAIN WITH BILATERAL SCIATICA: ICD-10-CM

## 2024-06-23 DIAGNOSIS — G43.009 MIGRAINE WITHOUT AURA AND WITHOUT STATUS MIGRAINOSUS, NOT INTRACTABLE: ICD-10-CM

## 2024-06-24 RX ORDER — RIMEGEPANT SULFATE 75 MG/75MG
TABLET, ORALLY DISINTEGRATING ORAL
Qty: 9 TABLET | Refills: 0 | Status: SHIPPED | OUTPATIENT
Start: 2024-06-24

## 2024-06-24 RX ORDER — PROMETHAZINE HYDROCHLORIDE 25 MG/1
TABLET ORAL
Qty: 30 TABLET | Refills: 0 | Status: SHIPPED | OUTPATIENT
Start: 2024-06-24

## 2024-06-24 RX ORDER — TIZANIDINE 4 MG/1
4 TABLET ORAL 2 TIMES DAILY
Qty: 60 TABLET | Refills: 0 | Status: SHIPPED | OUTPATIENT
Start: 2024-06-24

## 2024-06-25 ENCOUNTER — OFFICE VISIT (OUTPATIENT)
Dept: MEDICAL GROUP | Facility: LAB | Age: 39
End: 2024-06-25
Payer: COMMERCIAL

## 2024-06-25 DIAGNOSIS — I10 PRIMARY HYPERTENSION: ICD-10-CM

## 2024-06-25 DIAGNOSIS — Z98.1 S/P CERVICAL SPINAL FUSION: ICD-10-CM

## 2024-06-25 DIAGNOSIS — Z98.890 S/P LUMBAR SPINE OPERATION: ICD-10-CM

## 2024-06-25 DIAGNOSIS — F98.8 ATTENTION DEFICIT DISORDER (ADD) IN ADULT: ICD-10-CM

## 2024-06-25 PROCEDURE — 3077F SYST BP >= 140 MM HG: CPT | Performed by: NURSE PRACTITIONER

## 2024-06-25 PROCEDURE — 3080F DIAST BP >= 90 MM HG: CPT | Performed by: NURSE PRACTITIONER

## 2024-06-25 PROCEDURE — 99214 OFFICE O/P EST MOD 30 MIN: CPT | Performed by: NURSE PRACTITIONER

## 2024-06-25 RX ORDER — DEXTROAMPHETAMINE SACCHARATE, AMPHETAMINE ASPARTATE, DEXTROAMPHETAMINE SULFATE AND AMPHETAMINE SULFATE 2.5; 2.5; 2.5; 2.5 MG/1; MG/1; MG/1; MG/1
10 TABLET ORAL 2 TIMES DAILY
Qty: 60 TABLET | Refills: 0 | Status: SHIPPED | OUTPATIENT
Start: 2024-08-26 | End: 2024-09-25

## 2024-06-25 RX ORDER — DEXTROAMPHETAMINE SACCHARATE, AMPHETAMINE ASPARTATE, DEXTROAMPHETAMINE SULFATE AND AMPHETAMINE SULFATE 2.5; 2.5; 2.5; 2.5 MG/1; MG/1; MG/1; MG/1
10 TABLET ORAL 2 TIMES DAILY
Qty: 60 TABLET | Refills: 0 | Status: SHIPPED | OUTPATIENT
Start: 2024-07-27 | End: 2024-08-26

## 2024-06-25 RX ORDER — DEXTROAMPHETAMINE SACCHARATE, AMPHETAMINE ASPARTATE, DEXTROAMPHETAMINE SULFATE AND AMPHETAMINE SULFATE 2.5; 2.5; 2.5; 2.5 MG/1; MG/1; MG/1; MG/1
10 TABLET ORAL 2 TIMES DAILY
Qty: 60 TABLET | Refills: 0 | Status: SHIPPED | OUTPATIENT
Start: 2024-06-27 | End: 2024-07-27

## 2024-06-25 ASSESSMENT — FIBROSIS 4 INDEX: FIB4 SCORE: 1

## 2024-06-26 ENCOUNTER — PATIENT MESSAGE (OUTPATIENT)
Dept: MEDICAL GROUP | Facility: LAB | Age: 39
End: 2024-06-26
Payer: COMMERCIAL

## 2024-06-26 VITALS
SYSTOLIC BLOOD PRESSURE: 155 MMHG | HEART RATE: 84 BPM | OXYGEN SATURATION: 95 % | WEIGHT: 261 LBS | HEIGHT: 72 IN | BODY MASS INDEX: 35.35 KG/M2 | RESPIRATION RATE: 12 BRPM | DIASTOLIC BLOOD PRESSURE: 100 MMHG | TEMPERATURE: 97.8 F

## 2024-06-26 DIAGNOSIS — I10 PRIMARY HYPERTENSION: ICD-10-CM

## 2024-06-26 NOTE — PATIENT INSTRUCTIONS
Call insurance - ask if they cover wegovy or zepbound for weight loss and if so - what BMI qualifies.

## 2024-06-26 NOTE — PROGRESS NOTES
"Verbal consent was acquired by the patient to use [employer] ambient listening note generation during this visit Yes      Subjective   Joy Mcnamara is a 39 y.o. female who presents for f/u on add / back surgery and concerns of weight.   History of Present Illness  The patient presents for ADD / back surgery and concerns of weight.     The patient underwent C5-6 fusion surgery on 09/22/2023, performed by Dr. Mcadams -  The subsequent surgery, performed on 12/01/2023, involved an L5-S1 fusion and cage revision. Despite the surgery, the patient's recovery was challenging, necessitating the use of a cane for mobility in the morning. Despite undergoing physical therapy, the patient continues to perform exercises at home.     HTN: chronic issue.  The patient's blood pressure has  worsened, typically ranging from 120 to 135 systolic and 85 to 97 diastolic. The patient is currently on amlodipine 5 mg and propranolol 40 mg twice daily.     Chronic pain with nausea: The patient uses Zofran for nausea at work and promethazine at night for pain management. The patient sleeps for 4 to 5 hours per night and does not require any sleep aids. The patient is also taking Lyrica 3 times daily, which does not induce drowsiness. The patient uses a TENS unit for pain management.    ADD:  The patient is currently on Adderall 10 mg twice daily, which is effective.      Review of Systems  Negative except for HPI  Objective   BP (!) 164/108 (BP Location: Left arm, Patient Position: Sitting, BP Cuff Size: Large adult)   Pulse 84   Temp 36.6 °C (97.8 °F)   Resp 12   Ht 1.829 m (6')   Wt 118 kg (261 lb)   SpO2 95%   Physical Exam  Gen. appears healthy in no distress   Skin appropriate for sex and age   Neck trachea is midline  Lungs unlabored breathing  Heart regular rate  Neuro gait and station normal   Psych appropriate, calm, interactive, well-groomed      Assessment & Plan  \"  1. Attention deficit disorder (ADD) in adult  " "amphetamine-dextroamphetamine (ADDERALL) 10 MG Tab    amphetamine-dextroamphetamine (ADDERALL) 10 MG Tab    amphetamine-dextroamphetamine (ADDERALL) 10 MG Tab      2. Primary hypertension        3. S/P lumbar spine operation        4. S/P cervical spinal fusion        \"  Renewed Adderall.  Reviewed  profile.  Follow-up 6 months.  Attention deficit disorder symptoms are controlled/stable.    Blood pressure elevated today.  She states this is related to her stressful day.  She does check her blood pressure at home and will notify me if it is consistently greater than 135/85 as we will need to increase her amlodipine and/or propranolol.  Rechecked her blood pressure which was 155/100 prior to departure.    Renewed her Lyrica today.  She is still in pain in her lumbar and cervical spine and is following up as scheduled with her neurosurgeon.  She is doing home physical therapy.               Please note that this dictation was created using voice recognition software. I have made every reasonable attempt to correct obvious errors, but I expect that there are errors of grammar and possibly content that I did not discover before finalizing the note.  "

## 2024-06-30 RX ORDER — SEMAGLUTIDE 0.68 MG/ML
INJECTION, SOLUTION SUBCUTANEOUS
Qty: 9 ML | Refills: 1 | Status: SHIPPED | OUTPATIENT
Start: 2024-06-30

## 2024-07-01 ENCOUNTER — PATIENT MESSAGE (OUTPATIENT)
Dept: MEDICAL GROUP | Facility: LAB | Age: 39
End: 2024-07-01
Payer: COMMERCIAL

## 2024-07-01 DIAGNOSIS — G89.29 CHRONIC BILATERAL LOW BACK PAIN WITH BILATERAL SCIATICA: ICD-10-CM

## 2024-07-01 DIAGNOSIS — M54.42 CHRONIC BILATERAL LOW BACK PAIN WITH BILATERAL SCIATICA: ICD-10-CM

## 2024-07-01 DIAGNOSIS — Z98.1 S/P LUMBAR FUSION: ICD-10-CM

## 2024-07-01 DIAGNOSIS — M54.41 CHRONIC BILATERAL LOW BACK PAIN WITH BILATERAL SCIATICA: ICD-10-CM

## 2024-07-01 RX ORDER — PREGABALIN 150 MG/1
150 CAPSULE ORAL 3 TIMES DAILY
Qty: 90 CAPSULE | Refills: 0 | Status: CANCELLED | OUTPATIENT
Start: 2024-07-01 | End: 2024-07-31

## 2024-07-01 RX ORDER — CYANOCOBALAMIN 1000 UG/ML
1000 INJECTION, SOLUTION INTRAMUSCULAR; SUBCUTANEOUS
Qty: 3 ML | Refills: 1 | Status: CANCELLED | OUTPATIENT
Start: 2024-07-01

## 2024-07-02 RX ORDER — TIZANIDINE 4 MG/1
4 TABLET ORAL 2 TIMES DAILY
Qty: 180 TABLET | Refills: 1 | Status: SHIPPED | OUTPATIENT
Start: 2024-07-02

## 2024-07-03 ENCOUNTER — OFFICE VISIT (OUTPATIENT)
Dept: MEDICAL GROUP | Facility: LAB | Age: 39
End: 2024-07-03
Payer: COMMERCIAL

## 2024-07-03 VITALS
SYSTOLIC BLOOD PRESSURE: 158 MMHG | OXYGEN SATURATION: 97 % | WEIGHT: 258 LBS | BODY MASS INDEX: 34.95 KG/M2 | TEMPERATURE: 97 F | RESPIRATION RATE: 12 BRPM | HEIGHT: 72 IN | HEART RATE: 86 BPM | DIASTOLIC BLOOD PRESSURE: 100 MMHG

## 2024-07-03 DIAGNOSIS — M25.511 CHRONIC RIGHT SHOULDER PAIN: ICD-10-CM

## 2024-07-03 DIAGNOSIS — G89.29 CHRONIC RIGHT SHOULDER PAIN: ICD-10-CM

## 2024-07-03 DIAGNOSIS — Z98.1 S/P LUMBAR FUSION: ICD-10-CM

## 2024-07-03 DIAGNOSIS — R20.2 PARESTHESIA OF LEFT FOOT: ICD-10-CM

## 2024-07-03 DIAGNOSIS — I10 PRIMARY HYPERTENSION: ICD-10-CM

## 2024-07-03 DIAGNOSIS — E53.8 VITAMIN B 12 DEFICIENCY: ICD-10-CM

## 2024-07-03 PROCEDURE — 20610 DRAIN/INJ JOINT/BURSA W/O US: CPT | Mod: RT | Performed by: NURSE PRACTITIONER

## 2024-07-03 PROCEDURE — 99214 OFFICE O/P EST MOD 30 MIN: CPT | Mod: 25 | Performed by: NURSE PRACTITIONER

## 2024-07-03 RX ORDER — AMLODIPINE BESYLATE 10 MG/1
10 TABLET ORAL DAILY
Qty: 90 TABLET | Refills: 3 | Status: SHIPPED | OUTPATIENT
Start: 2024-07-03

## 2024-07-03 RX ORDER — TRIAMCINOLONE ACETONIDE 40 MG/ML
40 INJECTION, SUSPENSION INTRA-ARTICULAR; INTRAMUSCULAR ONCE
Status: COMPLETED | OUTPATIENT
Start: 2024-07-03 | End: 2024-07-03

## 2024-07-03 RX ORDER — PREGABALIN 150 MG/1
150 CAPSULE ORAL 3 TIMES DAILY
Qty: 270 CAPSULE | Refills: 0 | Status: SHIPPED | OUTPATIENT
Start: 2024-07-03 | End: 2024-10-01

## 2024-07-03 RX ORDER — LIDOCAINE HYDROCHLORIDE 20 MG/ML
2 INJECTION, SOLUTION INFILTRATION; PERINEURAL ONCE
Status: COMPLETED | OUTPATIENT
Start: 2024-07-03 | End: 2024-07-03

## 2024-07-03 RX ORDER — CYANOCOBALAMIN 1000 UG/ML
1000 INJECTION, SOLUTION INTRAMUSCULAR; SUBCUTANEOUS
Qty: 3 ML | Refills: 1 | Status: SHIPPED | OUTPATIENT
Start: 2024-07-03 | End: 2024-10-01

## 2024-07-03 RX ADMIN — TRIAMCINOLONE ACETONIDE 40 MG: 40 INJECTION, SUSPENSION INTRA-ARTICULAR; INTRAMUSCULAR at 07:20

## 2024-07-03 RX ADMIN — LIDOCAINE HYDROCHLORIDE 2 ML: 20 INJECTION, SOLUTION INFILTRATION; PERINEURAL at 07:19

## 2024-07-03 ASSESSMENT — FIBROSIS 4 INDEX: FIB4 SCORE: 1

## 2024-07-08 ENCOUNTER — PATIENT MESSAGE (OUTPATIENT)
Dept: MEDICAL GROUP | Facility: LAB | Age: 39
End: 2024-07-08
Payer: COMMERCIAL

## 2024-07-08 ENCOUNTER — TELEPHONE (OUTPATIENT)
Dept: MEDICAL GROUP | Facility: LAB | Age: 39
End: 2024-07-08
Payer: COMMERCIAL

## 2024-07-18 ENCOUNTER — PATIENT MESSAGE (OUTPATIENT)
Dept: MEDICAL GROUP | Facility: LAB | Age: 39
End: 2024-07-18
Payer: COMMERCIAL

## 2024-07-24 DIAGNOSIS — G43.009 MIGRAINE WITHOUT AURA AND WITHOUT STATUS MIGRAINOSUS, NOT INTRACTABLE: ICD-10-CM

## 2024-07-24 RX ORDER — RIMEGEPANT SULFATE 75 MG/75MG
TABLET, ORALLY DISINTEGRATING ORAL
Qty: 9 TABLET | Refills: 0 | Status: SHIPPED | OUTPATIENT
Start: 2024-07-24

## 2024-07-31 ENCOUNTER — TELEPHONE (OUTPATIENT)
Dept: MEDICAL GROUP | Facility: LAB | Age: 39
End: 2024-07-31
Payer: COMMERCIAL

## 2024-08-05 RX ORDER — PROMETHAZINE HYDROCHLORIDE 25 MG/1
TABLET ORAL
Qty: 30 TABLET | Refills: 0 | Status: SHIPPED | OUTPATIENT
Start: 2024-08-05

## 2024-09-03 ENCOUNTER — APPOINTMENT (OUTPATIENT)
Dept: MEDICAL GROUP | Facility: LAB | Age: 39
End: 2024-09-03
Payer: COMMERCIAL

## 2024-09-04 DIAGNOSIS — G43.009 MIGRAINE WITHOUT AURA AND WITHOUT STATUS MIGRAINOSUS, NOT INTRACTABLE: ICD-10-CM

## 2024-09-04 RX ORDER — RIMEGEPANT SULFATE 75 MG/75MG
TABLET, ORALLY DISINTEGRATING ORAL
Qty: 9 TABLET | Refills: 0 | Status: SHIPPED | OUTPATIENT
Start: 2024-09-04

## 2024-09-11 ENCOUNTER — OFFICE VISIT (OUTPATIENT)
Dept: MEDICAL GROUP | Facility: LAB | Age: 39
End: 2024-09-11
Payer: COMMERCIAL

## 2024-09-11 VITALS
TEMPERATURE: 97.1 F | DIASTOLIC BLOOD PRESSURE: 110 MMHG | HEIGHT: 72 IN | BODY MASS INDEX: 33.72 KG/M2 | SYSTOLIC BLOOD PRESSURE: 190 MMHG | HEART RATE: 70 BPM | OXYGEN SATURATION: 95 % | WEIGHT: 249 LBS | RESPIRATION RATE: 12 BRPM

## 2024-09-11 DIAGNOSIS — G43.009 MIGRAINE WITHOUT AURA AND WITHOUT STATUS MIGRAINOSUS, NOT INTRACTABLE: ICD-10-CM

## 2024-09-11 DIAGNOSIS — K59.00 CONSTIPATION, UNSPECIFIED CONSTIPATION TYPE: ICD-10-CM

## 2024-09-11 DIAGNOSIS — M54.50 ACUTE BILATERAL LOW BACK PAIN WITHOUT SCIATICA: ICD-10-CM

## 2024-09-11 DIAGNOSIS — M51.369 DDD (DEGENERATIVE DISC DISEASE), LUMBAR: ICD-10-CM

## 2024-09-11 PROCEDURE — 99214 OFFICE O/P EST MOD 30 MIN: CPT | Performed by: NURSE PRACTITIONER

## 2024-09-11 PROCEDURE — 3080F DIAST BP >= 90 MM HG: CPT | Performed by: NURSE PRACTITIONER

## 2024-09-11 PROCEDURE — 3077F SYST BP >= 140 MM HG: CPT | Performed by: NURSE PRACTITIONER

## 2024-09-11 RX ORDER — BUTALBITAL, ACETAMINOPHEN AND CAFFEINE 300; 40; 50 MG/1; MG/1; MG/1
1 CAPSULE ORAL EVERY 6 HOURS PRN
Qty: 15 CAPSULE | Refills: 0 | Status: SHIPPED | OUTPATIENT
Start: 2024-09-11 | End: 2024-09-26 | Stop reason: SDUPTHER

## 2024-09-11 RX ORDER — ERENUMAB-AOOE 70 MG/ML
70 INJECTION SUBCUTANEOUS ONCE
Qty: 1.12 ML | Refills: 5 | Status: SHIPPED | OUTPATIENT
Start: 2024-09-11 | End: 2024-09-11

## 2024-09-11 RX ORDER — ONDANSETRON 4 MG/1
4 TABLET, FILM COATED ORAL EVERY 8 HOURS PRN
Qty: 30 TABLET | Refills: 2 | Status: SHIPPED | OUTPATIENT
Start: 2024-09-11 | End: 2024-09-26 | Stop reason: SDUPTHER

## 2024-09-11 ASSESSMENT — FIBROSIS 4 INDEX: FIB4 SCORE: 1

## 2024-09-12 RX ORDER — PROMETHAZINE HYDROCHLORIDE 25 MG/1
TABLET ORAL
Qty: 30 TABLET | Refills: 0 | Status: SHIPPED | OUTPATIENT
Start: 2024-09-12

## 2024-09-12 NOTE — PROGRESS NOTES
Verbal consent was acquired by the patient to use [employer] ambient listening note generation during this visit Yes      Demi Mcnamara is a 39 y.o. female who presents for a few issues:   History of Present Illness  The patient is a 39-year-old established female who presents for evaluation of multiple medical concerns.      Obesity:  She has been using tirzepatide injections, which have resulted in an 8-pound weight loss over 6 weeks. However, she experiences constipation, having bowel movements only once a week despite periodic Dulcolax use. She was advised to increase her intake of magnesium and vitamin C by ronny spence.     Chronic low back pain:  hx of 3-4 previous lumbar spine surgeries, last of which was in December 2023 with Dr. Woodward.  Unfortunately she has had acute on chronic low back pain.  She does take Lyrica 150 mg 3 times daily.  She wants to refrain from opiates, she has not taken opiates since last December after surgery.  She does take amitriptyline prior to bedtime.  She tries to refrain from NSAIDs when possible.  Tylenol alone is insufficient.  She does need [place] paperwork completed and she would like advice on where to go now.  She has severe low back pain, particularly when sitting for 20 minutes or more, which necessitates the use of a cane periodically towards the end of the day. Her last x-rays were taken in December 2023 by Dr. Mcadams, who performed surgery on her L5-S1 on December 1, 2023. She has not seen him since. She has undergone nerve ablations and received injections for leg nerve pain with Dr. Forbes. She describes a sensation akin to sandpaper rubbing across her foot, which limits her footwear options. Physical therapy has not been beneficial. She plans to try massage and cupping. She finds it difficult to sleep due to the pain and cannot lie on her right side. She takes promethazine at night for nausea caused by the pain, which sometimes has a  sedative effect.     She suffers from migraines, which are no longer effectively managed by Nurtec, even when taken every 24 hours.  She does take amitriptyline 25 mg every night but does not find this effective to prevent her migraines.  She previously used Fioricet or Fiorinal, and then switched to Nurtec about 2 years ago, which was effective until recently. She used to experience 2 to 3 migraines a month, but in the past month or two, this has increased to 3 a week. She is allergic to Imitrex and Maxalt sumatriptan. She has tried propranolol for both migraines and blood pressure, but it was not effective. .    Review of Systems  Negative except for HPI  Objective   BP (!) 190/110 (BP Location: Left arm, Patient Position: Sitting, BP Cuff Size: Large adult)   Pulse 70   Temp 36.2 °C (97.1 °F)   Resp 12   Ht 1.829 m (6')   Wt 113 kg (249 lb)   SpO2 95%   Physical Exam  Gen. appears healthy in no distress   Skin appropriate for sex and age   Neck trachea is midline  Lungs unlabored breathing.  Able to speak in full sentences.  Heart regular rate  Neuro gait and station normal.  Alert and oriented x 3..  Slight limp to her gait.  Psych appropriate, calm, interactive, well-groomed         Assessment & Plan  1. Constipation.  Her constipation is likely a side effect of tirzepatide.  A regimen of Dulcolax, one in the morning and one at night, along with a daily scoop of MiraLAX was recommended until bowel movements occur at least every other day. If stools become too loose, she was advised to reduce the dosage.  Encouraged a high-fiber diet and daily physical exercise    2.  Acute on chronic low back pain:  Encouraged her to follow-up with her physiatrist.  She declines physical therapy.  She is agreeable to an updated x-ray, likely to be followed by MRI.  She prefers to refrain from going back to her neurosurgeon at this time.  She is working hard on weight loss and trunk strengthening.  [place] paperwork  completed.  She continues on Lyrica and amitriptyline.    3. Migraines.  Unfortunately occurring approximately 12 to 15 days/month and not alleviated by Nurtec any longer.  She would benefit from further preventative migraine treatment beyond amitriptyline.  A prescription for Aimovig, to be injected once a month, was provided. She was advised to continue using Nurtec as needed and to take Fioricet if the migraine persists. If the migraine continues, she should take two amitriptyline tablets and rest, staying out of her car for 6 to 8 hours. A prescription for Fioricet was also provided to you short-term, preventing rebound migraine caused by Fioricet. If Aimovig is not approved by her insurance, an alternative injectable will be considered such as Emgality or Ajovy.  She will let me know how this is going over the next 3 months.    4. Weight management.  She has been experiencing weight loss due to her current medication regimen. The dose of trazepide was increased to 7.5 mg, to be injected once weekly for 4 weeks. After 4 weeks, the dose can be increased to 10 mg if desired. After another 4 weeks, the dose can be increased to 12.5 mg, with a maximum dose of 15 mg.  She will let me know if she is not having a bowel movement every other day.  Discussed long-term side effects such as decreased gastric motility, abdominal pain, nausea, vomiting, risk of thyroid cancer.      Side effects of all medications prescribed today were discussed with the patient including how to take the medications and proper dosage. Discussed repercussions of not taking the medications as prescribed. Instructed to call the office should she have any negative side effects or problems with the medications.             Please note that this dictation was created using voice recognition software. I have made every reasonable attempt to correct obvious errors, but I expect that there are errors of grammar and possibly content that I did not  discover before finalizing the note.

## 2024-09-12 NOTE — TELEPHONE ENCOUNTER
Received request via: Pharmacy    Was the patient seen in the last year in this department? Yes  LOV : 9/12/2024   Does the patient have an active prescription (recently filled or refills available) for medication(s) requested? No    Pharmacy Name: [facility]     Does the patient have USP Plus and need 100-day supply? (This applies to ALL medications) Patient does not have SCP

## 2024-09-16 ENCOUNTER — TELEPHONE (OUTPATIENT)
Dept: MEDICAL GROUP | Facility: LAB | Age: 39
End: 2024-09-16
Payer: COMMERCIAL

## 2024-09-16 NOTE — TELEPHONE ENCOUNTER
Outcome  Approved today by [employer]  CaseId:[id number];Status:Approved;Review Type:Prior Auth;Coverage Start Date:08/17/2024;Coverage End Date:09/16/2025;  Authorization Expiration Date: 9/15/2025

## 2024-09-23 ENCOUNTER — TELEPHONE (OUTPATIENT)
Dept: MEDICAL GROUP | Facility: LAB | Age: 39
End: 2024-09-23
Payer: COMMERCIAL

## 2024-09-23 NOTE — TELEPHONE ENCOUNTER
SAMUEL wanted to know hoe often she will be coming to the office for FMLA. I said I X a month. Would this be accurate? It looks like she has been coming almost every month

## 2024-09-26 ENCOUNTER — PATIENT MESSAGE (OUTPATIENT)
Dept: MEDICAL GROUP | Facility: LAB | Age: 39
End: 2024-09-26
Payer: COMMERCIAL

## 2024-09-26 DIAGNOSIS — G43.009 MIGRAINE WITHOUT AURA AND WITHOUT STATUS MIGRAINOSUS, NOT INTRACTABLE: ICD-10-CM

## 2024-09-26 DIAGNOSIS — Z98.1 S/P LUMBAR FUSION: ICD-10-CM

## 2024-09-26 DIAGNOSIS — G89.29 CHRONIC BILATERAL LOW BACK PAIN WITH BILATERAL SCIATICA: ICD-10-CM

## 2024-09-26 DIAGNOSIS — M54.41 CHRONIC BILATERAL LOW BACK PAIN WITH BILATERAL SCIATICA: ICD-10-CM

## 2024-09-26 DIAGNOSIS — F98.8 ATTENTION DEFICIT DISORDER (ADD) IN ADULT: ICD-10-CM

## 2024-09-26 DIAGNOSIS — M54.42 CHRONIC BILATERAL LOW BACK PAIN WITH BILATERAL SCIATICA: ICD-10-CM

## 2024-09-26 NOTE — PATIENT COMMUNICATION
Received request via: Patient    Was the patient seen in the last year in this department? Yes    Does the patient have an active prescription (recently filled or refills available) for medication(s) requested? No    Pharmacy Name:      [facility] - [address] [address]  Phone: [phone] Fax: [phone]     Does the patient have prison Plus and need 100-day supply? (This applies to ALL medications) Patient does not have SCP

## 2024-09-30 RX ORDER — ONDANSETRON 4 MG/1
4 TABLET, FILM COATED ORAL EVERY 8 HOURS PRN
Qty: 30 TABLET | Refills: 2 | Status: SHIPPED | OUTPATIENT
Start: 2024-09-30

## 2024-09-30 RX ORDER — DEXTROAMPHETAMINE SACCHARATE, AMPHETAMINE ASPARTATE, DEXTROAMPHETAMINE SULFATE AND AMPHETAMINE SULFATE 2.5; 2.5; 2.5; 2.5 MG/1; MG/1; MG/1; MG/1
10 TABLET ORAL 2 TIMES DAILY
Qty: 60 TABLET | Refills: 0 | Status: SHIPPED | OUTPATIENT
Start: 2024-09-30 | End: 2024-10-30

## 2024-09-30 RX ORDER — BUTALBITAL, ACETAMINOPHEN AND CAFFEINE 300; 40; 50 MG/1; MG/1; MG/1
1 CAPSULE ORAL EVERY 6 HOURS PRN
Qty: 15 CAPSULE | Refills: 0 | Status: SHIPPED | OUTPATIENT
Start: 2024-09-30 | End: 2024-10-07

## 2024-09-30 RX ORDER — PREGABALIN 150 MG/1
150 CAPSULE ORAL 3 TIMES DAILY
Qty: 270 CAPSULE | Refills: 0 | Status: SHIPPED | OUTPATIENT
Start: 2024-09-30 | End: 2024-12-29

## 2024-10-03 ENCOUNTER — PATIENT MESSAGE (OUTPATIENT)
Dept: MEDICAL GROUP | Facility: LAB | Age: 39
End: 2024-10-03
Payer: COMMERCIAL

## 2024-10-03 DIAGNOSIS — Z98.1 S/P LUMBAR FUSION: ICD-10-CM

## 2024-10-03 RX ORDER — PREGABALIN 150 MG/1
150 CAPSULE ORAL 3 TIMES DAILY
Qty: 270 CAPSULE | Refills: 0 | Status: SHIPPED | OUTPATIENT
Start: 2024-10-03 | End: 2025-01-01

## 2024-10-09 NOTE — TELEPHONE ENCOUNTER
Samir Butt   Tried approving this prescription for your patient and send it electronically , Unfortunately not going through !  Can you try .  Nicole Anne    Claudia Justice

## 2024-10-16 RX ORDER — PROMETHAZINE HYDROCHLORIDE 25 MG/1
TABLET ORAL
Qty: 30 TABLET | Refills: 0 | Status: SHIPPED | OUTPATIENT
Start: 2024-10-16

## 2024-10-28 DIAGNOSIS — F98.8 ATTENTION DEFICIT DISORDER (ADD) IN ADULT: ICD-10-CM

## 2024-10-28 RX ORDER — DEXTROAMPHETAMINE SACCHARATE, AMPHETAMINE ASPARTATE, DEXTROAMPHETAMINE SULFATE AND AMPHETAMINE SULFATE 2.5; 2.5; 2.5; 2.5 MG/1; MG/1; MG/1; MG/1
10 TABLET ORAL 2 TIMES DAILY
Qty: 60 TABLET | Refills: 0 | Status: SHIPPED | OUTPATIENT
Start: 2024-10-28 | End: 2024-11-27

## 2024-10-29 NOTE — PROGRESS NOTES
Subjective:     Chief Complaint   Patient presents with    Hypertension Follow-up     HPI:   Mercedes presents today to discuss blood pressure. PCP unavailable.    Patient has a history of chronic hypertension.  Previously on losartan 100 mg daily, propranolol 40 mg twice daily (hypertension/migraine prophylaxis) and verapamil 120 mg daily (hypertension/migraine prophylaxis).    Patient had discontinued all medications in favor of trialing beet root powder for her hypertension.  States that her blood pressure did improve with this and at home was typically 120-130/80-92.  Patient states that she went in for a presurgical check yesterday for surgery that is scheduled tomorrow on her lower back and her blood pressure was significantly elevated.  States that both measurements were taken over her shirt. First approx 190/135 and repeat 160/100.  She had been recommended by the nurse who took her blood pressure to go to the emergency department for severe range blood pressure but patient denied any symptoms and deferred.  She was prescribed propranolol 40 mg twice daily by the surgeon, given a 10-day supply.  Took a dose last night and 1 this morning.  Denies any adverse side effects to the medication.    Regarding her migraine they are significantly improved from prior.  Previously would take Fiorinal and Fioricet but now uses Nurtec 2-3 times a month as needed.  Also had a daith piercing.    Review of Systems   Constitutional:  Negative for chills, fever and weight loss.   Respiratory:  Negative for cough and shortness of breath.    Cardiovascular:  Negative for chest pain, palpitations and leg swelling.   Gastrointestinal:  Negative for abdominal pain, diarrhea, nausea and vomiting.   Musculoskeletal:  Positive for back pain and joint pain.   Neurological:  Negative for dizziness and headaches.   Psychiatric/Behavioral:  The patient is nervous/anxious.      Objective:     Exam:  /78 (BP Location: Left arm,  Patient Position: Sitting, BP Cuff Size: Large adult)   Pulse 65   Temp 36.3 °C (97.4 °F)   Resp 12   Ht 1.829 m (6')   Wt 113 kg (250 lb)   LMP 03/16/2017   SpO2 93%   BMI 33.91 kg/m²  Body mass index is 33.91 kg/m².    Physical Exam  Vitals reviewed.   Constitutional:       General: She is not in acute distress.     Appearance: Normal appearance. She is obese. She is not ill-appearing.   HENT:      Head: Normocephalic and atraumatic.      Nose: Nose normal.      Mouth/Throat:      Mouth: Mucous membranes are moist.   Eyes:      General: No scleral icterus.     Conjunctiva/sclera: Conjunctivae normal.   Cardiovascular:      Rate and Rhythm: Normal rate and regular rhythm.      Heart sounds: Normal heart sounds. No murmur heard.  Pulmonary:      Effort: Pulmonary effort is normal. No respiratory distress.      Breath sounds: Normal breath sounds. No stridor. No wheezing, rhonchi or rales.   Abdominal:      General: Abdomen is flat. Bowel sounds are normal. There is no distension.      Palpations: Abdomen is soft. There is no mass.      Tenderness: There is no abdominal tenderness. There is no guarding or rebound.   Musculoskeletal:      Cervical back: Normal range of motion and neck supple. No rigidity or tenderness.      Right lower leg: No edema.      Left lower leg: No edema.   Skin:     General: Skin is warm and dry.      Coloration: Skin is not jaundiced.   Neurological:      General: No focal deficit present.      Mental Status: She is alert and oriented to person, place, and time.   Psychiatric:         Mood and Affect: Mood normal.         Behavior: Behavior normal.         Thought Content: Thought content normal.       Labs: Reviewed from 11/29/2023    Assessment & Plan:     38 y.o. female with the following -     1. Primary hypertension  Acute on chronic, well controlled today with medication below.  Continue for now.  Evaluate further with renin/aldosterone level.  Discussed ideal ranges and  return precautions.  - propranolol (INDERAL) 40 MG Tab; Take 1 Tablet by mouth 2 times a day.  Dispense: 180 Tablet; Refill: 3  - RENIN ACTIVITY; Future  - ALDOSTERONE; Future    2. Hypokalemia  This is a chronic condition in this patient with previously uncontrolled hypertension.  Unclear etiology.  Suspicious for primary hyperaldosteronism.  Trend in the next week and obtain renin/aldosterone levels.  In the meantime patient to increase potassium rich foods.  Discussed signs/symptoms of hypokalemia and return precautions.  - Comp Metabolic Panel; Future  - MAGNESIUM; Future  - RENIN ACTIVITY; Future  - ALDOSTERONE; Future    3. Elevated liver enzymes  New finding, mildly elevated.  Does not drink alcohol.  Trend.  - Comp Metabolic Panel; Future    Return in about 6 weeks (around 1/11/2024), or if symptoms worsen or fail to improve, for Blood pressure.    Please note that this dictation was created using voice recognition software. I have made every reasonable attempt to correct obvious errors, but I expect that there are errors of grammar and possibly content that I did not discover before finalizing the note.         Lamont Frank  Neurosurgery  [address], [address]  [place] [address]  Phone: [phone]  Fax: [phone]  Follow Up Time:     Garrick Corrigan  Otolaryngology  [address], [address]  [place] [address]  Phone: [phone]  Fax: [phone]  Follow Up Time:    Lamont Frank  Neurosurgery  [address], [address]  [place] [address]  Phone: [phone]  Fax: [phone]  Follow Up Time:     Garrick Corrigan  Otolaryngology  [address], [address]  [place] [address]  Phone: [phone]  Fax: [phone]  Follow Up Time:     Delvis Wolfe Y  Pediatrics  [id number] [address] [place] [id number]  Phone: [phone]  Fax: [phone]  Follow Up Time: 1-3 days

## 2024-11-06 DIAGNOSIS — G43.009 MIGRAINE WITHOUT AURA AND WITHOUT STATUS MIGRAINOSUS, NOT INTRACTABLE: ICD-10-CM

## 2024-11-06 RX ORDER — RIMEGEPANT SULFATE 75 MG/75MG
TABLET, ORALLY DISINTEGRATING ORAL
Qty: 9 TABLET | Refills: 0 | Status: SHIPPED | OUTPATIENT
Start: 2024-11-06

## 2024-11-06 NOTE — TELEPHONE ENCOUNTER
Received request via: Pharmacy    Was the patient seen in the last year in this department? Yes  LOV : 9/11/2024   Does the patient have an active prescription (recently filled or refills available) for medication(s) requested? No    Pharmacy Name: [facility]     Does the patient have longterm Plus and need 100-day supply? (This applies to ALL medications) Patient does not have SCP

## 2024-11-26 DIAGNOSIS — F98.8 ATTENTION DEFICIT DISORDER (ADD) IN ADULT: ICD-10-CM

## 2024-11-26 RX ORDER — PROMETHAZINE HYDROCHLORIDE 25 MG/1
TABLET ORAL
Qty: 30 TABLET | Refills: 0 | Status: SHIPPED | OUTPATIENT
Start: 2024-11-26

## 2024-11-26 RX ORDER — DEXTROAMPHETAMINE SACCHARATE, AMPHETAMINE ASPARTATE, DEXTROAMPHETAMINE SULFATE AND AMPHETAMINE SULFATE 2.5; 2.5; 2.5; 2.5 MG/1; MG/1; MG/1; MG/1
10 TABLET ORAL 2 TIMES DAILY
Qty: 60 TABLET | Refills: 0 | Status: SHIPPED | OUTPATIENT
Start: 2024-11-26 | End: 2024-12-26

## 2024-11-26 NOTE — TELEPHONE ENCOUNTER
Received request via: Patient    Was the patient seen in the last year in this department? Yes    Does the patient have an active prescription (recently filled or refills available) for medication(s) requested? No    Pharmacy Name: [facility]     Does the patient have intermediate Plus and need 100-day supply? (This applies to ALL medications) Patient does not have SCP   Parent(s)

## 2024-12-17 ENCOUNTER — OFFICE VISIT (OUTPATIENT)
Dept: MEDICAL GROUP | Facility: LAB | Age: 39
End: 2024-12-17
Payer: COMMERCIAL

## 2024-12-17 VITALS
BODY MASS INDEX: 35.49 KG/M2 | TEMPERATURE: 97.1 F | DIASTOLIC BLOOD PRESSURE: 88 MMHG | SYSTOLIC BLOOD PRESSURE: 148 MMHG | HEART RATE: 64 BPM | HEIGHT: 72 IN | WEIGHT: 262 LBS | OXYGEN SATURATION: 96 % | RESPIRATION RATE: 12 BRPM

## 2024-12-17 DIAGNOSIS — F41.9 ANXIETY: ICD-10-CM

## 2024-12-17 DIAGNOSIS — S43.431A ANTERIOR TO POSTERIOR TEAR OF SUPERIOR GLENOID LABRUM OF RIGHT SHOULDER: ICD-10-CM

## 2024-12-17 DIAGNOSIS — I10 ESSENTIAL HYPERTENSION: ICD-10-CM

## 2024-12-17 DIAGNOSIS — I10 PRIMARY HYPERTENSION: ICD-10-CM

## 2024-12-17 DIAGNOSIS — M25.511 CHRONIC RIGHT SHOULDER PAIN: ICD-10-CM

## 2024-12-17 DIAGNOSIS — G43.009 MIGRAINE WITHOUT AURA AND WITHOUT STATUS MIGRAINOSUS, NOT INTRACTABLE: ICD-10-CM

## 2024-12-17 DIAGNOSIS — R15.9 INCONTINENCE OF FECES, UNSPECIFIED FECAL INCONTINENCE TYPE: ICD-10-CM

## 2024-12-17 DIAGNOSIS — G89.29 CHRONIC RIGHT SHOULDER PAIN: ICD-10-CM

## 2024-12-17 DIAGNOSIS — F98.8 ATTENTION DEFICIT DISORDER (ADD) IN ADULT: ICD-10-CM

## 2024-12-17 PROCEDURE — 96372 THER/PROPH/DIAG INJ SC/IM: CPT | Performed by: NURSE PRACTITIONER

## 2024-12-17 PROCEDURE — 99214 OFFICE O/P EST MOD 30 MIN: CPT | Mod: 25 | Performed by: NURSE PRACTITIONER

## 2024-12-17 RX ORDER — RIMEGEPANT SULFATE 75 MG/75MG
75 TABLET, ORALLY DISINTEGRATING ORAL
Qty: 15 TABLET | Refills: 5 | Status: SHIPPED | OUTPATIENT
Start: 2024-12-17

## 2024-12-17 RX ORDER — ALPRAZOLAM 0.5 MG
0.5 TABLET ORAL 2 TIMES DAILY PRN
Qty: 10 TABLET | Refills: 1 | Status: SHIPPED | OUTPATIENT
Start: 2024-12-17 | End: 2025-01-16

## 2024-12-17 RX ORDER — PROPRANOLOL HYDROCHLORIDE 60 MG/1
60 CAPSULE, EXTENDED RELEASE ORAL DAILY
Qty: 90 CAPSULE | Refills: 3 | Status: SHIPPED | OUTPATIENT
Start: 2024-12-17

## 2024-12-17 RX ORDER — DEXTROAMPHETAMINE SACCHARATE, AMPHETAMINE ASPARTATE, DEXTROAMPHETAMINE SULFATE AND AMPHETAMINE SULFATE 2.5; 2.5; 2.5; 2.5 MG/1; MG/1; MG/1; MG/1
10 TABLET ORAL 2 TIMES DAILY
Qty: 60 TABLET | Refills: 0 | Status: SHIPPED | OUTPATIENT
Start: 2024-12-26 | End: 2025-01-25

## 2024-12-17 RX ORDER — TRIAMCINOLONE ACETONIDE 40 MG/ML
40 INJECTION, SUSPENSION INTRA-ARTICULAR; INTRAMUSCULAR ONCE
Status: COMPLETED | OUTPATIENT
Start: 2024-12-17 | End: 2024-12-17

## 2024-12-17 RX ADMIN — TRIAMCINOLONE ACETONIDE 40 MG: 40 INJECTION, SUSPENSION INTRA-ARTICULAR; INTRAMUSCULAR at 09:29

## 2024-12-17 RX ADMIN — Medication 2 ML: at 09:29

## 2024-12-17 ASSESSMENT — FIBROSIS 4 INDEX: FIB4 SCORE: 1

## 2024-12-17 NOTE — PROGRESS NOTES
Verbal consent was acquired by the patient to use [employer] ambient listening note generation during this visit Yes      Subjective   Joy Mcnamara is a 39 y.o. female who presents for a few issues  History of Present Illness  The patient is a 39-year-old established female who presents with acute on chronic right shoulder pain.    She has been experiencing right shoulder discomfort, which she attributes to a fall in January 2024. During the fall, she was holding onto a door, resulting in a SLAP tear. The proposed surgical intervention was primarily exploratory due to the lack of visible damage beyond the SLAP tear, per patient after seeing orthopedics. She declined further surgery, opting for maintenance management instead. The pain has been persistent for the past month that improved for several months after our last injection in July.  She is not allergic to lidocaine or steroids. She has been managing her symptoms with physical therapy exercises, including the use of stretchy bands and bench press exercises.    Attention deficit disorder: Chronic issue.  Doing well on 10 mg Adderall twice daily.  Needs a refill of Adderall.  Denies negative side effects of Adderall.  Has been doing well on Adderall for over a decade.  Last refill of Adderall was November 26.    Fecal incontinence: New issue after having lumbar spine surgery in 2023/December.  Waiting until late February to see GI and wondering if there is another option.  Has tried fiber, Metamucil without relief.    Migraines: Chronic issue.  Aimovig was injected once and she did not receive relief therefore she discontinued it as she also found it expensive and thought that her knees swelled after use.  She is failed Topamax.  She is on propranolol 40 mg twice daily.  She was using Nurtec for about 2-1/2 years abortively but this stopped working.  She is allergic to Imitrex and Maxalt.  She is not followed by neurology.    Hypertension: Chronic  issue.  Intermittently out of control at home with readings as high as 190/100 down to 130/70.  Not having chest pain.  Has chronic headaches.  Has occasional swelling in her legs.  Supplemental Information  She has a history of hysterectomy with ovarian preservation.    ALLERGIES  The patient has no known allergies.    Review of Systems  Negative except for HPI  Objective   /84 (BP Location: Left arm, Patient Position: Sitting, BP Cuff Size: Large adult)   Pulse 64   Temp 36.2 °C (97.1 °F)   Resp 12   Ht 1.829 m (6')   Wt 119 kg (262 lb)   SpO2 96%   Physical Exam  Gen. appears healthy in no distress   Skin appropriate for sex and age   Neck trachea is midline  Lungs unlabored breathing  Heart regular rate  Right shoulder: No bruising or deformity.  No bony tenderness.  Neuro gait and station normal   Psych appropriate, calm, interactive, well-groomed    Procedure note:  Verbal consent obtained. Right shoulder was cleaned with chlorhexidine gluconate and alcohol. Using sterile gloves, a 25-gauge a long needle was used to inject 2cc of 1% lidocaine without epinephrine, 1 cc Kenalog into the posterior aspect of her shoulder joint. The patient tolerated extremely well, no bleeding, Band-Aid applied. She was given resting and range of motion instructions. We discussed prevention of a frozen shoulder.  Discussed risk versus benefit of intra-articular injection such as infection, tendon rupture, increased pain and she verbalized understanding.     Assessment & Plan  1.  Right shoulder pain.  See shoulder injection as above.  Refrain from any further injections and shoulder for the next 6 months.  She prefers to refrain from orthopedic surgery at this time.    2.  Migraines:  aimovig unaffordable and may have caused knee swelling / didn't work but only tried for one month. Allergic to imitrex / maxalt abortively.  Nurtec stopped working unfortunately after 2.5 years.  Failed topamax for prevention.  On  propranolol and changing to LA from IR in hopes of helping BP, anxiety and migraines.  Will also try nurtec qod for prevention in the meantime as it's affordable for pt abortively -encouraged her to try Nurtec every other day for the next 3 months or until she sees neurology and to keep a track of her migraines on a migraine scarlet.    3.  HTN:    Change propranolol from 40 mg twice daily to propranolol LA 60 mg to help with better regulation of anxiety, blood pressure, heart rate and migraines.  Encouraged her to follow a low-salt diet, exercise daily and continue on amlodipine 10 mg.  Encouraged her to notify me if her home blood pressure readings are consistently greater than 140/90.    4.  Attention deficit disorder: Refilled Adderall.  Reviewed  profile.  UDS is up-to-date.  Stable.    5.  Fecal incontinence: Referred for consult with colorectal surgery, [facility].  Awaiting GI consult in late February.             Please note that this dictation was created using voice recognition software. I have made every reasonable attempt to correct obvious errors, but I expect that there are errors of grammar and possibly content that I did not discover before finalizing the note.

## 2024-12-26 DIAGNOSIS — G43.009 MIGRAINE WITHOUT AURA AND WITHOUT STATUS MIGRAINOSUS, NOT INTRACTABLE: ICD-10-CM

## 2024-12-26 DIAGNOSIS — Z98.1 S/P LUMBAR FUSION: ICD-10-CM

## 2024-12-27 RX ORDER — PROMETHAZINE HYDROCHLORIDE 25 MG/1
TABLET ORAL
Qty: 30 TABLET | Refills: 0 | Status: SHIPPED | OUTPATIENT
Start: 2024-12-27

## 2024-12-27 RX ORDER — BUTALBITAL, ACETAMINOPHEN AND CAFFEINE 300; 40; 50 MG/1; MG/1; MG/1
1 CAPSULE ORAL EVERY 6 HOURS PRN
Qty: 15 CAPSULE | Refills: 0 | Status: SHIPPED | OUTPATIENT
Start: 2024-12-27 | End: 2025-01-03

## 2024-12-27 RX ORDER — PREGABALIN 150 MG/1
150 CAPSULE ORAL 3 TIMES DAILY
Qty: 270 CAPSULE | Refills: 0 | Status: SHIPPED | OUTPATIENT
Start: 2024-12-27 | End: 2024-12-30 | Stop reason: SDUPTHER

## 2024-12-27 NOTE — TELEPHONE ENCOUNTER
Received request via: Pharmacy    Was the patient seen in the last year in this department? Yes    Does the patient have an active prescription (recently filled or refills available) for medication(s) requested? No    Pharmacy Name: [facility]     Does the patient have USP Plus and need 100-day supply? (This applies to ALL medications) Patient does not have SCP

## 2024-12-30 DIAGNOSIS — Z98.1 S/P LUMBAR FUSION: ICD-10-CM

## 2024-12-30 DIAGNOSIS — G43.009 MIGRAINE WITHOUT AURA AND WITHOUT STATUS MIGRAINOSUS, NOT INTRACTABLE: ICD-10-CM

## 2024-12-30 RX ORDER — ONDANSETRON 4 MG/1
4 TABLET, FILM COATED ORAL EVERY 8 HOURS PRN
Qty: 30 TABLET | Refills: 2 | Status: SHIPPED | OUTPATIENT
Start: 2024-12-30

## 2024-12-30 RX ORDER — PREGABALIN 150 MG/1
150 CAPSULE ORAL 3 TIMES DAILY
Qty: 270 CAPSULE | Refills: 0 | Status: SHIPPED | OUTPATIENT
Start: 2024-12-30 | End: 2025-03-30

## 2024-12-30 NOTE — TELEPHONE ENCOUNTER
Received request via: Pharmacy    Was the patient seen in the last year in this department? Yes    Does the patient have an active prescription (recently filled or refills available) for medication(s) requested? No    [facility]    Does the patient have care home Plus and need 100-day supply? (This applies to ALL medications) Patient does not have SCP    Last office visit 12/17/2024  Last labs 01/05/2024

## 2025-01-07 ENCOUNTER — OFFICE VISIT (OUTPATIENT)
Dept: MEDICAL GROUP | Facility: LAB | Age: 40
End: 2025-01-07
Payer: COMMERCIAL

## 2025-01-07 VITALS
TEMPERATURE: 96.4 F | HEART RATE: 70 BPM | HEIGHT: 72 IN | OXYGEN SATURATION: 98 % | DIASTOLIC BLOOD PRESSURE: 92 MMHG | WEIGHT: 255 LBS | SYSTOLIC BLOOD PRESSURE: 138 MMHG | BODY MASS INDEX: 34.54 KG/M2 | RESPIRATION RATE: 12 BRPM

## 2025-01-07 DIAGNOSIS — G43.009 MIGRAINE WITHOUT AURA AND WITHOUT STATUS MIGRAINOSUS, NOT INTRACTABLE: ICD-10-CM

## 2025-01-07 DIAGNOSIS — Z00.00 PREVENTATIVE HEALTH CARE: ICD-10-CM

## 2025-01-07 DIAGNOSIS — R61 NIGHT SWEATS: ICD-10-CM

## 2025-01-07 DIAGNOSIS — I10 PRIMARY HYPERTENSION: ICD-10-CM

## 2025-01-07 DIAGNOSIS — F41.9 ANXIETY: ICD-10-CM

## 2025-01-07 DIAGNOSIS — Z23 NEED FOR TDAP VACCINATION: ICD-10-CM

## 2025-01-07 PROBLEM — T50.901A POLYSUBSTANCE OVERDOSE: Status: RESOLVED | Noted: 2019-09-30 | Resolved: 2025-01-07

## 2025-01-07 PROCEDURE — 3080F DIAST BP >= 90 MM HG: CPT | Performed by: NURSE PRACTITIONER

## 2025-01-07 PROCEDURE — 90471 IMMUNIZATION ADMIN: CPT | Performed by: NURSE PRACTITIONER

## 2025-01-07 PROCEDURE — 90715 TDAP VACCINE 7 YRS/> IM: CPT | Performed by: NURSE PRACTITIONER

## 2025-01-07 PROCEDURE — 99214 OFFICE O/P EST MOD 30 MIN: CPT | Mod: 25 | Performed by: NURSE PRACTITIONER

## 2025-01-07 PROCEDURE — 3075F SYST BP GE 130 - 139MM HG: CPT | Performed by: NURSE PRACTITIONER

## 2025-01-07 RX ORDER — OLMESARTAN MEDOXOMIL 40 MG/1
40 TABLET ORAL DAILY
Qty: 90 TABLET | Refills: 3 | Status: SHIPPED | OUTPATIENT
Start: 2025-01-07 | End: 2025-01-29

## 2025-01-07 ASSESSMENT — FIBROSIS 4 INDEX: FIB4 SCORE: 1

## 2025-01-07 NOTE — PROGRESS NOTES
Verbal consent was acquired by the patient to use [employer] ambient listening note generation during this visit Yes      Subjective   Joy Mcnamara is a 39 y.o. female who presents for follow-up on issues  History of Present Illness  The patient is a 39-year-old established female here to follow up on some issues that we discussed a few weeks ago.    She reports an improvement in her right shoulder pain after we injected it at her visit a few weeks ago.    Migraines: Chronic issue for her.  At our last appointment we initiated Nurtec as a preventative treatment to take every other day and changed to propranolol to an extended release from immediate release.  States that her migraines have not improved yet although this is only 3 weeks ago.  She has an approved referral to neurology but has not scheduled yet.  She has been experiencing frequent migraines, predominantly triggered by weather changes. 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 Over the past 3 weeks, the frequency of her migraines has remained consistent.   She has a history of menstrual migraines, which ceased following her hysterectomy although she retains her ovaries and has been having night sweats.    Hypertension: Chronic issue.  Mentions increased swelling to her left lower extremity and is on 10 mg of amlodipine.  She has been monitoring her blood pressure at home using an automated device, which consistently indicates high readings. She has attempted to verify these readings at various pharmacies, but the results have been inconsistent, with discrepancies ranging from 10 to 20 points. She is considering purchasing a manual blood pressure cuff from [employer].    She has been experiencing insomnia and hot flashes at night, which disrupt her sleep. She retains her ovaries and has not had her hormone levels checked since her hysterectomy. She has no history  of blood clots, DVT, or PE. She has attempted breastfeeding with both her children, but they were unable to latch. She did pump some breast milk. She has not menstruated for several years because of the hysterectomy. Her estrogen level was recorded as 7 prior to her hysterectomy, and she was prescribed estrogen for a few months due to severe sweating episodes with Dr. Santillan.     Anxiety:  xanax quiets her brain - helped a lot.  Has taken about 3-4 in the past few weeks.      Supplemental Information  She has lost 7 pounds in a month. She is trying to cut back on sweets and soda.    FAMILY HISTORY  Both maternal and paternal grandmothers had breast cancer. Mother  of a brain aneurysm. No known family history of blood clots.    MEDICATIONS  Current: Nurtec, propranolol, multivitamins, Advil    Review of Systems  Negative except for HPI  Objective   BP (!) 138/92 (BP Location: Right arm, Patient Position: Sitting, BP Cuff Size: Large adult)   Pulse 70   Temp (!) 35.8 °C (96.4 °F)   Resp 12   Ht 1.829 m (6')   Wt 116 kg (255 lb)   SpO2 98%   Physical Exam  Gen. appears healthy in no distress   Skin appropriate for sex and age   Neck trachea is midline  Lungs unlabored breathing  Heart regular rate  Neuro gait and station normal   Psych appropriate, calm, interactive, well-groomed         Assessment & Plan  1.  Right shoulder pain: Improved following injection.  Follow-up as needed.    2. Migraines.  Chronic and persistent.  Continue Nurtec QOD.  Encouraged her to make an appoint with neurology to discuss possible preventative Botox.  She will also continue propranolol 60 mg LA.  3. Insomnia and hot flashes:  The patient reports experiencing insomnia and hot flashes at night, which wake her up in a pool of sweat.  Recommend updated FSH and estrogen level.  Discussed the risk versus benefit of HRT.  4.  Hypertension:  Discontinue amlodipine due to edema.  Continue propranolol LA 60 mg.  Add in olmesartan 40 mg  nightly.  I encouraged her to purchase a manual blood pressure cuff through [employer] as she lives with people that can check her blood pressure manually.  Encouraged her to contact me if home readings are consistently greater than 140/85.  Discussed long-term repercussions of uncontrolled hypertension.    Follow-up  The patient will follow up in 6 weeks via [facility] to discuss the progress of her migraine treatment, let me know that she has a neurology appointment, how her blood pressures are doing and if she has been able to schedule with colorectal surgery due to stool incontinence.               Please note that this dictation was created using voice recognition software. I have made every reasonable attempt to correct obvious errors, but I expect that there are errors of grammar and possibly content that I did not discover before finalizing the note.

## 2025-01-13 ENCOUNTER — HOSPITAL ENCOUNTER (OUTPATIENT)
Dept: LAB | Facility: MEDICAL CENTER | Age: 40
End: 2025-01-13
Attending: NURSE PRACTITIONER
Payer: COMMERCIAL

## 2025-01-13 DIAGNOSIS — Z00.00 PREVENTATIVE HEALTH CARE: ICD-10-CM

## 2025-01-13 DIAGNOSIS — R61 NIGHT SWEATS: ICD-10-CM

## 2025-01-13 LAB
ALBUMIN SERPL BCP-MCNC: 4.1 G/DL (ref 3.2–4.9)
ALBUMIN/GLOB SERPL: 1.6 G/DL
ALP SERPL-CCNC: 72 U/L (ref 30–99)
ALT SERPL-CCNC: 24 U/L (ref 2–50)
ANION GAP SERPL CALC-SCNC: 8 MMOL/L (ref 7–16)
AST SERPL-CCNC: 21 U/L (ref 12–45)
BASOPHILS # BLD AUTO: 0.9 % (ref 0–1.8)
BASOPHILS # BLD: 0.06 K/UL (ref 0–0.12)
BILIRUB SERPL-MCNC: 0.3 MG/DL (ref 0.1–1.5)
BUN SERPL-MCNC: 9 MG/DL (ref 8–22)
CALCIUM ALBUM COR SERPL-MCNC: 8.9 MG/DL (ref 8.5–10.5)
CALCIUM SERPL-MCNC: 9 MG/DL (ref 8.5–10.5)
CHLORIDE SERPL-SCNC: 106 MMOL/L (ref 96–112)
CHOLEST SERPL-MCNC: 231 MG/DL (ref 100–199)
CO2 SERPL-SCNC: 27 MMOL/L (ref 20–33)
CREAT SERPL-MCNC: 0.83 MG/DL (ref 0.5–1.4)
EOSINOPHIL # BLD AUTO: 0.19 K/UL (ref 0–0.51)
EOSINOPHIL NFR BLD: 2.7 % (ref 0–6.9)
ERYTHROCYTE [DISTWIDTH] IN BLOOD BY AUTOMATED COUNT: 47.2 FL (ref 35.9–50)
EST. AVERAGE GLUCOSE BLD GHB EST-MCNC: 103 MG/DL
ESTRADIOL SERPL-MCNC: 145 PG/ML
FSH SERPL-ACNC: 5.5 MIU/ML
GFR SERPLBLD CREATININE-BSD FMLA CKD-EPI: 90 ML/MIN/1.73 M 2
GLOBULIN SER CALC-MCNC: 2.5 G/DL (ref 1.9–3.5)
GLUCOSE SERPL-MCNC: 93 MG/DL (ref 65–99)
HBA1C MFR BLD: 5.2 % (ref 4–5.6)
HCT VFR BLD AUTO: 41 % (ref 37–47)
HDLC SERPL-MCNC: 52 MG/DL
HGB BLD-MCNC: 13.6 G/DL (ref 12–16)
IMM GRANULOCYTES # BLD AUTO: 0.02 K/UL (ref 0–0.11)
IMM GRANULOCYTES NFR BLD AUTO: 0.3 % (ref 0–0.9)
LDLC SERPL CALC-MCNC: 163 MG/DL
LH SERPL-ACNC: 7.8 IU/L
LYMPHOCYTES # BLD AUTO: 1.81 K/UL (ref 1–4.8)
LYMPHOCYTES NFR BLD: 25.7 % (ref 22–41)
MCH RBC QN AUTO: 31.2 PG (ref 27–33)
MCHC RBC AUTO-ENTMCNC: 33.2 G/DL (ref 32.2–35.5)
MCV RBC AUTO: 94 FL (ref 81.4–97.8)
MONOCYTES # BLD AUTO: 0.5 K/UL (ref 0–0.85)
MONOCYTES NFR BLD AUTO: 7.1 % (ref 0–13.4)
NEUTROPHILS # BLD AUTO: 4.45 K/UL (ref 1.82–7.42)
NEUTROPHILS NFR BLD: 63.3 % (ref 44–72)
NRBC # BLD AUTO: 0 K/UL
NRBC BLD-RTO: 0 /100 WBC (ref 0–0.2)
PLATELET # BLD AUTO: 296 K/UL (ref 164–446)
PMV BLD AUTO: 10 FL (ref 9–12.9)
POTASSIUM SERPL-SCNC: 4.3 MMOL/L (ref 3.6–5.5)
PROT SERPL-MCNC: 6.6 G/DL (ref 6–8.2)
RBC # BLD AUTO: 4.36 M/UL (ref 4.2–5.4)
SODIUM SERPL-SCNC: 141 MMOL/L (ref 135–145)
TRIGL SERPL-MCNC: 82 MG/DL (ref 0–149)
TSH SERPL-ACNC: 1.88 UIU/ML (ref 0.35–5.5)
WBC # BLD AUTO: 7 K/UL (ref 4.8–10.8)

## 2025-01-13 PROCEDURE — 83001 ASSAY OF GONADOTROPIN (FSH): CPT

## 2025-01-13 PROCEDURE — 83036 HEMOGLOBIN GLYCOSYLATED A1C: CPT

## 2025-01-13 PROCEDURE — 82670 ASSAY OF TOTAL ESTRADIOL: CPT

## 2025-01-13 PROCEDURE — 84443 ASSAY THYROID STIM HORMONE: CPT

## 2025-01-13 PROCEDURE — 80061 LIPID PANEL: CPT

## 2025-01-13 PROCEDURE — 83002 ASSAY OF GONADOTROPIN (LH): CPT

## 2025-01-13 PROCEDURE — 80053 COMPREHEN METABOLIC PANEL: CPT

## 2025-01-13 PROCEDURE — 36415 COLL VENOUS BLD VENIPUNCTURE: CPT

## 2025-01-13 PROCEDURE — 85025 COMPLETE CBC W/AUTO DIFF WBC: CPT

## 2025-01-17 ENCOUNTER — HOSPITAL ENCOUNTER (OUTPATIENT)
Dept: RADIOLOGY | Facility: MEDICAL CENTER | Age: 40
End: 2025-01-17
Attending: NURSE PRACTITIONER
Payer: COMMERCIAL

## 2025-01-17 DIAGNOSIS — M54.50 ACUTE BILATERAL LOW BACK PAIN WITHOUT SCIATICA: ICD-10-CM

## 2025-01-17 PROCEDURE — 72100 X-RAY EXAM L-S SPINE 2/3 VWS: CPT

## 2025-01-26 DIAGNOSIS — G89.29 CHRONIC BILATERAL LOW BACK PAIN WITH BILATERAL SCIATICA: ICD-10-CM

## 2025-01-26 DIAGNOSIS — M54.41 CHRONIC BILATERAL LOW BACK PAIN WITH BILATERAL SCIATICA: ICD-10-CM

## 2025-01-26 DIAGNOSIS — F98.8 ATTENTION DEFICIT DISORDER (ADD) IN ADULT: ICD-10-CM

## 2025-01-26 DIAGNOSIS — M54.42 CHRONIC BILATERAL LOW BACK PAIN WITH BILATERAL SCIATICA: ICD-10-CM

## 2025-01-27 RX ORDER — DEXTROAMPHETAMINE SACCHARATE, AMPHETAMINE ASPARTATE, DEXTROAMPHETAMINE SULFATE AND AMPHETAMINE SULFATE 2.5; 2.5; 2.5; 2.5 MG/1; MG/1; MG/1; MG/1
10 TABLET ORAL 2 TIMES DAILY
Qty: 60 TABLET | Refills: 0 | Status: SHIPPED | OUTPATIENT
Start: 2025-01-27 | End: 2025-02-26

## 2025-01-29 ENCOUNTER — OFFICE VISIT (OUTPATIENT)
Dept: SURGICAL ONCOLOGY | Facility: MEDICAL CENTER | Age: 40
End: 2025-01-29
Payer: COMMERCIAL

## 2025-01-29 VITALS
TEMPERATURE: 97.6 F | WEIGHT: 262.5 LBS | OXYGEN SATURATION: 97 % | SYSTOLIC BLOOD PRESSURE: 152 MMHG | BODY MASS INDEX: 35.55 KG/M2 | HEART RATE: 80 BPM | DIASTOLIC BLOOD PRESSURE: 98 MMHG | HEIGHT: 72 IN

## 2025-01-29 DIAGNOSIS — R15.9 INCONTINENCE OF FECES, UNSPECIFIED FECAL INCONTINENCE TYPE: ICD-10-CM

## 2025-01-29 RX ORDER — IBUPROFEN 400 MG/1
400 TABLET, FILM COATED ORAL EVERY 6 HOURS PRN
COMMUNITY

## 2025-01-29 ASSESSMENT — FIBROSIS 4 INDEX: FIB4 SCORE: 0.59

## 2025-01-29 NOTE — PATIENT INSTRUCTIONS
High fiber diet as tolerated, take a daily fiber supplement, try to be consistent as to what time each day  Imodium as needed for travel  Referral to Dr Dooley at [facility]- consider Sacral Nerve Stimulator

## 2025-01-29 NOTE — PROGRESS NOTES
Subjective:   1/29/2025  9:17 AM  Primary care physician:ILYA Marie      Chief Complaint:   Chief Complaint   Patient presents with    New Patient     fecal incontinence       History of presenting illness:  Joy Mcnamara  is a pleasant 39 y.o. year old female who presented with incontinence over the last 14 months.  She had a lumbar spine surgery in 2023 and after that procedure has noted some numbness in her LLE.  This has not improved over the last year.  She also notes some fecal urgency and incontinence, and she is unable to tell when bowel movements are coming.  She will have accidents several times a week now.  She has tried some dietary modifications and laxatives but without lasting success or improvement.           Past Medical History:   Diagnosis Date    Back pain     Depression     s/p mom's death    Depression 4/25/2014    Gynecological disorder     Migraine headache     Miscarriage     Pain 2017    low back; degenerative disk disease     Past Surgical History:   Procedure Laterality Date    VAGINAL HYSTERECTOMY SCOPE TOTAL N/A 03/27/2017    Procedure: VAGINAL HYSTERECTOMY SCOPE TOTAL right salpingectomy, partial left salpingectomy. Cystoscopy;  Surgeon: Zayda Santillan M.D.;  Location: SURGERY SAME DAY [facility];  Service:     NH NJX AA&/STRD TFRML EPI LUMBAR/SACRAL 1 LEVEL Bilateral 06/17/2015    Procedure: TRANSFORAMINAL BILATERAL L5-S1 EPIDURAL WITH IV SEDATION;  Surgeon: Tom Main M.D.;  Location: [facility];  Service: Pain Management    NH NJX AA&/STRD TFRML EPI LUMBAR/SACRAL 1 LEVEL  06/17/2015    Procedure: INJ-FORAMEN EPI LUM/SAC SNGL;  Surgeon: Tom Main M.D.;  Location: [facility];  Service: Pain Management    NH NJX AA&/STRD TFRML EPI LUMBAR/SACRAL 1 LEVEL  10/08/2014    Performed by Tom Main M.D. at [facility]    LUMBAR LAMINECTOMY DISKECTOMY  10/03/2013    Performed by Daniel Jack M.D. at [facility]  "[place] [facility]    LUMBAR LAMINECTOMY DISKECTOMY  06/10/2009    Performed by ESTUARDO CARMEN at SURGERY [facility]    CERVICAL FUSION POSTERIOR      Dr Mcadams - 9/2023    DENTAL EXTRACTION(S)      wisdom    GYN SURGERY      c section x2    LUMBAR FUSION POSTERIOR PERCUTANEOUS Bilateral     12/2023 - Dr. Woodward - fusion L5-S1, revision from prior discectomy and cage placement.    LUMBAR FUSION POSTERIOR PERCUTANEOUS Bilateral     Dr. Mcadams - 12/2023 -[facility]    OTHER ORTHOPEDIC SURGERY      PRIMARY C SECTION      x2     Allergies   Allergen Reactions    Sumatriptan Succinate      \"face burns & I can't see\"    Amoxicillin Rash     Rash on extremities and swelling of arms and legs    Tramadol Photosensitivity     Gives her migraines and makes her feel sick     Current Outpatient Medications   Medication Sig    ibuprofen (MOTRIN) 400 MG Tab Take 400 mg by mouth every 6 hours as needed.    tizanidine (ZANAFLEX) 4 MG Tab Take 1 Tablet by mouth 2 times a day.    amphetamine-dextroamphetamine (ADDERALL) 10 MG Tab Take 1 Tablet by mouth 2 times a day for 30 days.    ondansetron (ZOFRAN) 4 MG Tab tablet Take 1 Tablet by mouth every 8 hours as needed for Nausea/Vomiting.    pregabalin (LYRICA) 150 MG Cap Take 1 Capsule by mouth 3 times a day for 90 days.    promethazine (PHENERGAN) 25 MG Tab TAKE 1/2 TO 1 (ONE-HALF TO ONE) TABLET BY MOUTH EVERY 8 HOURS AS NEEDED FOR NAUSEA    propranolol LA (INDERAL LA) 60 MG CAPSULE SR 24 HR Take 1 Capsule by mouth every day.    Rimegepant Sulfate (NURTEC) 75 MG TABLET DISPERSIBLE Take 1 Tablet by mouth every 48 hours. For migraine prevention    amitriptyline (ELAVIL) 25 MG Tab Take 1 Tablet by mouth every evening. For sleep.  Take 45 min before bedtime and allow 8 hours for sleep    asa/apap/caffeine (EXCEDRIN) 250-250-65 MG Tab Take 2-3 Tablets by mouth every 8 hours as needed for Headache.     Social History     Socioeconomic History    Marital status:      Spouse name: Not on file    " Number of children: Not on file    Years of education: Not on file    Highest education level: Not on file   Occupational History    Not on file   Tobacco Use    Smoking status: Former     Current packs/day: 0.00     Types: Cigarettes     Start date: 3/1/2003     Quit date: 3/1/2007     Years since quittin.9    Smokeless tobacco: Never    Tobacco comments:     2007   Vaping Use    Vaping status: Never Used   Substance and Sexual Activity    Alcohol use: No     Comment: denies ; family hx of alcoholism    Drug use: No     Types: Marijuana, Methamphetamines    Sexual activity: Yes     Birth control/protection: Injection     Comment: , two kids; special ed   Other Topics Concern    Not on file   Social History Narrative    Not on file     Social Drivers of Health     Financial Resource Strain: Low Risk  (2023)    Received from [facility]    Overall Financial Resource Strain (CARDIA)     Difficulty of Paying Living Expenses: Not hard at all   Food Insecurity: No Food Insecurity (2023)    Received from [facility]    Hunger Vital Sign     Worried About Running Out of Food in the Last Year: Never true     Ran Out of Food in the Last Year: Never true   Transportation Needs: Unmet Transportation Needs (2023)    Received from [facility]    PRAPARE - Transportation     Lack of Transportation (Medical): Yes     Lack of Transportation (Non-Medical): Yes   Physical Activity: Insufficiently Active (2023)    Received from [facility]    Exercise Vital Sign     Days of Exercise per Week: 2 days     Minutes of Exercise per Session: 30 min   Stress: No Stress Concern Present (2023)    Received from [facility]    [employer] [place] [employer] - Occupational Stress Questionnaire     Feeling of Stress : Only a little   Social Connections: Socially Integrated  (9/22/2023)    Received from [facility]    Social Connection and Isolation Panel [NHANES]     Frequency of Communication with Friends and Family: More than three times a week     Frequency of Social Gatherings with Friends and Family: Once a week     Attends Evangelical Services: 1 to 4 times per year     Active Member of Clubs or Organizations: Yes     Attends Club or Organization Meetings: More than 4 times per year     Marital Status:    Intimate Partner Violence: Not At Risk (9/22/2023)    Received from [facility]    Humiliation, Afraid, Rape, and Kick questionnaire     Fear of Current or Ex-Partner: No     Emotionally Abused: No     Physically Abused: No     Sexually Abused: No   Housing Stability: Unknown (9/22/2023)    Received from [facility]    Housing Stability Vital Sign     Unable to Pay for Housing in the Last Year: No     Number of Places Lived in the Last Year: Not on file     Unstable Housing in the Last Year: No      Family History   Problem Relation Age of Onset    Stroke Mother         aneurysm - pt was 14 yrs old    Stroke Father         TIA    Cancer Maternal Grandmother         lung /breast    Cancer Paternal Grandmother         breast / lung?       ROS: Negative except as detailed in HPI.     Objective:   BP (!) 152/98 (BP Location: Right arm, Patient Position: Sitting, BP Cuff Size: Adult)   Pulse 80   Temp 36.4 °C (97.6 °F) (Temporal)   Ht 1.829 m (6')   Wt 119 kg (262 lb 8 oz)   LMP 03/16/2017   SpO2 97%   BMI 35.60 kg/m²     Physical Exam:  Constitutional: Well-developed and well-nourished. Not diaphoretic. No distress.   Skin: Skin is warm and dry. No rash noted.  Head: Atraumatic without lesions.  Eyes: Conjunctivae and extraocular motions are normal. No scleral icterus.   Nose: Nares patent. Septum midline. No discharge.   Neck: Supple, trachea midline. Normal range of motion. No thyromegaly present. No  lymphadenopathy--cervical or supraclavicular.  Cardiovascular: Regular rate and rhythm, 2+ pulses   Lungs: Effort normal. No chest wall masses.   Abdomen: Soft, non tender, and without distention. No rebound, guarding, masses.  Rectal: No infection or external lesions noted.  No masses or lesions on JONATHAN.  Normal Sphincter tone.  Extremities: Warm and well perfused, no pitting edema  Musculoskeletal: All major joints AROM full in all directions without pain.  Neurological: Alert and oriented x 3.   Psychiatric:  Behavior, mood, and affect are appropriate.      Procedures:  Anoscopy completed in clinic- findings as above    Diagnosis:     Assessment & Plan  Incontinence of feces, unspecified fecal incontinence type    Orders:    Referral to Colorectal Surgery          Medical Decision Making:  Today's Assessment / Status / Plan:     39y F here with fecal incontinence, no other anorectal pathology identified on exam.  I think the best next step in her treatment would be evaluation with anal manometry and possible biofeedback vs sacral nerve stimulator placement.  I strongly suspect the later is needed given her hx of nerve injury.  We don't currently provide this service at [facility], so will [place] [facility]

## 2025-02-12 NOTE — PROGRESS NOTES
[facility]  GENERAL NEUROLOGY  NEW PATIENT VISIT    Referral source: Filomena LIM    CC: Migraine without aura and without status migrainosus not intractable    HISTORY OF ILLNESS:  Joy Mcnamara is a 39 y.o. woman with a history most notable for migraine.  She presented to her primary provider with continued complaints of migraines.  She had tried Aimovig and Topamax and none of which was helpful.  She was started on propranolol and Nurtec.  She was referred to neurology for further evaluation and treatment options.  Today, Joy provided the following history:    Migraine description  Starts with nausea in the last few months. She is reporting approximately 1 hours until migraine. Migraine will start behind the left eye and will radiate to the right side or radiates across the left side. Can rarely radiate to the neck and shoulders. Quality of migraine is head in a vice. Intensity of migraine without medication 9-10/10. Fioricet 0-2/10, sumatriptan 10/10, rizatriptan 10/10, excedrin 5/10, nurtec 2-7/10. Migraines can last for 5 hours to 2.5 days. Post migraine hangover of tired, fatigue, and tingling for a day. Migraine frequency is 2-3 times a week. Associated symptoms: light sensitivity, noise sensitivity, smell sensitivity, nausea, vomiting, blurred vision, double vision horizontal, shoulder tingling, mastoid tingling, neck tension, tunnel vision, heaviness of eyelid. Triggers: possible stress, sleep deprivation, skipping meals, dehydration, barometric pressure change, altitude change, strong perfume/ cologne, strong cigarette smell, strong fire smell, strong soap smell, Avocado, chocolate, hot dogs, bright lights/ LED lights/ sunset light, hormones. Can trigger a migraine with sudden position change, laughing, crying, coughing.   Denies being able to trigger a migraine with bending, position change, or sneezing. Denies double vision outside of migraine. Migraines occur more  frequently in the afternoon. She does report snoring. She is more tired after a full night sleep. She is sleepy throughout the day. She grinds her teeth and clenches her jaw at night. She does startle herself awake at night and needs to take a deep breath. Unknown family history of sleep apnea.       The following is a summary of headache symptoms, presented in my standard format:    Family History: cousin  Age at onset (years): 2 years old  Location: see above   Radiation: see above   Frequency: see above   Duration: see above   Headache Days/Month: November 10-13/30, December 10-13/30, January 10-15/30, February 5/14  Quality: see above   Intensity: see above   Aura: see above   Photophobia/Phonophobia/Nausea/Vomiting: yes, yes, yes, yes  Provoked by Physical Activity?: yes  Triggers: see above   Associated Symptoms: see above   Autonomic Signs (such as ptosis, miosis, conjunctival injection, rhinorrhea, increased lacrimation): no  Head Trauma:  multiple head injuries in childhood.   Association with Menses: yes  ED Visits: yes  Hospitalizations: no  Missed Work Days (DMV): 1-2 days a month   Sleep (hours/night): 2-6 hours   Caffeine Intake: 1 red bull once a week, one soda once a week  Hydration: yes- needs improvement  Nutrition: skips meals and can trigger a migraine   Exercise: does not trigger  Analgesic Overuse: Excedrin 2 tablets once a week    Current Medication Regimen:  - Lyrica- different modality- not effective for migraine  - Amitriptyline- different modality - not effective for migraine     Medications Tried: Response  Preventive:  -Aimovig  -Topamax   - Nurtec preventative   - propranolol    Rescue:  -Sumatriptan  -Rizatriptan  - Fioricet   - Fiorinal     Medications Not Tried:  -     MEDICAL AND SURGICAL HISTORY:  Past Medical History:   Diagnosis Date    Back pain     Depression     s/p mom's death    Depression 4/25/2014    Gynecological disorder     Migraine headache     Miscarriage     Pain  2017    low back; degenerative disk disease     Past Surgical History:   Procedure Laterality Date    VAGINAL HYSTERECTOMY SCOPE TOTAL N/A 03/27/2017    Procedure: VAGINAL HYSTERECTOMY SCOPE TOTAL right salpingectomy, partial left salpingectomy. Cystoscopy;  Surgeon: Zayda Santillan M.D.;  Location: SURGERY SAME DAY [facility];  Service:     MD NJX AA&/STRD TFRML EPI LUMBAR/SACRAL 1 LEVEL Bilateral 06/17/2015    Procedure: TRANSFORAMINAL BILATERAL L5-S1 EPIDURAL WITH IV SEDATION;  Surgeon: Tom Main M.D.;  Location: SURGERY [facility];  Service: Pain Management    MD NJX AA&/STRD TFRML EPI LUMBAR/SACRAL 1 LEVEL  06/17/2015    Procedure: INJ-FORAMEN EPI LUM/SAC SNGL;  Surgeon: Tom Main M.D.;  Location: [facility];  Service: Pain Management    MD NJX AA&/STRD TFRML EPI LUMBAR/SACRAL 1 LEVEL  10/08/2014    Performed by Tom Main M.D. at [facility]    LUMBAR LAMINECTOMY DISKECTOMY  10/03/2013    Performed by Estuardo Jack M.D. at SURGERY [facility]    LUMBAR LAMINECTOMY DISKECTOMY  06/10/2009    Performed by ESTUARDO JACK at SURGERY [facility]    CERVICAL FUSION POSTERIOR      Dr Mcadams - 9/2023    DENTAL EXTRACTION(S)      wisdom    GYN SURGERY      c section x2    LUMBAR FUSION POSTERIOR PERCUTANEOUS Bilateral     12/2023 - Dr. Woodward - fusion L5-S1, revision from prior discectomy and cage placement.    LUMBAR FUSION POSTERIOR PERCUTANEOUS Bilateral     Dr. Mcadams - 12/2023 -[facility]    OTHER ORTHOPEDIC SURGERY      PRIMARY C SECTION      x2     MEDICATIONS:  Current Outpatient Medications   Medication Sig    ibuprofen (MOTRIN) 400 MG Tab Take 400 mg by mouth every 6 hours as needed.    tizanidine (ZANAFLEX) 4 MG Tab Take 1 Tablet by mouth 2 times a day.    amphetamine-dextroamphetamine (ADDERALL) 10 MG Tab Take 1 Tablet by mouth 2 times a day for 30 days.    ondansetron (ZOFRAN) 4 MG Tab tablet Take 1 Tablet by mouth every 8 hours as needed for  Nausea/Vomiting.    pregabalin (LYRICA) 150 MG Cap Take 1 Capsule by mouth 3 times a day for 90 days.    promethazine (PHENERGAN) 25 MG Tab TAKE 1/2 TO 1 (ONE-HALF TO ONE) TABLET BY MOUTH EVERY 8 HOURS AS NEEDED FOR NAUSEA    propranolol LA (INDERAL LA) 60 MG CAPSULE SR 24 HR Take 1 Capsule by mouth every day.    Rimegepant Sulfate (NURTEC) 75 MG TABLET DISPERSIBLE Take 1 Tablet by mouth every 48 hours. For migraine prevention    amitriptyline (ELAVIL) 25 MG Tab Take 1 Tablet by mouth every evening. For sleep.  Take 45 min before bedtime and allow 8 hours for sleep    asa/apap/caffeine (EXCEDRIN) 250-250-65 MG Tab Take 2-3 Tablets by mouth every 8 hours as needed for Headache.     SOCIAL HISTORY:  Social History     Tobacco Use    Smoking status: Former     Current packs/day: 0.00     Types: Cigarettes     Start date: 3/1/2003     Quit date: 3/1/2007     Years since quittin.9    Smokeless tobacco: Never    Tobacco comments:     2007   Substance Use Topics    Alcohol use: No     Comment: denies ; family hx of alcoholism     Social History     Social History Narrative    Not on file     FAMILY HISTORY:  Family History   Problem Relation Age of Onset    Stroke Mother         aneurysm - pt was 14 yrs old    Stroke Father         TIA    Cancer Maternal Grandmother         lung /breast    Cancer Paternal Grandmother         breast / lung?       Ambulatory Vitals  Encounter Vitals  Temperature: 36.7 °C (98 °F)  Temp src: Temporal  Blood Pressure: 128/80  Pulse: 76  Respiration: 12  Pulse Oximetry: 97 %  Weight: 122 kg (269 lb 2.9 oz)  Height: 182.9 cm (6') (pt reported)  BMI (Calculated): 36.51     REVIEW OF SYSTEMS:  A ROS was completed.  Pertinent positives and negatives were included in the HPI, above.  All other systems were reviewed and are negative.    PHYSICAL EXAM:  General/Medical:  Mercedes presents clean and well-dressed.  She does not appear in any acute distress at this time.  She has no malar rash.   She reports no jaw claudication but does report jaw pain associated with clenching and grinding.  She reports no allodynia.  She has no upper or lower extremity edema.  She has palpable radial pulses.  She had prolonged capillary refill.  Auscultation of her heart revealed S1 and S2 with no abnormal sounds.  Vital signs are listed above and are within normal limits.    Neuro:  MENTAL STATUS: awake and alert; no deficits of speech or language; oriented to person, place, and time; affect was appropriate to situation    CRANIAL NERVES:    II: acuity: J1/J2, fields: intact to confrontation, pupils: 3/3 to 2/2 without a relative afferent pupillary defect, discs: sharp    III/IV/VI: versions: intact without nystagmus    V: facial sensation: symmetric to light touch    VII: facial expression: symmetric    VIII: hearing: intact to finger rub    IX/X: palate: elevates symmetrically    XI: shoulder shrug: symmetric    XII: tongue: midline    MOTOR:  - bulk: normal throughout  - tone: normal throughout  Upper Extremity Strength  (R/L)    4+/4+   Elbow flexion 4+/4+   Elbow extension 4+/4+   Shoulder abduction 4+/4+     Lower Extremity Strength  (R/L)   Hip flexion 4+/4+   Knee extension 4+/4+   Knee flexion 4+/4+   Ankle plantarflexion 4+/4+   Ankle dorsiflexion 4+/4+     - pronator drift: absent  - abnormal movements: none    SENSATION:  - light touch: Intact  - vibration: Inconsistent in the left outer aspect of calf due to numbness consistent everywhere else.  - temperature: Inconsistent in the right hand and neck  - pinprick: NT  - proprioception: NT  - Romberg: absent    COORDINATION:  - finger to nose: normal, no ataxia on exam  - finger tapping: rapid and accurate, bilaterally  - foot tapping: Slow with low amplitude, bilaterally  - foot inversion/ eversion: Limited range of motion, bilaterally  - clonus: Negative    REFLEXES:  Reflex Right Left   BR 1+ 1+   Biceps 1+ 1+   Triceps 1+ 1+   Patellae 1+ 1+   Achilles  "1+ 1+   Toes NT NT     GAIT:  - narrow base and normal with normal arm swing  - heel-walk: Difficult to perform due to nerve damage, intact but left foot did drop  - toe-walk: Intact  - tandem gait: intact    REVIEW OF IMAGING STUDIES: I reviewed the following studies:  MRI Brain:  Date: N/A  W/o and w/ contrast?:  N/A  Indication: \"N/A\"  Comparison: N/A  Impression:  \"N/A\"    MRI C-spine:  Date: 2022  W/o and w/ contrast?:  Without  Indication: \"Neck pain\"  Comparison: None  Impression:  Mild cervical spine degenerative changes, most prominent at C5-6 with mild right foraminal narrowing. However, there is no definite nerve impingement.     REVIEW OF LABORATORY STUDIES:  2025 lipid panel WNL except total cholesterol 231 and   2025 TSH WNL  2025 hemoglobin A1c 5.2  2025 CMP WNL  2025 CBC WNL    ASSESSMENT& PLAN:1. Migraine with aura and with status migrainosus, not intractable  Joy presents to establish with a neurology provider for continued management of her migraines.  In the past she has tried multiple preventative medications listed above including: Aimovig, Topamax, propranolol, amitriptyline, Lyrica, and Nurtec prevention.  She is currently on propranolol and Nurtec for prevention but is still complaining of 10 to 15 migraine days a month.  Currently she is without a rescue medication but was using Fioricet which she does find beneficial for her migraines.  Her neurological exam revealed: Diminished strength in the upper and lower extremities left worse than the right, diminished reflexes, inconsistent temperature, and difficulty performing heel walk.  She had no other concerning neurological findings.  We did discuss an MRI of her brain with and without contrast looking for any secondary causes for her migraines.  She does have a first-degree relative who  of an aneurysm so would be a good idea to get an MRI of her brain.  She is agreeable to an MRI of her brain " with and without contrast however she does have claustrophobia so I will give her a higher dose of Xanax to take prior to MRI.  For migraine prevention we discussed: Botox, Qulipta, acetazolamide, or valproic acid.  She does have a history of severe depression with suicide attempt so I will stay away from [facility] as this could exacerbate suicidal ideation.  For her next preventative medication Joy would like to proceed with Botox.  Administration, side effects, and frequency were discussed.  She did fill out the authorization form.  As soon as we have authorization she will follow-up for Botox administration.  We did discuss waiting until she has Botox on board before stopping Nurtec and propranolol both of which she can stop abruptly.  She reports amitriptyline also being used for sleep and her migraines.  We did discuss titrating off of it if she does not find it beneficial.  She will need a new rescue medication.  She is okay to continue Fioricet if given by her primary provider.  Or we can try: Zavzpret or Ubrelvy.  She would like to try it Ubrelvy.  Administration, side effects, and redosing were discussed.  She does endorse having nausea and vomiting associated with her migraines but has Phenergan and Zofran at home.  We did discuss lifestyle changes such as: Adequate sleep, staying hydrated, stress reduction, not skipping meals, and not overusing over-the-counter analgesics.  We also discussed over-the-counter supplements for migraine prevention listed below.  She does have issues with sleep and does sound like she may have some sleep apnea we discussed a referral to sleep specialty to rule out sleep apnea and insomnia as a contributing factor for her migraines.  She is agreeable to a referral for a sleep study.  Her PHQ-9 was elevated we did discuss a referral for a psychiatrist or a therapist.  She reports that she is looking for both and her insurance does not require a referral but she is actively  looking for both a psychiatrist and a therapist.  If she needs a referral in the future she can reach out and I will order.  She can contact me via [employer] with any updates, concerns, or questions.  Otherwise she will follow-up with me in 3 months to discuss her use of Botox and its effectiveness on her migraines.  - Patient has been identified as having a positive depression screening. Appropriate orders and counseling have been given.  - onabotulinum toxin A (Botox) injection 155 Units  - Ubrogepant (UBRELVY) 100 MG Tab; Take 1 Tablet by mouth as needed (1 tab at headache osnet, repeat in 2 hour prn).  Dispense: 10 Tablet; Refill: 5  - MR-BRAIN-WITH & W/O; Future  - Referral to Pulmonary and Sleep Medicine  - ALPRAZolam (XANAX) 1 MG Tab; Take 1 Tablet by mouth one time for 1 dose. Take one tablet 1.5 hours before MRI. Can take another dose 30 minutes before MRI. Take all in one day  Dispense: 2 Tablet; Refill: 0   Try supplementing:  - magnesium: 400-600 mg once or 200-300 mg twice daily  - riboflavin (vitamin B2): 400 mg once daily  - coenzyme Q10: 300 mg daily     BILLING DOCUMENTATION:   I spent 72 minutes in patient care. This includes time with chart review, pre-charting, records review, discussions with other healthcare providers, and documentation. This also includes face-to-face time with the patient for: exam review, discussion and treatment planning.      Rain Renee MSN APRN-CNP  [facility] [place]

## 2025-02-13 ENCOUNTER — PATIENT MESSAGE (OUTPATIENT)
Dept: MEDICAL GROUP | Facility: LAB | Age: 40
End: 2025-02-13
Payer: COMMERCIAL

## 2025-02-13 ENCOUNTER — TELEPHONE (OUTPATIENT)
Dept: NEUROLOGY | Facility: MEDICAL CENTER | Age: 40
End: 2025-02-13
Payer: COMMERCIAL

## 2025-02-13 DIAGNOSIS — K08.9 CHRONIC DENTAL PAIN: ICD-10-CM

## 2025-02-13 DIAGNOSIS — G89.29 CHRONIC DENTAL PAIN: ICD-10-CM

## 2025-02-13 DIAGNOSIS — F41.9 ANXIETY: ICD-10-CM

## 2025-02-13 DIAGNOSIS — G43.009 MIGRAINE WITHOUT AURA AND WITHOUT STATUS MIGRAINOSUS, NOT INTRACTABLE: ICD-10-CM

## 2025-02-13 RX ORDER — CLINDAMYCIN HYDROCHLORIDE 300 MG/1
300 CAPSULE ORAL 3 TIMES DAILY
Qty: 15 CAPSULE | Refills: 0 | Status: SHIPPED | OUTPATIENT
Start: 2025-02-13 | End: 2025-02-18

## 2025-02-13 RX ORDER — ALPRAZOLAM 0.5 MG
0.5 TABLET ORAL 2 TIMES DAILY PRN
Qty: 10 TABLET | Refills: 1 | Status: SHIPPED | OUTPATIENT
Start: 2025-02-13 | End: 2025-03-15

## 2025-02-13 RX ORDER — PROMETHAZINE HYDROCHLORIDE 25 MG/1
TABLET ORAL
Qty: 30 TABLET | Refills: 0 | Status: SHIPPED | OUTPATIENT
Start: 2025-02-13

## 2025-02-13 RX ORDER — ONDANSETRON 4 MG/1
4 TABLET, FILM COATED ORAL EVERY 8 HOURS PRN
Qty: 30 TABLET | Refills: 2 | Status: SHIPPED | OUTPATIENT
Start: 2025-02-13

## 2025-02-13 NOTE — TELEPHONE ENCOUNTER
NEUROLOGY PATIENT PRE-VISIT PLANNING     Patient was NOT contacted to complete PVP.  Note: Patient will not be contacted if there is no indication to call.     Patient Appointment is scheduled as: New Patient     Is visit type and length scheduled correctly? Yes    [employer] Patient is checked in Patient Demographics? Yes    3.   Is referral attached to visit? Yes    4. Were records received from referring provider? Yes    4. Patient was NOT contacted to have someone accompany them to visit.     5. Is this appointment scheduled as a Hospital Follow-Up?  No    6. Does the patient require any pre procedure or post procedure follow up? No    7. If any orders were placed at last visit or intended to be done for this visit do we have Results/Consult Notes? No  Labs - Labs were not ordered at last office visit.  Imaging - Imaging was not ordered at last office visit.  Referrals - No referrals were ordered at last office visit.  Note: If patient appointment is for lab or imaging review and patient did not complete the studies, check with provider if OK to reschedule patient until completed.    8. If patient appointment is for Botox - is order pended for provider? N/A    9. Was Plan Assessment from last Neurology Office Visit Reviewed?  No

## 2025-02-14 ENCOUNTER — TELEPHONE (OUTPATIENT)
Dept: NEUROLOGY | Facility: MEDICAL CENTER | Age: 40
End: 2025-02-14

## 2025-02-14 ENCOUNTER — OFFICE VISIT (OUTPATIENT)
Dept: NEUROLOGY | Facility: MEDICAL CENTER | Age: 40
End: 2025-02-14
Payer: COMMERCIAL

## 2025-02-14 VITALS
WEIGHT: 269.18 LBS | RESPIRATION RATE: 12 BRPM | SYSTOLIC BLOOD PRESSURE: 128 MMHG | BODY MASS INDEX: 36.46 KG/M2 | HEART RATE: 76 BPM | TEMPERATURE: 98 F | HEIGHT: 72 IN | OXYGEN SATURATION: 97 % | DIASTOLIC BLOOD PRESSURE: 80 MMHG

## 2025-02-14 DIAGNOSIS — F40.240 CLAUSTROPHOBIA: ICD-10-CM

## 2025-02-14 DIAGNOSIS — G43.101 MIGRAINE WITH AURA AND WITH STATUS MIGRAINOSUS, NOT INTRACTABLE: ICD-10-CM

## 2025-02-14 PROCEDURE — 99212 OFFICE O/P EST SF 10 MIN: CPT

## 2025-02-14 PROCEDURE — 3074F SYST BP LT 130 MM HG: CPT

## 2025-02-14 PROCEDURE — 3079F DIAST BP 80-89 MM HG: CPT

## 2025-02-14 PROCEDURE — 99205 OFFICE O/P NEW HI 60 MIN: CPT

## 2025-02-14 RX ORDER — UBROGEPANT 100 MG/1
1 TABLET ORAL PRN
Qty: 10 TABLET | Refills: 5 | Status: SHIPPED | OUTPATIENT
Start: 2025-02-14

## 2025-02-14 RX ORDER — ALPRAZOLAM 1 MG/1
1 TABLET ORAL ONCE
Qty: 2 TABLET | Refills: 0 | Status: SHIPPED | OUTPATIENT
Start: 2025-02-14 | End: 2025-02-14

## 2025-02-14 ASSESSMENT — PATIENT HEALTH QUESTIONNAIRE - PHQ9
CLINICAL INTERPRETATION OF PHQ2 SCORE: 2
SUM OF ALL RESPONSES TO PHQ QUESTIONS 1-9: 13
5. POOR APPETITE OR OVEREATING: 3 - NEARLY EVERY DAY

## 2025-02-14 ASSESSMENT — FIBROSIS 4 INDEX: FIB4 SCORE: 0.59

## 2025-02-14 NOTE — TELEPHONE ENCOUNTER
Prior Authorization for UBRELVY 100MG (Quantity: 10, Days: 30) has been submitted via [id number]    Insurance: [employer] SCRIPS   Member ID:  [id number]   BIN: [id number]   PCN: [id number]   Group: [id number]    Will follow up in 24-48 business hours.

## 2025-02-14 NOTE — TELEPHONE ENCOUNTER
Received [employer] request via MS[employer]  for ubrelvy 100mg. (Quantity:10, Day Supply:30)     Insurance: [employer] SCRIPS  Member ID:  [id number]  BIN: [id number]  PCN: [id number]  Group: [id number]     Ran Test claim via [place] & medication Rejects stating prior authorization is required.

## 2025-02-14 NOTE — PATIENT INSTRUCTIONS
Try supplementing:  - magnesium: 400  - riboflavin (vitamin B2): 400 mg once daily  - coenzyme Q10: 300 mg daily     At onset of migraine take one ubrelvy, one nausea med, and two excedrin. If in 2 hours migraine is not 0/10 take another ubrelvy.

## 2025-02-14 NOTE — TELEPHONE ENCOUNTER
Prior Authorization for UBRELVY 100MG has been approved for a quantity of 10 , day supply 30    Prior Authorization reference number: [id number]  Insurance: EXPRESS ITZ   Member ID:  [id number]   BIN: [id number]   PCN: [id number]   Group: [id number]  Effective dates: 2/14/2025-2/13/2026  Copay: $0     Is patient eligible to fill with [facility]? Yes    Next Steps: The Patients copay is less than $5.00. Will contact the patient to determine choice of pharmacy, if applicable.

## 2025-02-19 ENCOUNTER — OFFICE VISIT (OUTPATIENT)
Dept: URGENT CARE | Facility: CLINIC | Age: 40
End: 2025-02-19
Payer: COMMERCIAL

## 2025-02-19 VITALS
OXYGEN SATURATION: 97 % | DIASTOLIC BLOOD PRESSURE: 90 MMHG | BODY MASS INDEX: 36.03 KG/M2 | HEART RATE: 90 BPM | WEIGHT: 266 LBS | SYSTOLIC BLOOD PRESSURE: 180 MMHG | RESPIRATION RATE: 14 BRPM | TEMPERATURE: 97.5 F | HEIGHT: 72 IN

## 2025-02-19 DIAGNOSIS — K08.89 PAIN, DENTAL: ICD-10-CM

## 2025-02-19 PROCEDURE — 3077F SYST BP >= 140 MM HG: CPT | Performed by: PHYSICIAN ASSISTANT

## 2025-02-19 PROCEDURE — 99214 OFFICE O/P EST MOD 30 MIN: CPT | Performed by: PHYSICIAN ASSISTANT

## 2025-02-19 PROCEDURE — 3080F DIAST BP >= 90 MM HG: CPT | Performed by: PHYSICIAN ASSISTANT

## 2025-02-19 ASSESSMENT — ENCOUNTER SYMPTOMS
FEVER: 0
PALPITATIONS: 0
WEAKNESS: 0
CHILLS: 0
DIZZINESS: 0
VOMITING: 0
SHORTNESS OF BREATH: 0
COUGH: 0
MYALGIAS: 0
HEADACHES: 0
SENSORY CHANGE: 0
NAUSEA: 0
TINGLING: 0
FOCAL WEAKNESS: 0

## 2025-02-19 ASSESSMENT — FIBROSIS 4 INDEX: FIB4 SCORE: 0.59

## 2025-02-19 NOTE — PROGRESS NOTES
Subjective     Joy Mcnamara is a 39 y.o. female who presents with Dental Injury (Lower (R) jaw, completed Abx, pain is worse )            Patient is here with right lower tooth/jaw pain. Patient took 5 days of Clindamycin as prescribed by her PCP. Patient states her pain slightly improved until today. She reports severe pain. She has had no fever or chills. She cannot get into her dentist for several weeks. She has been taking Ibuprofen, Zanaflex and Lyrica with no relief. She reports she thought she had an Amoxicillin allergy in the past but actually discovered that the rash on her legs was due to Diclofenac. She denies history of throat swelling or anaphylaxis. She would like to try Augmentin.       Past Medical History:   Diagnosis Date    Back pain     Depression     s/p mom's death    Depression 4/25/2014    Gynecological disorder     Migraine headache     Miscarriage     Pain 2017    low back; degenerative disk disease       Past Surgical History:   Procedure Laterality Date    VAGINAL HYSTERECTOMY SCOPE TOTAL N/A 03/27/2017    Procedure: VAGINAL HYSTERECTOMY SCOPE TOTAL right salpingectomy, partial left salpingectomy. Cystoscopy;  Surgeon: Zayda Santillan M.D.;  Location: SURGERY SAME DAY [facility] ORS;  Service:     [place] NJX AA&/STRD TFRML EPI LUMBAR/SACRAL 1 LEVEL Bilateral 06/17/2015    Procedure: TRANSFORAMINAL BILATERAL L5-S1 EPIDURAL WITH IV SEDATION;  Surgeon: Tom Main M.D.;  Location: [facility];  Service: Pain Management    AZ NJX AA&/STRD TFRML EPI LUMBAR/SACRAL 1 LEVEL  06/17/2015    Procedure: INJ-FORAMEN EPI LUM/SAC SNGL;  Surgeon: Tom Main M.D.;  Location: [facility];  Service: Pain Management    AZ NJX AA&/STRD TFRML EPI LUMBAR/SACRAL 1 LEVEL  10/08/2014    Performed by Tom Main M.D. at [facility] ORS    LUMBAR LAMINECTOMY DISKECTOMY  10/03/2013    Performed by Daniel Jack M.D. at [facility]    LUMBAR LAMINECTOMY  "DISKECTOMY  06/10/2009    Performed by ESTUARDO CARMEN at SURGERY [facility] ORS    CERVICAL FUSION POSTERIOR      Dr Mcadams - 9/2023    DENTAL EXTRACTION(S)      wisdom    GYN SURGERY      c section x2    LUMBAR FUSION POSTERIOR PERCUTANEOUS Bilateral     12/2023 - Dr. Woodward - fusion L5-S1, revision from prior discectomy and cage placement.    LUMBAR FUSION POSTERIOR PERCUTANEOUS Bilateral     Dr. Mcadams - 12/2023 -[facility]    OTHER ORTHOPEDIC SURGERY      PRIMARY C SECTION      x2       Family History   Problem Relation Age of Onset    Stroke Mother         aneurysm - pt was 14 yrs old    Stroke Father         TIA    Cancer Maternal Grandmother         lung /breast    Cancer Paternal Grandmother         breast / lung?     Allergies:  Sumatriptan succinate and Tramadol    Medications, Allergies, and current problem list reviewed today in Epic    Review of Systems   Constitutional:  Negative for chills, fever and malaise/fatigue.   Respiratory:  Negative for cough and shortness of breath.    Cardiovascular:  Negative for chest pain and palpitations.   Gastrointestinal:  Negative for nausea and vomiting.   Musculoskeletal:  Negative for myalgias.   Neurological:  Negative for dizziness, tingling, sensory change, focal weakness, weakness and headaches.     All other systems reviewed and are negative.            Objective     BP (!) 180/90 (BP Location: Left arm, Patient Position: Sitting, BP Cuff Size: Adult long)   Pulse 90   Temp 36.4 °C (97.5 °F) (Temporal)   Resp 14   Ht 1.829 m (6' 0.01\")   Wt 121 kg (266 lb)   LMP 03/16/2017   SpO2 97%   BMI 36.07 kg/m²      Physical Exam  Constitutional:       General: She is not in acute distress.     Appearance: She is not ill-appearing.   HENT:      Head: Normocephalic and atraumatic.        Mouth/Throat:      Dentition: Abnormal dentition. Dental tenderness present. No gingival swelling or dental abscesses.   Eyes:      Conjunctiva/sclera: Conjunctivae normal. "   Cardiovascular:      Rate and Rhythm: Normal rate and regular rhythm.   Pulmonary:      Effort: Pulmonary effort is normal. No respiratory distress.      Breath sounds: No stridor. No wheezing.   Lymphadenopathy:      Cervical: No cervical adenopathy.   Skin:     General: Skin is warm and dry.   Neurological:      General: No focal deficit present.      Mental Status: She is alert and oriented to person, place, and time.   Psychiatric:         Mood and Affect: Mood normal.         Behavior: Behavior normal.         Thought Content: Thought content normal.         Judgment: Judgment normal.                                  Assessment & Plan  Pain, dental    Orders:    amoxicillin-clavulanate (AUGMENTIN) 875-125 MG Tab; Take 1 Tablet by mouth 2 times a day for 10 days.         PAtient's BP is elevated today. She reports this occurs with pain. She monitors her BP regularly and is compliant with her medication. She denies any associated symptoms.she will go home and continue to monitor and be rechecked if no improvement.     She also would like to try Augmentin.She has taken amoxicillin and penicillin derivatives her whole life without issues. She developed a rash on her legs about 2-3 years ago when on Amoxicillin and Diclofenac. She states she has similar issues with Meloxicam and believes Diclofenac was the culprit. She understands the risks of retrying the Amoxicillin and will have an antihistamine on hand.       Differential diagnoses, Supportive care, and indications for immediate follow-up discussed with patient.   Pathogenesis of diagnosis discussed including typical length and natural progression.   Instructed to return to clinic or nearest emergency department for any change in condition, further concerns, or worsening of symptoms.      The patient demonstrated a good understanding and agreed with the treatment plan.      Olivia Benitez P.A.-C.

## 2025-02-19 NOTE — Clinical Note
REFERRAL APPROVAL NOTICE         Sent on February 18, 2025                   Joy Mcnamara  [address] [place] [address]                   Dear Ms. Mcnamara,    After a careful review of the medical information and benefit coverage, [facility] has processed your referral. See below for additional details.    If applicable, you must be actively enrolled with your insurance for coverage of the authorized service. If you have any questions regarding your coverage, please contact your insurance directly.    REFERRAL INFORMATION   Referral #:  [id number]  Referred-To Department    Referred-By Provider:  Pulmonary and Sleep Medicine    TAI Hammonds   [place] [facility] [place]      [address]  [address]  [place] [id number] [phone] [address], [address]  [place] [id number] [phone]    Referral Start Date:  02/14/2025  Referral End Date:   02/15/2026           SCHEDULING  If you do not already have an appointment, please call [phone] to make an appointment.   MORE INFORMATION  As a reminder, [facility] ownership has changed, meaning this location is now owned and operated by [facility]. As such, we want to clarify that our patients should expect to receive two separate bills for the services received at [facility] - one representing the [facility] facility fees as the owner of the establishment, and the other to represent the physician's services and subsequent fees. You can speak with your insurance carrier for a pricing estimate by calling the customer service number on the back of your card and ask about charges for a hospital outpatient visit.  If you do not already have a [employer] account, sign up at: [email or web address]  You can access your medical information, make appointments, see lab results,  billing information, and more.  If you have questions regarding this referral, please contact  the [facility] department at:             [phone]. Monday - Friday 7:30AM - 5:00PM.      Sincerely,  [facility]

## 2025-02-28 DIAGNOSIS — F98.8 ATTENTION DEFICIT DISORDER (ADD) IN ADULT: ICD-10-CM

## 2025-03-03 DIAGNOSIS — G43.009 MIGRAINE WITHOUT AURA AND WITHOUT STATUS MIGRAINOSUS, NOT INTRACTABLE: ICD-10-CM

## 2025-03-03 RX ORDER — DEXTROAMPHETAMINE SACCHARATE, AMPHETAMINE ASPARTATE, DEXTROAMPHETAMINE SULFATE AND AMPHETAMINE SULFATE 2.5; 2.5; 2.5; 2.5 MG/1; MG/1; MG/1; MG/1
10 TABLET ORAL 2 TIMES DAILY
Qty: 60 TABLET | Refills: 0 | Status: SHIPPED | OUTPATIENT
Start: 2025-03-03 | End: 2025-03-28 | Stop reason: ALTCHOICE

## 2025-03-03 RX ORDER — ONDANSETRON 4 MG/1
4 TABLET, FILM COATED ORAL EVERY 8 HOURS PRN
Qty: 30 TABLET | Refills: 2 | Status: SHIPPED | OUTPATIENT
Start: 2025-03-03

## 2025-03-03 NOTE — TELEPHONE ENCOUNTER
Received request via: Patient    Was the patient seen in the last year in this department? Yes    Does the patient have an active prescription (recently filled or refills available) for medication(s) requested? No    Pharmacy Name: [facility]    Does the patient have long term Plus and need 100-day supply? (This applies to ALL medications) Patient does not have SCP

## 2025-03-04 DIAGNOSIS — G43.009 MIGRAINE WITHOUT AURA AND WITHOUT STATUS MIGRAINOSUS, NOT INTRACTABLE: ICD-10-CM

## 2025-03-04 RX ORDER — BUTALBITAL, ACETAMINOPHEN AND CAFFEINE 300; 40; 50 MG/1; MG/1; MG/1
1 CAPSULE ORAL EVERY 6 HOURS PRN
Qty: 15 CAPSULE | Refills: 0 | Status: SHIPPED | OUTPATIENT
Start: 2025-03-04 | End: 2025-03-11

## 2025-03-11 NOTE — Clinical Note
REFERRAL APPROVAL NOTICE         Sent on March 10, 2025                   Joy Mcnamara  [address] [place] [address]                   Dear Ms. Mcnamara,    After a careful review of the medical information and benefit coverage, [facility] has processed your referral. See below for additional details.    If applicable, you must be actively enrolled with your insurance for coverage of the authorized service. If you have any questions regarding your coverage, please contact your insurance directly.    REFERRAL INFORMATION   Referral #:  [id number]  Referred-To Department    Referred-By Provider:  Neurology    TAI Hammonds   Neurology [facility] [place]      [address]  [address]  [place] [id number] [phone] [address]  [place] [id number] [phone]    Referral Start Date:  02/18/2025  Referral End Date:   08/17/2025           SCHEDULING  If you do not already have an appointment, please call [phone] to make an appointment.   MORE INFORMATION  As a reminder, [facility] ownership has changed, meaning this location is now owned and operated by [facility]. As such, we want to clarify that our patients should expect to receive two separate bills for the services received at [facility] - one representing the [facility] facility fees as the owner of the establishment, and the other to represent the physician's services and subsequent fees. You can speak with your insurance carrier for a pricing estimate by calling the customer service number on the back of your card and ask about charges for a hospital outpatient visit.  If you do not already have a [employer] account, sign up at: [email or web address]  You can access your medical information, make appointments, see lab results, billing information, and more.  If you have questions regarding this referral, please contact  the [facility] Referrals department at:             [phone]. Monday  - Friday 7:30AM - 5:00PM.      Sincerely,  [facility]

## 2025-03-12 RX ORDER — PROMETHAZINE HYDROCHLORIDE 25 MG/1
TABLET ORAL
Qty: 30 TABLET | Refills: 0 | Status: SHIPPED | OUTPATIENT
Start: 2025-03-12

## 2025-03-17 ENCOUNTER — OFFICE VISIT (OUTPATIENT)
Dept: MEDICAL GROUP | Facility: LAB | Age: 40
End: 2025-03-17
Payer: COMMERCIAL

## 2025-03-17 VITALS
DIASTOLIC BLOOD PRESSURE: 80 MMHG | HEART RATE: 84 BPM | BODY MASS INDEX: 35.89 KG/M2 | RESPIRATION RATE: 16 BRPM | SYSTOLIC BLOOD PRESSURE: 178 MMHG | WEIGHT: 265 LBS | TEMPERATURE: 97.1 F | OXYGEN SATURATION: 95 % | HEIGHT: 72 IN

## 2025-03-17 DIAGNOSIS — R20.2 PARESTHESIA OF LEFT FOOT: ICD-10-CM

## 2025-03-17 DIAGNOSIS — H93.13 TINNITUS OF BOTH EARS: ICD-10-CM

## 2025-03-17 DIAGNOSIS — S34.8XXA: ICD-10-CM

## 2025-03-17 DIAGNOSIS — G89.29 CHRONIC BILATERAL LOW BACK PAIN WITH BILATERAL SCIATICA: ICD-10-CM

## 2025-03-17 DIAGNOSIS — F32.89 OTHER DEPRESSION: ICD-10-CM

## 2025-03-17 DIAGNOSIS — I10 PRIMARY HYPERTENSION: ICD-10-CM

## 2025-03-17 DIAGNOSIS — F41.9 ANXIETY: ICD-10-CM

## 2025-03-17 DIAGNOSIS — F98.8 ATTENTION DEFICIT DISORDER (ADD) IN ADULT: ICD-10-CM

## 2025-03-17 DIAGNOSIS — R15.9 INCONTINENCE OF FECES, UNSPECIFIED FECAL INCONTINENCE TYPE: ICD-10-CM

## 2025-03-17 DIAGNOSIS — Z98.1 S/P LUMBAR FUSION: ICD-10-CM

## 2025-03-17 DIAGNOSIS — M54.41 CHRONIC BILATERAL LOW BACK PAIN WITH BILATERAL SCIATICA: ICD-10-CM

## 2025-03-17 DIAGNOSIS — G43.009 MIGRAINE WITHOUT AURA AND WITHOUT STATUS MIGRAINOSUS, NOT INTRACTABLE: ICD-10-CM

## 2025-03-17 DIAGNOSIS — M54.42 CHRONIC BILATERAL LOW BACK PAIN WITH BILATERAL SCIATICA: ICD-10-CM

## 2025-03-17 PROCEDURE — 3079F DIAST BP 80-89 MM HG: CPT | Performed by: NURSE PRACTITIONER

## 2025-03-17 PROCEDURE — 99214 OFFICE O/P EST MOD 30 MIN: CPT | Performed by: NURSE PRACTITIONER

## 2025-03-17 PROCEDURE — 3077F SYST BP >= 140 MM HG: CPT | Performed by: NURSE PRACTITIONER

## 2025-03-17 RX ORDER — PREGABALIN 150 MG/1
150 CAPSULE ORAL 3 TIMES DAILY
Qty: 270 CAPSULE | Refills: 1 | Status: SHIPPED | OUTPATIENT
Start: 2025-03-17 | End: 2025-06-15

## 2025-03-17 ASSESSMENT — FIBROSIS 4 INDEX: FIB4 SCORE: 0.59
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
## 2025-03-17 NOTE — LETTER
March 17, 2025       Patient: Joy Mcnamara   YOB: 1985   Date of Visit: 3/17/2025         To Whom It May Concern:    In my medical opinion, I recommend that Joy Mcnamara be within 40 ft of a restroom due to chronic gastrointestinal issues.  She also has nerve damage in her left foot and needs to wear Crocs to prevent severe pain in her feet.      If you have any questions or concerns, please don't hesitate to call [phone]          Sincerely,          SWETHA Marie.  Electronically Signed

## 2025-03-18 RX ORDER — CYANOCOBALAMIN 1000 UG/ML
1000 INJECTION, SOLUTION INTRAMUSCULAR; SUBCUTANEOUS
Qty: 3 ML | Refills: 0 | Status: SHIPPED | OUTPATIENT
Start: 2025-03-18

## 2025-03-18 NOTE — TELEPHONE ENCOUNTER
Received request via: Pharmacy    Was the patient seen in the last year in this department? Yes    Does the patient have an active prescription (recently filled or refills available) for medication(s) requested? No    Pharmacy Name: [facility]     Does the patient have [facility] Plus and need 100-day supply? (This applies to ALL medications) Patient does not have SCP

## 2025-03-25 ENCOUNTER — OFFICE VISIT (OUTPATIENT)
Dept: BEHAVIORAL HEALTH | Facility: PSYCHIATRIC FACILITY | Age: 40
End: 2025-03-25
Payer: COMMERCIAL

## 2025-03-25 DIAGNOSIS — F33.1 MODERATE EPISODE OF RECURRENT MAJOR DEPRESSIVE DISORDER (HCC): ICD-10-CM

## 2025-03-25 ASSESSMENT — ENCOUNTER SYMPTOMS
PALPITATIONS: 0
FEVER: 0
NAUSEA: 1
SHORTNESS OF BREATH: 0
DIZZINESS: 0
CHILLS: 0
COUGH: 0
VOMITING: 0
ABDOMINAL PAIN: 0
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
## 2025-03-26 NOTE — PSYCHOTHERAPY
"[facility] [place] [facility] [place]    Today's Date: 03/25/2025  Supervising Attending: Mack Little M.D.    CASE CONCEPTUALIZATION   Therapeutic Intervention(s): Develop/modify treatment plan, Establish rapport, and Goal-setting     Description of Presenting Problem: Numerous depressive symptoms, most significantly excessive guilt and negative self talk. Very hard on self re: parenting, weight. Also with unspecified anxiety and trauma-related syx.     Theory for how/why the problem has arisen (BioPsychoSocial Model)    Barriers: dislike of therapy     ASSESSMENT  Numerous depressive symptoms, most significantly excessive guilt and negative self talk. Very hard on self re: parenting, weight. 2 prior SA (16 & 34) via OD however denies active SI. Previous therapy (in and out for years). Goal of not wanting to \"feel so negative.\" Very in tune with physical symptoms (pulsatile tinnitus). Worries about finances (inappropriate), relationship with daughters, physical health. Numerous prior medication trials, however does not know by name.     Found mother dead at age 14 (brain cyst -> hydrocephalus). Sees her mom \"in everything.\" Worst close to anniversary. Intrusive memories, nightmares.     Works at [employer], lives with  & youngest daughter (17). Older daughter (22) lives 1 mile away. Still waiting for her to want to rekindle close relationship.     TREATMENT PLAN  Goal(s) of Treatment: increase self esteem, develop sufficient coping strategies    Plan to Achieve Goal(s): further exploration of syx and hx     Progress toward Treatment Goals:  Initial encounter     Plan:  Target for next session:   -continued information gathering & rapport-building  -what was good and bad about more recent therapy experiences (what works/what doesn't)    Future:  -details re: bipolar dx?  -relationship with daughters  -relationship with   -previous marriage  -childhood   -relationship with father     "

## 2025-03-26 NOTE — PROGRESS NOTES
[facility]  Psychotherapy Summary Note    Full therapy note has been documented and is under restricted viewing.  Please see below for summary of today's session.     Date of Service: 03/25/2025  Appointment type: in-office appointment.  Attending:  Mack Little M.D.    Type of session:Individual psychotherapy  Session start time: 3:50 pm  Session stop time: 5:00 pm  Length of time spent face to face with patient: 70 min  Persons in attendance: Patient    SI reported: no  HI reported: no    SUMMARY  Joy Mcnamara is a 39 y.o. old female who presents today for initial psychiatric evaluation.      Symptoms currently being addressed in therapy: depression, anxiety, and grief    Therapeutic Intervention(s): Develop/modify treatment plan, Establish rapport, and Goal-setting    CURRENT RISK ASSESSMENT       Suicide: Low       Homicide: Low       Self-Harm: Low       Relapse: Low       Crisis Safety Plan Reviewed No    DIAGNOSES/PLAN  Problem List Items Addressed This Visit          Psychiatry Problems    Depression         Treatment Goal(s)/Objective(s) addressed: Tx plan:  Utilize learned skills to manage mood and emotional suffering more effectively  Learn to successfully challenge & change distortions in thinking  Learn to ameliorate effects of anxiety on life and functioning  Increase behaviors of self-compassion and self-care  Increase self-esteem     Progress toward Treatment Goals:  Initial appointment     Plan:  - Continue individual therapy    Pollo Rangel M.D.

## 2025-03-28 ENCOUNTER — OFFICE VISIT (OUTPATIENT)
Dept: NEUROLOGY | Facility: MEDICAL CENTER | Age: 40
End: 2025-03-28
Payer: COMMERCIAL

## 2025-03-28 ENCOUNTER — OFFICE VISIT (OUTPATIENT)
Dept: BEHAVIORAL HEALTH | Facility: PSYCHIATRIC FACILITY | Age: 40
End: 2025-03-28
Payer: COMMERCIAL

## 2025-03-28 VITALS
SYSTOLIC BLOOD PRESSURE: 146 MMHG | HEART RATE: 88 BPM | HEIGHT: 72 IN | DIASTOLIC BLOOD PRESSURE: 90 MMHG | BODY MASS INDEX: 36.31 KG/M2 | OXYGEN SATURATION: 97 % | RESPIRATION RATE: 12 BRPM | WEIGHT: 268.08 LBS | TEMPERATURE: 97.8 F

## 2025-03-28 VITALS
WEIGHT: 265.4 LBS | OXYGEN SATURATION: 96 % | HEIGHT: 72 IN | DIASTOLIC BLOOD PRESSURE: 98 MMHG | BODY MASS INDEX: 35.95 KG/M2 | HEART RATE: 93 BPM | SYSTOLIC BLOOD PRESSURE: 166 MMHG

## 2025-03-28 DIAGNOSIS — F90.9 ATTENTION DEFICIT HYPERACTIVITY DISORDER (ADHD), UNSPECIFIED ADHD TYPE: ICD-10-CM

## 2025-03-28 DIAGNOSIS — G43.101 MIGRAINE WITH AURA AND WITH STATUS MIGRAINOSUS, NOT INTRACTABLE: ICD-10-CM

## 2025-03-28 DIAGNOSIS — F43.10 PANIC ATTACK DUE TO POST TRAUMATIC STRESS DISORDER (PTSD): ICD-10-CM

## 2025-03-28 DIAGNOSIS — F41.0 PANIC ATTACK DUE TO POST TRAUMATIC STRESS DISORDER (PTSD): ICD-10-CM

## 2025-03-28 DIAGNOSIS — F43.10 PTSD (POST-TRAUMATIC STRESS DISORDER): ICD-10-CM

## 2025-03-28 PROCEDURE — 700111 HCHG RX REV CODE 636 W/ 250 OVERRIDE (IP)

## 2025-03-28 PROCEDURE — 64615 CHEMODENERV MUSC MIGRAINE: CPT

## 2025-03-28 PROCEDURE — 3077F SYST BP >= 140 MM HG: CPT

## 2025-03-28 PROCEDURE — 99213 OFFICE O/P EST LOW 20 MIN: CPT | Mod: 25

## 2025-03-28 PROCEDURE — 3080F DIAST BP >= 90 MM HG: CPT

## 2025-03-28 RX ORDER — PRAZOSIN HYDROCHLORIDE 1 MG/1
1 CAPSULE ORAL NIGHTLY
Qty: 30 CAPSULE | Refills: 2 | Status: SHIPPED | OUTPATIENT
Start: 2025-03-28 | End: 2025-06-26

## 2025-03-28 RX ORDER — DEXTROAMPHETAMINE SACCHARATE, AMPHETAMINE ASPARTATE MONOHYDRATE, DEXTROAMPHETAMINE SULFATE AND AMPHETAMINE SULFATE 5; 5; 5; 5 MG/1; MG/1; MG/1; MG/1
20 CAPSULE, EXTENDED RELEASE ORAL EVERY MORNING
Qty: 30 CAPSULE | Refills: 0 | Status: SHIPPED | OUTPATIENT
Start: 2025-04-12 | End: 2025-05-12

## 2025-03-28 RX ORDER — CLONIDINE HYDROCHLORIDE 0.1 MG/1
0.1 TABLET ORAL 2 TIMES DAILY PRN
Qty: 60 TABLET | Refills: 2 | Status: SHIPPED | OUTPATIENT
Start: 2025-03-28 | End: 2025-06-26

## 2025-03-28 RX ORDER — GUANFACINE 2 MG/1
2 TABLET, EXTENDED RELEASE ORAL DAILY
Qty: 30 TABLET | Refills: 2 | Status: SHIPPED | OUTPATIENT
Start: 2025-03-28 | End: 2025-06-26

## 2025-03-28 RX ORDER — DULOXETIN HYDROCHLORIDE 30 MG/1
30 CAPSULE, DELAYED RELEASE ORAL DAILY
Qty: 30 CAPSULE | Refills: 2 | Status: SHIPPED | OUTPATIENT
Start: 2025-03-28 | End: 2025-06-26

## 2025-03-28 RX ADMIN — ONABOTULINUMTOXINA 155 UNITS: 200 INJECTION, POWDER, LYOPHILIZED, FOR SOLUTION INTRADERMAL; INTRAMUSCULAR at 17:12

## 2025-03-28 ASSESSMENT — FIBROSIS 4 INDEX
FIB4 SCORE: 0.59
FIB4 SCORE: 0.59

## 2025-03-28 ASSESSMENT — PATIENT HEALTH QUESTIONNAIRE - PHQ9
SUM OF ALL RESPONSES TO PHQ QUESTIONS 1-9: 14
SUM OF ALL RESPONSES TO PHQ QUESTIONS 1-9: 7
CLINICAL INTERPRETATION OF PHQ2 SCORE: 2
5. POOR APPETITE OR OVEREATING: 0 - NOT AT ALL
5. POOR APPETITE OR OVEREATING: 1 - SEVERAL DAYS
CLINICAL INTERPRETATION OF PHQ2 SCORE: 2

## 2025-03-28 ASSESSMENT — ANXIETY QUESTIONNAIRES
4. TROUBLE RELAXING: MORE THAN HALF THE DAYS
6. BECOMING EASILY ANNOYED OR IRRITABLE: MORE THAN HALF THE DAYS
GAD7 TOTAL SCORE: 16
2. NOT BEING ABLE TO STOP OR CONTROL WORRYING: NEARLY EVERY DAY
3. WORRYING TOO MUCH ABOUT DIFFERENT THINGS: MORE THAN HALF THE DAYS
5. BEING SO RESTLESS THAT IT IS HARD TO SIT STILL: SEVERAL DAYS
1. FEELING NERVOUS, ANXIOUS, OR ON EDGE: NEARLY EVERY DAY
7. FEELING AFRAID AS IF SOMETHING AWFUL MIGHT HAPPEN: NEARLY EVERY DAY

## 2025-03-28 ASSESSMENT — ENCOUNTER SYMPTOMS
HALLUCINATIONS: 0
NECK PAIN: 1
HEADACHES: 1
DEPRESSION: 1
NERVOUS/ANXIOUS: 1
INSOMNIA: 1
DIAPHORESIS: 1
MYALGIAS: 1

## 2025-03-28 NOTE — PROGRESS NOTES
[facility]  BOTOX PROCEDURE NOTE    Chronic Migraine:  Botox therapy has reduced patient’s migraines by more than 7 days and/or 100 hours per month.     I treated Joy Mcnamara in clinic today with BotoxA 155 units according to the dosing/injection paradigm currently mandated by the FDA for the management of chronic migraine.  Specifically, I injected:  - 5 units to the procerus,  - 5 units to the corrugators (bilaterally),  - a total of 20 units to the frontalis musculature,  - 20 units to the temporalis (bilaterally),  - 15 units to the occipitalis (bilaterally),  - 10 units to the cervical paraspinals (bilaterally), and  - 15 units to the trapezius musculature (bilaterally).    The remainder of the Botox was discarded as wastage per FDA guidelines  Consent on file.  Patient identify verified with 2 patient identifiers.     Frequency of headaches is >15 days monthly with at least 8 migraines monthly.    Migraines include at least two of the following: worsened with activity or avoidance of activity with migraines (ie they go lie down), moderate to severe pain intensity, pulsing headache, unilateral headache and has  have either nausea or vomiting OR sensitivity to light and sound.     Although Joy Mcnamara is responding to botox s/he is NOT migraine free.  I recommend that Botox be continued at this time.    Joy Mcnamara has chronic migraines, defined as having 15 or more headaches days per month, 8 of which are migraines, over a minimum of the last three months.  Episodes last more than 4 hours (untreated).  Pt has 2 or more of following (see initial note):  - headache worsened with activity  - pain is moderate to severe intensity  - pulsing in nature  - unilateral   and patient either has nausea/vomiting OR sensitivity to light and sound.    No adverse effect of Botox noted at conclusion of today's appointment.    Follow up in 12 weeks for Botox or sooner if needed.    Rain  Thelma MSN APRN-CNP  [facility] [place]     She does have some neck issues and on a previous C-spine MRI showed foraminal nerve impingement.  She is requesting a referral to pain management for further evaluation and treatment options.  I will go ahead and refer her to pain management.
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
## 2025-03-29 NOTE — ASSESSMENT & PLAN NOTE
Start Guanfacine ER  2 mg p.o. daily for ADHD  Start duloxetine 30 mg p.o. daily for neuropathic pain, depression and anxiety related to PTSD.  Start prazosin 1 mg p.o. nightly for PTSD related nightmares  Start Clonidine  0.1 mg p.o. twice daily as needed for anxiety attacks and nighttime sleep aid.  5. Controlled substance agreement will be signed when patient is in clinic next Tuesday.  Change Adderall 20mg to extended release.  Available for pickup 4/14/2025.  6.  Advised patient to taper off Xanax.  7. Labs ordered: B12, Folate, Vitamin D

## 2025-04-01 ENCOUNTER — APPOINTMENT (OUTPATIENT)
Dept: BEHAVIORAL HEALTH | Facility: PSYCHIATRIC FACILITY | Age: 40
End: 2025-04-01
Payer: COMMERCIAL

## 2025-04-01 DIAGNOSIS — F33.1 MODERATE EPISODE OF RECURRENT MAJOR DEPRESSIVE DISORDER (HCC): ICD-10-CM

## 2025-04-01 PROCEDURE — 99999 PR NO CHARGE: CPT
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
## 2025-04-02 NOTE — PROGRESS NOTES
[facility]  Psychotherapy Summary Note    Full therapy note has been documented and is under restricted viewing.  Please see below for summary of today's session.     Date of Service: 04/01/2025  Appointment type: in-office appointment.  Attending:  Mack Little M.D.    Type of session:Individual psychotherapy  Session start time: 4:00pm  Session stop time: 5:00pm  Length of time spent face to face with patient: 60 min  Persons in attendance: Patient    SI reported: no  HI reported: no    SUMMARY  Joy Mcnamara is a 40 y.o. old female who presents today for regularly scheduled psychotherapy for depression      Symptoms currently being addressed in therapy: depression, anxiety, and social anxiety    Therapeutic Intervention(s): Develop/modify treatment plan, Establish rapport, and Goal-setting    CURRENT RISK ASSESSMENT       Suicide: Low       Homicide: Low       Self-Harm: Low       Relapse: Low       Crisis Safety Plan Reviewed No    DIAGNOSES/PLAN  Problem List Items Addressed This Visit          Psychiatry Problems    Depression         Treatment Goal(s)/Objective(s) addressed: Tx plan:  Utilize learned skills to manage mood and emotional suffering more effectively  Learn to successfully challenge & change distortions in thinking  Learn to ameliorate effects of anxiety on life and functioning  Increase behaviors of self-compassion and self-care  Increase self-esteem      Progress toward Treatment Goals: No change     Plan:  - Continue individual therapy    Pollo Rangel M.D.

## 2025-04-02 NOTE — Clinical Note
REFERRAL APPROVAL NOTICE         Sent on April 2, 2025                   Joy Mcnamara  [address] [place] [address]                   Dear Ms. Mcnamara,    After a careful review of the medical information and benefit coverage, [facility] has processed your referral. See below for additional details.    If applicable, you must be actively enrolled with your insurance for coverage of the authorized service. If you have any questions regarding your coverage, please contact your insurance directly.    REFERRAL INFORMATION   Referral #:  [id number]  Referred-To Department    Referred-By Provider:  Physical Medicine and Rehab    [facility]   Physiatry Jennifer      [address]  [address]  [place] [id number] [phone] [address]  [place] [id number] [phone]    Referral Start Date:  03/28/2025  Referral End Date:   03/28/2026             SCHEDULING  If you do not already have an appointment, please call [phone] to make an appointment.     MORE INFORMATION  If you do not already have a [facility] account, sign up at: [email or web address]  You can access your medical information, make appointments, see lab results, billing information, and more.  If you have questions regarding this referral, please contact  the [facility] Referrals department at:             [phone]. Monday - Friday 8:00AM - 5:00PM.     Sincerely,    [facility]

## 2025-04-03 NOTE — PROGRESS NOTES
Verbal consent was acquired by the patient to use [employer] ambient listening note generation during this visit Yes     NEW PATIENT VISIT     Interventional Spine and Pain  Physiatry (Physical Medicine and Rehabilitation)     Date of Service: See Epic  Chief Complaint:   Chief Complaint   Patient presents with    New Patient      Referring Provider: Rain Renee A*   Patient Name: Joy Mcnamara   : 1985   MRN: [id number]     History:     HPI:     Joy Mcnamara is a 40 y.o. female with history of migraines, lumbar fusion in , ADHD, anxiety and depression, who presents to clinic for evaluation of neck and low back pain.     History of Present Illness  The patient is a 40-year-old female who presents today for evaluation of chronic low back and neck pain. She has a history of herniated disc, which was surgically removed in  and again in . In 2023, she experienced a fall at work, leading to a workers' compensation claim that has since been resolved. She underwent a C5-C6 fusion in 2023 and an L5-S1 fusion with cage revision in 2023. Post-surgery, she reports ongoing radiating aches and pains, most bothersome in the bilateral upper trapezius and lumbar paraspinals. She also mentions a prior minor tear in her right shoulder, which was not addressed due to its minimal impact. Her primary care physician has administered shoulder steroid injections on several occasions. She received Botox treatment from a neurologist last Friday which was beneficial for migraines. She prefers occasional injections over daily medication. She takes tizanidine at night, as it induces sleepiness, and Lyrica three times daily. She is currently one year post-surgery and continues to experience digestive issues including fecal incontinence. She has a constant sensation of pins and needles in her left foot, which she manages by wearing loose shoes. She recalls being bedridden for  24 hours post lumbar surgery, during which her left leg was completely numb. Over the next two weeks, sensation gradually returned, but only up to her second toe. She experiences tingling in her right arm, predominantly in her ring and pinky fingers, chronic since her cervical surgery. She has previously tried Soma, which she found unhelpful, and Flexeril, the efficacy of which she does not recall. She takes Advil three times daily, two 200 mg tablets each time. She has tried various medications between 2009 and 2022, including fentanyl patches, but found them unhelpful. Tylenol causes swelling, but Advil provides some relief. She is allergic to meloxicam and diclofenac, which cause her tattoos to raise and bubble. She has undergone physical therapy post-surgery and walks with her child when the weather permits. She has a treadmill and Bowflex at home but lacks the endurance to use the Bowflex for more than seven minutes. She works at the [employer] and has to get up and move around. She has work restrictions due to her condition post lumbar surgery. She notes that her pain seems to improve in warmer weather.    Supplemental Information  She has a history of depression and two suicide attempts, both involving overdoses.    ALLERGIES  The patient is allergic to MELOXICAM and DICLOFENAC.    MEDICATIONS  Current: duloxetine, clonidine, pregabalin, ibuprofen, and tizanidine.   Past: Soma, Flexeril      Red Flags ROS:   Fever, Chills, Sweats: Denies  Involuntary Weight Loss: Denies  Bladder Incontinence: Denies  Bowel Incontinence: Chronic since lumbar fusion       Medical Records Review:  I reviewed the note from the referring provider Rain Renee A* including the note dated 3/28/25 where they discussed neck pain.       PMHx:   Past Medical History:   Diagnosis Date    Back pain     Depression     s/p mom's death    Depression 4/25/2014    Gynecological disorder     Migraine headache     Miscarriage     Pain 2017     low back; degenerative disk disease       Current Outpatient Medications on File Prior to Visit   Medication Sig Dispense Refill    guanFACINE ER (INTUNIV) 2 MG TABLET SR 24 HR tablet Take 1 Tablet by mouth every day for 90 days. 30 Tablet 2    DULoxetine (CYMBALTA) 30 MG Cap DR Particles Take 1 Capsule by mouth every day for 90 days. 30 Capsule 2    prazosin (MINIPRESS) 1 MG Cap Take 1 Capsule by mouth every evening for 90 days. 30 Capsule 2    cloNIDine (CATAPRES) 0.1 MG Tab Take 1 Tablet by mouth 2 times a day as needed (Take before panic attacks, or take before times to help with sleep.) for up to 90 days. 60 Tablet 2    cyanocobalamin (VITAMIN B-12) 1000 MCG/ML Solution INJECT 1 ML INTO THE SHOULDER, THIGH, OR BUTTOCKS EVERY 30 DAYS 3 mL 0    pregabalin (LYRICA) 150 MG Cap Take 1 Capsule by mouth 3 times a day for 90 days. 270 Capsule 1    promethazine (PHENERGAN) 25 MG Tab TAKE 1/2 TO 1 (ONE-HALF TO ONE) TABLET BY MOUTH EVERY 8 HOURS AS NEEDED FOR NAUSEA 30 Tablet 0    ondansetron (ZOFRAN) 4 MG Tab tablet Take 1 Tablet by mouth every 8 hours as needed for Nausea/Vomiting. 30 Tablet 2    Ubrogepant (UBRELVY) 100 MG Tab Take 1 Tablet by mouth as needed (1 tab at headache osnet, repeat in 2 hour prn). 10 Tablet 5    ibuprofen (MOTRIN) 400 MG Tab Take 400 mg by mouth every 6 hours as needed.      asa/apap/caffeine (EXCEDRIN) 250-250-65 MG Tab Take 2-3 Tablets by mouth every 8 hours as needed for Headache.       No current facility-administered medications on file prior to visit.        PSHx:   Past Surgical History:   Procedure Laterality Date    VAGINAL HYSTERECTOMY SCOPE TOTAL N/A 03/27/2017    Procedure: VAGINAL HYSTERECTOMY SCOPE TOTAL right salpingectomy, partial left salpingectomy. Cystoscopy;  Surgeon: Zayda Santillan M.D.;  Location: SURGERY SAME DAY [facility];  Service:     [place] NJX AA&/STRD TFRML EPI LUMBAR/SACRAL 1 LEVEL Bilateral 06/17/2015    Procedure: TRANSFORAMINAL BILATERAL L5-S1 EPIDURAL WITH IV  SEDATION;  Surgeon: Tom Main M.D.;  Location: SURGERY [facility];  Service: Pain Management    [place] NJX AA&/STRD TFRML EPI LUMBAR/SACRAL 1 LEVEL  06/17/2015    Procedure: INJ-FORAMEN EPI LUM/SAC SNGL;  Surgeon: Tom Main M.D.;  Location: SURGERY [facility];  Service: Pain Management    [place] NJX AA&/STRD TFRML EPI LUMBAR/SACRAL 1 LEVEL  10/08/2014    Performed by Tom Main M.D. at [facility]    LUMBAR LAMINECTOMY DISKECTOMY  10/03/2013    Performed by Estuardo Jack M.D. at SURGERY [facility]    LUMBAR LAMINECTOMY DISKECTOMY  06/10/2009    Performed by ESTUARDO JACK at SURGERY [facility]    CERVICAL FUSION POSTERIOR      Dr Mcadams - 9/2023    DENTAL EXTRACTION(S)      wisdom    GYN SURGERY      c section x2    LUMBAR FUSION POSTERIOR PERCUTANEOUS Bilateral     12/2023 - Dr. Woodward - fusion L5-S1, revision from prior discectomy and cage placement.    LUMBAR FUSION POSTERIOR PERCUTANEOUS Bilateral     Dr. Mcadams - 12/2023 -[facility]    OTHER ORTHOPEDIC SURGERY      PRIMARY C SECTION      x2       Family history   Family History   Problem Relation Age of Onset    Drug abuse Mother     Psychiatric Illness Mother     Alcohol abuse Mother     Stroke Mother         aneurysm - pt was 14 yrs old    Drug abuse Father     Stroke Father         TIA    Drug abuse Sister     Drug abuse Brother     Drug abuse Maternal Aunt     Drug abuse Maternal Uncle     Drug abuse Paternal Aunt     Drug abuse Paternal Uncle     Autism Maternal Grandmother     Drug abuse Maternal Grandmother     Cancer Maternal Grandmother         lung /breast    Autism Maternal Grandfather     Drug abuse Maternal Grandfather     Autism Paternal Grandmother     Drug abuse Paternal Grandmother     Cancer Paternal Grandmother         breast / lung?    Autism Paternal Grandfather     Drug abuse Paternal Grandfather          Medications: [facility]  Outpatient Medications Marked as Taking for the 4/4/25 encounter  "(Office Visit) with So Veliz M.D.   Medication Sig Dispense Refill    [START ON 4/8/2025] amphetamine-dextroamphetamine (ADDERALL XR) 20 MG per XR capsule Take 1 Capsule by mouth every morning for 30 days. 30 Capsule 0    methocarbamol (ROBAXIN) 500 MG Tab Take 0.5-1 Tablets by mouth 3 times a day as needed (Pain). 60 Tablet 1    tizanidine (ZANAFLEX) 4 MG Tab Take 1 Tablet by mouth every evening. 90 Tablet 1    guanFACINE ER (INTUNIV) 2 MG TABLET SR 24 HR tablet Take 1 Tablet by mouth every day for 90 days. 30 Tablet 2    DULoxetine (CYMBALTA) 30 MG Cap DR Particles Take 1 Capsule by mouth every day for 90 days. 30 Capsule 2    prazosin (MINIPRESS) 1 MG Cap Take 1 Capsule by mouth every evening for 90 days. 30 Capsule 2    cloNIDine (CATAPRES) 0.1 MG Tab Take 1 Tablet by mouth 2 times a day as needed (Take before panic attacks, or take before times to help with sleep.) for up to 90 days. 60 Tablet 2    cyanocobalamin (VITAMIN B-12) 1000 MCG/ML Solution INJECT 1 ML INTO THE SHOULDER, THIGH, OR BUTTOCKS EVERY 30 DAYS 3 mL 0    pregabalin (LYRICA) 150 MG Cap Take 1 Capsule by mouth 3 times a day for 90 days. 270 Capsule 1    promethazine (PHENERGAN) 25 MG Tab TAKE 1/2 TO 1 (ONE-HALF TO ONE) TABLET BY MOUTH EVERY 8 HOURS AS NEEDED FOR NAUSEA 30 Tablet 0    ondansetron (ZOFRAN) 4 MG Tab tablet Take 1 Tablet by mouth every 8 hours as needed for Nausea/Vomiting. 30 Tablet 2    Ubrogepant (UBRELVY) 100 MG Tab Take 1 Tablet by mouth as needed (1 tab at headache osnet, repeat in 2 hour prn). 10 Tablet 5    ibuprofen (MOTRIN) 400 MG Tab Take 400 mg by mouth every 6 hours as needed.      asa/apap/caffeine (EXCEDRIN) 250-250-65 MG Tab Take 2-3 Tablets by mouth every 8 hours as needed for Headache.          Allergies:   Allergies   Allergen Reactions    Sumatriptan Succinate      \"face burns & I can't see\"    Tramadol Photosensitivity     Gives her migraines and makes her feel sick       Social Hx:   Social History "     Socioeconomic History    Marital status:      Spouse name: Not on file    Number of children: Not on file    Years of education: Not on file    Highest education level: Not on file   Occupational History    Not on file   Tobacco Use    Smoking status: Former     Current packs/day: 0.00     Types: Cigarettes     Start date: 3/1/2003     Quit date: 3/1/2007     Years since quittin.1    Smokeless tobacco: Never    Tobacco comments:     2007   Vaping Use    Vaping status: Never Used   Substance and Sexual Activity    Alcohol use: No     Comment: denies ; family hx of alcoholism    Drug use: No     Types: Marijuana, Methamphetamines    Sexual activity: Yes     Birth control/protection: Injection     Comment: , two kids; special ed   Other Topics Concern    Not on file   Social History Narrative    Not on file     Social Drivers of Health     Financial Resource Strain: Low Risk  (2023)    Received from [facility]    Overall Financial Resource Strain (CARDIA)     Difficulty of Paying Living Expenses: Not hard at all   Food Insecurity: No Food Insecurity (2023)    Received from [facility]    Hunger Vital Sign     Worried About Running Out of Food in the Last Year: Never true     Ran Out of Food in the Last Year: Never true   Transportation Needs: Unmet Transportation Needs (2023)    Received from [facility]    PRAPARE - Transportation     Lack of Transportation (Medical): Yes     Lack of Transportation (Non-Medical): Yes   Physical Activity: Insufficiently Active (2023)    Received from [facility]    Exercise Vital Sign     Days of Exercise per Week: 2 days     Minutes of Exercise per Session: 30 min   Stress: No Stress Concern Present (2023)    Received from [facility]    [place] [employer] - Occupational Stress Questionnaire      Feeling of Stress : Only a little   Social Connections: Socially Integrated (9/22/2023)    Received from [facility]    Social Connection and Isolation Panel [NHANES]     Frequency of Communication with Friends and Family: More than three times a week     Frequency of Social Gatherings with Friends and Family: Once a week     Attends [employer] Services: 1 to 4 times per year     Active Member of Clubs or Organizations: Yes     Attends Club or Organization Meetings: More than 4 times per year     Marital Status:    Intimate Partner Violence: Not At Risk (9/22/2023)    Received from [facility]    Humiliation, Afraid, Rape, and Kick questionnaire     Fear of Current or Ex-Partner: No     Emotionally Abused: No     Physically Abused: No     Sexually Abused: No   Housing Stability: Unknown (9/22/2023)    Received from [facility] [employer] [facility]    Housing Stability Vital Sign     Unable to Pay for Housing in the Last Year: No     Number of Places Lived in the Last Year: Not on file     Unstable Housing in the Last Year: No          EXAMINATION     Physical Exam:   Vitals: BP (!) 186/105 (BP Location: Left arm, Patient Position: Sitting, BP Cuff Size: Large adult)   Pulse 79   Temp 36.6 °C (97.8 °F) (Temporal)   Ht 1.829 m (6')   Wt 123 kg (271 lb 13.2 oz)   SpO2 95%     Constitutional:   Body Habitus: Body mass index is 36.87 kg/m².  Cooperation: Fully cooperates with exam  Appearance: Well-groomed, well-nourished  Eyes: No scleral icterus to suggest severe liver disease, no proptosis to suggest severe hyperthyroid  ENT: No obvious auditory deficits, no obvious tongue lesions, tongue midline, no facial droop   Respiratory:  Breathing comfortable on room air, no audible wheezing  Cardiovascular: Skin appears well-perfused  Psychiatric: Appropriate affect  Gait: normal gait, no use of ambulatory device, nonantalgic.     Neurologic:  Strength:    Bilateral  UE 5/5 in shoulder abductors, elbow extensors and flexors, wrist extensors, finger abductors, and finger flexors   Bilateral LE 5/5 in hip flexors, knee extensors and flexors, ankle dorsiflexors and plantarflexors  Sensation: decreased sensation right pinky and ulnar forearm, left anterior shin and medial ankle (chronic since surgeries)  MSK:?TTP across bilateral trapezius, lumbar paraspinals      MEDICAL DECISION MAKING    Medical Records Review: See under HPI section    DATA    Labs:   Lab Results   Component Value Date/Time    SODIUM 141 01/13/2025 06:33 AM    POTASSIUM 4.3 01/13/2025 06:33 AM    CHLORIDE 106 01/13/2025 06:33 AM    CO2 27 01/13/2025 06:33 AM    ANION 8.0 01/13/2025 06:33 AM    GLUCOSE 93 01/13/2025 06:33 AM    BUN 9 01/13/2025 06:33 AM    CREATININE 0.83 01/13/2025 06:33 AM    CREATININE 0.6 03/27/2007 06:55 PM    CALCIUM 9.0 01/13/2025 06:33 AM    ASTSGOT 21 01/13/2025 06:33 AM    ALTSGPT 24 01/13/2025 06:33 AM    TBILIRUBIN 0.3 01/13/2025 06:33 AM    ALBUMIN 4.1 01/13/2025 06:33 AM    TOTPROTEIN 6.6 01/13/2025 06:33 AM    GLOBULIN 2.5 01/13/2025 06:33 AM    AGRATIO 1.6 01/13/2025 06:33 AM   ]    Lab Results   Component Value Date/Time    PROTHROMBTM 13.4 09/24/2013 09:54 AM    INR 1.00 09/24/2013 09:54 AM        Lab Results   Component Value Date/Time    WBC 7.0 01/13/2025 06:33 AM    RBC 4.36 01/13/2025 06:33 AM    HEMOGLOBIN 13.6 01/13/2025 06:33 AM    HEMATOCRIT 41.0 01/13/2025 06:33 AM    MCV 94.0 01/13/2025 06:33 AM    MCH 31.2 01/13/2025 06:33 AM    MCHC 33.2 01/13/2025 06:33 AM    MPV 10.0 01/13/2025 06:33 AM    NEUTSPOLYS 63.30 01/13/2025 06:33 AM    LYMPHOCYTES 25.70 01/13/2025 06:33 AM    MONOCYTES 7.10 01/13/2025 06:33 AM    EOSINOPHILS 2.70 01/13/2025 06:33 AM    EOSINOPHILS 2 11/22/2022 07:00 PM    BASOPHILS 0.90 01/13/2025 06:33 AM        Lab Results   Component Value Date/Time    HBA1C 5.2 01/13/2025 06:33 AM        Imaging:   I personally reviewed the following images, below are  my independent reads:  MRI Cervical Spine 2/13/22: Straightening of usual cervical lordosis. Mild C5-6 disc bulge with minimal right foraminal stenosis. Multilevel mild uncovertebral joint arthropathy.      IMAGING radiology reads: I reviewed the following radiology reports                 Results for orders placed during the hospital encounter of 02/13/22    MR-CERVICAL SPINE-W/O  FINDINGS:  Cervical spine alignment is normal. Vertebral body heights are preserved. Intervertebral disc heights are normal. Bone marrow signal in the cervical spine is within normal limits. Included portions of posterior fossa is normal. The craniocervical   junction is normal.  Spinal cord signal intensity is within normal limits.     C2-3: Normal.     C3-4: Mild uncovertebral degeneration without stenosis.     C4-5: Mild anterior disc osteophyte complex with bilateral uncovertebral degeneration. There is no significant foraminal stenosis or canal stenosis.     C5-6: Endplate disc osteophyte complex with bilateral uncovertebral degeneration, worse on the right. There is mild right foraminal narrowing. There is mild asymmetric canal narrowing, slightly greater left paracentral aspect.     C6-7: Mild endplate disc osteophyte complex. There is no significant canal stenosis or foraminal narrowing.     C7-T1: No significant abnormality.     The prevertebral soft tissues are within normal limits.     IMPRESSION:        Mild cervical spine degenerative changes, most prominent at C5-6 with mild right foraminal narrowing. However, there is no definite nerve impingement.    MRI Lumbar Spine 3/5/2020:  FINDINGS:  There is transitional lumbosacral anatomy again noted. There is a partially lumbarized S1 segment and partially formed S1-S2 disc.     Normal lumbar lordosis. Preservation of vertebral body heights and alignment. No compression fracture.     Conus medullaris terminates at L1 and is normal in signal.     T12-L4: No significant disc  herniation, spinal or foraminal stenosis.  L4-L5: Mild facet degeneration. Patent  L5-S1: Disc narrowing, mild disc osteophyte. Tiny posterior central disc protrusion/extrusion. Mild facet degeneration. Moderate foraminal stenosis, right greater than left. No significant spinal stenosis. No significant change from prior.     IMPRESSION:     1.  Stable moderate disc degeneration L5-S1 with right greater than left foraminal stenosis.  2.  No significant spinal stenosis.  3.  No acute osseous abnormality.                         Diagnosis   Visit Diagnoses     ICD-10-CM   1. Cervicalgia  M54.2   2. Myalgia  M79.10   3. Chronic bilateral low back pain without sciatica  M54.50    G89.29   4. History of lumbar fusion  Z98.1   5. History of fusion of cervical spine  Z98.1   6. Chronic bilateral low back pain with bilateral sciatica  M54.42    M54.41    G89.29   7. Right arm numbness  R20.0   8. Left leg numbness  R20.0         ASSESSMENT AND PLAN:  Joy Mcnamara is a 40 y.o. female who presents to clinic for evaluation of neck and low back pain.     Mercedes was seen today for new patient.    Diagnoses and all orders for this visit:    Cervicalgia  -     methocarbamol (ROBAXIN) 500 MG Tab; Take 0.5-1 Tablets by mouth 3 times a day as needed (Pain).    Myalgia  -     Referral to Pain Clinic  -     methocarbamol (ROBAXIN) 500 MG Tab; Take 0.5-1 Tablets by mouth 3 times a day as needed (Pain).    Chronic bilateral low back pain without sciatica  -     methocarbamol (ROBAXIN) 500 MG Tab; Take 0.5-1 Tablets by mouth 3 times a day as needed (Pain).    History of lumbar fusion    History of fusion of cervical spine    Chronic bilateral low back pain with bilateral sciatica  -     tizanidine (ZANAFLEX) 4 MG Tab; Take 1 Tablet by mouth every evening.    Right arm numbness    Left leg numbness        Assessment & Plan  Chronic low back pain  Chronic neck pain  History of C5-6 fusion  History of L5-S1 fusion  Myalgia  Patient  reports chronic moderate to severe persistent low back and neck pain following multiple fusion surgeries, including a C5-6 fusion in September 2023 and an L5-S1 fusion in December 2023. She has numbness in the right arm and left leg that have been constant since her surgeries. Her most bothersome pain is aching and burning pain in the bilateral upper trapezius and lower lumbar paraspinals, with tenderness to palpation on exam. Given tenderness to palpation I think patient would benefit from trigger point injections targeting the myofascial component of this pain which she is interested in and was ordered today. She is currently on duloxetine, clonidine, pregabalin, ibuprofen, and tizanidine. Because she only takes tizanidine at nighttime as it causes fatigue, a prescription for methocarbamol 500 mg tablets will be provided, with instructions to start at a low dose of 250 mg in the morning. If well-tolerated, the dosage can be increased to 500 mg, taken two to three times daily so that she potentially has the option of a daytime muscle relaxant. The tizanidine dosage will be adjusted to nightly administration due to its sedative effects.       Physical Therapy: Consider referral pending response to the above    Medications: Methocarbamol 250-500 mg to be used up to three times daily as needed during the day. Tizanidine changes to 4 mg nighttime only. Continue duloxetine, clonidine, pregabalin, ibuprofen as prescribed by outside providers.    Interventional Treatment: Ultrasound-guided trigger point injections targeting the bilateral upper trapezius and lumbar paraspinals    Follow-up: For ultrasound-guided trigger point injections targeting the bilateral upper trapezius and lumbar paraspinals, then 3 weeks after injections in clinic      Please note that this dictation was created using voice recognition software. I have made every reasonable attempt to correct obvious errors but there may be errors of grammar and  content that I may have overlooked prior to finalization of this note.      So Veliz MD  Physical Medicine and Rehabilitation  Interventional Spine and Sports Physiatry  [facility]       CC ILYA Marie   CC Rain Renee A*.

## 2025-04-04 ENCOUNTER — OFFICE VISIT (OUTPATIENT)
Dept: PHYSICAL MEDICINE AND REHAB | Facility: MEDICAL CENTER | Age: 40
End: 2025-04-04
Payer: COMMERCIAL

## 2025-04-04 ENCOUNTER — TELEPHONE (OUTPATIENT)
Dept: BEHAVIORAL HEALTH | Facility: PSYCHIATRIC FACILITY | Age: 40
End: 2025-04-04

## 2025-04-04 VITALS
HEIGHT: 72 IN | SYSTOLIC BLOOD PRESSURE: 186 MMHG | WEIGHT: 271.83 LBS | OXYGEN SATURATION: 95 % | DIASTOLIC BLOOD PRESSURE: 105 MMHG | HEART RATE: 79 BPM | BODY MASS INDEX: 36.82 KG/M2 | TEMPERATURE: 97.8 F

## 2025-04-04 DIAGNOSIS — M54.41 CHRONIC BILATERAL LOW BACK PAIN WITH BILATERAL SCIATICA: ICD-10-CM

## 2025-04-04 DIAGNOSIS — G89.29 CHRONIC BILATERAL LOW BACK PAIN WITHOUT SCIATICA: ICD-10-CM

## 2025-04-04 DIAGNOSIS — M54.50 CHRONIC BILATERAL LOW BACK PAIN WITHOUT SCIATICA: ICD-10-CM

## 2025-04-04 DIAGNOSIS — R20.0 LEFT LEG NUMBNESS: ICD-10-CM

## 2025-04-04 DIAGNOSIS — G89.29 CHRONIC BILATERAL LOW BACK PAIN WITH BILATERAL SCIATICA: ICD-10-CM

## 2025-04-04 DIAGNOSIS — M54.42 CHRONIC BILATERAL LOW BACK PAIN WITH BILATERAL SCIATICA: ICD-10-CM

## 2025-04-04 DIAGNOSIS — Z98.1 HISTORY OF LUMBAR FUSION: ICD-10-CM

## 2025-04-04 DIAGNOSIS — F90.9 ATTENTION DEFICIT HYPERACTIVITY DISORDER (ADHD), UNSPECIFIED ADHD TYPE: ICD-10-CM

## 2025-04-04 DIAGNOSIS — R20.0 RIGHT ARM NUMBNESS: ICD-10-CM

## 2025-04-04 DIAGNOSIS — M54.2 CERVICALGIA: Primary | ICD-10-CM

## 2025-04-04 DIAGNOSIS — M79.10 MYALGIA: ICD-10-CM

## 2025-04-04 DIAGNOSIS — Z98.1 HISTORY OF FUSION OF CERVICAL SPINE: ICD-10-CM

## 2025-04-04 PROCEDURE — 3080F DIAST BP >= 90 MM HG: CPT | Performed by: STUDENT IN AN ORGANIZED HEALTH CARE EDUCATION/TRAINING PROGRAM

## 2025-04-04 PROCEDURE — 99204 OFFICE O/P NEW MOD 45 MIN: CPT | Performed by: STUDENT IN AN ORGANIZED HEALTH CARE EDUCATION/TRAINING PROGRAM

## 2025-04-04 PROCEDURE — 3077F SYST BP >= 140 MM HG: CPT | Performed by: STUDENT IN AN ORGANIZED HEALTH CARE EDUCATION/TRAINING PROGRAM

## 2025-04-04 RX ORDER — METHOCARBAMOL 500 MG/1
250-500 TABLET, FILM COATED ORAL 3 TIMES DAILY PRN
Qty: 60 TABLET | Refills: 1 | Status: SHIPPED | OUTPATIENT
Start: 2025-04-04 | End: 2025-04-11 | Stop reason: SINTOL

## 2025-04-04 RX ORDER — DEXTROAMPHETAMINE SACCHARATE, AMPHETAMINE ASPARTATE MONOHYDRATE, DEXTROAMPHETAMINE SULFATE AND AMPHETAMINE SULFATE 5; 5; 5; 5 MG/1; MG/1; MG/1; MG/1
20 CAPSULE, EXTENDED RELEASE ORAL EVERY MORNING
Qty: 30 CAPSULE | Refills: 0 | Status: SHIPPED | OUTPATIENT
Start: 2025-04-08 | End: 2025-04-04

## 2025-04-04 ASSESSMENT — PATIENT HEALTH QUESTIONNAIRE - PHQ9
SUM OF ALL RESPONSES TO PHQ QUESTIONS 1-9: 7
CLINICAL INTERPRETATION OF PHQ2 SCORE: 2
5. POOR APPETITE OR OVEREATING: 1 - SEVERAL DAYS

## 2025-04-04 ASSESSMENT — FIBROSIS 4 INDEX: FIB4 SCORE: 0.61

## 2025-04-04 NOTE — TELEPHONE ENCOUNTER
Called pt regarding her completion of controlled substance agreement. Pt reports she forgot to sign it on Tuesday when she had the appointment with Dr. Rangel. I reminded pt that the controlled substance agreement needs to be completed before I can let the pharmacy to dispense her Adderall XR 20mg po qD. Pt expressed understanding. PDMP was reviewed. Her Adderall IR 10mg po BID was last dispensed on 3/7 and she has 7 pills left.  Pt said she will come to the clinic today to sign the controlled substance agreement. First fill date changed to 4/8/2025.

## 2025-04-08 ENCOUNTER — OFFICE VISIT (OUTPATIENT)
Dept: BEHAVIORAL HEALTH | Facility: PSYCHIATRIC FACILITY | Age: 40
End: 2025-04-08
Payer: COMMERCIAL

## 2025-04-08 DIAGNOSIS — F33.1 MODERATE EPISODE OF RECURRENT MAJOR DEPRESSIVE DISORDER (HCC): ICD-10-CM

## 2025-04-08 PROCEDURE — 99999 PR NO CHARGE: CPT

## 2025-04-09 NOTE — PROGRESS NOTES
[facility]  Psychotherapy Summary Note    Full therapy note has been documented and is under restricted viewing.  Please see below for summary of today's session.     Date of Service: 04/08/2025  Appointment type: in-office appointment.  Attending:  Mack Little M.D.    Type of session:Individual psychotherapy  Session start time: 4:00pm  Session stop time: 4:55pm  Length of time spent face to face with patient: 55 min  Persons in attendance: Patient    SI reported: no  HI reported: no    SUMMARY  Joy Mcnamara is a 40 y.o. old female who presents today for regularly scheduled psychotherapy for depression.       Symptoms currently being addressed in therapy: depression, anxiety, and social anxiety    Therapeutic Intervention(s): Develop/modify treatment plan, Establish rapport, and Maladaptive behavior addressed    CURRENT RISK ASSESSMENT       Suicide: Low       Homicide: Low       Self-Harm: Low       Relapse: Low       Crisis Safety Plan Reviewed No    DIAGNOSES/PLAN  Problem List Items Addressed This Visit    None        Treatment Goal(s)/Objective(s) addressed: Tx plan:  Utilize learned skills to manage mood and emotional suffering more effectively  Learn to successfully challenge & change distortions in thinking  Learn to ameliorate effects of anxiety on life and functioning  Increase behaviors of self-compassion and self-care  Increase self-esteem      Progress toward Treatment Goals: No change     Plan:  - Continue individual therapy    Pollo Rangel M.D.
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
## 2025-04-10 ENCOUNTER — OFFICE VISIT (OUTPATIENT)
Dept: MEDICAL GROUP | Facility: LAB | Age: 40
End: 2025-04-10
Payer: COMMERCIAL

## 2025-04-10 VITALS
DIASTOLIC BLOOD PRESSURE: 78 MMHG | SYSTOLIC BLOOD PRESSURE: 130 MMHG | BODY MASS INDEX: 36.03 KG/M2 | TEMPERATURE: 96.7 F | WEIGHT: 266 LBS | HEART RATE: 86 BPM | HEIGHT: 72 IN | RESPIRATION RATE: 12 BRPM | OXYGEN SATURATION: 98 %

## 2025-04-10 DIAGNOSIS — G43.009 MIGRAINE WITHOUT AURA AND WITHOUT STATUS MIGRAINOSUS, NOT INTRACTABLE: ICD-10-CM

## 2025-04-10 DIAGNOSIS — R15.9 INCONTINENCE OF FECES, UNSPECIFIED FECAL INCONTINENCE TYPE: ICD-10-CM

## 2025-04-10 DIAGNOSIS — M54.42 CHRONIC BILATERAL LOW BACK PAIN WITH LEFT-SIDED SCIATICA: ICD-10-CM

## 2025-04-10 DIAGNOSIS — G62.9 NEUROPATHY: ICD-10-CM

## 2025-04-10 DIAGNOSIS — G43.909 ACUTE MIGRAINE: ICD-10-CM

## 2025-04-10 DIAGNOSIS — H93.13 TINNITUS OF BOTH EARS: ICD-10-CM

## 2025-04-10 DIAGNOSIS — G89.29 CHRONIC BILATERAL LOW BACK PAIN WITH LEFT-SIDED SCIATICA: ICD-10-CM

## 2025-04-10 DIAGNOSIS — K63.5 POLYP OF COLON, UNSPECIFIED PART OF COLON, UNSPECIFIED TYPE: ICD-10-CM

## 2025-04-10 PROCEDURE — 99214 OFFICE O/P EST MOD 30 MIN: CPT | Mod: 25 | Performed by: NURSE PRACTITIONER

## 2025-04-10 PROCEDURE — 3078F DIAST BP <80 MM HG: CPT | Performed by: NURSE PRACTITIONER

## 2025-04-10 PROCEDURE — 96372 THER/PROPH/DIAG INJ SC/IM: CPT | Performed by: NURSE PRACTITIONER

## 2025-04-10 PROCEDURE — 3075F SYST BP GE 130 - 139MM HG: CPT | Performed by: NURSE PRACTITIONER

## 2025-04-10 RX ORDER — KETOROLAC TROMETHAMINE 15 MG/ML
15 INJECTION, SOLUTION INTRAMUSCULAR; INTRAVENOUS ONCE
Status: COMPLETED | OUTPATIENT
Start: 2025-04-10 | End: 2025-04-10

## 2025-04-10 RX ORDER — PROMETHAZINE HYDROCHLORIDE 25 MG/1
TABLET ORAL
Qty: 30 TABLET | Refills: 1 | Status: SHIPPED | OUTPATIENT
Start: 2025-04-10

## 2025-04-10 RX ORDER — BUTALBITAL, ACETAMINOPHEN AND CAFFEINE 300; 40; 50 MG/1; MG/1; MG/1
CAPSULE ORAL
Qty: 15 CAPSULE | Refills: 0 | Status: SHIPPED | OUTPATIENT
Start: 2025-04-10 | End: 2025-05-10

## 2025-04-10 RX ADMIN — KETOROLAC TROMETHAMINE 15 MG: 15 INJECTION, SOLUTION INTRAMUSCULAR; INTRAVENOUS at 12:53

## 2025-04-10 RX ADMIN — KETOROLAC TROMETHAMINE 15 MG: 15 INJECTION, SOLUTION INTRAMUSCULAR; INTRAVENOUS at 12:52

## 2025-04-10 ASSESSMENT — FIBROSIS 4 INDEX: FIB4 SCORE: 0.61

## 2025-04-10 NOTE — PROCEDURES
Date of Service: 4/11/2025     Physician/s: So Veliz MD    Pre-operative Diagnosis: Myalgia (M79.1)    Post-operative Diagnosis: Myalgia (M79.1)    Procedure: Right trapezius muscle, left trapezius muscle, right lumbar paraspinals, left lumbar paraspinals 10 trigger point injections    Description of procedure:    The risks, benefits, and alternatives of the procedure were reviewed and discussed with the patient.  Written informed consent was freely obtained. A pre-procedural time-out was conducted by the physician verifying patient’s identity, procedure to be performed, procedure site and side, and allergy verification. Appropriate equipment was determined to be in place for the procedure.     In the office suite exam room the patient was placed in a prone position and the skin areas for injection over the right trapezius muscle, left trapezius muscle, right lumbar paraspinals, left lumbar paraspinals was marked. A solution was prepared with 1 mL of 4 mg/mL of dexamethasone, 4.5 mL of 1% lidocaine and 4.5 mL of 0.5% bupivicaine. The areas of pain was then prepped and draped in the usual sterile fashion. Ultrasound was confirmed to view the adjacent structures for blood vessels and nerves and to confirm the needle path was not within the structures nor was it too deep to be within the lung. A 27g needle was placed into each of the markings at the areas above under ultrasound guidance with an in plane approach.. After negative aspiration, approximately 1 mL of the above solution was injected. The needle was removed intact after each trigger point injection, and the patient's back was covered with a 4x4 gauze, the area was cleansed with sterile normal saline, and a dressing was applied. There were no complications noted. The images were uploaded to our media tab for permanent storage.    Preprocedural pain: 9/10  Postprocedural pain: 9/10    So Veliz MD  Physical Medicine and Rehabilitation  Interventional  Spine and Sports Physiatry  [facility]

## 2025-04-10 NOTE — PROGRESS NOTES
Verbal consent was acquired by the patient to use [email or web address] ambient listening note generation during this visit Yes      Subjective   Joy Mcnamara is a 40 y.o. female who presents for f/u   History of Present Illness  The patient is a 40-year-old female who presents for f/u on chronic issues including migraines, fecal incontinence, lower extremity nerve damage, chronic low back and neck pain.  She is followed by neurology, physiatry, psychiatry, a therapist, pain management.  She works at the [employer].  She needs paperwork completed to be close to a restroom, 40 feet otherwise she has an accident because of her fecal incontinence with urgency.  She also has nerve damage in her feet/legs from a previous spine surgery and has excruciating pain in her feet if she wear shoes other than crocs at work and needs paperwork stating all of this.    Migraines: Improving overall.  Now receiving Botox.  Has Ubrelvy for abortive treatment.  she started to experience a severe migraine yesterday, which was particularly intense, reminiscent of an episode she had not encountered in several years. She has a scheduled appointment for her first trigger point injections with physiatry tomorrow morning.  She has previously responded positively to Toradol. She has undergone a CT scan, which revealed no aneurysms or tumors. To alleviate the pain from this migraine, she administered Ubrelvy and Advil, which provided some relief. However, the intensity of the migraine was such that she felt as if she was on the verge of death due to an impending burst. She took another dose of Ubrelvy this morning.    MEDICATIONS  Current: Ubrelvy, Advil    Review of Systems  Negative except for HPI  Objective   /78 (BP Location: Right arm, Patient Position: Sitting, BP Cuff Size: Large adult)   Pulse 86   Temp 35.9 °C (96.7 °F)   Resp 12   Ht 1.829 m (6')   Wt 121 kg (266 lb)   SpO2 98%   Physical Exam  Gen: NAD  Resp: nonlabored.  Able to  speak in full sentences  Psy: pleasant / cooperative.   Neuro:  Alert and oriented x 3        Assessment & Plan  1. Migraine  She experienced a severe migraine yesterday, which has not fully subsided despite taking Ubrelvy and Advil.  Treatment plan: A Toradol injection will be administered today, as she has previously responded well to this treatment. She is advised to continue using Ubrelvy, with the option to repeat the dosage every 2 hours if necessary.    2.  Chronic low back pain with sciatica, lower extremity nerve damage, fecal incontinence: Completed all paperwork necessary today for her that she can have a reasonable accommodation at work in which she will be within 40 feet of her restroom and be able to wear shoes that do not cause her pain.      3.  Colon polyp :  stable / removed.  Utd with colonoscopy.     4.  Pulsatile tinnitus:  up to date with ENT             Please note that this dictation was created using voice recognition software. I have made every reasonable attempt to correct obvious errors, but I expect that there are errors of grammar and possibly content that I did not discover before finalizing the note.

## 2025-04-11 ENCOUNTER — OFFICE VISIT (OUTPATIENT)
Dept: PHYSICAL MEDICINE AND REHAB | Facility: MEDICAL CENTER | Age: 40
End: 2025-04-11
Payer: COMMERCIAL

## 2025-04-11 VITALS
SYSTOLIC BLOOD PRESSURE: 162 MMHG | HEIGHT: 72 IN | HEART RATE: 78 BPM | DIASTOLIC BLOOD PRESSURE: 92 MMHG | BODY MASS INDEX: 36.31 KG/M2 | TEMPERATURE: 98 F | WEIGHT: 268.08 LBS | OXYGEN SATURATION: 97 %

## 2025-04-11 DIAGNOSIS — M79.10 MYALGIA: Primary | ICD-10-CM

## 2025-04-11 PROCEDURE — 1125F AMNT PAIN NOTED PAIN PRSNT: CPT | Performed by: STUDENT IN AN ORGANIZED HEALTH CARE EDUCATION/TRAINING PROGRAM

## 2025-04-11 PROCEDURE — 3080F DIAST BP >= 90 MM HG: CPT | Performed by: STUDENT IN AN ORGANIZED HEALTH CARE EDUCATION/TRAINING PROGRAM

## 2025-04-11 PROCEDURE — 20553 NJX 1/MLT TRIGGER POINTS 3/>: CPT | Performed by: STUDENT IN AN ORGANIZED HEALTH CARE EDUCATION/TRAINING PROGRAM

## 2025-04-11 PROCEDURE — 76942 ECHO GUIDE FOR BIOPSY: CPT | Performed by: STUDENT IN AN ORGANIZED HEALTH CARE EDUCATION/TRAINING PROGRAM

## 2025-04-11 PROCEDURE — 3077F SYST BP >= 140 MM HG: CPT | Performed by: STUDENT IN AN ORGANIZED HEALTH CARE EDUCATION/TRAINING PROGRAM

## 2025-04-11 RX ORDER — DEXAMETHASONE SODIUM PHOSPHATE 4 MG/ML
4 INJECTION, SOLUTION INTRA-ARTICULAR; INTRALESIONAL; INTRAMUSCULAR; INTRAVENOUS; SOFT TISSUE ONCE
Status: COMPLETED | OUTPATIENT
Start: 2025-04-11 | End: 2025-04-11

## 2025-04-11 RX ORDER — CYCLOBENZAPRINE HCL 5 MG
2.5-5 TABLET ORAL 3 TIMES DAILY PRN
Qty: 60 TABLET | Refills: 1 | Status: SHIPPED | OUTPATIENT
Start: 2025-04-11

## 2025-04-11 RX ORDER — BUPIVACAINE HYDROCHLORIDE 5 MG/ML
4.5 INJECTION, SOLUTION EPIDURAL; INTRACAUDAL; PERINEURAL ONCE
Status: COMPLETED | OUTPATIENT
Start: 2025-04-11 | End: 2025-04-11

## 2025-04-11 RX ADMIN — Medication 4.5 ML: at 09:23

## 2025-04-11 RX ADMIN — BUPIVACAINE HYDROCHLORIDE 4.5 ML: 5 INJECTION, SOLUTION EPIDURAL; INTRACAUDAL; PERINEURAL at 09:23

## 2025-04-11 RX ADMIN — DEXAMETHASONE SODIUM PHOSPHATE 4 MG: 4 INJECTION, SOLUTION INTRA-ARTICULAR; INTRALESIONAL; INTRAMUSCULAR; INTRAVENOUS; SOFT TISSUE at 09:23

## 2025-04-11 ASSESSMENT — PAIN SCALES - GENERAL: PAINLEVEL_OUTOF10: 9=SEVERE PAIN

## 2025-04-11 ASSESSMENT — FIBROSIS 4 INDEX: FIB4 SCORE: 0.61

## 2025-04-15 ENCOUNTER — OFFICE VISIT (OUTPATIENT)
Dept: BEHAVIORAL HEALTH | Facility: PSYCHIATRIC FACILITY | Age: 40
End: 2025-04-15
Payer: COMMERCIAL

## 2025-04-15 DIAGNOSIS — F33.1 MODERATE EPISODE OF RECURRENT MAJOR DEPRESSIVE DISORDER (HCC): ICD-10-CM

## 2025-04-15 PROCEDURE — 99999 PR NO CHARGE: CPT
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
## 2025-04-16 NOTE — PROGRESS NOTES
[facility]  Psychotherapy Summary Note    Full therapy note has been documented and is under restricted viewing.  Please see below for summary of today's session.     Date of Service: 04/15/2025  Appointment type: in-office appointment.  Attending:  Mack Little M.D.    Type of session:Individual psychotherapy  Session start time: 3:50pm  Session stop time: 4:50pm  Length of time spent face to face with patient: 60 min  Persons in attendance: Patient    SI reported: no  HI reported: no    SUMMARY  Joy Mcnamara is a 40 y.o. old female who presents today for regularly scheduled psychotherapy for depression.       Symptoms currently being addressed in therapy: depression and grief    Therapeutic Intervention(s): Parenting/familial roles addressed, Stressors assessed, and Supportive psychotherapy    CURRENT RISK ASSESSMENT       Suicide: Low       Homicide: Low       Self-Harm: Low       Relapse: Low       Crisis Safety Plan Reviewed No    DIAGNOSES/PLAN  Problem List Items Addressed This Visit          Psychiatry Problems    Depression         Treatment Goal(s)/Objective(s) addressed: Tx plan:  Utilize learned skills to manage mood and emotional suffering more effectively  Learn to successfully challenge & change distortions in thinking  Learn to ameliorate effects of anxiety on life and functioning  Increase behaviors of self-compassion and self-care  Increase self-esteem      Progress toward Treatment Goals: No change     Plan:  - Continue individual therapy    Pollo Rangel M.D.

## 2025-04-17 ENCOUNTER — HOSPITAL ENCOUNTER (OUTPATIENT)
Dept: RADIOLOGY | Facility: MEDICAL CENTER | Age: 40
End: 2025-04-17
Payer: COMMERCIAL

## 2025-04-17 DIAGNOSIS — G43.101 MIGRAINE WITH AURA AND WITH STATUS MIGRAINOSUS, NOT INTRACTABLE: ICD-10-CM

## 2025-04-17 PROCEDURE — A9579 GAD-BASE MR CONTRAST NOS,1ML: HCPCS | Mod: JZ

## 2025-04-17 PROCEDURE — 700117 HCHG RX CONTRAST REV CODE 255: Mod: JZ

## 2025-04-17 PROCEDURE — 70553 MRI BRAIN STEM W/O & W/DYE: CPT

## 2025-04-17 RX ADMIN — GADOTERIDOL 20 ML: 279.3 INJECTION, SOLUTION INTRAVENOUS at 16:57

## 2025-04-22 ENCOUNTER — OFFICE VISIT (OUTPATIENT)
Dept: BEHAVIORAL HEALTH | Facility: PSYCHIATRIC FACILITY | Age: 40
End: 2025-04-22
Payer: COMMERCIAL

## 2025-04-22 DIAGNOSIS — F33.1 MODERATE EPISODE OF RECURRENT MAJOR DEPRESSIVE DISORDER (HCC): ICD-10-CM

## 2025-04-22 PROCEDURE — 99999 PR NO CHARGE: CPT

## 2025-04-22 NOTE — PROGRESS NOTES
[facility]  Psychotherapy Summary Note    Full therapy note has been documented and is under restricted viewing.  Please see below for summary of today's session.     Date of Service: 04/22/2025  Appointment type: in-office appointment.  Attending:  Mack Little M.D.    Type of session:Individual psychotherapy  Session start time: 3:55pm  Session stop time: 4:55pm  Length of time spent face to face with patient: 60 min  Persons in attendance: Patient    SI reported: no  HI reported: no    SUMMARY  Joy Mcnamara is a 40 y.o. old female who presents today for regularly scheduled psychotherapy for depression.       Symptoms currently being addressed in therapy: depression and social anxiety    Therapeutic Intervention(s): Cognitive behavioral therapy, Maladaptive behavior addressed, Parenting/familial roles addressed, and Supportive psychotherapy    CURRENT RISK ASSESSMENT       Suicide: Low       Homicide: Low       Self-Harm: Low       Relapse: Low       Crisis Safety Plan Reviewed No    DIAGNOSES/PLAN  Problem List Items Addressed This Visit          Psychiatry Problems    Depression         Treatment Goal(s)/Objective(s) addressed: Tx plan:  Utilize learned skills to manage mood and emotional suffering more effectively  Learn to successfully challenge & change distortions in thinking  Learn to ameliorate effects of anxiety on life and functioning  Increase behaviors of self-compassion and self-care  Increase self-esteem      Progress toward Treatment Goals: No change     Plan:  - Continue individual therapy    Pollo Rangel M.D.
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
## 2025-04-29 ENCOUNTER — OFFICE VISIT (OUTPATIENT)
Dept: BEHAVIORAL HEALTH | Facility: PSYCHIATRIC FACILITY | Age: 40
End: 2025-04-29
Payer: COMMERCIAL

## 2025-04-29 ENCOUNTER — HOSPITAL ENCOUNTER (OUTPATIENT)
Dept: LAB | Facility: MEDICAL CENTER | Age: 40
End: 2025-04-29
Payer: COMMERCIAL

## 2025-04-29 DIAGNOSIS — F90.9 ATTENTION DEFICIT HYPERACTIVITY DISORDER (ADHD), UNSPECIFIED ADHD TYPE: ICD-10-CM

## 2025-04-29 DIAGNOSIS — F43.10 PTSD (POST-TRAUMATIC STRESS DISORDER): ICD-10-CM

## 2025-04-29 DIAGNOSIS — F33.1 MODERATE EPISODE OF RECURRENT MAJOR DEPRESSIVE DISORDER (HCC): ICD-10-CM

## 2025-04-29 LAB
25(OH)D3 SERPL-MCNC: 21 NG/ML (ref 30–100)
FOLATE SERPL-MCNC: 8.9 NG/ML
VIT B12 SERPL-MCNC: 410 PG/ML (ref 211–911)

## 2025-04-29 PROCEDURE — 36415 COLL VENOUS BLD VENIPUNCTURE: CPT

## 2025-04-29 PROCEDURE — 82306 VITAMIN D 25 HYDROXY: CPT

## 2025-04-29 PROCEDURE — 82607 VITAMIN B-12: CPT

## 2025-04-29 PROCEDURE — 82746 ASSAY OF FOLIC ACID SERUM: CPT
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
## 2025-04-30 ENCOUNTER — OFFICE VISIT (OUTPATIENT)
Dept: NEUROLOGY | Facility: MEDICAL CENTER | Age: 40
End: 2025-04-30
Payer: COMMERCIAL

## 2025-04-30 VITALS
BODY MASS INDEX: 36.79 KG/M2 | SYSTOLIC BLOOD PRESSURE: 142 MMHG | HEIGHT: 72 IN | DIASTOLIC BLOOD PRESSURE: 90 MMHG | HEART RATE: 91 BPM | TEMPERATURE: 96.6 F | OXYGEN SATURATION: 98 % | WEIGHT: 271.61 LBS

## 2025-04-30 DIAGNOSIS — G43.101 MIGRAINE WITH AURA AND WITH STATUS MIGRAINOSUS, NOT INTRACTABLE: ICD-10-CM

## 2025-04-30 PROCEDURE — 99212 OFFICE O/P EST SF 10 MIN: CPT

## 2025-04-30 RX ORDER — UBROGEPANT 100 MG/1
1 TABLET ORAL PRN
Qty: 10 TABLET | Refills: 11 | Status: SHIPPED | OUTPATIENT
Start: 2025-04-30

## 2025-04-30 ASSESSMENT — FIBROSIS 4 INDEX: FIB4 SCORE: 0.61

## 2025-04-30 NOTE — PROGRESS NOTES
[facility]  Psychotherapy Summary Note    Full therapy note has been documented and is under restricted viewing.  Please see below for summary of today's session.     Date of Service: 04/29/2025  Appointment type: in-office appointment.  Attending:  Mack Little M.D.    Type of session:Individual psychotherapy  Session start time: 4:00pm  Session stop time: 5:00pm  Length of time spent face to face with patient: 60 min  Persons in attendance: Patient    SI reported: no  HI reported: no    SUMMARY  Joy Mcnamara is a 40 y.o. old female who presents today for regularly scheduled psychotherapy for depression.       Symptoms currently being addressed in therapy: depression, anxiety, and interpersonal difficulty    Therapeutic Intervention(s): Cognitive behavioral therapy, Develop/modify treatment plan, and Maladaptive behavior addressed    CURRENT RISK ASSESSMENT       Suicide: Low       Homicide: Low       Self-Harm: Low       Relapse: Low       Crisis Safety Plan Reviewed No    DIAGNOSES/PLAN  Problem List Items Addressed This Visit          Psychiatry Problems    Depression         Treatment Goal(s)/Objective(s) addressed: Tx plan:  Utilize learned skills to manage mood and emotional suffering more effectively  Learn to successfully challenge & change distortions in thinking  Learn to ameliorate effects of anxiety on life and functioning  Increase behaviors of self-compassion and self-care  Increase self-esteem      Progress toward Treatment Goals: Mild improvement     Plan:  - Continue individual therapy    Pollo Rangel M.D.
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
## 2025-04-30 NOTE — PROGRESS NOTES
"[facility] Outpatient Psychiatric Follow Up Note  Evaluation completed by: Fercho Avila M.D.   Date of Service: 05/02/2025  Appointment type: in-office appointment.  Attending:  Kelsi Dias M.D.   Information below was collected from: patient    CHIEF COMPLIANT:  Medication Management (For MDD, ADHD, PTSD w/ panic attacks, at risk for ROSA)    Last visit with me on 3/28/2025:    Started Guanfacine ER  2 mg p.o. daily for ADHD  Started duloxetine 30 mg p.o. daily for neuropathic pain, depression and anxiety related to PTSD.  Started prazosin 1 mg p.o. nightly for PTSD related nightmares  Started Clonidine  0.1 mg p.o. twice daily as needed for anxiety attacks and nighttime sleep aid.  5. Controlled substance agreement will be signed when patient is in clinic next Tuesday.  Change Adderall 20mg to extended release.  Available for pickup 4/14/2025.  6.  Advised patient to taper off Xanax.  7. Labs ordered: B12, Folate, Vitamin D      HPI:   Joy Mcnamara is a 40 y.o. old female who presents today for regularly scheduled follow up for assessment of Medication Management (For MDD, ADHD, PTSD w/ panic attacks, at risk for ROSA)    #MDD  #ADHD  #PTSD w/ panic attacks  #Migraine  #Chronic pain  #ROSA  Labs: B12 folate WNL. Vitamin D 21 low.    Patient reports \"okay\" mood.  Her nightmare has been worse, and she frequently wakes up from night terrors.  Patient endorses frequent snoring, to the point that she has to sleep in a separate bedroom.  Patient reports her headache has become worse.  We discussed the possibility of obstructive sleep apnea and is related risk factors including obesity, family history, and the neck circumference.  I educated patient on the complications of untreated ROSA including: Hypertension, nightmares, daytime fatigue, metabolic disorder, inattention, anxiety, and depression.  Patient is agreeable to sleep medicine referral.  Duloxetine attenuated both positive mood and negative " moods, she has been experiencing emotional numbness.  The duloxetine is also causing some GI discomfort to the point that she hesitates to eat throughout the day. Pt does not notice any significant effect of prazosin on her nightmares.    Patient reports significant constipation and guanfacine ER, she now has to use laxative because her bowel movements are every 3 to 5 days.  She has increased anxiety and panic attacks due to having to use laxative and her bowel movement becomes even more unpredictable on top of having incontinence.  We discussed discontinuing duloxetine and switching to Lexapro which has less GI side effects, patient is agreeable.    Patient reports doing well on Adderall XR 20 mg p.o. daily, denies any heart palpitation. She reports her Adderall wears off around 3-4 pm.  Focus has not changed since last visit.      She is excessively weaned off Xanax, there is increased anxiety and panic attacks associated with rebound symptoms, I encouraged patient to continue to utilize clonidine as needed to manage her panic attacks, she uses 2 doses of clonidine as needed per day for panic attacks.  Her sleep has being slightly poor due to the recurrence of nightmares, partially related to reactivation of trauma in her therapy sessions.  She has hard time falling asleep because of racing thoughts before bedtime.  She is agreeable to increasing clonidine to 0.1 mg 3 times daily as needed, so she has a one dose available to help with sleep.    #Uncontrolled HTN  #Lower extremity edema  #Pulsatile tinnitus  Patient reports lower extremity edema on antihypertensive drugs started 2 years ago.  Patient reports she has tried metoprolol which worsened her lower extremity edema.  Hence she has not been taking any blood pressure medication.  I educated patient on the long-term consequences of untreated hypertension including heart failure and increased CVD risks.  Patient reports her pulsating tinnitus started around  the same time and the intensity of the pulsating tinnitus would change when she turns her neck to different directions.  I strongly encouraged patient to follow up with her primary care provider as soon as possible and obtain a referral to cardiology, as pulsating tinnitus can be a result of turbulent flow within the internal carotid artery secondary to hypertension.  Patient is agreeable.      Offered ergocalciferol for vitamin D insufficiency, however pt deferred and will increase the dose of her at-home supplementation.  Discussed plan below and pt is agreeable:    Sleep medicine referral for hospital sleep study to rule out ROSA.  2. Discontinue Guanfacine  3. Discontinue Duloxetine  4. Start Lexapro 5mg po qD for anxiety for PTSD and depression.  5. Increase Prazosin to 2mg po qHS.  5.  Increase clonidine to 0.1 mg p.o. 3 times daily as needed for panic attacks and sleep disturbances.  6.  Continue Adderall XR 20 mg p.o. daily for ADHD.  7. Follow-up with PCP ASAP for cardiology referral, consider carotid Doppler ultrasound and echocardiogram.        PSYCHIATRIC REVIEW OF SYSTEMS:current symptoms as reported by pt.  Depression: Depressed mood, Difficulty sleeping, Anhedonia, Excessive feelings of guilt, Low energy, Low appetite, and Difficulty concentrating  Amita: Patient denies any change in mood, increased energy, or marked irritability  Anxiety/Panic Attacks: palpitations, sweating, chest pain, shortness of breath, dizziness, insomnia, racing thoughts, psychomotor agitation, feelings of losing control, difficulty concentrating  Trauma: intrusive memories, nightmares, flashbacks, avoiding memories, negative beliefs of self/others/world, feeling detached from others, and irritable  Psychosis: Patient reports no signs or symptoms indicative of psychosis  ADHD: has difficulty sustaining attention in tasks or play activities, has difficulty organizing tasks and activities, is easily distracted by extraneous  stimuli, is often forgetful in daily activities,       CURRENT MEDICATIONS    Current Outpatient Medications:     cloNIDine (CATAPRES) 0.1 MG Tab, Take 1 Tablet by mouth 3 times a day as needed (Take before panic attacks, or take before bedtime to help with sleep.) for up to 90 days., Disp: 90 Tablet, Rfl: 2    [START ON 5/7/2025] amphetamine-dextroamphetamine (ADDERALL XR) 20 MG per XR capsule, Take 1 Capsule by mouth every morning for 30 days., Disp: 30 Capsule, Rfl: 0    prazosin (MINIPRESS) 2 MG Cap, Take 1 Capsule by mouth every evening for 90 days., Disp: 30 Capsule, Rfl: 2    escitalopram (LEXAPRO) 5 MG tablet, Take 1 Tablet by mouth every day for 90 days., Disp: 30 Tablet, Rfl: 2    Ubrogepant (UBRELVY) 100 MG Tab, Take 1 Tablet by mouth as needed (1 tab at headache osnet, repeat in 2 hour prn)., Disp: 10 Tablet, Rfl: 11    cyclobenzaprine (FLEXERIL) 5 mg tablet, Take 0.5-1 Tablets by mouth 3 times a day as needed for Mild Pain, Moderate Pain or Muscle Spasms. Take during the daytime only, tizanidine should be used only at nighttime., Disp: 60 Tablet, Rfl: 1    acetaminophen/caffeine/butalbital 300-40-50 mg (FIORICET) -40 MG Cap capsule, TAKE 1 CAPSULE BY MOUTH EVERY 6 HOURS AS NEEDED FOR HEADACHE FOR 7 DAYS, Disp: 15 Capsule, Rfl: 0    promethazine (PHENERGAN) 25 MG Tab, TAKE 1/2 TO 1 (ONE-HALF TO ONE) TABLET BY MOUTH EVERY 8 HOURS AS NEEDED FOR NAUSEA, Disp: 30 Tablet, Rfl: 1    tizanidine (ZANAFLEX) 4 MG Tab, Take 1 Tablet by mouth every evening., Disp: 90 Tablet, Rfl: 1    cyanocobalamin (VITAMIN B-12) 1000 MCG/ML Solution, INJECT 1 ML INTO THE SHOULDER, THIGH, OR BUTTOCKS EVERY 30 DAYS, Disp: 3 mL, Rfl: 0    pregabalin (LYRICA) 150 MG Cap, Take 1 Capsule by mouth 3 times a day for 90 days., Disp: 270 Capsule, Rfl: 1    ondansetron (ZOFRAN) 4 MG Tab tablet, Take 1 Tablet by mouth every 8 hours as needed for Nausea/Vomiting., Disp: 30 Tablet, Rfl: 2    ibuprofen (MOTRIN) 400 MG Tab, Take 400 mg by  "mouth every 6 hours as needed., Disp: , Rfl:     asa/apap/caffeine (EXCEDRIN) 250-250-65 MG Tab, Take 2-3 Tablets by mouth every 8 hours as needed for Headache., Disp: , Rfl:      REVIEW OF SYSTEMS   ROS  Neurologic: no tics, tremors, dyskinesias. The patient denies dizziness, syncope, falls. Ambulates independently    PAST MEDICAL HISTORY  Past Medical History:   Diagnosis Date    Back pain     Depression     s/p mom's death    Depression 4/25/2014    Gynecological disorder     Migraine headache     Miscarriage     Pain 2017    low back; degenerative disk disease     Allergies   Allergen Reactions    Sumatriptan Succinate      \"face burns & I can't see\"    Tramadol Photosensitivity     Gives her migraines and makes her feel sick    Diclofenac Itching and Swelling    Meloxicam Itching and Swelling     Past Surgical History:   Procedure Laterality Date    VAGINAL HYSTERECTOMY SCOPE TOTAL N/A 03/27/2017    Procedure: VAGINAL HYSTERECTOMY SCOPE TOTAL right salpingectomy, partial left salpingectomy. Cystoscopy;  Surgeon: Zayda Santillan M.D.;  Location: SURGERY SAME DAY [facility] ORS;  Service:     MS NJX AA&/STRD TFRML EPI LUMBAR/SACRAL 1 LEVEL Bilateral 06/17/2015    Procedure: TRANSFORAMINAL BILATERAL L5-S1 EPIDURAL WITH IV SEDATION;  Surgeon: Tom Main M.D.;  Location: SURGERY [facility] ORS;  Service: Pain Management    MS NJX AA&/STRD TFRML EPI LUMBAR/SACRAL 1 LEVEL  06/17/2015    Procedure: INJ-FORAMEN EPI LUM/SAC SNGL;  Surgeon: Tom Main M.D.;  Location: SURGERY [facility] ORS;  Service: Pain Management    MS NJX AA&/STRD TFRML EPI LUMBAR/SACRAL 1 LEVEL  10/08/2014    Performed by Tom Main M[facility]D. at [facility]    LUMBAR LAMINECTOMY DISKECTOMY  10/03/2013    Performed by Estuardo Jack M.D. at SURGERY [facility] ORS    LUMBAR LAMINECTOMY DISKECTOMY  06/10/2009    Performed by ESTUARDO JACK at SURGERY [facility] ORS    CERVICAL FUSION POSTERIOR      Dr Mcadams - 9/2023    DENTAL " EXTRACTION(S)      wisdom    GYN SURGERY      c section x2    LUMBAR FUSION POSTERIOR PERCUTANEOUS Bilateral     2023 - Dr. Woodward - fusion L5-S1, revision from prior discectomy and cage placement.    LUMBAR FUSION POSTERIOR PERCUTANEOUS Bilateral     Dr. Mcadams - 2023 -[facility]    OTHER ORTHOPEDIC SURGERY      PRIMARY C SECTION      x2      Family History   Problem Relation Age of Onset    Drug abuse Mother     Psychiatric Illness Mother     Alcohol abuse Mother     Stroke Mother         aneurysm - pt was 14 yrs old    Drug abuse Father     Stroke Father         TIA    Drug abuse Sister     Drug abuse Brother     Drug abuse Maternal Aunt     Drug abuse Maternal Uncle     Drug abuse Paternal Aunt     Drug abuse Paternal Uncle     Autism Maternal Grandmother     Drug abuse Maternal Grandmother     Cancer Maternal Grandmother         lung /breast    Autism Maternal Grandfather     Drug abuse Maternal Grandfather     Autism Paternal Grandmother     Drug abuse Paternal Grandmother     Cancer Paternal Grandmother         breast / lung?    Autism Paternal Grandfather     Drug abuse Paternal Grandfather      Social History     Socioeconomic History    Marital status:    Tobacco Use    Smoking status: Former     Current packs/day: 0.00     Types: Cigarettes     Start date: 3/1/2003     Quit date: 3/1/2007     Years since quittin.1    Smokeless tobacco: Never    Tobacco comments:     2007   Vaping Use    Vaping status: Never Used   Substance and Sexual Activity    Alcohol use: No     Comment: denies ; family hx of alcoholism    Drug use: No     Types: Marijuana, Methamphetamines    Sexual activity: Yes     Birth control/protection: Injection     Comment: , two kids; special ed     Social Drivers of Health     Financial Resource Strain: Low Risk  (2023)    Received from [facility], [facility]    Overall Financial Resource Strain (CARDIA)     Difficulty of Paying Living Expenses:  Not hard at all   Food Insecurity: No Food Insecurity (9/22/2023)    Received from [facility]    Hunger Vital Sign     Worried About Running Out of Food in the Last Year: Never true     Ran Out of Food in the Last Year: Never true   Transportation Needs: Unmet Transportation Needs (9/22/2023)    Received from [facility]    PRAPARE - Transportation     Lack of Transportation (Medical): Yes     Lack of Transportation (Non-Medical): Yes   Physical Activity: Insufficiently Active (9/22/2023)    Received from [facility]    Exercise Vital Sign     Days of Exercise per Week: 2 days     Minutes of Exercise per Session: 30 min   Stress: No Stress Concern Present (9/22/2023)    Received from [facility]    [place] [employer] - Occupational Stress Questionnaire     Feeling of Stress : Only a little   Social Connections: Socially Integrated (9/22/2023)    Received from [facility]    Social Connection and Isolation Panel [NHANES]     Frequency of Communication with Friends and Family: More than three times a week     Frequency of Social Gatherings with Friends and Family: Once a week     Attends Restorationist Services: 1 to 4 times per year     Active Member of Clubs or Organizations: Yes     Attends Club or Organization Meetings: More than 4 times per year     Marital Status:    Intimate Partner Violence: Not At Risk (9/22/2023)    Received from [facility]    Humiliation, Afraid, Rape, and Kick questionnaire     Fear of Current or Ex-Partner: No     Emotionally Abused: No     Physically Abused: No     Sexually Abused: No   Housing Stability: Unknown (9/22/2023)    Received from [facility]    Housing Stability Vital Sign     Unable to Pay for Housing in the Last Year: No     Unstable Housing in the Last Year: No     Past Surgical History:    Procedure Laterality Date    VAGINAL HYSTERECTOMY SCOPE TOTAL N/A 03/27/2017    Procedure: VAGINAL HYSTERECTOMY SCOPE TOTAL right salpingectomy, partial left salpingectomy. Cystoscopy;  Surgeon: Zayda Santillan M.D.;  Location: SURGERY SAME DAY [facility];  Service:     [place] NJX AA&/STRD TFRML EPI LUMBAR/SACRAL 1 LEVEL Bilateral 06/17/2015    Procedure: TRANSFORAMINAL BILATERAL L5-S1 EPIDURAL WITH IV SEDATION;  Surgeon: Tom Main M.D.;  Location: SURGERY [facility];  Service: Pain Management    [place] NJX AA&/STRD TFRML EPI LUMBAR/SACRAL 1 LEVEL  06/17/2015    Procedure: INJ-FORAMEN EPI LUM/SAC SNGL;  Surgeon: Tom Main M.D.;  Location: [facility];  Service: Pain Management    [place] NJX AA&/STRD TFRML EPI LUMBAR/SACRAL 1 LEVEL  10/08/2014    Performed by Tom Main M.D. at [facility]    LUMBAR LAMINECTOMY DISKECTOMY  10/03/2013    Performed by Estuardo Jack M.D. at SURGERY [facility]    LUMBAR LAMINECTOMY DISKECTOMY  06/10/2009    Performed by ESTUARDO JACK at SURGERY [facility]    CERVICAL FUSION POSTERIOR      Dr Mcadams - 9/2023    DENTAL EXTRACTION(S)      wisdom    GYN SURGERY      c section x2    LUMBAR FUSION POSTERIOR PERCUTANEOUS Bilateral     12/2023 - Dr. Woodward - fusion L5-S1, revision from prior discectomy and cage placement.    LUMBAR FUSION POSTERIOR PERCUTANEOUS Bilateral     Dr. Mcadams - 12/2023 -[facility]    OTHER ORTHOPEDIC SURGERY      PRIMARY C SECTION      x2       PSYCHIATRIC EXAMINATION   /88 (BP Location: Left arm, Patient Position: Sitting, BP Cuff Size: Large adult)   Pulse 83   Ht 1.829 m (6')   Wt 123 kg (271 lb 9.6 oz)   LMP 03/16/2017   SpO2 94%   BMI 36.84 kg/m²   Musculoskeletal: No abnormal movements noted  Appearance: well-developed, well-nourished, appears stated age, fair hygiene, no apparent distress, obese, and appropriately dressed, cooperative, engaged, friendly, pleasant, and good eye contact  Thought Process:   "linear, coherent, goal-oriented, and organized  Abnormal or Psychotic Thoughts: No evidence of auditory or visual hallucinations, and no overt delusions noted  Speech: regular rate, rhythm, volume, tone, and syntax and coherent  Mood: \"ok\"  Affect: euthymic, restricted, and congruent with mood  SI/HI: Denies SI and HI  Orientation: alert and oriented  Recent and Remote Memory: no gross impairment in immediate, recent, or remote memory  Attention Span and Concentration: Grossly intact  Insight/Judgement into symptoms: fair  Neurological Testing (MSSE Score and/or clock drawing): MMSE not performed during this encounter    SCREENINGS:      3/28/2025     4:40 PM 4/4/2025     4:20 PM 5/2/2025     9:45 AM   Depression Screen (PHQ-2/PHQ-9)   PHQ-2 Total Score 2 2 5   PHQ-9 Total Score 7 7 11         3/28/2025    10:20 AM 5/2/2025    10:24 AM   YOANA 7   YOANA-7 Total Score 16 14       PREVENTATIVE CARE  Medication Monitoring: Stimulants: Reviewed height, weight, blood pressure, and pulse.  No concerns. Signed Controlled Substance Agreement.     [place]  records   reviewed.  No concerns about misuse of controlled substance.    CURRENT RISK ASSESSMENT       Suicide: Low       Homicide: Low       Self-Harm: Low       Relapse: Low       Crisis Safety Plan Reviewed Not Indicated    ASSESSMENT/DIAGNOSES/PLAN  Problem List Items Addressed This Visit          Psychiatry Problems    Depression    Relevant Medications    escitalopram (LEXAPRO) 5 MG tablet    Other Relevant Orders    Referral to Pulmonary and Sleep Medicine    Attention deficit hyperactivity disorder (ADHD)    Relevant Medications    cloNIDine (CATAPRES) 0.1 MG Tab    amphetamine-dextroamphetamine (ADDERALL XR) 20 MG per XR capsule (Start on 5/7/2025)    Other Relevant Orders    Referral to Pulmonary and Sleep Medicine    PTSD (post-traumatic stress disorder)    Relevant Medications    cloNIDine (CATAPRES) 0.1 MG Tab    prazosin (MINIPRESS) 2 MG Cap    escitalopram " (LEXAPRO) 5 MG tablet    Panic attack due to post traumatic stress disorder (PTSD)    Relevant Medications    escitalopram (LEXAPRO) 5 MG tablet       Other    At risk for obstructive sleep apnea    Relevant Orders    Referral to Pulmonary and Sleep Medicine          Medication options, alternatives (including no medications) and medication risks/benefits/side effects were discussed in detail.  The patient was advised to call, message clinician on Mementohart, or come in to the clinic if symptoms worsen or if questions/issues regarding their medications arise.  The patient verbalized understanding and agreement.    The patient was educated to call 911, call the suicide hotline, or go to the local ER if having thoughts of suicide or homicide.  The patient verbalized understanding and agreement.   The proposed treatment plan was discussed with the patient who was provided the opportunity to ask questions and make suggestions regarding alternative treatment. Patient verbalized understanding and expressed agreement with the plan.      Return in about 1 month (around 6/2/2025).      This appointment was supervised by attending psychiatrist, Kelsi Dias M.D. , who agrees with assessment and treatment plan.  See attending attestation for more details.

## 2025-05-01 PROBLEM — Z91.89 AT RISK FOR OBSTRUCTIVE SLEEP APNEA: Status: ACTIVE | Noted: 2025-05-01

## 2025-05-01 NOTE — PROGRESS NOTES
Verbal consent was acquired by the patient to use [employer] ambient listening note generation during this visit Yes     FOLLOW-UP PATIENT VISIT    Interventional Spine and Pain  Physiatry (Physical Medicine and Rehabilitation)     Date of Service: 25   Chief Complaint:   Chief Complaint   Patient presents with    Follow-Up     3wk post inj        Patient Name: Joy Mcnamara   : 1985   MRN: [id number]       PRIOR HISTORY    Please see new patient note by Dr. Veliz for more details.     Interval History  Patient identification: Joy Mcnamara,  1985, with history of migraines, lumbar fusion in , ADHD, anxiety and depression, who presents today for follow-up of neck and low back pain.    History of Present Illness  The patient is a 40-year-old female who presents today for follow-up of neck and low back pain. She underwent trigger point injections approximately 3 weeks ago.    She reports that the trigger point injections have been beneficial for her bilateral neck pain, but she has recently experienced new symptoms. She describes a sensation of pulling on both sides of her lower back, more pronounced on the left, which she likens to a band. This sensation is particularly noticeable during movement at night and is localized near her spine. She hypothesizes that this could be due to muscle strain or scar tissue. She has not previously engaged in manual physical therapy. She also reports that descending stairs exacerbates her pain, while ascending stairs does not significantly affect her discomfort. She does find the addition of daytime Flexeril has been beneficial.    Supplemental Information  She has a history of white-coat hypertension and her BP is always elevated in doctor's offices as well as with mechanical BP machines, so she checks her BP manually at home.    MEDICATIONS  Current: duloxetine, clonidine, pregabalin, ibuprofen, Flexeril, and  tizanidine      Procedures:  4/11/25: Trigger point injections targeting bilateral trapezius and lumbar paraspinals, 50% improvement in neck pain      PMHx:   Past Medical History:   Diagnosis Date    Back pain     Depression     s/p mom's death    Depression 4/25/2014    Gynecological disorder     Migraine headache     Miscarriage     Pain 2017    low back; degenerative disk disease       PSHx:   Past Surgical History:   Procedure Laterality Date    VAGINAL HYSTERECTOMY SCOPE TOTAL N/A 03/27/2017    Procedure: VAGINAL HYSTERECTOMY SCOPE TOTAL right salpingectomy, partial left salpingectomy. Cystoscopy;  Surgeon: Zayda Santillan M.D.;  Location: SURGERY SAME DAY [facility]S;  Service:     WA NJX AA&/STRD TFRML EPI LUMBAR/SACRAL 1 LEVEL Bilateral 06/17/2015    Procedure: TRANSFORAMINAL BILATERAL L5-S1 EPIDURAL WITH IV SEDATION;  Surgeon: Tom Main M.D.;  Location: [facility];  Service: Pain Management    WA NJX AA&/STRD TFRML EPI LUMBAR/SACRAL 1 LEVEL  06/17/2015    Procedure: INJ-FORAMEN EPI LUM/SAC SNGL;  Surgeon: Tom Main M.D.;  Location: [facility];  Service: Pain Management    WA NJX AA&/STRD TFRML EPI LUMBAR/SACRAL 1 LEVEL  10/08/2014    Performed by Tom Main M.D. at [facility]    LUMBAR LAMINECTOMY DISKECTOMY  10/03/2013    Performed by Estuardo Jack M.D. at [facility]    LUMBAR LAMINECTOMY DISKECTOMY  06/10/2009    Performed by ESTUARDO JACK at SURGERY [facility] ORS    CERVICAL FUSION POSTERIOR      Dr Mcadams - 9/2023    DENTAL EXTRACTION(S)      wisdom    GYN SURGERY      c section x2    LUMBAR FUSION POSTERIOR PERCUTANEOUS Bilateral     12/2023 - Dr. Woodward - fusion L5-S1, revision from prior discectomy and cage placement.    LUMBAR FUSION POSTERIOR PERCUTANEOUS Bilateral     Dr. Mcadams - 12/2023 -[facility]    OTHER ORTHOPEDIC SURGERY      PRIMARY C SECTION      x2       Family history   Family History   Problem Relation Age of Onset     Drug abuse Mother     Psychiatric Illness Mother     Alcohol abuse Mother     Stroke Mother         aneurysm - pt was 14 yrs old    Drug abuse Father     Stroke Father         TIA    Drug abuse Sister     Drug abuse Brother     Drug abuse Maternal Aunt     Drug abuse Maternal Uncle     Drug abuse Paternal Aunt     Drug abuse Paternal Uncle     Autism Maternal Grandmother     Drug abuse Maternal Grandmother     Cancer Maternal Grandmother         lung /breast    Autism Maternal Grandfather     Drug abuse Maternal Grandfather     Autism Paternal Grandmother     Drug abuse Paternal Grandmother     Cancer Paternal Grandmother         breast / lung?    Autism Paternal Grandfather     Drug abuse Paternal Grandfather        Medications:   Outpatient Medications Marked as Taking for the 5/2/25 encounter (Office Visit) with So Veliz M.D.   Medication Sig Dispense Refill    cloNIDine (CATAPRES) 0.1 MG Tab Take 1 Tablet by mouth 3 times a day as needed (Take before panic attacks, or take before bedtime to help with sleep.) for up to 90 days. 90 Tablet 2    [START ON 5/7/2025] amphetamine-dextroamphetamine (ADDERALL XR) 20 MG per XR capsule Take 1 Capsule by mouth every morning for 30 days. 30 Capsule 0    prazosin (MINIPRESS) 2 MG Cap Take 1 Capsule by mouth every evening for 90 days. 30 Capsule 2    escitalopram (LEXAPRO) 5 MG tablet Take 1 Tablet by mouth every day for 90 days. 30 Tablet 2    cyclobenzaprine (FLEXERIL) 5 MG tablet Take 0.5-1 Tablets by mouth 3 times a day as needed for Mild Pain, Moderate Pain or Muscle Spasms. Take during the daytime only, tizanidine should be used only at nighttime. 60 Tablet 2    Ubrogepant (UBRELVY) 100 MG Tab Take 1 Tablet by mouth as needed (1 tab at headache osnet, repeat in 2 hour prn). 10 Tablet 11    acetaminophen/caffeine/butalbital 300-40-50 mg (FIORICET) -40 MG Cap capsule TAKE 1 CAPSULE BY MOUTH EVERY 6 HOURS AS NEEDED FOR HEADACHE FOR 7 DAYS 15 Capsule 0     promethazine (PHENERGAN) 25 MG Tab TAKE 1/2 TO 1 (ONE-HALF TO ONE) TABLET BY MOUTH EVERY 8 HOURS AS NEEDED FOR NAUSEA 30 Tablet 1    tizanidine (ZANAFLEX) 4 MG Tab Take 1 Tablet by mouth every evening. 90 Tablet 1    cyanocobalamin (VITAMIN B-12) 1000 MCG/ML Solution INJECT 1 ML INTO THE SHOULDER, THIGH, OR BUTTOCKS EVERY 30 DAYS 3 mL 0    pregabalin (LYRICA) 150 MG Cap Take 1 Capsule by mouth 3 times a day for 90 days. 270 Capsule 1    ondansetron (ZOFRAN) 4 MG Tab tablet Take 1 Tablet by mouth every 8 hours as needed for Nausea/Vomiting. 30 Tablet 2    ibuprofen (MOTRIN) 400 MG Tab Take 400 mg by mouth every 6 hours as needed.      asa/apap/caffeine (EXCEDRIN) 250-250-65 MG Tab Take 2-3 Tablets by mouth every 8 hours as needed for Headache.          Current Outpatient Medications on File Prior to Visit   Medication Sig Dispense Refill    cloNIDine (CATAPRES) 0.1 MG Tab Take 1 Tablet by mouth 3 times a day as needed (Take before panic attacks, or take before bedtime to help with sleep.) for up to 90 days. 90 Tablet 2    [START ON 5/7/2025] amphetamine-dextroamphetamine (ADDERALL XR) 20 MG per XR capsule Take 1 Capsule by mouth every morning for 30 days. 30 Capsule 0    prazosin (MINIPRESS) 2 MG Cap Take 1 Capsule by mouth every evening for 90 days. 30 Capsule 2    escitalopram (LEXAPRO) 5 MG tablet Take 1 Tablet by mouth every day for 90 days. 30 Tablet 2    Ubrogepant (UBRELVY) 100 MG Tab Take 1 Tablet by mouth as needed (1 tab at headache osnet, repeat in 2 hour prn). 10 Tablet 11    acetaminophen/caffeine/butalbital 300-40-50 mg (FIORICET) -40 MG Cap capsule TAKE 1 CAPSULE BY MOUTH EVERY 6 HOURS AS NEEDED FOR HEADACHE FOR 7 DAYS 15 Capsule 0    promethazine (PHENERGAN) 25 MG Tab TAKE 1/2 TO 1 (ONE-HALF TO ONE) TABLET BY MOUTH EVERY 8 HOURS AS NEEDED FOR NAUSEA 30 Tablet 1    tizanidine (ZANAFLEX) 4 MG Tab Take 1 Tablet by mouth every evening. 90 Tablet 1    cyanocobalamin (VITAMIN B-12) 1000 MCG/ML  "Solution INJECT 1 ML INTO THE SHOULDER, THIGH, OR BUTTOCKS EVERY 30 DAYS 3 mL 0    pregabalin (LYRICA) 150 MG Cap Take 1 Capsule by mouth 3 times a day for 90 days. 270 Capsule 1    ondansetron (ZOFRAN) 4 MG Tab tablet Take 1 Tablet by mouth every 8 hours as needed for Nausea/Vomiting. 30 Tablet 2    ibuprofen (MOTRIN) 400 MG Tab Take 400 mg by mouth every 6 hours as needed.      asa/apap/caffeine (EXCEDRIN) 250-250-65 MG Tab Take 2-3 Tablets by mouth every 8 hours as needed for Headache.       No current facility-administered medications on file prior to visit.       Allergies:   Allergies   Allergen Reactions    Sumatriptan Succinate      \"face burns & I can't see\"    Tramadol Photosensitivity     Gives her migraines and makes her feel sick    Diclofenac Itching and Swelling    Meloxicam Itching and Swelling       Social Hx:   Social History     Socioeconomic History    Marital status:      Spouse name: Not on file    Number of children: Not on file    Years of education: Not on file    Highest education level: Not on file   Occupational History    Not on file   Tobacco Use    Smoking status: Former     Current packs/day: 0.00     Types: Cigarettes     Start date: 3/1/2003     Quit date: 3/1/2007     Years since quittin.1    Smokeless tobacco: Never    Tobacco comments:     2007   Vaping Use    Vaping status: Never Used   Substance and Sexual Activity    Alcohol use: No     Comment: denies ; family hx of alcoholism    Drug use: No     Types: Marijuana, Methamphetamines    Sexual activity: Yes     Birth control/protection: Injection     Comment: , two kids; special ed   Other Topics Concern    Not on file   Social History Narrative    Not on file     Social Drivers of Health     Financial Resource Strain: Low Risk  (2023)    Received from [facility] [employer]    Overall Financial Resource Strain (CARDIA)     Difficulty of Paying Living Expenses: Not hard at all   Food " Insecurity: No Food Insecurity (9/22/2023)    Received from [facility]    Hunger Vital Sign     Worried About Running Out of Food in the Last Year: Never true     Ran Out of Food in the Last Year: Never true   Transportation Needs: Unmet Transportation Needs (9/22/2023)    Received from [facility]    PRAPARE - Transportation     Lack of Transportation (Medical): Yes     Lack of Transportation (Non-Medical): Yes   Physical Activity: Insufficiently Active (9/22/2023)    Received from [facility]    Exercise Vital Sign     Days of Exercise per Week: 2 days     Minutes of Exercise per Session: 30 min   Stress: No Stress Concern Present (9/22/2023)    Received from [facility]    [employer] - Occupational Stress Questionnaire     Feeling of Stress : Only a little   Social Connections: Socially Integrated (9/22/2023)    Received from [facility]    Social Connection and Isolation Panel [NHANES]     Frequency of Communication with Friends and Family: More than three times a week     Frequency of Social Gatherings with Friends and Family: Once a week     Attends Yazdanism Services: 1 to 4 times per year     Active Member of Clubs or Organizations: Yes     Attends Club or Organization Meetings: More than 4 times per year     Marital Status:    Intimate Partner Violence: Not At Risk (9/22/2023)    Received from [facility]    Humiliation, Afraid, Rape, and Kick questionnaire     Fear of Current or Ex-Partner: No     Emotionally Abused: No     Physically Abused: No     Sexually Abused: No   Housing Stability: Unknown (9/22/2023)    Received from [facility]    Housing Stability Vital Sign     Unable to Pay for Housing in the Last Year: No     Number of Places Lived in the Last Year: Not on file     Unstable Housing in the Last  Year: No         EXAMINATION     Physical Exam:   Vitals: BP (!) 202/119 (BP Location: Right arm, Patient Position: Sitting, BP Cuff Size: Adult)   Pulse 88   Temp 36.5 °C (97.7 °F) (Temporal)   Ht 1.829 m (6')   Wt 124 kg (272 lb 14.9 oz)   SpO2 97%       Constitutional:   Body Habitus: Body mass index is 37.02 kg/m².  Cooperation: Fully cooperates with exam  Appearance: Well-groomed, well-nourished  Respiratory:  Breathing comfortable on room air, no audible wheezing  Cardiovascular: Skin appears well-perfused  Psychiatric: Appropriate affect  Gait: normal gait, no use of ambulatory device, nonantalgic.       MEDICAL DECISION MAKING    DATA    Labs:   Lab Results   Component Value Date/Time    SODIUM 141 01/13/2025 06:33 AM    POTASSIUM 4.3 01/13/2025 06:33 AM    CHLORIDE 106 01/13/2025 06:33 AM    CO2 27 01/13/2025 06:33 AM    GLUCOSE 93 01/13/2025 06:33 AM    BUN 9 01/13/2025 06:33 AM    CREATININE 0.83 01/13/2025 06:33 AM    CREATININE 0.6 03/27/2007 06:55 PM    BUNCREATRAT <8.3 (L) 12/04/2023 04:57 AM        Lab Results   Component Value Date/Time    PROTHROMBTM 13.4 09/24/2013 09:54 AM    INR 1.00 09/24/2013 09:54 AM        Lab Results   Component Value Date/Time    WBC 7.0 01/13/2025 06:33 AM    RBC 4.36 01/13/2025 06:33 AM    HEMOGLOBIN 13.6 01/13/2025 06:33 AM    HEMATOCRIT 41.0 01/13/2025 06:33 AM    MCV 94.0 01/13/2025 06:33 AM    MCH 31.2 01/13/2025 06:33 AM    MCHC 33.2 01/13/2025 06:33 AM    MPV 10.0 01/13/2025 06:33 AM    NEUTSPOLYS 63.30 01/13/2025 06:33 AM    LYMPHOCYTES 25.70 01/13/2025 06:33 AM    MONOCYTES 7.10 01/13/2025 06:33 AM    EOSINOPHILS 2.70 01/13/2025 06:33 AM    EOSINOPHILS 2 11/22/2022 07:00 PM    BASOPHILS 0.90 01/13/2025 06:33 AM        Lab Results   Component Value Date/Time    HBA1C 5.2 01/13/2025 06:33 AM          Imaging:   Prior personal imaging review:  MRI Cervical Spine 2/13/22: Straightening of usual cervical lordosis. Mild C5-6 disc bulge with minimal right foraminal  stenosis. Multilevel mild uncovertebral joint arthropathy.        IMAGING radiology reads: I reviewed the following radiology reports                 Results for orders placed during the hospital encounter of 02/13/22     MR-CERVICAL SPINE-W/O  FINDINGS:  Cervical spine alignment is normal. Vertebral body heights are preserved. Intervertebral disc heights are normal. Bone marrow signal in the cervical spine is within normal limits. Included portions of posterior fossa is normal. The craniocervical   junction is normal.  Spinal cord signal intensity is within normal limits.     C2-3: Normal.     C3-4: Mild uncovertebral degeneration without stenosis.     C4-5: Mild anterior disc osteophyte complex with bilateral uncovertebral degeneration. There is no significant foraminal stenosis or canal stenosis.     C5-6: Endplate disc osteophyte complex with bilateral uncovertebral degeneration, worse on the right. There is mild right foraminal narrowing. There is mild asymmetric canal narrowing, slightly greater left paracentral aspect.     C6-7: Mild endplate disc osteophyte complex. There is no significant canal stenosis or foraminal narrowing.     C7-T1: No significant abnormality.     The prevertebral soft tissues are within normal limits.     IMPRESSION:        Mild cervical spine degenerative changes, most prominent at C5-6 with mild right foraminal narrowing. However, there is no definite nerve impingement.     MRI Lumbar Spine 3/5/2020:  FINDINGS:  There is transitional lumbosacral anatomy again noted. There is a partially lumbarized S1 segment and partially formed S1-S2 disc.     Normal lumbar lordosis. Preservation of vertebral body heights and alignment. No compression fracture.     Conus medullaris terminates at L1 and is normal in signal.     T12-L4: No significant disc herniation, spinal or foraminal stenosis.  L4-L5: Mild facet degeneration. Patent  L5-S1: Disc narrowing, mild disc osteophyte. Tiny posterior  central disc protrusion/extrusion. Mild facet degeneration. Moderate foraminal stenosis, right greater than left. No significant spinal stenosis. No significant change from prior.     IMPRESSION:     1.  Stable moderate disc degeneration L5-S1 with right greater than left foraminal stenosis.  2.  No significant spinal stenosis.  3.  No acute osseous abnormality.    DIAGNOSIS   Visit Diagnoses     ICD-10-CM   1. Myofascial low back pain  M54.50   2. Chronic bilateral low back pain without sciatica  M54.50    G89.29   3. Cervicalgia  M54.2   4. Myalgia  M79.10         ASSESSMENT and PLAN:     Joy Mcnamara is a 40 y.o. female who returns to clinic for follow-up of neck and low back pain.    Mercedes was seen today for follow-up.    Diagnoses and all orders for this visit:    Myofascial low back pain  -     Referral to Physical Therapy    Chronic bilateral low back pain without sciatica  -     Referral to Physical Therapy    Cervicalgia    Myalgia  -     cyclobenzaprine (FLEXERIL) 5 MG tablet; Take 0.5-1 Tablets by mouth 3 times a day as needed for Mild Pain, Moderate Pain or Muscle Spasms. Take during the daytime only, tizanidine should be used only at nighttime.        Assessment & Plan  Cervicalgia  Low back pain  Myofascial back and neck pain  Patient reports approximately 50% improvement in neck pain since trigger point injections targeting the bilateral trapezius and lumbar paraspinals. Patient's new symptoms of a band of tightness in the low back are consistent with myofascial back pain. A referral for manual physical therapy will be initiated to address these issues in order to focus on tissue mobilization and modalities. Home exercises using tools like a Thera cane or foam roller are recommended to manage adhesions and scar tissue. She is advised to continue her current regimen of Flexeril during the day, with additional refills provided.     Elevated blood pressure  Patient's blood pressure was  202/119 in the office today, she reports this is common for her in doctor's offices as she has white coat hypertension as well as elevated blood pressure readings with mechanical blood pressure cuffs. She regularly checks her blood pressure at home and will check when she gets home and follow-up with her PCP if it remains elevated.        Follow up: In 6 weeks    Thank you for allowing me to participate in the care of this patient. If you have any questions please not hesitate to contact me.         Please note that this dictation was created using voice recognition software. I have made every reasonable attempt to correct obvious errors but there may be errors of grammar and content that I may have overlooked prior to finalization of this note.    So Veliz MD  Interventional Spine and Sports Physiatry  Physical Medicine and Rehabilitation  [facility]

## 2025-05-02 ENCOUNTER — RESULTS FOLLOW-UP (OUTPATIENT)
Dept: BEHAVIORAL HEALTH | Facility: PSYCHIATRIC FACILITY | Age: 40
End: 2025-05-02

## 2025-05-02 ENCOUNTER — OFFICE VISIT (OUTPATIENT)
Dept: BEHAVIORAL HEALTH | Facility: PSYCHIATRIC FACILITY | Age: 40
End: 2025-05-02
Payer: COMMERCIAL

## 2025-05-02 ENCOUNTER — APPOINTMENT (OUTPATIENT)
Dept: PHYSICAL MEDICINE AND REHAB | Facility: MEDICAL CENTER | Age: 40
End: 2025-05-02
Payer: COMMERCIAL

## 2025-05-02 VITALS
HEIGHT: 72 IN | BODY MASS INDEX: 36.97 KG/M2 | OXYGEN SATURATION: 97 % | DIASTOLIC BLOOD PRESSURE: 119 MMHG | SYSTOLIC BLOOD PRESSURE: 202 MMHG | TEMPERATURE: 97.7 F | HEART RATE: 88 BPM | WEIGHT: 272.93 LBS

## 2025-05-02 VITALS
BODY MASS INDEX: 36.79 KG/M2 | HEART RATE: 83 BPM | DIASTOLIC BLOOD PRESSURE: 88 MMHG | OXYGEN SATURATION: 94 % | HEIGHT: 72 IN | SYSTOLIC BLOOD PRESSURE: 132 MMHG | WEIGHT: 271.6 LBS

## 2025-05-02 DIAGNOSIS — M79.10 MYALGIA: ICD-10-CM

## 2025-05-02 DIAGNOSIS — M54.2 CERVICALGIA: ICD-10-CM

## 2025-05-02 DIAGNOSIS — F33.1 MODERATE EPISODE OF RECURRENT MAJOR DEPRESSIVE DISORDER (HCC): ICD-10-CM

## 2025-05-02 DIAGNOSIS — F41.0 PANIC ATTACK DUE TO POST TRAUMATIC STRESS DISORDER (PTSD): ICD-10-CM

## 2025-05-02 DIAGNOSIS — F43.10 PANIC ATTACK DUE TO POST TRAUMATIC STRESS DISORDER (PTSD): ICD-10-CM

## 2025-05-02 DIAGNOSIS — G89.29 CHRONIC BILATERAL LOW BACK PAIN WITHOUT SCIATICA: ICD-10-CM

## 2025-05-02 DIAGNOSIS — Z91.89 AT RISK FOR OBSTRUCTIVE SLEEP APNEA: ICD-10-CM

## 2025-05-02 DIAGNOSIS — F43.10 PTSD (POST-TRAUMATIC STRESS DISORDER): ICD-10-CM

## 2025-05-02 DIAGNOSIS — I10 HYPERTENSION, UNSPECIFIED TYPE: ICD-10-CM

## 2025-05-02 DIAGNOSIS — M54.50 MYOFASCIAL LOW BACK PAIN: Primary | ICD-10-CM

## 2025-05-02 DIAGNOSIS — F90.9 ATTENTION DEFICIT HYPERACTIVITY DISORDER (ADHD), UNSPECIFIED ADHD TYPE: ICD-10-CM

## 2025-05-02 DIAGNOSIS — M54.50 CHRONIC BILATERAL LOW BACK PAIN WITHOUT SCIATICA: ICD-10-CM

## 2025-05-02 PROCEDURE — 1125F AMNT PAIN NOTED PAIN PRSNT: CPT | Performed by: STUDENT IN AN ORGANIZED HEALTH CARE EDUCATION/TRAINING PROGRAM

## 2025-05-02 PROCEDURE — 99999 PR NO CHARGE: CPT

## 2025-05-02 PROCEDURE — 3080F DIAST BP >= 90 MM HG: CPT | Performed by: STUDENT IN AN ORGANIZED HEALTH CARE EDUCATION/TRAINING PROGRAM

## 2025-05-02 PROCEDURE — 3077F SYST BP >= 140 MM HG: CPT | Performed by: STUDENT IN AN ORGANIZED HEALTH CARE EDUCATION/TRAINING PROGRAM

## 2025-05-02 PROCEDURE — 99214 OFFICE O/P EST MOD 30 MIN: CPT | Performed by: STUDENT IN AN ORGANIZED HEALTH CARE EDUCATION/TRAINING PROGRAM

## 2025-05-02 RX ORDER — DEXTROAMPHETAMINE SACCHARATE, AMPHETAMINE ASPARTATE MONOHYDRATE, DEXTROAMPHETAMINE SULFATE AND AMPHETAMINE SULFATE 5; 5; 5; 5 MG/1; MG/1; MG/1; MG/1
20 CAPSULE, EXTENDED RELEASE ORAL EVERY MORNING
Qty: 30 CAPSULE | Refills: 0 | Status: SHIPPED | OUTPATIENT
Start: 2025-05-07 | End: 2025-06-06

## 2025-05-02 RX ORDER — CLONIDINE HYDROCHLORIDE 0.1 MG/1
0.1 TABLET ORAL 3 TIMES DAILY PRN
Qty: 90 TABLET | Refills: 2 | Status: SHIPPED | OUTPATIENT
Start: 2025-05-02 | End: 2025-07-31

## 2025-05-02 RX ORDER — ESCITALOPRAM OXALATE 5 MG/1
5 TABLET ORAL DAILY
Qty: 30 TABLET | Refills: 2 | Status: SHIPPED | OUTPATIENT
Start: 2025-05-02 | End: 2025-07-31

## 2025-05-02 RX ORDER — CYCLOBENZAPRINE HCL 5 MG
2.5-5 TABLET ORAL 3 TIMES DAILY PRN
Qty: 60 TABLET | Refills: 2 | Status: SHIPPED | OUTPATIENT
Start: 2025-05-02

## 2025-05-02 RX ORDER — PRAZOSIN HYDROCHLORIDE 2 MG/1
2 CAPSULE ORAL NIGHTLY
Qty: 30 CAPSULE | Refills: 2 | Status: SHIPPED | OUTPATIENT
Start: 2025-05-02 | End: 2025-07-31

## 2025-05-02 ASSESSMENT — ANXIETY QUESTIONNAIRES
4. TROUBLE RELAXING: MORE THAN HALF THE DAYS
7. FEELING AFRAID AS IF SOMETHING AWFUL MIGHT HAPPEN: NEARLY EVERY DAY
1. FEELING NERVOUS, ANXIOUS, OR ON EDGE: MORE THAN HALF THE DAYS
6. BECOMING EASILY ANNOYED OR IRRITABLE: MORE THAN HALF THE DAYS
3. WORRYING TOO MUCH ABOUT DIFFERENT THINGS: SEVERAL DAYS
2. NOT BEING ABLE TO STOP OR CONTROL WORRYING: NEARLY EVERY DAY
GAD7 TOTAL SCORE: 14
IF YOU CHECKED OFF ANY PROBLEMS ON THIS QUESTIONNAIRE, HOW DIFFICULT HAVE THESE PROBLEMS MADE IT FOR YOU TO DO YOUR WORK, TAKE CARE OF THINGS AT HOME, OR GET ALONG WITH OTHER PEOPLE: VERY DIFFICULT
5. BEING SO RESTLESS THAT IT IS HARD TO SIT STILL: SEVERAL DAYS

## 2025-05-02 ASSESSMENT — FIBROSIS 4 INDEX
FIB4 SCORE: 0.61
FIB4 SCORE: 0.61

## 2025-05-02 ASSESSMENT — PATIENT HEALTH QUESTIONNAIRE - PHQ9
CLINICAL INTERPRETATION OF PHQ2 SCORE: 5
SUM OF ALL RESPONSES TO PHQ QUESTIONS 1-9: 13
5. POOR APPETITE OR OVEREATING: 0 - NOT AT ALL
5. POOR APPETITE OR OVEREATING: 0 - NOT AT ALL
CLINICAL INTERPRETATION OF PHQ2 SCORE: 5
SUM OF ALL RESPONSES TO PHQ QUESTIONS 1-9: 11

## 2025-05-02 ASSESSMENT — PAIN SCALES - GENERAL: PAINLEVEL_OUTOF10: 7=MODERATE-SEVERE PAIN

## 2025-05-02 NOTE — ASSESSMENT & PLAN NOTE
Sleep medicine referral for hospital sleep study to rule out ROSA.  2. Discontinue Guanfacine  3. Discontinue Duloxetine  4. Start Lexapro 5mg po qD for anxiety for PTSD and depression.  5. Increase Prazosin to 2mg po qHS.  5.  Increase clonidine to 0.1 mg p.o. 3 times daily as needed for panic attacks and sleep disturbances.  6.  Continue Adderall XR 20 mg p.o. daily for ADHD.  7. Follow-up with PCP ASAP for cardiology referral, consider carotid Doppler ultrasound and echocardiogram.

## 2025-05-02 NOTE — ASSESSMENT & PLAN NOTE
Sleep medicine referral for hospital sleep study to rule out ROSA.  2. Discontinue Guanfacine,   3. Discontinue Duloxetine  4. Start lexapro 5mg po qD for anxiety for PTSD and depression.  5.  Increase clonidine to 0.1 mg p.o. 3 times daily as needed for panic attacks and sleep disturbances.  6.  Continue Adderall XR 20 mg p.o. daily for ADHD.  7. Follow-up with PCP ASAP for cardiology referral, consider carotid Doppler ultrasound and echocardiogram.

## 2025-05-05 NOTE — Clinical Note
REFERRAL APPROVAL NOTICE         Sent on May 5, 2025                   Joy Mcnamara  [address] [place] [address]                   Dear Ms. Mcnamara,    After a careful review of the medical information and benefit coverage, [facility] has processed your referral. See below for additional details.    If applicable, you must be actively enrolled with your insurance for coverage of the authorized service. If you have any questions regarding your coverage, please contact your insurance directly.    REFERRAL INFORMATION   Referral #:  [id number]  Referred-To Provider    Referred-By Provider:  Physical Therapy    So Veliz M.D.   [facility]      [address]  [address]  [place] [address]  [phone] [id number]  [place] [address]  [phone]    Referral Start Date:  05/02/2025  Referral End Date:   05/02/2026             SCHEDULING  If you do not already have an appointment, please call [phone] to make an appointment.     MORE INFORMATION  If you do not already have a [employer] account, sign up at: [email or web address]  You can access your medical information, make appointments, see lab results, billing information, and more.  If you have questions regarding this referral, please contact  the [facility] Referrals department at:             [phone]. Monday - Friday 8:00AM - 5:00PM.     Sincerely,    [facility]

## 2025-05-06 ENCOUNTER — OFFICE VISIT (OUTPATIENT)
Dept: BEHAVIORAL HEALTH | Facility: PSYCHIATRIC FACILITY | Age: 40
End: 2025-05-06
Payer: COMMERCIAL

## 2025-05-06 DIAGNOSIS — F33.1 MODERATE EPISODE OF RECURRENT MAJOR DEPRESSIVE DISORDER (HCC): ICD-10-CM

## 2025-05-06 NOTE — Clinical Note
REFERRAL APPROVAL NOTICE         Sent on May 6, 2025                   Joy Mcnamara  [address] [place] [address]                   Dear Ms. Mcnamara,    After a careful review of the medical information and benefit coverage, [facility] has processed your referral. See below for additional details.    If applicable, you must be actively enrolled with your insurance for coverage of the authorized service. If you have any questions regarding your coverage, please contact your insurance directly.    REFERRAL INFORMATION   Referral #:  [id number]  Referred-To Department    Referred-By Provider:  Pulmonary and Sleep Medicine    Fercho Avila M.D.   Pulmonary/sleep [facility]      [address]  [address]  [place] [address]  [phone] [address], [address]  [place] [address] [phone]    Referral Start Date:  05/02/2025  Referral End Date:   05/01/2026           SCHEDULING  If you do not already have an appointment, please call [phone] to make an appointment.   MORE INFORMATION  As a reminder, [facility] ownership has changed, meaning this location is now owned and operated by [facility]. As such, we want to clarify that our patients should expect to receive two separate bills for the services received at [facility] - one representing the [facility] facility fees as the owner of the establishment, and the other to represent the physician's services and subsequent fees. You can speak with your insurance carrier for a pricing estimate by calling the customer service number on the back of your card and ask about charges for a hospital outpatient visit.  If you do not already have a [facility] account, sign up at: [email or web address]  You can access your medical information, make appointments, see lab results, billing information, and  more.  If you have questions regarding this referral, please contact  the [facility] department at:             [phone]. Monday - Friday 7:30AM - 5:00PM.      Sincerely,  [facility]

## 2025-05-07 ENCOUNTER — TELEPHONE (OUTPATIENT)
Dept: HEALTH INFORMATION MANAGEMENT | Facility: OTHER | Age: 40
End: 2025-05-07
Payer: COMMERCIAL

## 2025-05-07 NOTE — PROGRESS NOTES
[facility]  Psychotherapy Summary Note    Full therapy note has been documented and is under restricted viewing.  Please see below for summary of today's session.     Date of Service: 05/06/2025  Appointment type: in-office appointment.  Attending:  Mack Little M.D.    Type of session:Individual psychotherapy  Session start time: 4:00pm  Session stop time: 5:00pm  Length of time spent face to face with patient: 60min  Persons in attendance: Patient    SI reported: no  HI reported: no    SUMMARY  Joy Mcnamara is a 40 y.o. old female who presents today for regularly scheduled psychotherapy for depression.      Symptoms currently being addressed in therapy: depression and anxiety    Therapeutic Intervention(s): Cognitive behavioral therapy, Distress tolerance skills, Interpersonal effectiveness skills, Maladaptive behavior addressed, Stressors assessed, and Supportive psychotherapy    CURRENT RISK ASSESSMENT       Suicide: Low       Homicide: Low       Self-Harm: Low       Relapse: Low       Crisis Safety Plan Reviewed No    DIAGNOSES/PLAN  Problem List Items Addressed This Visit          Psychiatry Problems    Depression         Treatment Goal(s)/Objective(s) addressed: Tx plan:  Utilize learned skills to manage mood and emotional suffering more effectively  Learn to successfully challenge & change distortions in thinking  Learn to ameliorate effects of anxiety on life and functioning  Increase behaviors of self-compassion and self-care  Increase self-esteem.      Progress toward Treatment Goals: No change     Plan:  - Continue individual therapy    Pollo Rangel M.D.
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
## 2025-05-08 DIAGNOSIS — F41.9 ANXIETY: ICD-10-CM

## 2025-05-08 DIAGNOSIS — G43.009 MIGRAINE WITHOUT AURA AND WITHOUT STATUS MIGRAINOSUS, NOT INTRACTABLE: ICD-10-CM

## 2025-05-08 RX ORDER — BUTALBITAL, ACETAMINOPHEN AND CAFFEINE 300; 40; 50 MG/1; MG/1; MG/1
CAPSULE ORAL
Qty: 15 CAPSULE | Refills: 0 | Status: SHIPPED | OUTPATIENT
Start: 2025-05-08 | End: 2025-06-07

## 2025-05-08 RX ORDER — ALPRAZOLAM 0.5 MG
TABLET ORAL
Qty: 10 TABLET | Refills: 0 | Status: SHIPPED | OUTPATIENT
Start: 2025-05-08 | End: 2025-06-07

## 2025-05-08 RX ORDER — CYANOCOBALAMIN 1000 UG/ML
INJECTION, SOLUTION INTRAMUSCULAR; SUBCUTANEOUS
Qty: 3 ML | Refills: 0 | Status: SHIPPED | OUTPATIENT
Start: 2025-05-08

## 2025-05-13 ENCOUNTER — APPOINTMENT (OUTPATIENT)
Dept: BEHAVIORAL HEALTH | Facility: PSYCHIATRIC FACILITY | Age: 40
End: 2025-05-13
Payer: COMMERCIAL

## 2025-05-20 ENCOUNTER — APPOINTMENT (OUTPATIENT)
Dept: BEHAVIORAL HEALTH | Facility: PSYCHIATRIC FACILITY | Age: 40
End: 2025-05-20
Payer: COMMERCIAL

## 2025-05-27 ENCOUNTER — APPOINTMENT (OUTPATIENT)
Dept: BEHAVIORAL HEALTH | Facility: PSYCHIATRIC FACILITY | Age: 40
End: 2025-05-27
Payer: COMMERCIAL

## 2025-06-04 DIAGNOSIS — G43.009 MIGRAINE WITHOUT AURA AND WITHOUT STATUS MIGRAINOSUS, NOT INTRACTABLE: ICD-10-CM

## 2025-06-04 RX ORDER — RIMEGEPANT SULFATE 75 MG/75MG
TABLET, ORALLY DISINTEGRATING ORAL
Qty: 15 TABLET | Refills: 3 | Status: SHIPPED | OUTPATIENT
Start: 2025-06-04

## 2025-06-05 DIAGNOSIS — G43.009 MIGRAINE WITHOUT AURA AND WITHOUT STATUS MIGRAINOSUS, NOT INTRACTABLE: ICD-10-CM

## 2025-06-05 DIAGNOSIS — F90.9 ATTENTION DEFICIT HYPERACTIVITY DISORDER (ADHD), UNSPECIFIED ADHD TYPE: ICD-10-CM

## 2025-06-05 DIAGNOSIS — F41.9 ANXIETY: ICD-10-CM

## 2025-06-05 RX ORDER — ALPRAZOLAM 0.5 MG
0.5 TABLET ORAL
Qty: 10 TABLET | Refills: 1 | Status: SHIPPED | OUTPATIENT
Start: 2025-06-05 | End: 2025-07-05

## 2025-06-05 RX ORDER — BUTALBITAL, ACETAMINOPHEN AND CAFFEINE 300; 40; 50 MG/1; MG/1; MG/1
1-2 CAPSULE ORAL EVERY 6 HOURS PRN
Qty: 30 CAPSULE | Refills: 0 | Status: SHIPPED | OUTPATIENT
Start: 2025-06-05 | End: 2025-07-05

## 2025-06-05 RX ORDER — PROMETHAZINE HYDROCHLORIDE 25 MG/1
TABLET ORAL
Qty: 30 TABLET | Refills: 1 | Status: SHIPPED | OUTPATIENT
Start: 2025-06-05

## 2025-06-05 RX ORDER — ONDANSETRON 4 MG/1
4 TABLET, FILM COATED ORAL EVERY 8 HOURS PRN
Qty: 30 TABLET | Refills: 2 | Status: SHIPPED | OUTPATIENT
Start: 2025-06-05

## 2025-06-06 RX ORDER — DEXTROAMPHETAMINE SACCHARATE, AMPHETAMINE ASPARTATE MONOHYDRATE, DEXTROAMPHETAMINE SULFATE AND AMPHETAMINE SULFATE 5; 5; 5; 5 MG/1; MG/1; MG/1; MG/1
20 CAPSULE, EXTENDED RELEASE ORAL EVERY MORNING
Qty: 30 CAPSULE | Refills: 0 | OUTPATIENT
Start: 2025-06-06 | End: 2025-07-06

## 2025-06-10 NOTE — PROGRESS NOTES
Verbal consent was acquired by the patient to use [employer] ambient listening note generation during this visit Yes     FOLLOW-UP PATIENT VISIT    Interventional Spine and Pain  Physiatry (Physical Medicine and Rehabilitation)     Date of Service: 25   Chief Complaint:   Chief Complaint   Patient presents with    Follow-Up     Myofascial low back pain      Patient Name: Joy Mcnamara   : 1985   MRN: [id number]       PRIOR HISTORY    Please see new patient note by Dr. Veliz for more details.     Interval History  Patient identification: Joy Mcnamara,  1985, with history of migraines, lumbar fusion in , ADHD, anxiety and depression, who presents today for follow-up of neck and low back pain.     History of Present Illness  The patient is a 40-year-old female who presents today for follow-up of neck, upper back, and low back pain. She reports a persistent sensation of stretching and pulling in her lower back, akin to the feeling of rubber bands. She experienced an episode of sharp, electric-like pain in the same area of the left mid buttock while sitting on her couch, which recurred after a few minutes. This type of pain has been more frequent recently and is currently the most bothersome. The deep ache she previously reported has lessened in intensity since the trigger point injections. She also mentions occasional foot pain, which she describes as similar to the sensation of sandpaper against her skin. She is scheduled to start physical therapy today. She is currently on Flexeril during the day and tizanidine at night which has been working well for her.       Procedures:  25: Trigger point injections targeting bilateral trapezius and lumbar paraspinals, 50% improvement in neck pain      PMHx:   Past Medical History[1]    PSHx:   Past Surgical History[2]    Family history   Family History   Problem Relation Age of Onset    Drug abuse Mother     Psychiatric Illness Mother      Alcohol abuse Mother     Stroke Mother         aneurysm - pt was 14 yrs old    Drug abuse Father     Stroke Father         TIA    Drug abuse Sister     Drug abuse Brother     Drug abuse Maternal Aunt     Drug abuse Maternal Uncle     Drug abuse Paternal Aunt     Drug abuse Paternal Uncle     Autism Maternal Grandmother     Drug abuse Maternal Grandmother     Cancer Maternal Grandmother         lung /breast    Autism Maternal Grandfather     Drug abuse Maternal Grandfather     Autism Paternal Grandmother     Drug abuse Paternal Grandmother     Cancer Paternal Grandmother         breast / lung?    Autism Paternal Grandfather     Drug abuse Paternal Grandfather        Medications:   Active Medications[3]     Medications Ordered Prior to Encounter[4]    Allergies:   Allergies[5]    Social Hx:   Social History     Socioeconomic History    Marital status:      Spouse name: Not on file    Number of children: Not on file    Years of education: Not on file    Highest education level: Not on file   Occupational History    Not on file   Tobacco Use    Smoking status: Former     Current packs/day: 0.00     Types: Cigarettes     Start date: 3/1/2003     Quit date: 3/1/2007     Years since quittin.2    Smokeless tobacco: Never    Tobacco comments:     2007   Vaping Use    Vaping status: Never Used   Substance and Sexual Activity    Alcohol use: No     Comment: denies ; family hx of alcoholism    Drug use: No     Types: Marijuana, Methamphetamines    Sexual activity: Yes     Birth control/protection: Injection     Comment: , two kids; special ed   Other Topics Concern    Not on file   Social History Narrative    Not on file     Social Drivers of Health     Financial Resource Strain: Low Risk  (2023)    Received from [facility]    Overall Financial Resource Strain (CARDIA)     Difficulty of Paying Living Expenses: Not hard at all   Food Insecurity: No Food Insecurity (2023)    Received  from [facility]    Hunger Vital Sign     Worried About Running Out of Food in the Last Year: Never true     Ran Out of Food in the Last Year: Never true   Transportation Needs: Unmet Transportation Needs (9/22/2023)    Received from [facility]    PRAPARE - Transportation     Lack of Transportation (Medical): Yes     Lack of Transportation (Non-Medical): Yes   Physical Activity: Insufficiently Active (9/22/2023)    Received from [facility]    Exercise Vital Sign     Days of Exercise per Week: 2 days     Minutes of Exercise per Session: 30 min   Stress: No Stress Concern Present (9/22/2023)    Received from [facility]    [employer] - Occupational Stress Questionnaire     Feeling of Stress : Only a little   Social Connections: Socially Integrated (9/22/2023)    Received from [facility]    Social Connection and Isolation Panel [NHANES]     Frequency of Communication with Friends and Family: More than three times a week     Frequency of Social Gatherings with Friends and Family: Once a week     Attends Church Services: 1 to 4 times per year     Active Member of Clubs or Organizations: Yes     Attends Club or Organization Meetings: More than 4 times per year     Marital Status:    Intimate Partner Violence: Not At Risk (9/22/2023)    Received from [facility]    Humiliation, Afraid, Rape, and Kick questionnaire     Fear of Current or Ex-Partner: No     Emotionally Abused: No     Physically Abused: No     Sexually Abused: No   Housing Stability: Unknown (9/22/2023)    Received from [facility]    Housing Stability Vital Sign     Unable to Pay for Housing in the Last Year: No     Number of Places Lived in the Last Year: Not on file     Unstable Housing in the Last Year: No         EXAMINATION     Physical Exam:   Vitals: BP (!) 164/100   Pulse 71   Temp 36.6 °C (97.9 °F) (Temporal)   Ht 1.829 m (6')   Wt (!) 127 kg (279 lb 5.2 oz)   SpO2  98%       Constitutional:   Body Habitus: Body mass index is 37.88 kg/m².  Cooperation: Fully cooperates with exam  Appearance: Well-groomed, well-nourished  Respiratory:  Breathing comfortable on room air, no audible wheezing  Cardiovascular: Skin appears well-perfused  Psychiatric: Appropriate affect  Gait: normal gait, no use of ambulatory device, nonantalgic.       MEDICAL DECISION MAKING    DATA    Labs:   Lab Results   Component Value Date/Time    SODIUM 141 01/13/2025 06:33 AM    POTASSIUM 4.3 01/13/2025 06:33 AM    CHLORIDE 106 01/13/2025 06:33 AM    CO2 27 01/13/2025 06:33 AM    GLUCOSE 93 01/13/2025 06:33 AM    BUN 9 01/13/2025 06:33 AM    CREATININE 0.83 01/13/2025 06:33 AM    CREATININE 0.6 03/27/2007 06:55 PM    BUNCREATRAT <8.3 (L) 12/04/2023 04:57 AM        Lab Results   Component Value Date/Time    PROTHROMBTM 13.4 09/24/2013 09:54 AM    INR 1.00 09/24/2013 09:54 AM        Lab Results   Component Value Date/Time    WBC 7.0 01/13/2025 06:33 AM    RBC 4.36 01/13/2025 06:33 AM    HEMOGLOBIN 13.6 01/13/2025 06:33 AM    HEMATOCRIT 41.0 01/13/2025 06:33 AM    MCV 94.0 01/13/2025 06:33 AM    MCH 31.2 01/13/2025 06:33 AM    MCHC 33.2 01/13/2025 06:33 AM    MPV 10.0 01/13/2025 06:33 AM    NEUTSPOLYS 63.30 01/13/2025 06:33 AM    LYMPHOCYTES 25.70 01/13/2025 06:33 AM    MONOCYTES 7.10 01/13/2025 06:33 AM    EOSINOPHILS 2.70 01/13/2025 06:33 AM    EOSINOPHILS 2 11/22/2022 07:00 PM    BASOPHILS 0.90 01/13/2025 06:33 AM        Lab Results   Component Value Date/Time    HBA1C 5.2 01/13/2025 06:33 AM          Imaging:   Prior personal imaging review:  MRI Cervical Spine 2/13/22: Straightening of usual cervical lordosis. Mild C5-6 disc bulge with minimal right foraminal stenosis. Multilevel mild uncovertebral joint arthropathy.        IMAGING radiology reads: I reviewed the following radiology reports                 Results for orders placed during the hospital encounter of 02/13/22     MR-CERVICAL  SPINE-W/O  FINDINGS:  Cervical spine alignment is normal. Vertebral body heights are preserved. Intervertebral disc heights are normal. Bone marrow signal in the cervical spine is within normal limits. Included portions of posterior fossa is normal. The craniocervical   junction is normal.  Spinal cord signal intensity is within normal limits.     C2-3: Normal.     C3-4: Mild uncovertebral degeneration without stenosis.     C4-5: Mild anterior disc osteophyte complex with bilateral uncovertebral degeneration. There is no significant foraminal stenosis or canal stenosis.     C5-6: Endplate disc osteophyte complex with bilateral uncovertebral degeneration, worse on the right. There is mild right foraminal narrowing. There is mild asymmetric canal narrowing, slightly greater left paracentral aspect.     C6-7: Mild endplate disc osteophyte complex. There is no significant canal stenosis or foraminal narrowing.     C7-T1: No significant abnormality.     The prevertebral soft tissues are within normal limits.     IMPRESSION:        Mild cervical spine degenerative changes, most prominent at C5-6 with mild right foraminal narrowing. However, there is no definite nerve impingement.     MRI Lumbar Spine 3/5/2020:  FINDINGS:  There is transitional lumbosacral anatomy again noted. There is a partially lumbarized S1 segment and partially formed S1-S2 disc.     Normal lumbar lordosis. Preservation of vertebral body heights and alignment. No compression fracture.     Conus medullaris terminates at L1 and is normal in signal.     T12-L4: No significant disc herniation, spinal or foraminal stenosis.  L4-L5: Mild facet degeneration. Patent  L5-S1: Disc narrowing, mild disc osteophyte. Tiny posterior central disc protrusion/extrusion. Mild facet degeneration. Moderate foraminal stenosis, right greater than left. No significant spinal stenosis. No significant change from prior.     IMPRESSION:     1.  Stable moderate disc degeneration  L5-S1 with right greater than left foraminal stenosis.  2.  No significant spinal stenosis.  3.  No acute osseous abnormality.      DIAGNOSIS   Visit Diagnoses     ICD-10-CM   1. Chronic bilateral low back pain without sciatica  M54.50    G89.29   2. Myalgia  M79.10   3. Myofascial low back pain  M54.50   4. Cervicalgia  M54.2         ASSESSMENT and PLAN:     Joy Mcnamara is a 40 y.o. female who returns to clinic for follow-up of low back and neck pain.    Mercedes was seen today for follow-up.    Diagnoses and all orders for this visit:    Chronic bilateral low back pain without sciatica    Myalgia  -     cyclobenzaprine (FLEXERIL) 5 MG tablet; Take 0.5-1 Tablets by mouth 3 times a day as needed for Mild Pain, Moderate Pain or Muscle Spasms. Take during the daytime only, tizanidine should be used only at nighttime.    Myofascial low back pain    Cervicalgia      Assessment & Plan  Neck, upper back, and low back pain  Patient presents for follow-up of chronic neck, upper back, and low back pain, with the lower back currently being most bothersome and described as rubber band snapping and stabbing pain in the left buttock. She is scheduled to start physical therapy today, which I believe will be beneficial for her myofascial pain. She will continue using the Thera Cane and foam roller at home. A 60-day supply of Flexeril has been provided for daytime use, as well as continued tizanidine at nighttime. If there is no improvement after 6 weeks of physical therapy, an updated MRI of the lower back will be considered. Trigger point injections may be repeated in August 2025.        Follow up: In 6 weeks    Thank you for allowing me to participate in the care of this patient. If you have any questions please not hesitate to contact me.         Please note that this dictation was created using voice recognition software. I have made every reasonable attempt to correct obvious errors but there may be errors of  grammar and content that I may have overlooked prior to finalization of this note.    So Veliz MD  Interventional Spine and Sports Physiatry  Physical Medicine and Rehabilitation  [facility]         [1]   Past Medical History:  Diagnosis Date    Back pain     Depression     s/p mom's death    Depression 4/25/2014    Gynecological disorder     Migraine headache     Miscarriage     Pain 2017    low back; degenerative disk disease   [2]   Past Surgical History:  Procedure Laterality Date    VAGINAL HYSTERECTOMY SCOPE TOTAL N/A 03/27/2017    Procedure: VAGINAL HYSTERECTOMY SCOPE TOTAL right salpingectomy, partial left salpingectomy. Cystoscopy;  Surgeon: Zayda Santillan M.D.;  Location: SURGERY SAME DAY [facility] ORS;  Service:     DC NJX AA&/STRD TFRML EPI LUMBAR/SACRAL 1 LEVEL Bilateral 06/17/2015    Procedure: TRANSFORAMINAL BILATERAL L5-S1 EPIDURAL WITH IV SEDATION;  Surgeon: Tom Main M.D.;  Location: [facility];  Service: Pain Management    DC NJX AA&/STRD TFRML EPI LUMBAR/SACRAL 1 LEVEL  06/17/2015    Procedure: INJ-FORAMEN EPI LUM/SAC SNGL;  Surgeon: Tom Main M.D.;  Location: SURGERY [facility];  Service: Pain Management    DC NJX AA&/STRD TFRML EPI LUMBAR/SACRAL 1 LEVEL  10/08/2014    Performed by Tom Main M.D. at [facility]    LUMBAR LAMINECTOMY DISKECTOMY  10/03/2013    Performed by Estuardo Jack M.D. at SURGERY [facility] ORS    LUMBAR LAMINECTOMY DISKECTOMY  06/10/2009    Performed by ESTUARDO JACK at SURGERY [facility] ORS    CERVICAL FUSION POSTERIOR      Dr Mcadams - 9/2023    DENTAL EXTRACTION(S)      wisdom    GYN SURGERY      c section x2    LUMBAR FUSION POSTERIOR PERCUTANEOUS Bilateral     12/2023 - Dr. Woodward - fusion L5-S1, revision from prior discectomy and cage placement.    LUMBAR FUSION POSTERIOR PERCUTANEOUS Bilateral     Dr. Mcadams - 12/2023 -[facility]    OTHER ORTHOPEDIC SURGERY      PRIMARY C SECTION      x2   [3]    Outpatient Medications Marked as Taking for the 6/11/25 encounter (Office Visit) with So Veliz M.D.   Medication Sig Dispense Refill    cyclobenzaprine (FLEXERIL) 5 MG tablet Take 0.5-1 Tablets by mouth 3 times a day as needed for Mild Pain, Moderate Pain or Muscle Spasms. Take during the daytime only, tizanidine should be used only at nighttime. 180 Tablet 1    ondansetron (ZOFRAN) 4 MG Tab tablet Take 1 Tablet by mouth every 8 hours as needed for Nausea/Vomiting. 30 Tablet 2    promethazine (PHENERGAN) 25 MG Tab TAKE 1/2 TO 1 (ONE-HALF TO ONE) TABLET BY MOUTH EVERY 8 HOURS AS NEEDED FOR NAUSEA 30 Tablet 1    ALPRAZolam (XANAX) 0.5 MG Tab Take 1 Tablet by mouth 1 time a day as needed for Sleep for up to 30 days. 10 Tablet 1    acetaminophen/caffeine/butalbital 300-40-50 mg (FIORICET) -40 MG Cap capsule Take 1-2 Capsules by mouth every 6 hours as needed for Headache for up to 30 days. 30 Capsule 0    NURTEC 75 MG TABLET DISPERSIBLE DISSOLVE 1 TABLET BY MOUTH EVERY 48 HOURS FOR MIGRAINE PREVENTION 15 Tablet 3    cyanocobalamin (VITAMIN B-12) 1000 MCG/ML Solution INJECT 1 ML (CC) INTRAMUSCULARLY INTO THE SHOULDER, THIGH OR BUTTOCKS ONCE EVERY 30 DAYS 3 mL 0    cloNIDine (CATAPRES) 0.1 MG Tab Take 1 Tablet by mouth 3 times a day as needed (Take before panic attacks, or take before bedtime to help with sleep.) for up to 90 days. 90 Tablet 2    amphetamine-dextroamphetamine (ADDERALL XR) 20 MG per XR capsule Take 1 Capsule by mouth every morning for 30 days. 30 Capsule 0    prazosin (MINIPRESS) 2 MG Cap Take 1 Capsule by mouth every evening for 90 days. 30 Capsule 2    escitalopram (LEXAPRO) 5 MG tablet Take 1 Tablet by mouth every day for 90 days. 30 Tablet 2    Ubrogepant (UBRELVY) 100 MG Tab Take 1 Tablet by mouth as needed (1 tab at headache osnet, repeat in 2 hour prn). 10 Tablet 11    tizanidine (ZANAFLEX) 4 MG Tab Take 1 Tablet by mouth every evening. 90 Tablet 1    pregabalin (LYRICA) 150 MG Cap  Take 1 Capsule by mouth 3 times a day for 90 days. 270 Capsule 1    ibuprofen (MOTRIN) 400 MG Tab Take 400 mg by mouth every 6 hours as needed.      asa/apap/caffeine (EXCEDRIN) 250-250-65 MG Tab Take 2-3 Tablets by mouth every 8 hours as needed for Headache.     [4]   Current Outpatient Medications on File Prior to Visit   Medication Sig Dispense Refill    ondansetron (ZOFRAN) 4 MG Tab tablet Take 1 Tablet by mouth every 8 hours as needed for Nausea/Vomiting. 30 Tablet 2    promethazine (PHENERGAN) 25 MG Tab TAKE 1/2 TO 1 (ONE-HALF TO ONE) TABLET BY MOUTH EVERY 8 HOURS AS NEEDED FOR NAUSEA 30 Tablet 1    ALPRAZolam (XANAX) 0.5 MG Tab Take 1 Tablet by mouth 1 time a day as needed for Sleep for up to 30 days. 10 Tablet 1    acetaminophen/caffeine/butalbital 300-40-50 mg (FIORICET) -40 MG Cap capsule Take 1-2 Capsules by mouth every 6 hours as needed for Headache for up to 30 days. 30 Capsule 0    NURTEC 75 MG TABLET DISPERSIBLE DISSOLVE 1 TABLET BY MOUTH EVERY 48 HOURS FOR MIGRAINE PREVENTION 15 Tablet 3    cyanocobalamin (VITAMIN B-12) 1000 MCG/ML Solution INJECT 1 ML (CC) INTRAMUSCULARLY INTO THE SHOULDER, THIGH OR BUTTOCKS ONCE EVERY 30 DAYS 3 mL 0    cloNIDine (CATAPRES) 0.1 MG Tab Take 1 Tablet by mouth 3 times a day as needed (Take before panic attacks, or take before bedtime to help with sleep.) for up to 90 days. 90 Tablet 2    amphetamine-dextroamphetamine (ADDERALL XR) 20 MG per XR capsule Take 1 Capsule by mouth every morning for 30 days. 30 Capsule 0    prazosin (MINIPRESS) 2 MG Cap Take 1 Capsule by mouth every evening for 90 days. 30 Capsule 2    escitalopram (LEXAPRO) 5 MG tablet Take 1 Tablet by mouth every day for 90 days. 30 Tablet 2    Ubrogepant (UBRELVY) 100 MG Tab Take 1 Tablet by mouth as needed (1 tab at headache osnet, repeat in 2 hour prn). 10 Tablet 11    tizanidine (ZANAFLEX) 4 MG Tab Take 1 Tablet by mouth every evening. 90 Tablet 1    pregabalin (LYRICA) 150 MG Cap Take 1 Capsule  "by mouth 3 times a day for 90 days. 270 Capsule 1    ibuprofen (MOTRIN) 400 MG Tab Take 400 mg by mouth every 6 hours as needed.      asa/apap/caffeine (EXCEDRIN) 250-250-65 MG Tab Take 2-3 Tablets by mouth every 8 hours as needed for Headache.       No current facility-administered medications on file prior to visit.   [5]   Allergies  Allergen Reactions    Sumatriptan Succinate      \"face burns & I can't see\"    Tramadol Photosensitivity     Gives her migraines and makes her feel sick    Diclofenac Itching and Swelling    Meloxicam Itching and Swelling     "

## 2025-06-11 ENCOUNTER — APPOINTMENT (OUTPATIENT)
Dept: PHYSICAL MEDICINE AND REHAB | Facility: MEDICAL CENTER | Age: 40
End: 2025-06-11
Payer: COMMERCIAL

## 2025-06-11 VITALS
TEMPERATURE: 97.9 F | HEIGHT: 72 IN | OXYGEN SATURATION: 98 % | DIASTOLIC BLOOD PRESSURE: 100 MMHG | WEIGHT: 279.32 LBS | SYSTOLIC BLOOD PRESSURE: 164 MMHG | BODY MASS INDEX: 37.83 KG/M2 | HEART RATE: 71 BPM

## 2025-06-11 DIAGNOSIS — M54.2 CERVICALGIA: ICD-10-CM

## 2025-06-11 DIAGNOSIS — M54.50 CHRONIC BILATERAL LOW BACK PAIN WITHOUT SCIATICA: Primary | ICD-10-CM

## 2025-06-11 DIAGNOSIS — F90.9 ATTENTION DEFICIT HYPERACTIVITY DISORDER (ADHD), UNSPECIFIED ADHD TYPE: ICD-10-CM

## 2025-06-11 DIAGNOSIS — M54.50 MYOFASCIAL LOW BACK PAIN: ICD-10-CM

## 2025-06-11 DIAGNOSIS — M79.10 MYALGIA: ICD-10-CM

## 2025-06-11 DIAGNOSIS — G89.29 CHRONIC BILATERAL LOW BACK PAIN WITHOUT SCIATICA: Primary | ICD-10-CM

## 2025-06-11 PROCEDURE — 3080F DIAST BP >= 90 MM HG: CPT | Performed by: STUDENT IN AN ORGANIZED HEALTH CARE EDUCATION/TRAINING PROGRAM

## 2025-06-11 PROCEDURE — 1125F AMNT PAIN NOTED PAIN PRSNT: CPT | Performed by: STUDENT IN AN ORGANIZED HEALTH CARE EDUCATION/TRAINING PROGRAM

## 2025-06-11 PROCEDURE — 3077F SYST BP >= 140 MM HG: CPT | Performed by: STUDENT IN AN ORGANIZED HEALTH CARE EDUCATION/TRAINING PROGRAM

## 2025-06-11 PROCEDURE — 99214 OFFICE O/P EST MOD 30 MIN: CPT | Performed by: STUDENT IN AN ORGANIZED HEALTH CARE EDUCATION/TRAINING PROGRAM

## 2025-06-11 RX ORDER — DEXTROAMPHETAMINE SACCHARATE, AMPHETAMINE ASPARTATE MONOHYDRATE, DEXTROAMPHETAMINE SULFATE AND AMPHETAMINE SULFATE 5; 5; 5; 5 MG/1; MG/1; MG/1; MG/1
20 CAPSULE, EXTENDED RELEASE ORAL EVERY MORNING
Qty: 30 CAPSULE | Refills: 0 | OUTPATIENT
Start: 2025-06-11 | End: 2025-07-11

## 2025-06-11 RX ORDER — CYCLOBENZAPRINE HCL 5 MG
2.5-5 TABLET ORAL 3 TIMES DAILY PRN
Qty: 180 TABLET | Refills: 1 | Status: SHIPPED | OUTPATIENT
Start: 2025-06-11

## 2025-06-11 RX ORDER — DEXTROAMPHETAMINE SACCHARATE, AMPHETAMINE ASPARTATE MONOHYDRATE, DEXTROAMPHETAMINE SULFATE AND AMPHETAMINE SULFATE 5; 5; 5; 5 MG/1; MG/1; MG/1; MG/1
20 CAPSULE, EXTENDED RELEASE ORAL EVERY MORNING
Qty: 30 CAPSULE | Refills: 0 | Status: SHIPPED | OUTPATIENT
Start: 2025-06-11 | End: 2025-07-11

## 2025-06-11 ASSESSMENT — PATIENT HEALTH QUESTIONNAIRE - PHQ9
CLINICAL INTERPRETATION OF PHQ2 SCORE: 2
5. POOR APPETITE OR OVEREATING: 1 - SEVERAL DAYS
SUM OF ALL RESPONSES TO PHQ QUESTIONS 1-9: 9

## 2025-06-11 ASSESSMENT — FIBROSIS 4 INDEX: FIB4 SCORE: 0.61

## 2025-06-11 ASSESSMENT — PAIN SCALES - GENERAL: PAINLEVEL_OUTOF10: 8=MODERATE-SEVERE PAIN

## 2025-06-16 ENCOUNTER — OFFICE VISIT (OUTPATIENT)
Dept: MEDICAL GROUP | Facility: LAB | Age: 40
End: 2025-06-16
Payer: COMMERCIAL

## 2025-06-16 VITALS
TEMPERATURE: 96.6 F | DIASTOLIC BLOOD PRESSURE: 96 MMHG | HEIGHT: 72 IN | WEIGHT: 272 LBS | RESPIRATION RATE: 12 BRPM | HEART RATE: 70 BPM | BODY MASS INDEX: 36.84 KG/M2 | SYSTOLIC BLOOD PRESSURE: 148 MMHG | OXYGEN SATURATION: 97 %

## 2025-06-16 DIAGNOSIS — M54.41 CHRONIC BILATERAL LOW BACK PAIN WITH BILATERAL SCIATICA: ICD-10-CM

## 2025-06-16 DIAGNOSIS — G89.29 CHRONIC BILATERAL LOW BACK PAIN WITH BILATERAL SCIATICA: ICD-10-CM

## 2025-06-16 DIAGNOSIS — F43.10 PTSD (POST-TRAUMATIC STRESS DISORDER): ICD-10-CM

## 2025-06-16 DIAGNOSIS — M54.42 CHRONIC BILATERAL LOW BACK PAIN WITH BILATERAL SCIATICA: ICD-10-CM

## 2025-06-16 DIAGNOSIS — I10 ESSENTIAL HYPERTENSION: ICD-10-CM

## 2025-06-16 DIAGNOSIS — F43.10 PANIC ATTACK DUE TO POST TRAUMATIC STRESS DISORDER (PTSD): ICD-10-CM

## 2025-06-16 DIAGNOSIS — F41.0 PANIC ATTACK DUE TO POST TRAUMATIC STRESS DISORDER (PTSD): ICD-10-CM

## 2025-06-16 DIAGNOSIS — F32.2 SEVERE MAJOR DEPRESSIVE DISORDER (HCC): ICD-10-CM

## 2025-06-16 DIAGNOSIS — Z98.1 S/P LUMBAR FUSION: ICD-10-CM

## 2025-06-16 DIAGNOSIS — G43.009 MIGRAINE WITHOUT AURA AND WITHOUT STATUS MIGRAINOSUS, NOT INTRACTABLE: ICD-10-CM

## 2025-06-16 DIAGNOSIS — I10 PRIMARY HYPERTENSION: ICD-10-CM

## 2025-06-16 PROCEDURE — 99214 OFFICE O/P EST MOD 30 MIN: CPT | Performed by: NURSE PRACTITIONER

## 2025-06-16 PROCEDURE — 3080F DIAST BP >= 90 MM HG: CPT | Performed by: NURSE PRACTITIONER

## 2025-06-16 PROCEDURE — 3077F SYST BP >= 140 MM HG: CPT | Performed by: NURSE PRACTITIONER

## 2025-06-16 RX ORDER — PREGABALIN 150 MG/1
150 CAPSULE ORAL 3 TIMES DAILY
Qty: 270 CAPSULE | Refills: 1 | Status: SHIPPED | OUTPATIENT
Start: 2025-06-16 | End: 2025-09-14

## 2025-06-16 RX ORDER — PROPRANOLOL HYDROCHLORIDE 60 MG/1
60 CAPSULE, EXTENDED RELEASE ORAL DAILY
Qty: 90 CAPSULE | Refills: 3
Start: 2025-06-16

## 2025-06-16 ASSESSMENT — FIBROSIS 4 INDEX: FIB4 SCORE: 0.61

## 2025-06-16 NOTE — PROGRESS NOTES
Verbal consent was acquired by the patient to use [employer] ambient listening note generation during this visit Yes      Demi Mcnamara is a 40 y.o. female who presents for follow-up  History of Present Illness  The patient is a 40-year-old established female who presents for evaluation of anxiety, depression, PTSD, sleep issues, migraines, blood pressure management, and medication management.    She had 2 consults with psychiatry.  She has discontinued her newly prescribed psychiatric treatment due to the prescription of 5 medications simultaneously, which she found overwhelming. These medications included guanfacine, duloxetine, prazosin, clonidine, and Adderall. She experienced constipation as a side effect of 4 out of 5 medications, leading to a reliance on laxatives and subsequent diarrhea. She also reported a weight gain of 10 to 15 pounds. Despite expressing her dissatisfaction with the treatment during her second appointment, only one medication was discontinued. She was also prescribed escitalopram on 05/02/2025, which she discontinued two weeks ago due to lack of noticeable improvement. She does not feel the need for antidepressants at this time and prefers to manage her symptoms through day trips and walks.    She continues to struggle with sleep issues and chronic back pain.  She has been receiving back injections and has initiated physical therapy.     She monitors her blood pressure at home, which typically ranges from 125 to 135 over 82 to 92. She notes that her blood pressure readings are generally better at night and when she is not in a doctor's office.     She has received one Botox injection for her migraines and is scheduled for another later this month or next month. She uses Ubrelvy for breakthrough migraines as it can be taken more frequently than Nurtec, which has ceased to be effective. She also takes Fioricet at home when necessary.  She is on preventative extended  release propranolol.    She is currently on extended-release Adderall but has not noticed any significant difference compared to her previous medication. She takes Xanax sparingly, approximately 10 times per month. She is seeking refills for Lyrica and tizanidine, which she takes at night due to its sedative effects. She has received trigger point injections from physiatry which have provided some relief from her lower back pain. She describes the pain as a pulling sensation extending from the spine to the sides, which both her doctor and neurologist suggest may be due to scar tissue bands.    She has increased her vitamin D intake due to low levels.      Objective   BP (!) 148/96 (BP Location: Right arm, Patient Position: Sitting, BP Cuff Size: Large adult)   Pulse 70   Temp 35.9 °C (96.6 °F)   Resp 12   Ht 1.829 m (6')   Wt 123 kg (272 lb)   SpO2 97%   Physical Exam  Gen. appears healthy in no distress   Skin appropriate for sex and age   Neck trachea is midline  Lungs unlabored breathing  Heart regular rate  Neuro gait and station normal   Psych appropriate, calm, interactive, well-groomed    Results  Labs   - Vitamin D: 04/2025, Low       Assessment & Plan  1. Anxiety  She prefers to refrain from any further trials of any other anxiety medicine.    2. Depression  Prefers to refrain from any other specific medications for depression.  Denies SI or HI.    3. Post-Traumatic Stress Disorder (PTSD)  Prazosin discontinued as it worsened nightmares.  Prefers to refrain from seeing psychiatry or therapist at this time.    4. Sleep disturbances  Stable    5. Migraines  Stable with propranolol prevention, Botox as prevention and abortive Nurtec or Ubrelvy.    6. Blood pressure management  Home blood pressure readings range from 125-135/82-92 mmHg.  Treatment plan: Advised to continue monitoring blood pressure at home and report significant changes.    7.  Attention disorder:  Refilled Adderall.  Reviewed   profile.  Reviewed recent notes with psychiatry.    8. Health maintenance  Due for a mammogram and can schedule it via [employer]. Annual lab work not due until 01/2026.    Follow-up: Follow-up scheduled for 12/2025.               Please note that this dictation was created using voice recognition software. I have made every reasonable attempt to correct obvious errors, but I expect that there are errors of grammar and possibly content that I did not discover before finalizing the note.

## 2025-06-23 ENCOUNTER — APPOINTMENT (OUTPATIENT)
Dept: NEUROLOGY | Facility: MEDICAL CENTER | Age: 40
End: 2025-06-23
Attending: PSYCHIATRY & NEUROLOGY
Payer: COMMERCIAL

## 2025-06-30 ENCOUNTER — OFFICE VISIT (OUTPATIENT)
Dept: NEUROLOGY | Facility: MEDICAL CENTER | Age: 40
End: 2025-06-30
Attending: PSYCHIATRY & NEUROLOGY
Payer: COMMERCIAL

## 2025-06-30 VITALS
BODY MASS INDEX: 36.91 KG/M2 | TEMPERATURE: 98.3 F | HEIGHT: 72 IN | RESPIRATION RATE: 16 BRPM | WEIGHT: 272.49 LBS | DIASTOLIC BLOOD PRESSURE: 84 MMHG | SYSTOLIC BLOOD PRESSURE: 132 MMHG | OXYGEN SATURATION: 95 % | HEART RATE: 82 BPM

## 2025-06-30 DIAGNOSIS — G43.101 MIGRAINE WITH AURA AND WITH STATUS MIGRAINOSUS, NOT INTRACTABLE: Primary | ICD-10-CM

## 2025-06-30 PROCEDURE — 96372 THER/PROPH/DIAG INJ SC/IM: CPT | Performed by: PSYCHIATRY & NEUROLOGY

## 2025-06-30 PROCEDURE — 64615 CHEMODENERV MUSC MIGRAINE: CPT | Performed by: PSYCHIATRY & NEUROLOGY

## 2025-06-30 PROCEDURE — 700111 HCHG RX REV CODE 636 W/ 250 OVERRIDE (IP): Mod: JZ

## 2025-06-30 RX ORDER — KETOROLAC TROMETHAMINE 30 MG/ML
60 INJECTION, SOLUTION INTRAMUSCULAR; INTRAVENOUS ONCE
Status: COMPLETED | OUTPATIENT
Start: 2025-06-30 | End: 2025-06-30

## 2025-06-30 RX ADMIN — ONABOTULINUMTOXINA 200 UNITS: 200 INJECTION, POWDER, LYOPHILIZED, FOR SOLUTION INTRADERMAL; INTRAMUSCULAR at 08:53

## 2025-06-30 RX ADMIN — KETOROLAC TROMETHAMINE 60 MG: 30 INJECTION, SOLUTION INTRAMUSCULAR at 08:44

## 2025-06-30 ASSESSMENT — PATIENT HEALTH QUESTIONNAIRE - PHQ9
SUM OF ALL RESPONSES TO PHQ QUESTIONS 1-9: 9
CLINICAL INTERPRETATION OF PHQ2 SCORE: 2
5. POOR APPETITE OR OVEREATING: 0 - NOT AT ALL

## 2025-06-30 ASSESSMENT — FIBROSIS 4 INDEX: FIB4 SCORE: 0.61

## 2025-06-30 NOTE — PROGRESS NOTES
[facility]  BOTOX PROCEDURE NOTE    Chronic Migraine:  Botox therapy has reduced patient’s migraines by more than 7 days and/or 100 hours per month.     I treated Joy Mcnamara in clinic today with BotoxA 155 units according to the dosing/injection paradigm currently mandated by the FDA for the management of chronic migraine.  Specifically, I injected:  - 5 units to the procerus,  - 5 units to the corrugators (bilaterally),  - a total of 20 units to the frontalis musculature,  - 20 units to the temporalis (bilaterally),  - 15 units to the occipitalis (bilaterally),  - 10 units to the cervical paraspinals (bilaterally), and  - 15 units to the trapezius musculature (bilaterally).    The remainder of the Botox was discarded as wastage per FDA guidelines  Consent on file.  Patient identify verified with 2 patient identifiers.     Frequency of headaches is >15 days monthly with at least 8 migraines monthly.    Migraines include at least two of the following: worsened with activity or avoidance of activity with migraines (ie they go lie down), moderate to severe pain intensity, pulsing headache, unilateral headache and has  have either nausea or vomiting OR sensitivity to light and sound.     Although Joy Mcnamara is responding to botox s/he is NOT migraine free.  I recommend that Botox be continued at this time.    Joy Mcnamara has chronic migraines, defined as having 15 or more headaches days per month, 8 of which are migraines, over a minimum of the last three months.  Episodes last more than 4 hours (untreated).  Pt has 2 or more of following (see initial note):  - headache worsened with activity  - pain is moderate to severe intensity  - pulsing in nature  - unilateral   and patient either has nausea/vomiting OR sensitivity to light and sound.    No adverse effect of Botox noted at conclusion of today's appointment.    Follow up in 12 weeks for Botox or sooner if needed.    Signed: Tadeo  MARIBEL Escalante M.D.

## 2025-07-02 DIAGNOSIS — G43.009 MIGRAINE WITHOUT AURA AND WITHOUT STATUS MIGRAINOSUS, NOT INTRACTABLE: ICD-10-CM

## 2025-07-02 RX ORDER — BUTALBITAL, ACETAMINOPHEN AND CAFFEINE 300; 40; 50 MG/1; MG/1; MG/1
CAPSULE ORAL
Qty: 30 CAPSULE | Refills: 0 | Status: SHIPPED | OUTPATIENT
Start: 2025-07-02 | End: 2025-08-01

## 2025-07-03 ENCOUNTER — TELEPHONE (OUTPATIENT)
Dept: MEDICAL GROUP | Facility: LAB | Age: 40
End: 2025-07-03
Payer: COMMERCIAL

## 2025-07-03 NOTE — TELEPHONE ENCOUNTER
Mercedes Naima (Key: [id number])    Drug  Nurtec 75MG dispersible tablets    [employer] Electronic PA Form (2017 NCPDP)

## 2025-07-08 ENCOUNTER — APPOINTMENT (OUTPATIENT)
Dept: BEHAVIORAL HEALTH | Facility: PSYCHIATRIC FACILITY | Age: 40
End: 2025-07-08
Payer: COMMERCIAL

## 2025-07-09 ENCOUNTER — APPOINTMENT (OUTPATIENT)
Dept: MEDICAL GROUP | Facility: LAB | Age: 40
End: 2025-07-09
Payer: COMMERCIAL

## 2025-07-09 NOTE — TELEPHONE ENCOUNTER
----- Message from Joy Mcnamara sent at 11/30/2020  6:16 AM PST -----  Regarding: Prescription Question  Contact: [phone]  I need to request 3 prescriptions that I am unable to select through the request page. B12 injection, Adderall 10mg xr, and fioricett. I am due for Adderall today and have had a lot of trouble with migraines recently with the weather changing. Thank you.   Patient's medicare is changing and her diabetic testing meter is going to be different and she would like to discuss it with a nurse (PEGGY). Please call patient to discuss [phone]. Pharmacy is verified.

## 2025-07-14 DIAGNOSIS — F90.9 ATTENTION DEFICIT HYPERACTIVITY DISORDER (ADHD), UNSPECIFIED ADHD TYPE: ICD-10-CM

## 2025-07-14 DIAGNOSIS — F98.8 ATTENTION DEFICIT DISORDER (ADD) IN ADULT: ICD-10-CM

## 2025-07-14 RX ORDER — DEXTROAMPHETAMINE SACCHARATE, AMPHETAMINE ASPARTATE, DEXTROAMPHETAMINE SULFATE AND AMPHETAMINE SULFATE 2.5; 2.5; 2.5; 2.5 MG/1; MG/1; MG/1; MG/1
10 TABLET ORAL 2 TIMES DAILY
Qty: 60 TABLET | Refills: 0 | Status: SHIPPED | OUTPATIENT
Start: 2025-07-14 | End: 2025-08-13

## 2025-07-14 RX ORDER — DEXTROAMPHETAMINE SACCHARATE, AMPHETAMINE ASPARTATE MONOHYDRATE, DEXTROAMPHETAMINE SULFATE AND AMPHETAMINE SULFATE 5; 5; 5; 5 MG/1; MG/1; MG/1; MG/1
20 CAPSULE, EXTENDED RELEASE ORAL EVERY MORNING
Qty: 30 CAPSULE | Refills: 0 | Status: CANCELLED | OUTPATIENT
Start: 2025-07-14 | End: 2025-08-13

## 2025-07-15 ENCOUNTER — APPOINTMENT (OUTPATIENT)
Dept: BEHAVIORAL HEALTH | Facility: PSYCHIATRIC FACILITY | Age: 40
End: 2025-07-15
Payer: COMMERCIAL

## 2025-07-16 ENCOUNTER — OFFICE VISIT (OUTPATIENT)
Dept: MEDICAL GROUP | Facility: LAB | Age: 40
End: 2025-07-16
Payer: COMMERCIAL

## 2025-07-16 VITALS
RESPIRATION RATE: 16 BRPM | SYSTOLIC BLOOD PRESSURE: 154 MMHG | OXYGEN SATURATION: 97 % | HEART RATE: 78 BPM | HEIGHT: 72 IN | TEMPERATURE: 97.9 F | DIASTOLIC BLOOD PRESSURE: 100 MMHG | WEIGHT: 278 LBS | BODY MASS INDEX: 37.65 KG/M2

## 2025-07-16 DIAGNOSIS — I10 ESSENTIAL HYPERTENSION: ICD-10-CM

## 2025-07-16 DIAGNOSIS — G43.009 MIGRAINE WITHOUT AURA AND WITHOUT STATUS MIGRAINOSUS, NOT INTRACTABLE: ICD-10-CM

## 2025-07-16 DIAGNOSIS — F90.8 OTHER SPECIFIED ATTENTION DEFICIT HYPERACTIVITY DISORDER (ADHD): ICD-10-CM

## 2025-07-16 PROCEDURE — 99214 OFFICE O/P EST MOD 30 MIN: CPT | Performed by: NURSE PRACTITIONER

## 2025-07-16 PROCEDURE — 3080F DIAST BP >= 90 MM HG: CPT | Performed by: NURSE PRACTITIONER

## 2025-07-16 PROCEDURE — 3077F SYST BP >= 140 MM HG: CPT | Performed by: NURSE PRACTITIONER

## 2025-07-16 RX ORDER — OLMESARTAN MEDOXOMIL 20 MG/1
20 TABLET ORAL DAILY
Qty: 90 TABLET | Refills: 3 | Status: SHIPPED | OUTPATIENT
Start: 2025-07-16 | End: 2026-08-20

## 2025-07-16 RX ORDER — DEXTROAMPHETAMINE SACCHARATE, AMPHETAMINE ASPARTATE, DEXTROAMPHETAMINE SULFATE AND AMPHETAMINE SULFATE 2.5; 2.5; 2.5; 2.5 MG/1; MG/1; MG/1; MG/1
10 TABLET ORAL 2 TIMES DAILY
Qty: 60 TABLET | Refills: 0 | Status: SHIPPED | OUTPATIENT
Start: 2025-08-15 | End: 2025-09-14

## 2025-07-16 RX ORDER — ONDANSETRON 4 MG/1
4 TABLET, FILM COATED ORAL EVERY 8 HOURS PRN
Qty: 30 TABLET | Refills: 2 | Status: SHIPPED | OUTPATIENT
Start: 2025-07-16

## 2025-07-16 RX ORDER — DEXTROAMPHETAMINE SACCHARATE, AMPHETAMINE ASPARTATE, DEXTROAMPHETAMINE SULFATE AND AMPHETAMINE SULFATE 2.5; 2.5; 2.5; 2.5 MG/1; MG/1; MG/1; MG/1
10 TABLET ORAL 2 TIMES DAILY
Qty: 60 TABLET | Refills: 0 | Status: SHIPPED | OUTPATIENT
Start: 2025-09-14 | End: 2025-10-14

## 2025-07-16 ASSESSMENT — FIBROSIS 4 INDEX: FIB4 SCORE: 0.61

## 2025-07-17 NOTE — PROGRESS NOTES
Verbal consent was acquired by the patient to use [employer] ambient listening note generation during this visit Yes      Subjective   Joy Mcnamara is a 40 y.o. female who presents for f/u  History of Present Illness  The patient is a 40-year-old female who presents for evaluation of back pain, nausea, elevated blood pressure, and weight gain.    She is seeking a renewal of her handicap placard due to her back condition. She does not have a permanent disability and continues to work.  Had numerous back surgeries.  Has been followed by neurosurgery and physiatry.    Migraines: She has been experiencing severe nausea, which she believes may be a side effect of her Botox treatment for head pain. However, she also notes that she often feels nauseous when in pain, regardless of the source. She has found non-drowsy Dramamine to be an effective substitute for Zofran, but it is not always readily available. She has been prescribed Zofran 30, but her insurance only covers 9 tablets. She has run out of Zofran and took her last dose today.    Her blood pressure was elevated during this visit, which she attributes to recent pain episodes. She monitors her blood pressure at home in the evenings, where it typically ranges from high 130s to low 140s over mid 80s to mid 90s. She is currently taking propranolol at night and beetroot powder in the morning.    She has noticed a significant increase in her weight, estimating a gain of 20 to 30 pounds since the start of the year. Despite taking various vitamins, she continues to struggle with weight gain and elevated blood pressure, and overall does not feel well.    Attention deficit disorder: Chronic issue.  Doing better on Adderall 10 mg immediate release twice daily rather than 20 mg XR and needs this refilled.  Denies any negative side effects.      Objective   BP (!) 154/100 (BP Location: Left arm, Patient Position: Sitting, BP Cuff Size: Large adult)   Pulse 78   Temp  36.6 °C (97.9 °F)   Resp 16   Ht 1.829 m (6')   Wt (!) 126 kg (278 lb)   SpO2 97%   Physical Exam  Gen. appears healthy in no distress   Skin appropriate for sex and age   Neck trachea is midline  Lungs unlabored breathing  Heart regular rate  Neuro gait and station normal   Psych appropriate, calm, interactive, well-groomed         Assessment & Plan  1.  Chronic low back pain: Failed back surgeries  A DMV form for a handicap placard was completed today, valid from 07/16/2025 through 07/16/2027.    2. Nausea  Reports that Botox is helping with head pain but is experiencing significant nausea.  Treatment plan: A prescription for Zofran (ondansetron) was provided, with instructions to use GoodRx for better pricing. Advised to find the best price using GoodRx and inform the pharmacy accordingly.    3.  Hypertension: willing to start blood pressure medication.  Discussed long-term repercussions of uncontrolled hypertension.   monitors blood pressure at home, which usually ranges from high 130s to low 140s over mid 80s to mid 90s.  Treatment plan: Discussed the potential benefits of olmesartan, including its ability to lower blood pressure without causing significant side effects. A prescription for olmesartan was provided, with instructions to take it nightly. If blood pressure does not respond well to olmesartan, alternative treatments will be considered.    4. Weight gain  Experienced a weight gain of 6 pounds since the last visit, with an overall increase of 20 to 30 pounds since the beginning of the year.  Treatment plan: Emphasized the importance of regular exercise, high-fiber diet, avoidance of simple carbohydrates.    5.  Attention deficit disorder: Stable.  Doing better on 10 mg immediate release twice daily versus 20 mg XR.  Treatment plan: A prescription for Adderall 10 mg twice daily was provided for the next 3 months, with instructions to take it early in the day. Prescription will be sent to the  pharmacy, and advised to contact them if there are any issues.               Please note that this dictation was created using voice recognition software. I have made every reasonable attempt to correct obvious errors, but I expect that there are errors of grammar and possibly content that I did not discover before finalizing the note.

## 2025-07-21 ENCOUNTER — OFFICE VISIT (OUTPATIENT)
Dept: URGENT CARE | Facility: CLINIC | Age: 40
End: 2025-07-21
Payer: COMMERCIAL

## 2025-07-21 VITALS
SYSTOLIC BLOOD PRESSURE: 140 MMHG | WEIGHT: 278 LBS | BODY MASS INDEX: 37.65 KG/M2 | HEIGHT: 72 IN | RESPIRATION RATE: 16 BRPM | DIASTOLIC BLOOD PRESSURE: 96 MMHG | OXYGEN SATURATION: 95 % | HEART RATE: 91 BPM | TEMPERATURE: 97.5 F

## 2025-07-21 DIAGNOSIS — H04.203 BILATERAL EPIPHORA: Primary | ICD-10-CM

## 2025-07-21 DIAGNOSIS — J30.2 SEASONAL ALLERGIES: ICD-10-CM

## 2025-07-21 PROCEDURE — 3077F SYST BP >= 140 MM HG: CPT

## 2025-07-21 PROCEDURE — 99213 OFFICE O/P EST LOW 20 MIN: CPT

## 2025-07-21 PROCEDURE — 3080F DIAST BP >= 90 MM HG: CPT

## 2025-07-21 RX ORDER — FLUTICASONE PROPIONATE 50 MCG
2 SPRAY, SUSPENSION (ML) NASAL DAILY
Qty: 9.9 ML | Refills: 0 | Status: SHIPPED | OUTPATIENT
Start: 2025-07-21

## 2025-07-21 ASSESSMENT — FIBROSIS 4 INDEX: FIB4 SCORE: 0.61

## 2025-07-21 ASSESSMENT — ENCOUNTER SYMPTOMS
EYE REDNESS: 0
EYE DISCHARGE: 1
CONSTITUTIONAL NEGATIVE: 1
EYE PAIN: 0
CARDIOVASCULAR NEGATIVE: 1
RESPIRATORY NEGATIVE: 1

## 2025-07-21 NOTE — LETTER
[facility]  [address]  [place] [address]     July 21, 2025    Patient: Joy Mcnamara   YOB: 1985   Date of Visit: 7/21/2025       To Whom It May Concern:    Joy Mcnamara was seen and treated in our department on 7/21/2025. Please excuse from any missed work today.     Sincerely,     ARACELIS Navas.

## 2025-07-21 NOTE — PROGRESS NOTES
Subjective:   Chief Complaint  Joy Mcnamara is a 40 y.o. female who presents for Eye Problem (Watery eyes since saturday )      History of Present Illness  Presents with complaints of increased lacrimation in both of her eyes for the past 2 to 3 days.  She states over the past few months she has had this intermittent increase in lacrimation from both of her eyes.  She states that the left is worse than the right.  She states as the days go on she starts to develop itchiness and puffiness around her eyes.  She does report using a daily allergy medication.  She states that she had leftover ophthalmic antibiotic drops which have not helped her symptoms.  She denies bodies, chills, fevers.  She denies any changes of vision.  Denies any trauma.        Review of Systems  Review of Systems   Constitutional: Negative.    HENT: Negative.     Eyes:  Positive for discharge. Negative for pain and redness.   Respiratory: Negative.     Cardiovascular: Negative.    Skin: Negative.        Past Medical History  Past Medical History[1]    Family History  Family History   Problem Relation Age of Onset    Drug abuse Mother     Psychiatric Illness Mother     Alcohol abuse Mother     Stroke Mother         aneurysm - pt was 14 yrs old    Drug abuse Father     Stroke Father         TIA    Drug abuse Sister     Drug abuse Brother     Drug abuse Maternal Aunt     Drug abuse Maternal Uncle     Drug abuse Paternal Aunt     Drug abuse Paternal Uncle     Autism Maternal Grandmother     Drug abuse Maternal Grandmother     Cancer Maternal Grandmother         lung /breast    Autism Maternal Grandfather     Drug abuse Maternal Grandfather     Autism Paternal Grandmother     Drug abuse Paternal Grandmother     Cancer Paternal Grandmother         breast / lung?    Autism Paternal Grandfather     Drug abuse Paternal Grandfather        Social History  Social History[2]    Surgical History  Past Surgical History[3]    Current Medications  Home  Medications       Reviewed by Everett Simmons't (Medical Assistant) on 07/21/25 at 0909  Med List Status: <None>     Medication Last Dose Status   acetaminophen/caffeine/butalbital 300-40-50 mg (FIORICET) -40 MG Cap capsule Taking Active   amphetamine-dextroamphetamine (ADDERALL) 10 MG Tab Taking Active   amphetamine-dextroamphetamine (ADDERALL) 10 MG Tab Taking Active   amphetamine-dextroamphetamine (ADDERALL) 10 MG Tab Taking Active   asa/apap/caffeine (EXCEDRIN) 250-250-65 MG Tab Taking Active   cyanocobalamin (VITAMIN B-12) 1000 MCG/ML Solution Taking Active   cyclobenzaprine (FLEXERIL) 5 MG tablet Taking Active   ibuprofen (MOTRIN) 400 MG Tab Taking Active   NURTEC 75 MG TABLET DISPERSIBLE Taking Active   olmesartan (BENICAR) 20 MG Tab Taking Active   onabotulinum toxin A (Botox) injection 200 Units  Active   ondansetron (ZOFRAN) 4 MG Tab tablet Taking Active   pregabalin (LYRICA) 150 MG Cap Taking Active   promethazine (PHENERGAN) 25 MG Tab Taking Active   propranolol LA (INDERAL LA) 60 MG CAPSULE SR 24 HR Taking Active   tizanidine (ZANAFLEX) 4 MG Tab Taking Active   Ubrogepant (UBRELVY) 100 MG Tab Taking Active                    Allergies  Allergies[4]       Objective:     BP (!) 140/96 (BP Location: Left arm, Patient Position: Sitting, BP Cuff Size: Large adult)   Pulse 91   Temp 36.4 °C (97.5 °F) (Temporal)   Resp 16   Ht 1.829 m (6')   Wt (!) 126 kg (278 lb)   SpO2 95%     Physical Exam  Constitutional:       Appearance: Normal appearance.   HENT:      Head: Normocephalic and atraumatic.      Right Ear: Tympanic membrane, ear canal and external ear normal.      Left Ear: Tympanic membrane, ear canal and external ear normal.      Nose: Congestion present.      Mouth/Throat:      Mouth: Mucous membranes are moist.      Pharynx: Oropharynx is clear.   Eyes:      Extraocular Movements: Extraocular movements intact.      Conjunctiva/sclera: Conjunctivae normal.      Pupils: Pupils are  "equal, round, and reactive to light.   Cardiovascular:      Rate and Rhythm: Normal rate and regular rhythm.      Pulses: Normal pulses.      Heart sounds: Normal heart sounds.   Pulmonary:      Effort: Pulmonary effort is normal.      Breath sounds: Normal breath sounds.   Abdominal:      General: Bowel sounds are normal.   Skin:     General: Skin is warm and dry.      Capillary Refill: Capillary refill takes less than 2 seconds.   Neurological:      General: No focal deficit present.      Mental Status: She is alert and oriented to person, place, and time.   Psychiatric:         Mood and Affect: Mood normal.         Behavior: Behavior normal.         Assessment/Plan:     Diagnosis  1. Bilateral epiphora  - fluticasone (FLONASE) 50 MCG/ACT nasal spray; Administer 2 Sprays into affected nostril(S) every day.  Dispense: 9.9 mL; Refill: 0    2. Seasonal allergies        MDM/Plan/Discussion  The patient's physical exam is relatively benign.  There is no redness along the sclera or conjunctivo-.  There are no signs of infection.  Patient's symptoms's are most likely related to allergies given that she has feelings of \" puffy eyes\" as well as increased itching.  The patient was advised to begin taking a daily allergy medication.  She was also educated that she may also utilize a Flonase nasal spray along with a decongestion to treat her symptoms. The patient was educated on signs and symptoms to monitor for that would prompt them to return to  or seek immediate medical attention at the nearest ED for further evaluation.  The patient is agreeable to this plan verbalized understanding        Shared decision-making was utilized with patient for treatment plan. Medication discussed included indication for use and the potential benefits and side effects. Education was provided regarding the aforementioned assessments.  Differential Diagnosis, natural history, and supportive care discussed. All of the patient's questions " were answered to their satisfaction at the time of discharge. Patient was encouraged to monitor symptoms closely. Those signs and symptoms which would warrant concern and mandate seeking a higher level of service through the emergency department discussed at length.  Patient stated agreement and understanding of this plan of care.    Advised the patient to follow-up with the primary care physician for recheck, reevaluation, and consideration of further management.    Please note that this dictation was created using voice recognition software. I have made a reasonable attempt to correct obvious errors, but I expect that there are errors of grammar and possibly content that I did not discover before finalizing the note.    This note was electronically signed by TAI Navas             [1]   Past Medical History:  Diagnosis Date    Back pain     Depression     s/p mom's death    Depression 2014    Gynecological disorder     Migraine headache     Miscarriage     Pain 2017    low back; degenerative disk disease   [2]   Social History  Socioeconomic History    Marital status:    Tobacco Use    Smoking status: Former     Current packs/day: 0.00     Types: Cigarettes     Start date: 3/1/2003     Quit date: 3/1/2007     Years since quittin.4    Smokeless tobacco: Never    Tobacco comments:     2007   Vaping Use    Vaping status: Never Used   Substance and Sexual Activity    Alcohol use: No     Comment: denies ; family hx of alcoholism    Drug use: No     Types: Marijuana, Methamphetamines    Sexual activity: Yes     Birth control/protection: Injection     Comment: , two kids; special ed     Social Drivers of Health     Financial Resource Strain: Low Risk  (2023)    Received from [facility]    Overall Financial Resource Strain (CARDIA)     Difficulty of Paying Living Expenses: Not hard at all   Food Insecurity: No Food Insecurity (2023)    Received from [facility]    Hunger Vital Sign     Worried About Running Out of Food in the Last Year: Never true     Ran Out of Food in the Last Year: Never true   Transportation Needs: Unmet Transportation Needs (9/22/2023)    Received from [facility]    PRAPARE - Transportation     Lack of Transportation (Medical): Yes     Lack of Transportation (Non-Medical): Yes   Physical Activity: Insufficiently Active (9/22/2023)    Received from [facility]    Exercise Vital Sign     Days of Exercise per Week: 2 days     Minutes of Exercise per Session: 30 min   Stress: No Stress Concern Present (9/22/2023)    Received from [facility] [place] [facility] - Occupational Stress Questionnaire     Feeling of Stress : Only a little   Social Connections: Socially Integrated (9/22/2023)    Received from [facility]    Social Connection and Isolation Panel [NHANES]     Frequency of Communication with Friends and Family: More than three times a week     Frequency of Social Gatherings with Friends and Family: Once a week     Attends [employer] Services: 1 to 4 times per year     Active Member of Clubs or Organizations: Yes     Attends Club or Organization Meetings: More than 4 times per year     Marital Status:    Intimate Partner Violence: Not At Risk (9/22/2023)    Received from [facility]    Humiliation, Afraid, Rape, and Kick questionnaire     Fear of Current or Ex-Partner: No     Emotionally Abused: No     Physically Abused: No     Sexually Abused: No   Housing Stability: Unknown (9/22/2023)    Received from [facility]    Housing Stability Vital Sign     Unable to Pay for Housing in the Last Year: No     Unstable Housing in the Last Year: No   [3]   Past Surgical History:  Procedure Laterality Date    VAGINAL HYSTERECTOMY SCOPE TOTAL N/A 03/27/2017    Procedure: VAGINAL HYSTERECTOMY SCOPE TOTAL right salpingectomy, partial left salpingectomy. Cystoscopy;  Surgeon: Zayda Santillan,  "M.D.;  Location: SURGERY SAME DAY [facility] ORS;  Service:     SD NJX AA&/STRD TFRML EPI LUMBAR/SACRAL 1 LEVEL Bilateral 06/17/2015    Procedure: TRANSFORAMINAL BILATERAL L5-S1 EPIDURAL WITH IV SEDATION;  Surgeon: Tom Main M.D.;  Location: [facility];  Service: Pain Management    SD NJX AA&/STRD TFRML EPI LUMBAR/SACRAL 1 LEVEL  06/17/2015    Procedure: INJ-FORAMEN EPI LUM/SAC SNGL;  Surgeon: Tom Main M.D.;  Location: SURGERY [facility];  Service: Pain Management    SD NJX AA&/STRD TFRML EPI LUMBAR/SACRAL 1 LEVEL  10/08/2014    Performed by Tom Main M.D. at [facility]    LUMBAR LAMINECTOMY DISKECTOMY  10/03/2013    Performed by Estuardo Jack M.D. at SURGERY [facility]    LUMBAR LAMINECTOMY DISKECTOMY  06/10/2009    Performed by ESTUARDO JACK at SURGERY [facility] ORS    CERVICAL FUSION POSTERIOR      Dr Mcadams - 9/2023    DENTAL EXTRACTION(S)      wisdom    GYN SURGERY      c section x2    LUMBAR FUSION POSTERIOR PERCUTANEOUS Bilateral     12/2023 - Dr. Woodward - fusion L5-S1, revision from prior discectomy and cage placement.    LUMBAR FUSION POSTERIOR PERCUTANEOUS Bilateral     Dr. Mcadams - 12/2023 -[facility]    OTHER ORTHOPEDIC SURGERY      PRIMARY C SECTION      x2   [4]   Allergies  Allergen Reactions    Sumatriptan Succinate      \"face burns & I can't see\"    Tramadol Photosensitivity     Gives her migraines and makes her feel sick    Diclofenac Itching and Swelling    Meloxicam Itching and Swelling     "

## 2025-07-22 ENCOUNTER — APPOINTMENT (OUTPATIENT)
Dept: BEHAVIORAL HEALTH | Facility: PSYCHIATRIC FACILITY | Age: 40
End: 2025-07-22
Payer: COMMERCIAL

## 2025-07-23 DIAGNOSIS — M79.10 MYALGIA: Primary | ICD-10-CM

## 2025-07-25 ENCOUNTER — APPOINTMENT (OUTPATIENT)
Dept: PHYSICAL MEDICINE AND REHAB | Facility: MEDICAL CENTER | Age: 40
End: 2025-07-25
Payer: COMMERCIAL

## 2025-07-29 ENCOUNTER — APPOINTMENT (OUTPATIENT)
Dept: BEHAVIORAL HEALTH | Facility: PSYCHIATRIC FACILITY | Age: 40
End: 2025-07-29
Payer: COMMERCIAL

## 2025-07-31 DIAGNOSIS — F41.9 ANXIETY: ICD-10-CM

## 2025-08-01 ENCOUNTER — TELEPHONE (OUTPATIENT)
Dept: MEDICAL GROUP | Facility: LAB | Age: 40
End: 2025-08-01
Payer: COMMERCIAL

## 2025-08-01 RX ORDER — PROMETHAZINE HYDROCHLORIDE 25 MG/1
TABLET ORAL
Qty: 30 TABLET | Refills: 0 | Status: SHIPPED | OUTPATIENT
Start: 2025-08-01

## 2025-08-01 RX ORDER — ALPRAZOLAM 0.5 MG
TABLET ORAL
Qty: 10 TABLET | Refills: 0 | Status: SHIPPED | OUTPATIENT
Start: 2025-08-01 | End: 2025-08-05

## 2025-08-04 DIAGNOSIS — F41.9 ANXIETY: ICD-10-CM

## 2025-08-05 RX ORDER — ALPRAZOLAM 0.5 MG
TABLET ORAL
Qty: 10 TABLET | Refills: 0 | Status: SHIPPED | OUTPATIENT
Start: 2025-08-05 | End: 2025-09-04

## 2025-08-13 ENCOUNTER — APPOINTMENT (OUTPATIENT)
Dept: PHYSICAL MEDICINE AND REHAB | Facility: MEDICAL CENTER | Age: 40
End: 2025-08-13
Attending: STUDENT IN AN ORGANIZED HEALTH CARE EDUCATION/TRAINING PROGRAM
Payer: COMMERCIAL

## 2025-08-13 ASSESSMENT — LIFESTYLE VARIABLES
HOW MANY STANDARD DRINKS CONTAINING ALCOHOL DO YOU HAVE ON A TYPICAL DAY: 1 OR 2
SKIP TO QUESTIONS 9-10: 1
HOW OFTEN DO YOU HAVE SIX OR MORE DRINKS ON ONE OCCASION: NEVER
AUDIT-C TOTAL SCORE: 1
HOW OFTEN DO YOU HAVE A DRINK CONTAINING ALCOHOL: MONTHLY OR LESS

## 2025-08-13 ASSESSMENT — PAIN SCALES - GENERAL: PAINLEVEL_OUTOF10: 8=MODERATE-SEVERE PAIN

## 2025-08-13 ASSESSMENT — PATIENT HEALTH QUESTIONNAIRE - PHQ9
5. POOR APPETITE OR OVEREATING: 0 - NOT AT ALL
SUM OF ALL RESPONSES TO PHQ QUESTIONS 1-9: 10
CLINICAL INTERPRETATION OF PHQ2 SCORE: 3

## 2025-08-13 ASSESSMENT — FIBROSIS 4 INDEX: FIB4 SCORE: 0.61

## 2025-08-23 DIAGNOSIS — G43.009 MIGRAINE WITHOUT AURA AND WITHOUT STATUS MIGRAINOSUS, NOT INTRACTABLE: ICD-10-CM

## 2025-08-25 RX ORDER — BUTALBITAL, ACETAMINOPHEN AND CAFFEINE 300; 40; 50 MG/1; MG/1; MG/1
CAPSULE ORAL
Qty: 30 CAPSULE | Refills: 0 | Status: SHIPPED | OUTPATIENT
Start: 2025-08-25 | End: 2025-09-24

## 2025-09-05 ENCOUNTER — APPOINTMENT (OUTPATIENT)
Dept: PHYSICAL MEDICINE AND REHAB | Facility: MEDICAL CENTER | Age: 40
End: 2025-09-05
Payer: COMMERCIAL

## (undated) DEVICE — KIT  I.V. START (100EA/CA)

## (undated) DEVICE — GLOVE BIOGEL SZ 7.5 SURGICAL PF LTX - (50PR/BX 4BX/CA)

## (undated) DEVICE — PACK MINOR BASIN - (2EA/CA)

## (undated) DEVICE — SODIUM CHL IRRIGATION 0.9% 1000ML (12EA/CA)

## (undated) DEVICE — HEAD HOLDER JUNIOR/ADULT

## (undated) DEVICE — LEAD SET 6 DISP. EKG NIHON KOHDEN

## (undated) DEVICE — SLEEVE, VASO, THIGH, MED

## (undated) DEVICE — SENSOR SPO2 NEO LNCS ADHESIVE (20/BX) SEE USER NOTES

## (undated) DEVICE — Device

## (undated) DEVICE — ELECTRODE DUAL RETURN W/ CORD - (50/PK)

## (undated) DEVICE — TUBE CONNECTING SUCTION - CLEAR PLASTIC STERILE 72 IN (50EA/CA)

## (undated) DEVICE — CANISTER SUCTION RIGID RED 1500CC (40EA/CA)

## (undated) DEVICE — LACTATED RINGERS INJ 1000 ML - (14EA/CA 60CA/PF)

## (undated) DEVICE — PAD BABY LAP 4X18 W/O - RINGS PREWASHED 5/PK 40PK/CS

## (undated) DEVICE — SET SUCTION/IRRIGATION WITH DISPOSABLE TIP (6/CA )PART #0250-070-520 IS A SUB

## (undated) DEVICE — PACK LAPAROSCOPY - (1/CA)

## (undated) DEVICE — HEMOSTAT ARISTA PWD 5 GRAM - (5/BX)

## (undated) DEVICE — SUTURE 0 VICRYL PLUS CT-1 - 36 INCH (36/BX)

## (undated) DEVICE — WATER IRRIGATION STERILE 1000ML (12EA/CA)

## (undated) DEVICE — TRAY SRGPRP PVP IOD WT PRP - (20/CA)

## (undated) DEVICE — SPONGE GAUZESTER. 2X2 4-PL - (2/PK 50PK/BX 30BX/CS)

## (undated) DEVICE — NEPTUNE 4 PORT MANIFOLD - (20/PK)

## (undated) DEVICE — SET LEADWIRE 5 LEAD BEDSIDE DISPOSABLE ECG (1SET OF 5/EA)

## (undated) DEVICE — PAD SANITARY 11IN MAXI IND WRAPPED  (12EA/PK 24PK/CA)

## (undated) DEVICE — SUCTION INSTRUMENT YANKAUER BULBOUS TIP W/O VENT (50EA/CA)

## (undated) DEVICE — CATHETER IV 20 GA X 1-1/4 ---SURG.& SDS ONLY--- (50EA/BX)

## (undated) DEVICE — DRESSING TRANSPARENT FILM TEGADERM 2.375 X 2.75"  (100EA/BX)"

## (undated) DEVICE — TROCAR LAPSCP 100MM 12MM NTHRD - (6/BX)

## (undated) DEVICE — TUBING SETDISPOS HIGH FLOW II - (10/BX)

## (undated) DEVICE — ELECTRODE 850 FOAM ADHESIVE - HYDROGEL RADIOTRNSPRNT (50/PK)

## (undated) DEVICE — GLOVE BIOGEL INDICATOR SZ 7.5 SURGICAL PF LTX - (50PR/BX 4BX/CA)

## (undated) DEVICE — APPICATOR HEMOSTAT ARISTA XL - FLEXITIP (10EA/CA)

## (undated) DEVICE — SUTURE 0 VICRYL PLUS CT-2 - 27 INCH (36/BX)

## (undated) DEVICE — MASK ANESTHESIA ADULT  - (100/CA)

## (undated) DEVICE — LIGASURE 5MM BLUNT TIP LONG - 44CM (6EA/PK)

## (undated) DEVICE — DRAPE VAGINAL BIB W/ POUCH (10EA/CA)

## (undated) DEVICE — SUTURE 4-0 VICRYL PLUSFS-1 - 27 INCH (36/BX)

## (undated) DEVICE — BLADE SURGICAL #10 - (50/BX)

## (undated) DEVICE — GOWN WARMING STANDARD FLEX - (30/CA)

## (undated) DEVICE — ARMREST CRADLE FOAM - (2PR/PK 12PR/CA)

## (undated) DEVICE — SUTURE 2-0 VICRYL PLUS CT-1 36 (36PK/BX)"

## (undated) DEVICE — TRAY FOLEY CATHETER STATLOCK 16FR SURESTEP  (10EA/CA)

## (undated) DEVICE — SUTURE 0 COATED VICRYL 6-18IN - (12PK/BX)

## (undated) DEVICE — SUTURE GENERAL

## (undated) DEVICE — TROCAR 5X100 BLADED Z-THREAD - KII (6/BX)

## (undated) DEVICE — TUBING CLEARLINK DUO-VENT - C-FLO (48EA/CA)

## (undated) DEVICE — KIT ANESTHESIA W/CIRCUIT & 3/LT BAG W/FILTER (20EA/CA)

## (undated) DEVICE — SUTURE 0 VICRYL PLUS CT-1 - 8 X 18 INCH (12/BX)